# Patient Record
Sex: FEMALE | Race: BLACK OR AFRICAN AMERICAN | Employment: FULL TIME | URBAN - METROPOLITAN AREA
[De-identification: names, ages, dates, MRNs, and addresses within clinical notes are randomized per-mention and may not be internally consistent; named-entity substitution may affect disease eponyms.]

---

## 2017-01-09 ENCOUNTER — HOSPITAL ENCOUNTER (OUTPATIENT)
Dept: VASCULAR SURGERY | Age: 37
Discharge: HOME | End: 2017-01-09
Attending: PODIATRIST
Payer: COMMERCIAL

## 2017-01-09 DIAGNOSIS — M79.652 LEFT THIGH PAIN: ICD-10-CM

## 2017-01-09 PROCEDURE — 93971 EXTREMITY STUDY: CPT

## 2017-01-09 NOTE — PROCEDURES
[facility]  *** FINAL REPORT ***    Name: Trista Swift  MRN: [id number]    Outpatient  : 12 Mar 1980  HIS Order #: [id number]  [id number] [facility] Visit #: [id number]  Date: 2017    TYPE OF TEST: Peripheral Venous Testing    REASON FOR TEST  Pain in limb, Limb swelling    Left Leg:-  Deep venous thrombosis:           No  Superficial venous thrombosis:    No  Deep venous insufficiency:        No  Superficial venous insufficiency: No      INTERPRETATION/FINDINGS  PROCEDURE:  Venous duplex examination using B-mode, color flow and  spectral Doppler of the lower extremity veins. Left leg :  1. Deep vein(s) visualized include the common femoral, proximal  femoral, mid femoral, distal femoral, popliteal(above knee),  popliteal(fossa), popliteal(below knee), posterior tibial and peroneal   veins. 2. No evidence of deep venous thrombosis detected in the veins  visualized. 3. No evidence of deep vein thrombosis in the contralateral common  femoral vein. 4. Superficial vein(s) visualized include the great saphenous vein. 5. No evidence of superficial thrombosis detected. ADDITIONAL COMMENTS    I have personally reviewed the data relevant to the interpretation of  this  study.     TECHNOLOGIST: Baron Light RVT  Signed: 2017 11:31 AM    PHYSICIAN: Momo Bernstein MD  Signed: 2017 04:23 PM

## 2017-01-09 NOTE — PROGRESS NOTES
[facility]  Preliminary Report:  Venous Duplex Leg    Left leg venous duplex was performed. All deep and superficial veins appear compressible with normal Doppler characteristics. Final report to follow.

## 2017-03-10 ENCOUNTER — OFFICE VISIT (OUTPATIENT)
Dept: SURGERY | Age: 37
End: 2017-03-10

## 2017-03-10 VITALS
WEIGHT: 255 LBS | HEART RATE: 94 BPM | BODY MASS INDEX: 40.02 KG/M2 | OXYGEN SATURATION: 99 % | TEMPERATURE: 97.6 F | DIASTOLIC BLOOD PRESSURE: 80 MMHG | HEIGHT: 67 IN | SYSTOLIC BLOOD PRESSURE: 141 MMHG

## 2017-03-10 DIAGNOSIS — R10.32 LLQ PAIN: Primary | ICD-10-CM

## 2017-03-10 DIAGNOSIS — M25.475 SWELLING OF FOOT JOINT, LEFT: ICD-10-CM

## 2017-03-10 NOTE — PROGRESS NOTES
SUBJECTIVE: Sherwin Garcia is a 39 y.o. female who presents with left foot swelling for about a year on a daily basis and her foot and ankle specialist, Dr. Dileep Franks recommended that she see her gyn for pelvic US to make sure that there is no growth in her pelvis that is causing pressure on the left and making her left foot swell. Also having mild LLQ pain for a few weeks. Patient's last menstrual period was 2017. Hx of PCOS and oligomenorrhea and now trying to get pregnant without success. Will refer to Dr. Alysha Bradley. 2016  Cervical/Endocervical Thin Prep   Satisfactory for evaluation. NEGATIVE FOR INTRAEPITHELIAL LESION OR MALIGNANCY. No Known Allergies    Past Medical History:   Diagnosis Date    History of PCOS      History reviewed. No pertinent surgical history. OB History    Grav Para Term  Abortions TAB SAB Ect Mult Living    0 0 0 0 0 0 0 0 0 0        Family History   Problem Relation Age of Onset    Hypertension Mother     Hypertension Sister     Hypertension Brother     Cancer Paternal Uncle      Social History     Social History    Marital status:      Spouse name: N/A    Number of children: N/A    Years of education: N/A     Occupational History    Not on file. Social History Main Topics    Smoking status: Former Smoker    Smokeless tobacco: Never Used    Alcohol use Yes    Drug use: No    Sexual activity: Yes     Partners: Male     Birth control/ protection: None     Other Topics Concern    Not on file     Social History Narrative         Review of Systems:   Constitutional: No weight change, chills or fever, anorexia, weakness or sleep disturbance . Cardiovascular: No chest pain, shortness of breath, or palpitations . Respiratory: No cough, shortness of breath, hemoptysis, or orthopnea . Neurologic: No syncope, headaches or seizures . Hematologic: No easy bruising or unusual bleeding .  Psychiatric: No insomnia, confusion, depression, or anxiety . GI:No nausea and vomiting, diarrhea or constipation  . : See HPI . Musculoskeletal: No joint pain or muscle pain . Endocrine: No polydipsia, polyuria, cold intolerance, excessive fatigue, or sleep disturbance . Integumentary: No breast pain, lumps, nipple discharge, or axillary lumps . Objective:     Visit Vitals    Ht 5' 7\" (1.702 m)    Wt 255 lb (115.7 kg)    LMP 02/20/2017    BMI 39.94 kg/m2       General:  alert, cooperative, no distress, appears stated age   Skin:  no rash or abnormalities   Eyes: negative   Mouth: MMM no lesions   Lymph Nodes:  Cervical, supraclavicular, and axillary nodes normal.   Breast Exam: normal appearance, no masses or tenderness    Lungs:  clear to auscultation bilaterally   Heart:  regular rate and rhythm   Abdomen: soft, non-tender. Bowel sounds normal. No masses,  no organomegaly   Back:  Costovertebral angle tenderness absent   Genitourinary: Pelvic exam: VULVA: normal appearing vulva with no masses, tenderness or lesions, VAGINA: normal appearing vagina with normal color and discharge, no lesions, CERVIX: normal appearing cervix without discharge or lesions, UTERUS: uterus is normal size, shape, consistency and moderately tender, ADNEXA: tender bilateral, marked. Extremities:  extremities normal, atraumatic, no cyanosis or edema   Neurologic:  negative   Psychiatric:  non focal     ASSESSMENT:      ICD-10-CM ICD-9-CM    1. LLQ pain R10.32 789.04    2. Swelling of foot joint, left M25.475 719.07 US PELV NON OBS      US TRANSVAGINAL        Follow-up Disposition:  Return if symptoms worsen or fail to improve.

## 2017-03-10 NOTE — MR AVS SNAPSHOT
Visit Information Date & Time Provider Department Dept. Phone Encounter #  
 3/10/2017  3:00 PM Charlie Garcia, [address] [facility] [address] [id number] Follow-up Instructions Return if symptoms worsen or fail to improve. Your Appointments 12/8/2017  8:30 AM  
ACUTE CARE with Charlie Garcia MD  
[facility] [address]) Appt Note: 1 year well woman OV $0 - 12/2/16  
 [address] [phone] [address] [address] Upcoming Health Maintenance Date Due DTaP/Tdap/Td series (1 - Tdap) 3/12/2001 INFLUENZA AGE 9 TO ADULT 8/1/2016 PAP AKA CERVICAL CYTOLOGY 12/2/2019 Allergies as of 3/10/2017  Review Complete On: 3/10/2017 By: Richard Murphy LPN No Known Allergies Current Immunizations  Never Reviewed No immunizations on file. Not reviewed this visit You Were Diagnosed With   
  
 Codes Comments LLQ pain    -  Primary ICD-10-CM: R10.32 
ICD-9-CM: 789.04 Swelling of foot joint, left     ICD-10-CM: M25.475 ICD-9-CM: 719.07 Vitals BP Pulse Temp Height(growth percentile) Weight(growth percentile) LMP  
 141/80 94 97.6 °F (36.4 °C) (Oral) 5' 7\" (1.702 m) 255 lb (115.7 kg) 02/20/2017 SpO2 BMI OB Status Smoking Status 99% 39.94 kg/m2 Having regular periods Former Smoker Vitals History BMI and BSA Data Body Mass Index Body Surface Area  
 39.94 kg/m 2 2.34 m 2 Preferred Pharmacy Pharmacy Name Phone [facility] [address] [place] [address] [phone] Your Updated Medication List  
  
   
This list is accurate as of: 3/10/17  3:29 PM.  Always use your most recent med list.  
  
  
  
  
 furosemide 20 mg tablet Commonly known as:  LASIX Take 1 tablet by mouth daily Follow-up Instructions Return if symptoms worsen or fail to improve. To-Do List   
 03/10/2017 Imaging:  US PELV NON OBS   
  
 03/10/2017 Imaging:  US TRANSVAGINAL Introducing [facility] SERVICES! Dear Pepito Sorto: 
Thank you for requesting a [employer] account. Our records indicate that you already have an active [employer] account. You can access your account anytime at [email or web address] Did you know that you can access your hospital and ER discharge instructions at any time in [employer]? You can also review all of your test results from your hospital stay or ER visit. Additional Information If you have questions, please visit the Frequently Asked Questions section of the [employer] website at [email or web address] Remember, [employer] is NOT to be used for urgent needs. For medical emergencies, dial 911. Now available from your iPhone and Android! Please provide this summary of care documentation to your next provider. Your primary care clinician is listed as Hillary Hernandez. If you have any questions after today's visit, please call [phone].

## 2017-03-17 ENCOUNTER — HOSPITAL ENCOUNTER (OUTPATIENT)
Dept: ULTRASOUND IMAGING | Age: 37
Discharge: HOME | End: 2017-03-17
Attending: OBSTETRICS & GYNECOLOGY
Payer: COMMERCIAL

## 2017-03-17 DIAGNOSIS — M25.475 SWELLING OF FOOT JOINT, LEFT: ICD-10-CM

## 2017-03-17 PROCEDURE — 76856 US EXAM PELVIC COMPLETE: CPT

## 2017-03-17 PROCEDURE — 76830 TRANSVAGINAL US NON-OB: CPT

## 2017-03-21 ENCOUNTER — TELEPHONE (OUTPATIENT)
Dept: SURGERY | Age: 37
End: 2017-03-21

## 2017-03-21 NOTE — TELEPHONE ENCOUNTER
Ms. Emma Diez is the patient that was holding on your phone because you said not to leave a message. She hung up after a while. Please call back as soon as possible.    [phone]

## 2017-03-21 NOTE — TELEPHONE ENCOUNTER
Pt said that you wanted her to call once her results came back. Please give her a call as soon as possible.     Thanks   [phone]

## 2017-03-23 NOTE — TELEPHONE ENCOUNTER
Called pt back to discuss pelvic US results:   Transvaginal pelvic ultrasound revealing uterine leiomyomata most likely,  although the lower uterine segment mass abutting the endometrium is  nonspecific. Gianfranco Spies Unremarkable ovaries with left ovarian cysts. She plans to sched appt to get myomectomy because she is trying to get pregnant and has hx of miscarriage.

## 2017-05-24 ENCOUNTER — OFFICE VISIT (OUTPATIENT)
Dept: SURGERY | Age: 37
End: 2017-05-24

## 2017-05-24 VITALS
BODY MASS INDEX: 38.36 KG/M2 | SYSTOLIC BLOOD PRESSURE: 135 MMHG | DIASTOLIC BLOOD PRESSURE: 97 MMHG | HEART RATE: 66 BPM | RESPIRATION RATE: 16 BRPM | HEIGHT: 67 IN | WEIGHT: 244.4 LBS | TEMPERATURE: 97.3 F | OXYGEN SATURATION: 98 %

## 2017-05-24 DIAGNOSIS — D25.1 FIBROIDS, INTRAMURAL: Primary | ICD-10-CM

## 2017-05-24 DIAGNOSIS — N94.6 DYSMENORRHEA: ICD-10-CM

## 2017-05-24 DIAGNOSIS — N91.5 OLIGOMENORRHEA: ICD-10-CM

## 2017-05-24 DIAGNOSIS — E28.2 PCOS (POLYCYSTIC OVARIAN SYNDROME): ICD-10-CM

## 2017-05-24 DIAGNOSIS — N92.0 MENORRHAGIA WITH REGULAR CYCLE: ICD-10-CM

## 2017-05-24 DIAGNOSIS — N97.0 INFERTILITY ASSOCIATED WITH ANOVULATION: ICD-10-CM

## 2017-05-24 NOTE — PROGRESS NOTES
SUBJECTIVE: Michelet Asencio is a 40 y.o. female who presents with left foot swelling for about a year on a daily basis and her foot and ankle specialist, Dr. Deana Hernández recommended that she see her gyn for pelvic US to make sure that there is no growth in her pelvis that is causing pressure on the left and making her left foot swell. Also having mild LLQ pain for a few weeks. Patient's last menstrual period was 2017. Hx of PCOS and oligomenorrhea and now trying to get pregnant without success. Will refer to Dr. Hayes Hum. 2016  Cervical/Endocervical Thin Prep   Satisfactory for evaluation. NEGATIVE FOR INTRAEPITHELIAL LESION OR MALIGNANCY. No Known Allergies    Past Medical History:   Diagnosis Date    History of PCOS      History reviewed. No pertinent surgical history. OB History    Grav Para Term  Abortions TAB SAB Ect Mult Living    0 0 0 0 0 0 0 0 0 0        Family History   Problem Relation Age of Onset    Hypertension Mother     Hypertension Sister     Hypertension Brother     Cancer Paternal Uncle      Social History     Social History    Marital status:      Spouse name: N/A    Number of children: N/A    Years of education: N/A     Occupational History    Not on file. Social History Main Topics    Smoking status: Former Smoker    Smokeless tobacco: Never Used    Alcohol use Yes    Drug use: No    Sexual activity: Yes     Partners: Male     Birth control/ protection: None     Other Topics Concern    Not on file     Social History Narrative         Review of Systems:   Constitutional: No weight change, chills or fever, anorexia, weakness or sleep disturbance . Cardiovascular: No chest pain, shortness of breath, or palpitations . Respiratory: No cough, shortness of breath, hemoptysis, or orthopnea . Neurologic: No syncope, headaches or seizures . Hematologic: No easy bruising or unusual bleeding .  Psychiatric: No insomnia, confusion, depression, or anxiety . GI:No nausea and vomiting, diarrhea or constipation  . : See HPI . Musculoskeletal: No joint pain or muscle pain . Endocrine: No polydipsia, polyuria, cold intolerance, excessive fatigue, or sleep disturbance . Integumentary: No breast pain, lumps, nipple discharge, or axillary lumps . Objective:     Visit Vitals    BP (!) 135/97    Pulse 66    Temp 97.3 °F (36.3 °C) (Oral)    Resp 16    Ht 5' 7\" (1.702 m)    Wt 244 lb 6.4 oz (110.9 kg)    LMP 05/01/2017    SpO2 98%    BMI 38.28 kg/m2       General:  alert, cooperative, no distress, appears stated age   Skin:  no rash or abnormalities   Eyes: negative   Mouth: MMM no lesions   Lymph Nodes:  Cervical, supraclavicular, and axillary nodes normal.   Breast Exam: normal appearance, no masses or tenderness    Lungs:  clear to auscultation bilaterally   Heart:  regular rate and rhythm   Abdomen: soft, non-tender. Bowel sounds normal. No masses,  no organomegaly   Back:  Costovertebral angle tenderness absent   Genitourinary: Pelvic exam: VULVA: normal appearing vulva with no masses, tenderness or lesions, VAGINA: normal appearing vagina with normal color and discharge, no lesions, CERVIX: normal appearing cervix without discharge or lesions, UTERUS: uterus is normal size, shape, consistency and moderately tender, ADNEXA: tender bilateral, marked. Extremities:  extremities normal, atraumatic, no cyanosis or edema   Neurologic:  negative   Psychiatric:  non focal     Final result (Exam End: 3/17/2017  5:29 PM) Reviewed    Study Result   INDICATION: left foot swelling and LLQ pain.     EXAM: PELVIC AND TRANSVAGINAL ULTRASONOGRAPHY.     COMPARISON: None .     PROCEDURE: The pelvis was scanned via high resolution real-time linear array  sonography, using both the transabdominal and transvaginal approach, which was  required for delineation of the endometrium and ovaries .     FINDINGS TRANSABDOMINAL: The UTERUS MEASURES 7.9 x 5.4 x 5.1 cm.  The central  endometrium measures 9mm in thickness. There is no focal endometrial mass or  fluid collection.     There is no free fluid in the cul-de-sac.     The ovaries cannot be seen because of overlying bowel gas obscuring the adnexal  regions.      FINDINGS TRANSVAGINAL: The uterus shows overall size of 8.7 x 6.5 x 5.3 cm. The  central endometrium measures 7 mm in thickness with no endometrial mass or fluid  collection. . There are, however, myometrial masses, the largest measuring 3.6 x  2.9 x 1.5 in the the lower uterine segment posteriorly. This lies immediately  posterior to the endometrium. The next largest measures 2.4 x 1.8 x 1.6 cm in  the uterine fundus anteriorly, with calcification. There are nabothian cysts at  the internal cervical os.     Further evaluation of the ovaries transvaginally reveals the right ovary  measures 4.2 x 3.5 x 2.8 cm and the left ovary 3.7 x 3.4 x 2.9 cm. . The left  ovary contains numerous simple cysts. The largest measures 1.8 cm in diameter. Both ovaries show normal blood flow by color Doppler.     IMPRESSION:   1. Transabdominal pelvic ultrasound revealing unremarkable uterus. Ovaries not  seen. .  2. Transvaginal pelvic ultrasound revealing uterine leiomyomata most likely,  although the lower uterine segment mass abutting the endometrium is  nonspecific. Coleen Lewis Unremarkable ovaries with left ovarian cysts. ASSESSMENT:      ICD-10-CM ICD-9-CM    1. Fibroids, intramural D25.1 218.1    2. Menorrhagia with regular cycle N92.0 626.2    3. PCOS (polycystic ovarian syndrome) E28.2 256.4    4. Dysmenorrhea N94.6 625.3    5. Infertility associated with anovulation N97.0 628.0    6. Oligomenorrhea N91.5 626.1      Pt is here today and is leaning toward having a myomectomy procedure because of diagnosis of fibroids. Pelvic US report noted and findings explained to pt.  She and  relate that Dr. Curt Moore had shared same findings on his pelvic US and he has not recommended any myomectomy based on his findings. I then stated that we should do nothing unless Dr. Diogenes Kaiser makes that recommendation because he is the expert in this area and I have the utmost respect for his opinion. Therefore, I am not recommending myomectomy. They agree. Follow-up Disposition:  Return if symptoms worsen or fail to improve.     Copy sent via [employer] to:    Dr. Charanjit Lima

## 2018-05-24 LAB
ANTIBODY SCREEN, EXTERNAL: NEGATIVE
CHLAMYDIA, EXTERNAL: NEGATIVE
HBSAG, EXTERNAL: NEGATIVE
HIV, EXTERNAL: NON REACTIVE
N. GONORRHEA, EXTERNAL: NEGATIVE
RUBELLA, EXTERNAL: NORMAL
T. PALLIDUM, EXTERNAL: NEGATIVE
TYPE, ABO & RH, EXTERNAL: NORMAL

## 2018-09-10 ENCOUNTER — HOSPITAL ENCOUNTER (OUTPATIENT)
Age: 38
Setting detail: OBSERVATION
Discharge: HOME | End: 2018-09-12
Attending: OBSTETRICS & GYNECOLOGY | Admitting: OBSTETRICS & GYNECOLOGY
Payer: COMMERCIAL

## 2018-09-10 LAB — GRBS, EXTERNAL: NEGATIVE

## 2018-09-10 PROCEDURE — 99218 HC RM OBSERVATION: CPT

## 2018-09-10 PROCEDURE — 99281 EMR DPT VST MAYX REQ PHY/QHP: CPT

## 2018-09-10 PROCEDURE — 74011250637 HC RX REV CODE- 250/637: Performed by: OBSTETRICS & GYNECOLOGY

## 2018-09-10 RX ORDER — SODIUM CHLORIDE 0.9 % (FLUSH) 0.9 %
5-10 SYRINGE (ML) INJECTION AS NEEDED
Status: DISCONTINUED | OUTPATIENT
Start: 2018-09-10 | End: 2018-09-12 | Stop reason: HOSPADM

## 2018-09-10 RX ORDER — ACETAMINOPHEN 325 MG/1
650 TABLET ORAL
Status: DISCONTINUED | OUTPATIENT
Start: 2018-09-10 | End: 2018-09-12 | Stop reason: HOSPADM

## 2018-09-10 RX ORDER — SWAB
1 SWAB, NON-MEDICATED MISCELLANEOUS DAILY
Status: DISCONTINUED | OUTPATIENT
Start: 2018-09-11 | End: 2018-09-12 | Stop reason: HOSPADM

## 2018-09-10 RX ORDER — DIPHENHYDRAMINE HCL 25 MG
25 CAPSULE ORAL
Status: DISCONTINUED | OUTPATIENT
Start: 2018-09-10 | End: 2018-09-12 | Stop reason: HOSPADM

## 2018-09-10 RX ORDER — ZOLPIDEM TARTRATE 5 MG/1
5 TABLET ORAL
Status: DISCONTINUED | OUTPATIENT
Start: 2018-09-10 | End: 2018-09-12 | Stop reason: HOSPADM

## 2018-09-10 RX ORDER — ONDANSETRON 4 MG/1
4 TABLET, ORALLY DISINTEGRATING ORAL
Status: DISCONTINUED | OUTPATIENT
Start: 2018-09-10 | End: 2018-09-12 | Stop reason: HOSPADM

## 2018-09-10 RX ORDER — PROGESTERONE 100 MG/1
100 CAPSULE ORAL
Status: DISCONTINUED | OUTPATIENT
Start: 2018-09-10 | End: 2018-09-12 | Stop reason: HOSPADM

## 2018-09-10 RX ORDER — MAG HYDROX/ALUMINUM HYD/SIMETH 200-200-20
30 SUSPENSION, ORAL (FINAL DOSE FORM) ORAL
Status: DISCONTINUED | OUTPATIENT
Start: 2018-09-10 | End: 2018-09-12 | Stop reason: HOSPADM

## 2018-09-10 RX ORDER — SODIUM CHLORIDE 0.9 % (FLUSH) 0.9 %
5-10 SYRINGE (ML) INJECTION EVERY 8 HOURS
Status: DISCONTINUED | OUTPATIENT
Start: 2018-09-10 | End: 2018-09-12 | Stop reason: HOSPADM

## 2018-09-10 RX ORDER — DOCUSATE SODIUM 100 MG/1
100 CAPSULE, LIQUID FILLED ORAL
Status: DISCONTINUED | OUTPATIENT
Start: 2018-09-10 | End: 2018-09-12 | Stop reason: HOSPADM

## 2018-09-10 RX ADMIN — PROGESTERONE 100 MG: 100 CAPSULE ORAL at 21:57

## 2018-09-10 NOTE — IP AVS SNAPSHOT
[address] 
[phone] Patient: Aleyda Guaman MRN: [id number] PWR:[birth year] A check brandie indicates which time of day the medication should be taken. My Medications START taking these medications Instructions Each Dose to Equal  
 Morning Noon Evening Bedtime  
 progesterone 200 mg capsule Commonly known as:  PROMETRIUM Your last dose was: Your next dose is: Insert 1 Cap into vagina nightly. 200 mg CONTINUE taking these medications Instructions Each Dose to Equal  
 Morning Noon Evening Bedtime PNV66-Iron Fumarate-FA-DSS-DHA 26-1.2- mg Cap Your last dose was: Your next dose is: Take  by mouth. STOP taking these medications   
 furosemide 20 mg tablet Commonly known as:  LASIX Where to Get Your Medications Information on where to get these meds will be given to you by the nurse or doctor. ! Ask your nurse or doctor about these medications  
  progesterone 200 mg capsule

## 2018-09-10 NOTE — H&P
EDC:2018 EGA: 24 weeks, 1 days Vital Signs 45Years Old Female Weight: 233.1 pounds BP:       138/80 Hospital of delivery: [facility] Pap/HPV/Gardasil History History of abnormal pap: no 
Gardasil Injection History: Not Applicable Patient's Prenatal Care with Doctor of Record Mara Martinez MD Notable For - High risk pregnancy AMA primigravida  lab screening Obesity in pregnancy BMI>34.99-FS ____ Obesity (BMI > 29.99) Leiomyoma Allergies This patient has no known allergies. Medications Removed from Medication List 
 
 
 
[address] for Follow-up Visit Estimated weeks of 
      gestation:  24  Weight:  233.1 Blood pressure: 138 / 80 Urine Protein:  N 
   Urine Glucose: N Headache:  + 
   Nausea/vomiting: nausea Edema:  Tr-gen Vaginal bleeding: no 
   Vaginal discharge: no 
   Fetal activity:  yes Fundal height:    25 FHR:   153/US Labor symptoms: no 
   Fetal position:  oblique right Cx Dilation:  0 Cx Effacement: 0% Cx Station:  high Next visit:  to L&D Preceptor:  gabriel 
   Comment:  It's a BOY! Shortened cervix seen today on ultrasound 7mm with 4mm funnel, pt asymptomatic, cervix appears closed on exam; per MFM to [facility] for eval, contraction monitoring, start vaginal progesterone, steroids if needed and repeat cervical length in 48 hours. Mycolplasma/ureaplasma, urine cx and gbs today. RTO 1 wk for f/u in office with cervical length if discharged from hospital.  
 
 
 
Impression & Recommendations: 
 
Problem # 1:  Cervical shortening, second trimester, 8mm, progesterone, inpatient admission, repeat cervical length in 48 hours ____ (XUS-322.93) (FNX82-S13.872) Shortened cervix seen today on ultrasound 7mm with 4mm funnel, pt asymptomatic, cervix appears closed on exam; per MFM (Demay) to [facility] for eval, contraction monitoring, start vaginal progesterone, steroids, and repeat cervical length in 48 hours. Mycolplasma/ureaplasma, urine cx and gbs collected today. Rx for vaginal progesterone given in office today. RTO 1 wk for f/u in office with cervical length if discharged from hospital.  
Growth today 78th%ile, male, presentation oblique right. Orders: Antepartum Care (CPT-10) Patient Sent to L&D (CPT-LD) Sent to L&D  (CPT-AdmitF) Prenatal Problem Visit Level 3 (KJQ-67133) Specimen Handling ([phone]) NuSwab Genital Mycoplasma/Ureaplasma Profile ([facility]-417238) Urine Sens Culture (UVI-91833) Group B Strep Culture ([facility]-83368/54622) 1 wk prenatal visit (xxxx) Cervix + ALEX w/MFM (xxxx) Medications (at conclusion of this visit) 09/10/2018 FIRST-PROGESTERONE  SUPPOSITORY (PROGESTERONE SUPP) Place one suppository per vagina qhs 
05/10/2018 PNV-DHA CAPSULE (PRENAT W/O Q-TT-NKLYEHD-FA-DHA CAPS) Primary Provider:  Antwan Wong MD 
 
CC:  shortened cervix. History of Present Illness: 
Pt is a  at 25 1/7 who presented to the office for routine growth scan for h/o fibroids and was found incidentally to have a shortened cervix (7mm) with 4mm funnel. She denies contractions, bleeding or leakage of fluid. Past Medical History: 
   Reviewed history from 2018 and no changes required: 
      Fibroids Past Surgical History: 
   Reviewed history from 2015 and no changes required: 
      McCamey Teeth Family History Summary:  
   Reviewed history Last on 2018 and no changes required:09/10/2018 Mother Alex Joseph.) - Has Family History of Hypertension - Entered On: 2015 Sister (full) - Has Family History of Hypertension - Entered On: 2015 Sister (full) - Has Family History of Diabetes - dc'd due to [de-identified]- age 28 - Entered On: 2015 Aunt - Has Family History of Heart Disease - Maternal - Entered On: 2015 Aunt - Has Family History of Stroke/CVA - Entered On: 2015 MGF - Has Family History of Prostate Cancer - dc'd - Entered On: 1/20/2015 Uncle - Has Family History of Prostate Cancer - Paternal-dc'd - Entered On: 1/20/2015 General Comments - FH: 
Family history transferred to [address] [place] [address] [address] [place] [address] Social History: 
   Reviewed history from 01/20/2015 and no changes required: 
      Stable relationship  [employer] mortgage Smoking History: 
      Patient has never smoked. Risk Factors:  
 
Dietary Counseling: no 
 
Previous Tobacco Use: Signed On - 05/24/2018 Smoked Tobacco Use:  Never smoker Smokeless Tobacco Use:  Never Passive smoke exposure:  no 
Drug use:  no 
HIV high-risk behavior:  no 
Caffeine use:  0 drinks per day Previous Alcohol Use: Signed On - 05/24/2018 Alcohol use:  no 
Exercise:  no 
Seatbelt use:  100 % Sun Exposure:  occasionally Dietary Counseling: no 
 
PAP Smear History: 
   Date of Last PAP Smear:  08/04/2014 Review of Systems See HPI Except as noted in the HPI, the review of systems is negative for General, Breast, , CV, Resp, GI, Endo, MS, Derm, Neuro, Psych, Eyes, ENT, Allergy and Heme. Vital Signs Blood Pressure: 138 / 80 Weight:  233.1 pounds Physical Exam  
 
General  
        General appearance:  no acute distress Head Inspection:   normal 
 
Eyes External:   EOM intact ENT Dental:   adequate dentition Chest  
        Lungs:  clear to auscultation Heart:  regular rate and rhythm Extremeties Extremeties:  0 edema Neurological  
        Reflexes:  2+ and symmetric with no pathological reflexes Psych Orientation:  oriented to time, place, and person Mood:  no appearance of anxiety, depression, or agitation Lymph Inguinal:  no inguinal adenopathy Skin Inspection:  no rashes, suspicious lesions, or ulcerations Abdomen Abdomen:  gravid Fundal Height:  25 
 
Pelvic Exam  
        EGBUS:  no lesions Vagina:  normal appearing without lesions or discharge Uterus:  gravid Cervix:  no lesions or discharge Dilation: : 0 Effacement:  0% Station:  high Presentation:  oblique right Allergies This patient has no known allergies. Medications Removed from Medication List 
 
 
 
Impression & Recommendations: 
 
Problem # 1:  Cervical shortening, second trimester, 8mm, progesterone, inpatient admission, repeat cervical length in 48 hours ____ (KWL-625.63) (VKD03-B12.872) Shortened cervix seen today on ultrasound 7mm with 4mm funnel, pt asymptomatic, cervix appears closed on exam; per MFM (Demay) to [facility] for eval, contraction monitoring, start vaginal progesterone, steroids, and repeat cervical length in 48 hours. Mycolplasma/ureaplasma, urine cx and gbs collected today. Rx for vaginal progesterone given in office today. RTO 1 wk for f/u in office with cervical length if discharged from hospital.  
Growth today 78th%ile, male, presentation oblique right. Orders: Antepartum Care (CPT-10) Patient Sent to L&D (CPT-LD) Sent to L&D  (CPT-AdmitF) Prenatal Problem Visit Level 3 (EBP-02633) Specimen Handling ([phone]) Nuab Genital Mycoplasma/Ureaplasma Profile (OOJ-[id number]) Urine Sens Culture (ICI-21291) Group B Strep Culture (WXE-62786/53712) 1 wk prenatal visit (xxxx) Cervix + ALEX w/MFM (xxxx) Medications (at conclusion of this visit) 09/10/2018 FIRST-PROGESTERONE  SUPPOSITORY (PROGESTERONE SUPP) Place one suppository per vagina qhs 
05/10/2018 PNV-DHA CAPSULE (PRENAT W/O Z-QK-MUBOGIH-FA-DHA CAPS) LABORATORY DATA TEST DATE RESULT Group B Strep culture                                     (Group B Strep Culture Result Field) Blood Type 05/24/2018 O                                             (Blood Type Result Field) Rh 05/24/2018 Positive                                   (Rh Result Field) Rhogam Inj Given Tdap Vaccine Given Antibody Screen 05/24/2018 Negative Rubella [facility] Reference Ranges On or After 3/10/14             
    <0.90              Non-immune 0.90 - 0.99     Equivocal 
    >0.99              Immune [address] Before 3/10/14    
      <5                 Non-immune 5 - 9               Equivocal     
      >9                 Immune [facility] < Or = 0.90       Negative        
    0.91-1.09          Equivocal       
    > Or = 1.10       Positive   05/24/2018 
 
 2.85 
  
TPA (T Pallidum Antibodies) 05/24/2018 Negative Serology (RPR) HBsAg 05/24/2018 Negative HIV 05/24/2018 Non Reactive Hemoglobin 05/24/2018 12.0 Hematocrit 05/24/2018 38.7 Platelets 34/78/8031 354 X10E3/UL  
TSH Urine Culture 06/21/2018 Negative GC DNA Probe 05/24/2018 Negative Chlamydia DNA 05/24/2018 Negative PAP 08/04/2014 Normal Per Patient Flu Vaccine Given HGBA1C    
HGB Electro 05/24/2018 AA  
T4, Free BG Fasting GTT 1H 50G    
GTT 1H 100G    
GTT 2H 100G    
GTT 3H 100G Glucose Plasma CF Accept or Decline 05/24/2018 declined CF Screen Result Nuchal Trans 05/24/2018 declined AFP Only Tetra AFP Serum 07/19/2018 declined CVS 05/24/2018 declined AFP Amniotic Amnio Karyo 05/24/2018 declined FISH    
GC Culture Chlamydia Cult Ureaplasma Mycoplasma WBC 05/24/2018 8.7 X10E3/UL  
RBC 05/24/2018 5.22 X10E6/UL  
MCV 05/24/2018 [facility] 05/24/2018 23.0 MCHC RBC 05/24/2018 31.0 ULTRASOUND DATA TEST DATE RESULT Estimated Fetal Weight 08/14/2018 104.2537632^231 g&grams Weight % 08/14/2018 72^72% %&percent ALEX                     
BPP Cervical Length (mm) 01/20/2015 40.000  
 
] Electronically signed by Kaila Rowe  MD on 09/10/2018 at 4:46 PM 
 
________________________________________________________________________

## 2018-09-10 NOTE — PROGRESS NOTES
Pt feels no contractions/cramping or abdominal pain. FHT - reassuring 
toco - no activity 
 
- vaginal progesterone ordered 
- daily fetal monitoring 
- cont toco for now

## 2018-09-10 NOTE — PROGRESS NOTES
[address] Patient arrived from Dr. Lillian Alcantar office for shortened cervix. Placed on monitor. Per patient, cervix is closed. Ira Berger at bedside discussing [address] Bedside shift change report given to Alma Nair RN (oncoming nurse) by Newton Schwab RN (offgoing nurse). Report included the following information SBAR, Kardex, Intake/Output, MAR and Recent Results.

## 2018-09-10 NOTE — IP AVS SNAPSHOT
[address] [facility] [address] Byrd 13 
[phone] Patient: Esau Salcido MRN: [id number] About your hospitalization You were admitted on:  September 10, 2018 You last received care in the:  [facility] 3 LABOR & DELIVERY You were discharged on:  2018 Why you were hospitalized Your primary diagnosis was:  Not on File Follow-up Information Follow up With Details Comments Contact Info Marcus Buitrago MD   [address] [place] [id number] [phone] Discharge Orders None A check brandie indicates which time of day the medication should be taken. My Medications START taking these medications Instructions Each Dose to Equal  
 Morning Noon Evening Bedtime  
 progesterone 200 mg capsule Commonly known as:  PROMETRIUM Your last dose was: Your next dose is: Insert 1 Cap into vagina nightly. 200 mg CONTINUE taking these medications Instructions Each Dose to Equal  
 Morning Noon Evening Bedtime PNV66-Iron Fumarate-FA-DSS-DHA 26-1.2- mg Cap Your last dose was: Your next dose is: Take  by mouth. STOP taking these medications   
 furosemide 20 mg tablet Commonly known as:  LASIX Where to Get Your Medications Information on where to get these meds will be given to you by the nurse or doctor. ! Ask your nurse or doctor about these medications  
  progesterone 200 mg capsule Discharge Instructions  DISCHARGE INSTRUCTIONS Name: Esau Salcido YOB: 1980 Primary Diagnosis: Active Problems: * No active hospital problems. * Introduction: You have visited the hospital because you thought you were in  labor.  These guidelines are for your information at home to help prevent repeated problems. In general, you should remember: 
? Empty your bladder every 2-3 hours. ? Avoid breast stimulation (including showers where the water stream is on your breasts)-this can cause contractions. ? Rest means lying down. ? Contractions and cramping happen more often in evening and nighttime. ? No intercourse or sexual stimulation without asking your doctor. ? Try to arrange for help with housework and . General:  
 
Follow up appointment to [facility] MFM appointment Diet/Diet Restrictions:   
 
Drink 8-10 glasses of water each day. Avoid beverages with caffeine. and Eat fresh vegetables, fruits, bran cereal to avoid becoming constipated. Physical Activity / Restrictions / Safety:  
 
* Activity at home is based on how strong your  labor has been, You should follow the following activity guidelines. As tolerated, avoid heavy lifting. Discharge Instructions/ Special Treatment/ Home Care Needs:  
 
Call your provider if: 
? Uterine cramping (menstrual-like cramps, intermittent or constant ? Uterine contractions every 10-15 minutes or more frequently ? Low abdominal pressure ( pelvic pressure) ? Dull low backache (intermittent or constant) ? Increase or change in vaginal discharge ? Feeling that the baby is \"pushing down\" ? Abdominal cramping with or without diarrhea If any of these symptoms are experienced, stop what you are doing, lie down on your side, drink two to three glasses of water and wait one hour. If the symptoms persist or get worse, call your provider. Pain Management:  
 
 
 
 
Signed By: Abhinav oRmeo RN                                                                                                   Date: 2018 Time: 9:57 AM 
 
Discharge Checklist-NURSING TO COMPLETE:  
 
Date and Time of Discharge: Date: 2018 Time: 9:57 AM 
 
Return of:  
Dental Appliance: Dental Appliances: None Vision: Hearing Aid:   
Jewelry: Jewelry: Felice Upton Clothing: Clothing: At bedside Other Valuables: Other Valuables: At bedside Valuables sent to safe:   
 
Prescription Given: yes Medication Instruction Sheet(s), including side effects, provided: yes Accompanied By: Family Mode of Transportation: 
 
Discharge Disposition: Home I have had the opportunity to make my options or choices for discharge. I have received and understand these instructions. Introducing [facility]! Dear Isi Velazquez: 
Thank you for requesting a [employer] account. Our records indicate that you already have an active [employer] account. You can access your account anytime at [email or web address] Did you know that you can access your hospital and ER discharge instructions at any time in [employer]? You can also review all of your test results from your hospital stay or ER visit. Additional Information If you have questions, please visit the Frequently Asked Questions section of the [employer] website at [email or web address] Remember, [employer] is NOT to be used for urgent needs. For medical emergencies, dial 911. Now available from your iPhone and Android! Introducing Juliano Baxter As a [employer] patient, I wanted to make you aware of our electronic visit tool called [employer] [employer]. [employer] 24/7 allows you to connect within minutes with a medical provider 24 hours a day, seven days a week via a mobile device or tablet or logging into a secure website from your computer. You can access Juliano Baxter from anywhere in the [place].  
 
A virtual visit might be right for you when you have a simple condition and feel like you just dont want to get out of bed, or cant get away from work for an appointment, when your regular [employer] provider is not available (evenings, weekends or holidays), or when youre out of town and need minor care. Electronic visits cost only $49 and if the [employer] 24/7 provider determines a prescription is needed to treat your condition, one can be electronically transmitted to a nearby pharmacy*. Please take a moment to enroll today if you have not already done so. The enrollment process is free and takes just a few minutes. To enroll, please download the [employer] 24/7 amara to your tablet or phone, or visit [email or web address] to enroll on your computer. And, as an [address] [place] [address] patient with a [employer] account, the results of your visits will be scanned into your electronic medical record and your primary care provider will be able to view the scanned results. We urge you to continue to see your regular [employer] provider for your ongoing medical care. And while your primary care provider may not be the one available when you seek a [employer] virtual visit, the peace of mind you get from getting a real diagnosis real time can be priceless. For more information on [employer], view our Frequently Asked Questions (FAQs) at [email or web address]. Sincerely, 
 
Gurjit Hull MD 
Chief Medical Officer [employer] *:  certain medications cannot be prescribed via [employer] Providers Seen During Your Hospitalization Provider Specialty Primary office phone Alyssia Kidd MD Obstetrics & Gynecology [phone] Your Primary Care Physician (PCP) Primary Care Physician Office Phone Office Fax [address] [phone] You are allergic to the following No active allergies Recent Documentation Height Weight BMI OB Status Smoking Status 1.702 m 105.7 kg 36.49 kg/m2 Pregnant Former Smoker Emergency Contacts Name Discharge Info Relation Home Work Mobile Erik Brown DISCHARGE CAREGIVER [3] Spouse [3] [phone] Patient Belongings The following personal items are in your possession at time of discharge: 
  Dental Appliances: None         Home Medications: None   Jewelry: Ring, Necklace  Clothing: At bedside    Other Valuables: At bedside Please provide this summary of care documentation to your next provider. Signatures-by signing, you are acknowledging that this After Visit Summary has been reviewed with you and you have received a copy. Patient Signature:  ____________________________________________________________ Date:  ____________________________________________________________  
  
[facility] Provider Signature:  ____________________________________________________________ Date:  ____________________________________________________________

## 2018-09-11 PROCEDURE — 99218 HC RM OBSERVATION: CPT

## 2018-09-11 PROCEDURE — 74011250637 HC RX REV CODE- 250/637: Performed by: OBSTETRICS & GYNECOLOGY

## 2018-09-11 RX ADMIN — PROGESTERONE 100 MG: 100 CAPSULE ORAL at 21:27

## 2018-09-11 RX ADMIN — Medication 1 TABLET: at 09:31

## 2018-09-11 NOTE — PROGRESS NOTES
Verbal shift change report given to ARIELA Barksdale RN (oncoming nurse) by LINDSAY Amador RN (offgoing nurse). Report included the following information SBKymberly CUEVAS, MAR. 
0935:  Breakfast tray given. 1044:  Dr. Yusuf Butcher in to see pt and discussing plan of care. Dr. Yusuf Butchre states she will talk with Dr. Johnnie Escamilla regarding plan of care. Pt denies any pain or UC's. Orders received to monitor for UC's twice a day. 1048: Monitors off, no UC's noted. 1215:  Report to KEVIN Sam RN for lunch coverage. 1250:  Care resumed per Chayo Hicks RN. 1330:  Pt tolerated lunch, continues to state no UC's and no discomfort. 1440:  Dr. Yusuf Butcher spoke with Dr. Johnnie Escamilla and states will do [address] tomorrow for cervical length and decide plan of care after that. Pt states no UC's and no pain. 1733: Monitoring done, no UC's noted. 1800:  Pt tolerated dinner. 1850:  Pt showered and states she feels much better. Pt denies UC's or pain. 1935:  Bedside and Verbal shift change report given to IZABELA Levy RN (oncoming nurse) by ARIELA Barksdale RN (offgoing nurse). Report included the following information SBAR, Kardex and Intake/Output.

## 2018-09-12 ENCOUNTER — APPOINTMENT (OUTPATIENT)
Dept: PERINATAL CARE | Age: 38
End: 2018-09-12
Payer: COMMERCIAL

## 2018-09-12 VITALS
TEMPERATURE: 98 F | BODY MASS INDEX: 36.57 KG/M2 | HEART RATE: 90 BPM | WEIGHT: 233 LBS | DIASTOLIC BLOOD PRESSURE: 70 MMHG | HEIGHT: 67 IN | SYSTOLIC BLOOD PRESSURE: 123 MMHG | RESPIRATION RATE: 16 BRPM

## 2018-09-12 PROCEDURE — 99218 HC RM OBSERVATION: CPT

## 2018-09-12 RX ORDER — PROGESTERONE 200 MG/1
200 CAPSULE ORAL
Qty: 30 CAP | Refills: 1 | Status: SHIPPED | OUTPATIENT
Start: 2018-09-12 | End: 2018-12-17

## 2018-09-12 NOTE — PROGRESS NOTES
Reviewed case with Dr. Sanya Brown - U/S today shows CL 23-25mm with echogenic intracervical finding that is most likely cervical mucous, no significant funneling with membranes/amniotic fluid. Pt remains without complaint. [address] for d/c home, but will need f/u with CL U/S with MFM next week - office will call pt to schedule. Reviewed precautions.

## 2018-09-12 NOTE — DISCHARGE SUMMARY
Antepartum  Discharge Summary     Patient ID:  Theo Berger  [id number]  68 y.o.  1980    Admit date: 9/10/2018    Discharge date: 9/12/2018    Admission Diagnoses:    Patient Active Problem List   Diagnosis Code    Oligomenorrhea N91.5    Abdominal pain, other specified site R10.9    Menorrhagia N92.0    Dysmenorrhea N94.6    PCOS (polycystic ovarian syndrome) E28.2    Infertility associated with anovulation N97.0       Discharge Diagnoses: There are no discharge diagnoses documented for the most recent discharge. 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 No si/sx pre-term labor. Disposition: Home or self care    Discharged Condition: stable            Patient Instructions:   Current Discharge Medication List      START taking these medications    Details   progesterone (PROMETRIUM) 200 mg capsule Insert 1 Cap into vagina nightly. Qty: 30 Cap, Refills: 1         CONTINUE these medications which have NOT CHANGED    Details   PNV66-Iron Fumarate-FA-DSS-DHA 26-1.2- mg cap Take  by mouth.          STOP taking these medications       furosemide (LASIX) 20 mg tablet Comments:   Reason for Stopping:             Activity: no strenuous exercise or sex until next visit  Diet: Regular Diet    Follow-up with   Follow-up Appointments   Procedures    FOLLOW UP VISIT Appointment in: One Week At [facility] for MFM ultrasound for cervical length     At [facility] for MFM ultrasound for cervical length     Standing Status:   Standing     Number of Occurrences:   1 Order Specific Question:   Appointment in     Answer:    One Week        Signed:  Bunny Sorenson MD  9/12/2018  1:15 PM

## 2018-09-12 NOTE — DISCHARGE INSTRUCTIONS
[address] INSTRUCTIONS    Name: Abelardo Perez  YOB: 1980  Primary Diagnosis: Active Problems:    * No active hospital problems. *      Introduction: You have visited the hospital because you thought you were in  labor. These guidelines are for your information at home to help prevent repeated problems. In general, you should remember:   Empty your bladder every 2-3 hours.  Avoid breast stimulation (including showers where the water stream is on your breasts)-this can cause contractions.  Rest means lying down.  Contractions and cramping happen more often in evening and nighttime.  No intercourse or sexual stimulation without asking your doctor.  Try to arrange for help with housework and . General:     Follow up appointment to [facility] appointment    Diet/Diet Restrictions:      Drink 8-10 glasses of water each day. Avoid beverages with caffeine. and Eat fresh vegetables, fruits, bran cereal to avoid becoming constipated. Physical Activity / Restrictions / Safety:     * Activity at home is based on how strong your  labor has been, You should follow the following activity guidelines. As tolerated, avoid heavy lifting. Discharge Instructions/ Special Treatment/ Home Care Needs:     Call your provider if:   Uterine cramping (menstrual-like cramps, intermittent or constant   Uterine contractions every 10-15 minutes or more frequently   Low abdominal pressure ( pelvic pressure)   Dull low backache (intermittent or constant)   Increase or change in vaginal discharge   Feeling that the baby is \"pushing down\"   Abdominal cramping with or without diarrhea  If any of these symptoms are experienced, stop what you are doing, lie down on your side, drink two to three glasses of water and wait one hour. If the symptoms persist or get worse, call your provider.     Pain Management:           Signed By: Abhinav Romeo RN Date: 9/11/2018 Time: 9:57 AM    Discharge Checklist-NURSING TO COMPLETE:     Date and Time of Discharge: Date: 9/11/2018 Time: 9:57 AM    Return of:   Dental Appliance: Dental Appliances: None  Vision:    Hearing Aid:    Jewelry: Jewelry: Jacquelin Mccollumlaanalia  Clothing: Clothing: At bedside  Other Valuables: Other Valuables: At bedside  Valuables sent to safe:      Prescription Given: yes  Medication Instruction Sheet(s), including side effects, provided: yes    Accompanied By: Family    Mode of Transportation:    Discharge Disposition: Home    I have had the opportunity to make my options or choices for discharge. I have received and understand these instructions.

## 2018-09-12 NOTE — PROGRESS NOTES
1200- SBAR from Agustin Zavala RN  
 
1297- Dr. Isabel Moe at bedside discussing plan of care and pt able to be discharged at this time. [facility]- for follow up MFM appointment for next week,  should be calling today or tomorrow to make appointment 1327- Discharge instructions given, explained and signed with patient, answering all questions. Prescriptions given to pt. 
1335-Pt walked to Room 315 to wait on her ride. Her family member will be arriving around 1500.

## 2018-09-12 NOTE — PROGRESS NOTES
1945 - Bedside and Verbal shift change report given to IZABELA Levy RNC (oncoming nurse) by ARIELA Barksdale RNC (offgoing nurse). Report included the following information SBAR, Kardex, Intake/Output, MAR, Accordion, Recent Results and Med Rec Status. 2128 - Pt inserted Prometrium vaginally after being instructed and stating that she was comfortable doing so (she washed hands before and after). She denies feeling any contractions/cramping/tightening. 2215 - [place] removed after approx 35 min. Some possible mild irritability noted, from 2153 - 2203, but not persistent/sustained and pt denies noticing it. She is trying to sleep (declined offer of Ambien). 5925 - Verbal shift change report given to PAULO Gomez RN (oncoming nurse) by Alyce Sanchez RNC (offgoing nurse). Report included the following information SBAR, Kardex, Intake/Output, MAR, Accordion, Recent Results and Med Rec Status. Did not give bedside report due to pt appearing to be soundly asleep.

## 2018-09-12 NOTE — PROGRESS NOTES
Ante Partum Progress Note Sarina Eisenmenger 
[id number] Assessment: 24w3d Possible cervical insufficiency - U/S 9/10 showed cervical shortening to 7mm with possible funneling of 4mm, although after [place] reviewed imaging further there was some question that funneling may have actually just been presence of cervical mucous, cervix closed and no evidence of PTL, started prometrium on admit, she has not had BMZ at this point Plan:  Continue hospitalization with hospitalized bedrest and Maternal fetal medicine consultation today for CL, if CL remains short appearing then would likely need to proceed with BMZ administration, if CL improved then could consider d/c home with office f/u next week for CL Orders/Charges: Medium Patient states she has no new complaints Vitals: 
Visit Vitals  /70 (BP 1 Location: Left arm, BP Patient Position: At rest)  Pulse 90  Temp 98 °F (36.7 °C)  Resp 16  
 Ht 5' 7\" (1.702 m)  Wt 105.7 kg (233 lb)  BMI 36.49 kg/m2 Temp (24hrs), Av.3 °F (36.8 °C), Min:98 °F (36.7 °C), Max:98.5 °F (36.9 °C) Last 24hr Input/Output: 
No intake or output data in the 24 hours ending 18 0911 Non stress test:  N/A Patient Vitals for the past 4 hrs: Mode Fetal Heart Rate Fetal Activity 18 0856 External 150 Present Patient Vitals for the past 4 hrs: Mode Fetal Heart Rate Fetal Activity 18 0856 External 150 Present Uterine Activity: None Exam:  Patient without distress. Abdomen, fundus soft non-tender Extremities, no redness or tenderness Additional Exam: Deferred Labs:  
 
Lab Results Component Value Date/Time WBC 10.4 2014 01:07 AM  
 HGB 11.7 2014 01:07 AM  
 HCT 37.6 2014 01:07 AM  
 PLATELET 684  01:07 AM  
 
 
No results found for this or any previous visit (from the past 24 hour(s)).

## 2018-09-12 NOTE — PROGRESS NOTES
7717 Bedside shift change report given to Leslee Ybarra RN (oncoming nurse) by Terry Dominguez RN (offgoing nurse). Report included the following information SBAR, Kardex, Intake/Output, MAR, Accordion and Recent Results. 1150 Patient to [facility] 
 
1200 Bedside shift change report given to Leslee Ybarra RN (oncoming nurse) by Terry Dominguez RN (offgoing nurse). Report included the following information SBAR, Kardex, Intake/Output, MAR, Accordion and Recent Results.

## 2018-09-25 ENCOUNTER — HOSPITAL ENCOUNTER (OUTPATIENT)
Age: 38
Setting detail: OBSERVATION
Discharge: HOME | End: 2018-09-26
Attending: OBSTETRICS & GYNECOLOGY | Admitting: OBSTETRICS & GYNECOLOGY
Payer: COMMERCIAL

## 2018-09-25 PROBLEM — Z34.90 PREGNANCY: Status: ACTIVE | Noted: 2018-09-25

## 2018-09-25 LAB
BASOPHILS # BLD: 0 K/UL (ref 0–0.1)
BASOPHILS NFR BLD: 0 % (ref 0–1)
DIFFERENTIAL METHOD BLD: ABNORMAL
EOSINOPHIL # BLD: 0.1 K/UL (ref 0–0.4)
EOSINOPHIL NFR BLD: 1 % (ref 0–7)
ERYTHROCYTE [DISTWIDTH] IN BLOOD BY AUTOMATED COUNT: 14.2 % (ref 11.5–14.5)
HCT VFR BLD AUTO: 35.7 % (ref 35–47)
HGB BLD-MCNC: 11.3 G/DL (ref 11.5–16)
IMM GRANULOCYTES # BLD: 0.1 K/UL (ref 0–0.04)
IMM GRANULOCYTES NFR BLD AUTO: 1 % (ref 0–0.5)
LYMPHOCYTES # BLD: 1.4 K/UL (ref 0.8–3.5)
LYMPHOCYTES NFR BLD: 16 % (ref 12–49)
MCH RBC QN AUTO: 23.6 PG (ref 26–34)
MCHC RBC AUTO-ENTMCNC: 31.7 G/DL (ref 30–36.5)
MCV RBC AUTO: 74.5 FL (ref 80–99)
MONOCYTES # BLD: 0.7 K/UL (ref 0–1)
MONOCYTES NFR BLD: 9 % (ref 5–13)
NEUTS SEG # BLD: 6.2 K/UL (ref 1.8–8)
NEUTS SEG NFR BLD: 73 % (ref 32–75)
NRBC # BLD: 0 K/UL (ref 0–0.01)
NRBC BLD-RTO: 0 PER 100 WBC
PLATELET # BLD AUTO: 272 K/UL (ref 150–400)
PMV BLD AUTO: 9.9 FL (ref 8.9–12.9)
RBC # BLD AUTO: 4.79 M/UL (ref 3.8–5.2)
WBC # BLD AUTO: 8.5 K/UL (ref 3.6–11)

## 2018-09-25 PROCEDURE — 99283 EMERGENCY DEPT VISIT LOW MDM: CPT

## 2018-09-25 PROCEDURE — 85025 COMPLETE CBC W/AUTO DIFF WBC: CPT | Performed by: OBSTETRICS & GYNECOLOGY

## 2018-09-25 PROCEDURE — 96372 THER/PROPH/DIAG INJ SC/IM: CPT

## 2018-09-25 PROCEDURE — 36415 COLL VENOUS BLD VENIPUNCTURE: CPT | Performed by: OBSTETRICS & GYNECOLOGY

## 2018-09-25 PROCEDURE — 74011250637 HC RX REV CODE- 250/637: Performed by: OBSTETRICS & GYNECOLOGY

## 2018-09-25 PROCEDURE — 87081 CULTURE SCREEN ONLY: CPT | Performed by: OBSTETRICS & GYNECOLOGY

## 2018-09-25 PROCEDURE — 74011250636 HC RX REV CODE- 250/636: Performed by: OBSTETRICS & GYNECOLOGY

## 2018-09-25 PROCEDURE — 99218 HC RM OBSERVATION: CPT

## 2018-09-25 RX ORDER — PROGESTERONE 100 MG/1
200 CAPSULE ORAL
Status: DISCONTINUED | OUTPATIENT
Start: 2018-09-25 | End: 2018-09-25

## 2018-09-25 RX ORDER — BETAMETHASONE SODIUM PHOSPHATE AND BETAMETHASONE ACETATE 3; 3 MG/ML; MG/ML
12 INJECTION, SUSPENSION INTRA-ARTICULAR; INTRALESIONAL; INTRAMUSCULAR; SOFT TISSUE EVERY 24 HOURS
Status: COMPLETED | OUTPATIENT
Start: 2018-09-25 | End: 2018-09-26

## 2018-09-25 RX ORDER — SODIUM CHLORIDE 0.9 % (FLUSH) 0.9 %
5-10 SYRINGE (ML) INJECTION EVERY 8 HOURS
Status: DISCONTINUED | OUTPATIENT
Start: 2018-09-25 | End: 2018-09-26 | Stop reason: HOSPADM

## 2018-09-25 RX ORDER — PROGESTERONE 100 MG/1
200 CAPSULE ORAL
Status: DISCONTINUED | OUTPATIENT
Start: 2018-09-25 | End: 2018-09-26 | Stop reason: HOSPADM

## 2018-09-25 RX ADMIN — BETAMETHASONE ACETATE AND BETAMETHASONE SODIUM PHOSPHATE 12 MG: 3; 3 INJECTION, SUSPENSION INTRA-ARTICULAR; INTRALESIONAL; INTRAMUSCULAR; SOFT TISSUE at 12:36

## 2018-09-25 RX ADMIN — Medication 10 ML: at 21:32

## 2018-09-25 RX ADMIN — Medication 10 ML: at 14:00

## 2018-09-25 RX ADMIN — PROGESTERONE 200 MG: 100 CAPSULE ORAL at 21:31

## 2018-09-25 NOTE — IP AVS SNAPSHOT
Summary of Care Report The Summary of Care report has been created to help improve care coordination. Users with access to [employer] or [address] (Web-based application) may access additional patient information including the Discharge Summary. If you are not currently a [address] user and need more information, please call the number listed below in the [place] [address] section and ask to be connected with Medical Records. Facility Information Name Address Phone [address] [id number] [phone] Patient Information Patient Name Sex RYAN Silveira ([id number]) Female 1980 Discharge Information Admitting Provider Service Area Unit Darshana Pereira MD / [address] [address] Labor & Delivery / [phone] Discharge Provider Discharge Date/Time Discharge Disposition Destination (none) 2018 Midday (Pending) AHR (none) Patient Language Language ENGLISH [13] Hospital Problems as of 2018  Reviewed: 2017  3:38 PM by Ashwin Hidalgo Noted - Resolved Last Modified POA Active Problems Pregnancy  2018 - Present 2018 by Darshana Pereira MD Unknown Entered by Darshana Pereira MD  
  
Non-Hospital Problems as of 2018  Reviewed: 2017  3:38 PM by Ashwin Hidalgo Noted - Resolved Last Modified Active Problems Oligomenorrhea  2013 - Present 2013 by Ashwin Renae Entered by Ashwin Renae Abdominal pain, other specified site  2014 - Present 2014 by Radha Byrd MD  
  Entered by Radha Byrd MD  
  Menorrhagia  2014 - Present 2014 by Ashwin Renae Entered by Ashwin Renae Dysmenorrhea  2014 - Present 2014 by Ashwin Renae Entered by Ashwin Renae PCOS (polycystic ovarian syndrome)  12/2/2016 - Present 12/2/2016 by Alistair Jolly Entered by Alistair Jolly Infertility associated with anovulation  12/2/2016 - Present 12/2/2016 by Alistair Jolly Entered by Alistair Jolly You are allergic to the following No active allergies Current Discharge Medication List  
  
CONTINUE these medications which have NOT CHANGED Dose & Instructions Dispensing Information Comments PNV66-Iron Fumarate-FA-DSS-DHA 26-1.2- mg Cap Take  by mouth. Refills:  0  
   
 progesterone 200 mg capsule Commonly known as:  PROMETRIUM Dose:  200 mg Insert 1 Cap into vagina nightly. Quantity:  30 Cap Refills:  1 Follow-up Information Follow up With Details Comments Contact Info Anjel Grullon MD   [address] [place] [address] [phone] Discharge Instructions Counting Your Baby's Kicks: Care Instructions Your Care Instructions Counting your baby's kicks is one way your doctor can tell that your baby is healthy. Most women-especially in a first pregnancy-feel their baby move for the first time between 16 and 22 weeks. The movement may feel like flutters rather than kicks. Your baby may move more at certain times of the day. When you are active, you may notice less kicking than when you are resting. At your prenatal visits, your doctor will ask whether the baby is active. In your last trimester, your doctor may ask you to count the number of times you feel your baby move. Follow-up care is a key part of your treatment and safety. Be sure to make and go to all appointments, and call your doctor if you are having problems. It's also a good idea to know your test results and keep a list of the medicines you take. How do you count fetal kicks? · A common method of checking your baby's movement is to count the number of kicks or moves you feel in 1 hour.  Ten movements (such as kicks, flutters, or rolls) in 1 hour are normal. Some doctors suggest that you count in the morning until you get to 10 movements. Then you can quit for that day and start again the next day. · Pick your baby's most active time of day to count. This may be any time from morning to evening. · If you do not feel 10 movements in an hour, your baby may be sleeping. Wait for the next hour and count again. When should you call for help? Call your doctor now or seek immediate medical care if: 
  · You noticed that your baby has stopped moving or is moving much less than normal.  
 Watch closely for changes in your health, and be sure to contact your doctor if you have any problems. Where can you learn more? Go to http://mikal-blair.info/. Enter Z805 in the search box to learn more about \"Counting Your Baby's Kicks: Care Instructions. \" Current as of: 2017 Content Version: 11.7 © 6821-8158 [employer]. Care instructions adapted under license by [employer] (which disclaims liability or warranty for this information). If you have questions about a medical condition or this instruction, always ask your healthcare professional. [place] [address] any warranty or liability for your use of this information.  Labor: Care Instructions Your Care Instructions  labor is the start of labor between 20 and 36 weeks of pregnancy. A full-term pregnancy lasts 37 to 42 weeks. In labor, the uterus contracts to open the cervix. This is the first stage of childbirth.  labor can be caused by a problem with the baby, the mother, or both. Often the cause is not known. In some cases, doctors use medicines to try to delay labor until 29 or more weeks of pregnancy. By this time, a baby has grown enough so that problems are not likely.  In some cases-such as with a serious infection-it is healthier for the baby to be born early. Your treatment will depend on how far along you are in your pregnancy and on your health and your baby's health. Follow-up care is a key part of your treatment and safety. Be sure to make and go to all appointments, and call your doctor if you are having problems. It's also a good idea to know your test results and keep a list of the medicines you take. How can you care for yourself at home? · If your doctor prescribed medicines, take them exactly as directed. Call your doctor if you think you are having a problem with your medicine. · Rest until your doctor advises you about activity. He or she will tell you if you should stay in bed most of the time. You may need to arrange for  if you have young children. · Do not have sexual intercourse unless your doctor says it is safe. · Use pads, not tampons, if you have vaginal bleeding. · Make sure to drink plenty of fluids. Dehydration can lead to contractions. If you have kidney, heart, or liver disease and have to limit fluids, talk with your doctor before you increase the amount of fluids you drink. · Do not smoke or allow others to smoke around you. If you need help quitting, talk to your doctor about stop-smoking programs and medicines. These can increase your chances of quitting for good. When should you call for help? Call 911 anytime you think you may need emergency care. For example, call if: 
  · You passed out (lost consciousness).  
  · You have severe vaginal bleeding.  
  · You have severe pain in your belly or pelvis.  
  · You have had fluid gushing or leaking from your vagina and you know or think the umbilical cord is bulging into your vagina. If this happens, immediately get down on your knees so your rear end (buttocks) is higher than your head. This will decrease the pressure on the cord until help arrives.  
[facility] your doctor now or seek immediate medical care if:   · You have signs of preeclampsia, such as: 
¨ Sudden swelling of your face, hands, or feet. ¨ New vision problems (such as dimness or blurring). ¨ A severe headache.  
  · You have any vaginal bleeding.  
  · You have belly pain or cramping.  
  · You have a fever.  
  · You have had regular contractions (with or without pain) for an hour. This means that you have 6 or more within 1 hour after you change your position and drink fluids.  
  · You have a sudden release of fluid from the vagina.  
  · You have low back pain or pelvic pressure that does not go away.  
  · You notice that your baby has stopped moving or is moving much less than normal.  
 Watch closely for changes in your health, and be sure to contact your doctor if you have any problems. Where can you learn more? Go to http://mikal-blair.info/. Enter Q400 in the search box to learn more about \" Labor: Care Instructions. \" Current as of: 2017 Content Version: 11.7 © 1201-9720 [employer]. Care instructions adapted under license by [employer] (which disclaims liability or warranty for this information). If you have questions about a medical condition or this instruction, always ask your healthcare professional. [place] [address] any warranty or liability for your use of this information. Chart Review Routing History Recipient Method Report Sent By Lillian Pedraza MD  
Fax: [phone] Phone: [phone] Fax Outpatient Ashley Cabrera 2016 10:43 AM 2016 José Miguel Pedraza MD  
Fax: [phone] Phone: [phone] Fax Outpatient Ashley Cabrera 2017  4:10 PM 2017 Ashwin Beckford MD  
Phone: [phone] In Basket IP Auto Routed Notes Bacilio King MD [[id number]] 9/10/2018  6:55 PM 09/10/2018 Ashwin Beckford MD  
Phone: [phone] In Basket IP Auto Routed Notes Holley Cruz MD [[id number]] 9/12/2018  1:18 PM 09/12/2018 Miriam Woodall MD  
Phone: [phone] In Basket IP Auto Routed Notes Myriam Emerson MD [[id number]] 9/25/2018 11:45 AM 09/25/2018 Miriam Woodall MD  
Phone: [phone] In Basket IP Auto Routed Notes Myriam Emerson MD [[id number]] 9/25/2018  4:49 PM 09/25/2018 Miriam Woodall MD  
Phone: [phone] In Basket IP Auto Routed Notes Quincy Obrien MD [[id number]] 9/26/2018 12:46 PM 09/26/2018

## 2018-09-25 NOTE — IP AVS SNAPSHOT
[facility] 
 
 
 [address] 
[phone] Patient: Chayo Taylor MRN: [id number] DNK:[id number] About your hospitalization You were admitted on:  September 25, 2018 You last received care in the:  [facility] 2 LABOR & DELIVERY You were discharged on:  September 26, 2018 Why you were hospitalized Your primary diagnosis was:  Not on File Your diagnoses also included:  Pregnancy Follow-up Information Follow up With Details Comments Contact Info Tierra Klein MD   [address] [place] [id number] 
[phone] Discharge Orders None A check brandie indicates which time of day the medication should be taken. My Medications CONTINUE taking these medications Instructions Each Dose to Equal  
 Morning Noon Evening Bedtime PNV66-Iron Fumarate-FA-DSS-DHA 26-1.2- mg Cap Take  by mouth.  
     
  
   
   
   
  
 progesterone 200 mg capsule Commonly known as:  PROMETRIUM Insert 1 Cap into vagina nightly. 200 mg Discharge Instructions Counting Your Baby's Kicks: Care Instructions Your Care Instructions Counting your baby's kicks is one way your doctor can tell that your baby is healthy. Most women-especially in a first pregnancy-feel their baby move for the first time between 16 and 22 weeks. The movement may feel like flutters rather than kicks. Your baby may move more at certain times of the day. When you are active, you may notice less kicking than when you are resting. At your prenatal visits, your doctor will ask whether the baby is active. In your last trimester, your doctor may ask you to count the number of times you feel your baby move. Follow-up care is a key part of your treatment and safety.  Be sure to make and go to all appointments, and call your doctor if you are having problems. It's also a good idea to know your test results and keep a list of the medicines you take. How do you count fetal kicks? · A common method of checking your baby's movement is to count the number of kicks or moves you feel in 1 hour. Ten movements (such as kicks, flutters, or rolls) in 1 hour are normal. Some doctors suggest that you count in the morning until you get to 10 movements. Then you can quit for that day and start again the next day. · Pick your baby's most active time of day to count. This may be any time from morning to evening. · If you do not feel 10 movements in an hour, your baby may be sleeping. Wait for the next hour and count again. When should you call for help? Call your doctor now or seek immediate medical care if: 
  · You noticed that your baby has stopped moving or is moving much less than normal.  
 Watch closely for changes in your health, and be sure to contact your doctor if you have any problems. Where can you learn more? Go to http://mikal-blair.info/. Enter P258 in the search box to learn more about \"Counting Your Baby's Kicks: Care Instructions. \" Current as of: 2017 Content Version: 11.7 © 8022-7314 Healthwise, Incorporated. Care instructions adapted under license by [employer] (which disclaims liability or warranty for this information). If you have questions about a medical condition or this instruction, always ask your healthcare professional. [place] [address] any warranty or liability for your use of this information.  Labor: Care Instructions Your Care Instructions  labor is the start of labor between 20 and 36 weeks of pregnancy. A full-term pregnancy lasts 37 to 42 weeks. In labor, the uterus contracts to open the cervix. This is the first stage of childbirth.  labor can be caused by a problem with the baby, the mother, or both. Often the cause is not known. In some cases, doctors use medicines to try to delay labor until 29 or more weeks of pregnancy. By this time, a baby has grown enough so that problems are not likely. In some cases-such as with a serious infection-it is healthier for the baby to be born early. Your treatment will depend on how far along you are in your pregnancy and on your health and your baby's health. Follow-up care is a key part of your treatment and safety. Be sure to make and go to all appointments, and call your doctor if you are having problems. It's also a good idea to know your test results and keep a list of the medicines you take. How can you care for yourself at home? · If your doctor prescribed medicines, take them exactly as directed. Call your doctor if you think you are having a problem with your medicine. · Rest until your doctor advises you about activity. He or she will tell you if you should stay in bed most of the time. You may need to arrange for  if you have young children. · Do not have sexual intercourse unless your doctor says it is safe. · Use pads, not tampons, if you have vaginal bleeding. · Make sure to drink plenty of fluids. Dehydration can lead to contractions. If you have kidney, heart, or liver disease and have to limit fluids, talk with your doctor before you increase the amount of fluids you drink. · Do not smoke or allow others to smoke around you. If you need help quitting, talk to your doctor about stop-smoking programs and medicines. These can increase your chances of quitting for good. When should you call for help? Call 911 anytime you think you may need emergency care. For example, call if: 
  · You passed out (lost consciousness).  
  · You have severe vaginal bleeding.  
  · You have severe pain in your belly or pelvis.  
  · You have had fluid gushing or leaking from your vagina and you know or think the umbilical cord is bulging into your vagina.  If this happens, immediately get down on your knees so your rear end (buttocks) is higher than your head. This will decrease the pressure on the cord until help arrives.  
[facility] your doctor now or seek immediate medical care if: 
  · You have signs of preeclampsia, such as: 
¨ Sudden swelling of your face, hands, or feet. ¨ New vision problems (such as dimness or blurring). ¨ A severe headache.  
  · You have any vaginal bleeding.  
  · You have belly pain or cramping.  
  · You have a fever.  
  · You have had regular contractions (with or without pain) for an hour. This means that you have 6 or more within 1 hour after you change your position and drink fluids.  
  · You have a sudden release of fluid from the vagina.  
  · You have low back pain or pelvic pressure that does not go away.  
  · You notice that your baby has stopped moving or is moving much less than normal.  
 Watch closely for changes in your health, and be sure to contact your doctor if you have any problems. Where can you learn more? Go to http://mikal-blair.info/. Enter Q400 in the search box to learn more about \" Labor: Care Instructions. \" Current as of: 2017 Content Version: 11.7 © 3299-3740 [employer]. Care instructions adapted under license by [facility] (which disclaims liability or warranty for this information). If you have questions about a medical condition or this instruction, always ask your healthcare professional. [place] [address] any warranty or liability for your use of this information. [place] Announcement We are excited to announce that we are making your provider's discharge notes available to you in [facility]. You will see these notes when they are completed and signed by the physician that discharged you from your recent hospital stay.   If you have any questions or concerns about any information you see in [facility], please call the Health Information Department where you were seen or reach out to your Primary Care Provider for more information about your plan of care. Introducing [facility]! Dear Myriam Solis: 
Thank you for requesting a [facility] account. Our records indicate that you already have an active [facility] account. You can access your account anytime at [email or web address] Did you know that you can access your hospital and ER discharge instructions at any time in [facility]? You can also review all of your test results from your hospital stay or ER visit. Additional Information If you have questions, please visit the Frequently Asked Questions section of the [facility] website at [email or web address] Remember, [facility] is NOT to be used for urgent needs. For medical emergencies, dial 911. Now available from your iPhone and Android! Introducing Juliano Baxter As a [facility] patient, I wanted to make you aware of our electronic visit tool called Juliano Baxter. [facility] 24/7 allows you to connect within minutes with a medical provider 24 hours a day, seven days a week via a mobile device or tablet or logging into a secure website from your computer. You can access Juliano Baxter from anywhere in the [place]. A virtual visit might be right for you when you have a simple condition and feel like you just dont want to get out of bed, or cant get away from work for an appointment, when your regular [facility] provider is not available (evenings, weekends or holidays), or when youre out of town and need minor care. Electronic visits cost only $49 and if the [facility] 24/7 provider determines a prescription is needed to treat your condition, one can be electronically transmitted to a nearby pharmacy*. Please take a moment to enroll today if you have not already done so.   The enrollment process is free and takes just a few minutes. To enroll, please download the [employer] 24/7 amara to your tablet or phone, or visit [email or web address] to enroll on your computer. And, as an [address] [place] [address] patient with a Install[employer] account, the results of your visits will be scanned into your electronic medical record and your primary care provider will be able to view the scanned results. We urge you to continue to see your regular [facility] provider for your ongoing medical care. And while your primary care provider may not be the one available when you seek a [employer] virtual visit, the peace of mind you get from getting a real diagnosis real time can be priceless. For more information on [employer], view our Frequently Asked Questions (FAQs) at [email or web address]. Sincerely, 
 
Tian Conn MD 
Chief Medical Officer Shala Hernandez *:  certain medications cannot be prescribed via [employer] Unresulted Labs-Please follow up with your PCP about these lab tests Order Current Status CULTURE, GENITAL GROUP B STREP Preliminary result Providers Seen During Your Hospitalization Provider Specialty Primary office phone Aleida Dee MD Obstetrics & Gynecology [phone] Your Primary Care Physician (PCP) Primary Care Physician Office Phone Office Fax [facility], [address] [place] [address] Rd [phone] You are allergic to the following No active allergies Recent Documentation Height Weight Breastfeeding? BMI OB Status Smoking Status 1.702 m 105.7 kg Yes 36.49 kg/m2 Pregnant Former Smoker Emergency Contacts Name Discharge Info Relation Home Work Mobile Erik Brown DISCHARGE CAREGIVER [3] Spouse [3] [phone] Patient Belongings The following personal items are in your possession at time of discharge: Dental Appliances: None  Visual Aid: None      Home Medications: None   Jewelry: Ring, With patient  Clothing: With patient    Other Valuables: Cell Phone, Purse, With patient Please provide this summary of care documentation to your next provider. Signatures-by signing, you are acknowledging that this After Visit Summary has been reviewed with you and you have received a copy. Patient Signature:  ____________________________________________________________ Date:  ____________________________________________________________  
  
[facility] Provider Signature:  ____________________________________________________________ Date:  ____________________________________________________________

## 2018-09-25 NOTE — PROGRESS NOTES
11:37- U/S used to doppler FHT.  
12:42- IV attempted by this RN x2 and by second RN x1. Unable to obtain IV access. Anesthesia unavailable at this time. PICC team contacted to obtain IV access and labs. Encouraged oral hydration. 17:00- RN at bedside to check on pt. Pt states she's been feeling pain like \"cramping or maybe the baby moving\" on her right side of her abdomen. RN places pt on toco to monitor for ctx. MD Merle notified. 17:20- RN removed toco from pt. No complaints at this time. Advised pt that she should continue to notify RNs if experiencing any pain, cramping, changes in current condition, even if unsure if may be fetal movement. Pt verbalizes understanding. 19:15- OB SBAR report given to PAT Nicolas

## 2018-09-25 NOTE — H&P
History and Physical 
 
Patient: Linda Cervantes MRN: [id number]  SSN: [id number] YOB: 1980  Age: 45 y.o. Sex: female Subjective:  
  
Linda Cervantes is a 45 y.o.  at 26.2 admitted from clinic due to progressively shortened cervical length. She been followed with serially ultrasound for the last several weeks after noticing a shorter length. Today the CL is 7mm with a funnel noted. The total cervical length is 2.8 cm. Patient denies any contractions or cramping. No bleeding. Active fetal movement. Past Medical History:  
Diagnosis Date  Fibroids  History of PCOS  Polycystic disease, ovaries  Psychiatric problem   
 anxiety Past Surgical History:  
Procedure Laterality Date  HX OTHER SURGICAL    
 wisdom tooth extraction x4 Family History Problem Relation Age of Onset  Hypertension Mother  Hypertension Sister  Diabetes Sister  Hypertension Brother  Cancer Paternal Uncle  Diabetes Paternal Uncle  Diabetes Maternal Uncle Social History Substance Use Topics  Smoking status: Former Smoker  Smokeless tobacco: Never Used  Alcohol use Yes Prior to Admission medications Medication Sig Start Date End Date Taking? Authorizing Provider  
progesterone (PROMETRIUM) 200 mg capsule Insert 1 Cap into vagina nightly. 18  Yes Arturo Montez MD  
[id number] Fumarate-FA-DSS-DHA 26-1.2- mg cap Take  by mouth. Yes Historical Provider No Known Allergies Review of Systems: A comprehensive review of systems was negative except for that written in the History of Present Illness. Objective:  
 
Vitals:  
 18 1113 18 1132 BP:  123/79 Pulse:  90 Resp:  16 Temp:  98.3 °F (36.8 °C) SpO2:  98% Weight: 105.7 kg (233 lb) Height: 5' 7\" (1.702 m) Physical Exam: 
GENERAL: alert, cooperative, no distress, appears stated age LUNG: non labored respirations HEART: normal peripheral perfusion ABDOMEN: soft, non-tender,Gravid EXTREMITIES:  extremities normal, atraumatic, no cyanosis or edema Cervix: examined by Dr. Vianey Marquez in clinic- closed/long/high Assessment and Plan:  
 46 yo  at 26.2 admitted for shortened cervical length of 7mm with funneling, total length 2.8 cm.   
- intermittent tocometer - BMZ now, repeat in 24hrs  
- repeat cervical exam in am  
- patient scheduled for repeat US on Friday with MFM   
-continue vaginal progesterone  
- will collect GBS given concern for early delivery Signed By: Ant Patel MD   
 2018

## 2018-09-25 NOTE — IP AVS SNAPSHOT
[address] 
[phone] Patient: Aleyda Guaman MRN: [id number] EWS:7/00/4168 A check brandie indicates which time of day the medication should be taken. My Medications CONTINUE taking these medications Instructions Each Dose to Equal  
 Morning Noon Evening Bedtime PNV66-Iron Fumarate-FA-DSS-DHA 26-1.2- mg Cap Take  by mouth.  
     
  
   
   
   
  
 progesterone 200 mg capsule Commonly known as:  PROMETRIUM Insert 1 Cap into vagina nightly.   
 200 mg

## 2018-09-26 VITALS
SYSTOLIC BLOOD PRESSURE: 119 MMHG | RESPIRATION RATE: 16 BRPM | BODY MASS INDEX: 36.57 KG/M2 | OXYGEN SATURATION: 98 % | WEIGHT: 233 LBS | DIASTOLIC BLOOD PRESSURE: 77 MMHG | TEMPERATURE: 98.5 F | HEART RATE: 96 BPM | HEIGHT: 67 IN

## 2018-09-26 PROCEDURE — 74011250636 HC RX REV CODE- 250/636: Performed by: OBSTETRICS & GYNECOLOGY

## 2018-09-26 PROCEDURE — 96372 THER/PROPH/DIAG INJ SC/IM: CPT

## 2018-09-26 PROCEDURE — 99218 HC RM OBSERVATION: CPT

## 2018-09-26 RX ADMIN — BETAMETHASONE ACETATE AND BETAMETHASONE SODIUM PHOSPHATE 12 MG: 3; 3 INJECTION, SUSPENSION INTRA-ARTICULAR; INTRALESIONAL; INTRAMUSCULAR; SOFT TISSUE at 12:44

## 2018-09-26 NOTE — PROGRESS NOTES
07:10- Bedside OB SBAR report received from PAT Gifford RN 
07:54- Doppler . Pt denies feeling ctx. 
08:30- Plan for the day discussed with pt. Pt will receive second dose beta at 12:30pm. MD will recheck cervix for change. If unchanged pt may be D/C'd later today. 10:45- Pt requesting to shower. RN provided pt with needed toiletries and linen. IV covered. 12:38Justina Renteria MD at bedside for cervical exam. No change.  MD states pt may be D/C'd

## 2018-09-26 NOTE — DISCHARGE INSTRUCTIONS
Counting Your Baby's Kicks: Care Instructions  Your Care Instructions    Counting your baby's kicks is one way your doctor can tell that your baby is healthy. Most women-especially in a first pregnancy-feel their baby move for the first time between 16 and 22 weeks. The movement may feel like flutters rather than kicks. Your baby may move more at certain times of the day. When you are active, you may notice less kicking than when you are resting. At your prenatal visits, your doctor will ask whether the baby is active. In your last trimester, your doctor may ask you to count the number of times you feel your baby move. Follow-up care is a key part of your treatment and safety. Be sure to make and go to all appointments, and call your doctor if you are having problems. It's also a good idea to know your test results and keep a list of the medicines you take. How do you count fetal kicks? · A common method of checking your baby's movement is to count the number of kicks or moves you feel in 1 hour. Ten movements (such as kicks, flutters, or rolls) in 1 hour are normal. Some doctors suggest that you count in the morning until you get to 10 movements. Then you can quit for that day and start again the next day. · Pick your baby's most active time of day to count. This may be any time from morning to evening. · If you do not feel 10 movements in an hour, your baby may be sleeping. Wait for the next hour and count again. When should you call for help? Call your doctor now or seek immediate medical care if:    · You noticed that your baby has stopped moving or is moving much less than normal.    Watch closely for changes in your health, and be sure to contact your doctor if you have any problems. Where can you learn more? Go to http://mikal-blair.info/. Enter N944 in the search box to learn more about \"Counting Your Baby's Kicks: Care Instructions. \"  Current as of: November 21, 2017  Content Version: 11.7  © 5568-3414 [employer] Care instructions adapted under license by [facility] (which disclaims liability or warranty for this information). If you have questions about a medical condition or this instruction, always ask your healthcare professional. [address] any warranty or liability for your use of this information.  Labor: Care Instructions  Your Care Instructions     labor is the start of labor between 21 and 36 weeks of pregnancy. A full-term pregnancy lasts 37 to 42 weeks. In labor, the uterus contracts to open the cervix. This is the first stage of childbirth.  labor can be caused by a problem with the baby, the mother, or both. Often the cause is not known. In some cases, doctors use medicines to try to delay labor until [address] or more weeks of pregnancy. By this time, a baby has grown enough so that problems are not likely. In some cases-such as with a serious infection-it is healthier for the baby to be born early. Your treatment will depend on how far along you are in your pregnancy and on your health and your baby's health. Follow-up care is a key part of your treatment and safety. Be sure to make and go to all appointments, and call your doctor if you are having problems. It's also a good idea to know your test results and keep a list of the medicines you take. How can you care for yourself at home? · If your doctor prescribed medicines, take them exactly as directed. Call your doctor if you think you are having a problem with your medicine. · Rest until your doctor advises you about activity. He or she will tell you if you should stay in bed most of the time. You may need to arrange for  if you have young children. · Do not have sexual intercourse unless your doctor says it is safe. · Use pads, not tampons, if you have vaginal bleeding. · Make sure to drink plenty of fluids. Dehydration can lead to contractions. If you have kidney, heart, or liver disease and have to limit fluids, talk with your doctor before you increase the amount of fluids you drink. · Do not smoke or allow others to smoke around you. If you need help quitting, talk to your doctor about stop-smoking programs and medicines. These can increase your chances of quitting for good. When should you call for help? Call 911 anytime you think you may need emergency care. For example, call if:    · You passed out (lost consciousness).     · You have severe vaginal bleeding.     · You have severe pain in your belly or pelvis.     · You have had fluid gushing or leaking from your vagina and you know or think the umbilical cord is bulging into your vagina. If this happens, immediately get down on your knees so your rear end (buttocks) is higher than your head. This will decrease the pressure on the cord until help arrives.   [facility] your doctor now or seek immediate medical care if:    · You have signs of preeclampsia, such as:  ¨ Sudden swelling of your face, hands, or feet. ¨ New vision problems (such as dimness or blurring). ¨ A severe headache.     · You have any vaginal bleeding.     · You have belly pain or cramping.     · You have a fever.     · You have had regular contractions (with or without pain) for an hour. This means that you have 6 or more within 1 hour after you change your position and drink fluids.     · You have a sudden release of fluid from the vagina.     · You have low back pain or pelvic pressure that does not go away.     · You notice that your baby has stopped moving or is moving much less than normal.    Watch closely for changes in your health, and be sure to contact your doctor if you have any problems. Where can you learn more? Go to http://mikal-blair.info/. Enter Q400 in the search box to learn more about \" Labor: Care Instructions. \"  Current as of:  2017  Content Version: 11.7  © 1216-4433 [employer]. Care instructions adapted under license by [employer] (which disclaims liability or warranty for this information). If you have questions about a medical condition or this instruction, always ask your healthcare professional. [address] any warranty or liability for your use of this information.

## 2018-09-26 NOTE — PROGRESS NOTES
Reviewed D/C instructions with pt. Written instructions given to patient. Pt verbalized understanding of instructions. Pt has ride home, but unavailable until approx. 15:00. RN aware. Charge RN aware. Pt may remain in room 216 until  arrives to bring pt home.

## 2018-09-26 NOTE — DISCHARGE SUMMARY
Antepartum  Discharge Summary     Patient ID:  Radha Espinoza  [id number]  2 y.o.  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 Patient Instructions:   Current Discharge Medication List      CONTINUE these medications which have NOT CHANGED    Details   progesterone (PROMETRIUM) 200 mg capsule Insert 1 Cap into vagina nightly. Qty: 30 Cap, Refills: 1      PNV66-Iron Fumarate-FA-DSS-DHA 26-1.2- mg cap Take  by mouth. Activity: Activity as tolerated  Diet: Regular Diet    Follow-up with   Follow-up Appointments   Procedures    FOLLOW UP VISIT Appointment in: 3 - 5 Days [facility], already scheduled     [phone] [facility]an, already scheduled     Standing Status:   Standing     Number of Occurrences:   1     Order Specific Question:   Appointment in     Answer:   3 - 5 Days        Signed:   Savana Mittal MD  9/26/2018  12:45 PM

## 2018-09-26 NOTE — PROGRESS NOTES
Patient seen prior to [address] #2 and cervical exam done No change and no signs of labor Will discharge with outpatient followup

## 2018-09-26 NOTE — PROGRESS NOTES
Ante Partum Progress Note Ophelia Mueller 
89H3S Assessment: 26w3d Shortened cervix/threatened  labor Plan:  Continue hospitalization with hospitalized bedrest 
 intermittent tocometer BMZ #1, 2nd dose later today ~1300 Repeat US on Friday with MFM at outpatient if cervix unchanged this afternoon Continue vaginal progesterone Collect GBS given concern for early delivery, consider discharge following BMZ # 2 Orders/Charges: Medium Patient states she has no new complaints, but does report baseline intermitted cramps, no bleeding or loss of fluid, does report yellowish tint to discharge but similar to color of inpatient progesterone Vitals: 
Visit Vitals  /72  Pulse 94  Temp 97.5 °F (36.4 °C)  Resp 16  
 Ht 5' 7\" (1.702 m)  Wt 105.7 kg (233 lb)  SpO2 98%  Breastfeeding Yes  BMI 36.49 kg/m2 Temp (24hrs), Av.9 °F (36.6 °C), Min:97.5 °F (36.4 °C), Max:98.3 °F (36.8 °C) Last 24hr Input/Output: 
No intake or output data in the 24 hours ending 18 07 Non stress test:  Non-reactive but appropriate for gestational age No data found. No data found. Uterine Activity: Irregular, mild Exam:  Patient without distress. Abdomen, fundus soft non-tender Extremities, no redness or tenderness Additional Exam: Deferred Labs:  
 
Lab Results Component Value Date/Time WBC 8.5 2018 12:14 PM  
 WBC 10.4 2014 01:07 AM  
 HGB 11.3 (L) 2018 12:14 PM  
 HGB 11.7 2014 01:07 AM  
 HCT 35.7 2018 12:14 PM  
 HCT 37.6 2014 01:07 AM  
 PLATELET 663  12:14 PM  
 PLATELET 307 456 01:07 AM  
 
 
Recent Results (from the past 24 hour(s)) CBC WITH AUTOMATED DIFF Collection Time: 18 12:14 PM  
Result Value Ref Range WBC 8.5 3.6 - 11.0 K/uL  
 RBC 4.79 3.80 - 5.20 M/uL  
 HGB 11.3 (L) 11.5 - 16.0 g/dL HCT 35.7 35.0 - 47.0 %  MCV 74.5 (L) 80.0 - 99.0 FL  
 MCH 23.6 (L) 26.0 - 34.0 PG  
 MCHC 31.7 30.0 - 36.5 g/dL  
 RDW 14.2 11.5 - 14.5 % PLATELET 577 551 - 884 K/uL MPV 9.9 8.9 - 12.9 FL  
 NRBC 0.0 0  WBC ABSOLUTE NRBC 0.00 0.00 - 0.01 K/uL NEUTROPHILS 73 32 - 75 % LYMPHOCYTES 16 12 - 49 % MONOCYTES 9 5 - 13 % EOSINOPHILS 1 0 - 7 % BASOPHILS 0 0 - 1 % IMMATURE GRANULOCYTES 1 (H) 0.0 - 0.5 % ABS. NEUTROPHILS 6.2 1.8 - 8.0 K/UL  
 ABS. LYMPHOCYTES 1.4 0.8 - 3.5 K/UL  
 ABS. MONOCYTES 0.7 0.0 - 1.0 K/UL  
 ABS. EOSINOPHILS 0.1 0.0 - 0.4 K/UL  
 ABS. BASOPHILS 0.0 0.0 - 0.1 K/UL  
 ABS. IMM. GRANS. 0.1 (H) 0.00 - 0.04 K/UL  
 DF AUTOMATED

## 2018-09-26 NOTE — ROUTINE PROCESS
1920- Bedside report received. No needs at this time. 2030- Pt resting in bed visiting with friends. 2130- FHT by doppler 150 with +fetal movement. 2230- Resting in bed. No needs at this time. 0200- Resting in bed with eyes closed.

## 2018-09-28 LAB
BACTERIA SPEC CULT: NORMAL
SERVICE CMNT-IMP: NORMAL

## 2018-10-16 ENCOUNTER — HOSPITAL ENCOUNTER (INPATIENT)
Age: 38
LOS: 6 days | Discharge: HOME | DRG: 833 | End: 2018-10-22
Attending: OBSTETRICS & GYNECOLOGY | Admitting: OBSTETRICS & GYNECOLOGY
Payer: COMMERCIAL

## 2018-10-16 PROBLEM — O47.00 THREATENED PREMATURE LABOR: Status: ACTIVE | Noted: 2018-10-16

## 2018-10-16 LAB
ERYTHROCYTE [DISTWIDTH] IN BLOOD BY AUTOMATED COUNT: 14.7 % (ref 11.5–14.5)
HCT VFR BLD AUTO: 34.3 % (ref 35–47)
HGB BLD-MCNC: 10.8 G/DL (ref 11.5–16)
MCH RBC QN AUTO: 23.6 PG (ref 26–34)
MCHC RBC AUTO-ENTMCNC: 31.5 G/DL (ref 30–36.5)
MCV RBC AUTO: 75.1 FL (ref 80–99)
NRBC # BLD: 0 K/UL (ref 0–0.01)
NRBC BLD-RTO: 0 PER 100 WBC
PLATELET # BLD AUTO: 236 K/UL (ref 150–400)
PMV BLD AUTO: 9.6 FL (ref 8.9–12.9)
RBC # BLD AUTO: 4.57 M/UL (ref 3.8–5.2)
WBC # BLD AUTO: 8.2 K/UL (ref 3.6–11)

## 2018-10-16 PROCEDURE — 74011250637 HC RX REV CODE- 250/637: Performed by: OBSTETRICS & GYNECOLOGY

## 2018-10-16 PROCEDURE — 65270000029 HC RM PRIVATE

## 2018-10-16 PROCEDURE — 59025 FETAL NON-STRESS TEST: CPT

## 2018-10-16 PROCEDURE — 99283 EMERGENCY DEPT VISIT LOW MDM: CPT

## 2018-10-16 PROCEDURE — 36415 COLL VENOUS BLD VENIPUNCTURE: CPT | Performed by: OBSTETRICS & GYNECOLOGY

## 2018-10-16 PROCEDURE — 85027 COMPLETE CBC AUTOMATED: CPT | Performed by: OBSTETRICS & GYNECOLOGY

## 2018-10-16 RX ORDER — SWAB
1 SWAB, NON-MEDICATED MISCELLANEOUS DAILY
Status: DISCONTINUED | OUTPATIENT
Start: 2018-10-16 | End: 2018-10-22 | Stop reason: HOSPADM

## 2018-10-16 RX ORDER — PROGESTERONE 100 MG/1
200 CAPSULE ORAL DAILY
Status: DISCONTINUED | OUTPATIENT
Start: 2018-10-17 | End: 2018-10-16

## 2018-10-16 RX ORDER — PROGESTERONE 100 MG/1
200 CAPSULE ORAL
Status: DISCONTINUED | OUTPATIENT
Start: 2018-10-16 | End: 2018-10-22 | Stop reason: HOSPADM

## 2018-10-16 RX ADMIN — PROGESTERONE 200 MG: 100 CAPSULE ORAL at 21:44

## 2018-10-16 RX ADMIN — Medication 1 TABLET: at 16:05

## 2018-10-16 NOTE — ROUTINE PROCESS
1537:Bedside, Verbal and Written shift change report given to KEVIN Stephenson (oncoming nurse) by Fernando Milligan. Parth Finnegan, RN (offgoing nurse). Report included the following information SBAR, Kardex, Intake/Output, MAR, Accordion and Recent Results. 1603: Dr. Fozia Pierce updated on pt status, no further orders received at this time    1900: Bedside, Verbal and Written shift change report given to LISSETTE Fernandez RN (oncoming nurse) by Jennifer Chanel RN (offgoing nurse). Report included the following information SBAR, Kardex, Intake/Output, MAR, Accordion and Recent Results.  Pt confirming feeling fetal movement at this time

## 2018-10-16 NOTE — H&P
Obstetrics Admission History & Physical    Name: Mario Alberto Penny MRN: [id number]  SSN: [id number]    YOB: 1980  Age: 45 y.o. Sex: female      Subjective:     Reason for Admission:  Pregnancy and Shortened cervix and funic presentation    History of Present Illness: Mario Alberto Penny is a 45 y.o.  female with an estimated gestational age of 26w3d with Estimated Date of Delivery: 18. Patient complains of mild pelvic pressure for many weeks. Pregnancy has been complicated by advanced maternal age, shortened cervix and funic presentation. Patient denies contractions, vaginal bleeding  and vaginal leaking of fluid . OB History      Para Term  AB Living    2    1 0    SAB TAB Ectopic Molar Multiple Live Births    1     0        Past Medical History:   Diagnosis Date    Fibroids     History of PCOS     Polycystic disease, ovaries     Psychiatric problem     anxiety     Past Surgical History:   Procedure Laterality Date    HX OTHER SURGICAL      wisdom tooth extraction x4     Social History     Social History    Marital status:      Spouse name: N/A    Number of children: N/A    Years of education: N/A     Occupational History    Not on file. Social History Main Topics    Smoking status: Former Smoker    Smokeless tobacco: Never Used    Alcohol use Yes    Drug use: No    Sexual activity: Yes     Partners: Male     Birth control/ protection: None     Other Topics Concern    Not on file     Social History Narrative     Family History   Problem Relation Age of Onset    Hypertension Mother     Hypertension Sister     Diabetes Sister     Hypertension Brother     Cancer Paternal Uncle     Diabetes Paternal Uncle     Diabetes Maternal Uncle      No Known Allergies  Prior to Admission medications    Medication Sig Start Date End Date Taking? Authorizing Provider   progesterone (PROMETRIUM) 200 mg capsule Insert 1 Cap into vagina nightly.  18   Viva Record Adenike Townsend MD   KIJ21-Zoxi Fumarate-FA-DSS-DHA 26-1.2- mg cap Take  by mouth. Historical Provider        Review of Systems:  A comprehensive review of systems was negative except for that written in the History of Present Illness. Objective:     Vitals:  Pending as well as initial NST    No Data Recorded     Physical Exam:  Deferred       Membranes:  Intact    Uterine Activity:  None    Fetal Heart Rate:  pending       Labs: No results found for this or any previous visit (from the past 24 hour(s)).     Assessment and Plan:     Shortened cervical length with funic presentation  Close observation and bed rest  Immediate delivery if labor or PROM given funic presentation unless changed  Repeat scan with MFM in 48 hours and dispo per findings  NST BID  Regular diet and bed rest  S/P BMZ course - will repeat if  delivery becomes probable  GBS-  Will get labs  Will continue vaginal progesterone    Signed By:  Markell Pérez MD     2018

## 2018-10-17 PROCEDURE — 65270000029 HC RM PRIVATE

## 2018-10-17 PROCEDURE — 74011250637 HC RX REV CODE- 250/637: Performed by: OBSTETRICS & GYNECOLOGY

## 2018-10-17 RX ADMIN — PROGESTERONE 200 MG: 100 CAPSULE ORAL at 22:27

## 2018-10-17 RX ADMIN — Medication 1 TABLET: at 10:29

## 2018-10-17 NOTE — PROGRESS NOTES
10/17/18 2:44 PM  CM met with patient to complete initial assessment and to begin discharge planning. Demographics were reviewed and confirmed. Patient and her /FOB Lilliana Marquesor ([phone]) live together and this is their first baby. Patient is currently 29w3d pregnant and here for progressive cervical shortening with funneling and cord presentation. Patient being followed by MFM and will have a repeat scan tomorrow for development of further plan of care. Demographics were reviewed and confirmed. Patient discussed that she plans to breastfeed and will order a breast pump. CM educated patient on how to obtain breast pump. A pediatrician list was provided to patient, as she has not yet selected a provider. Patient confirmed that she and her  have a means to purchase all necessary items for the baby. Denied need for [facility] and Medicaid services. Patient to remain on hospitalized bedrest and follow up with MFM in office tomorrow. CM will follow.   Care Management Interventions  PCP Verified by CM: Yes(Dr. Db Feliz)  Mode of Transport at Discharge: Self  Transition of Care Consult (CM Consult): Discharge Planning  Current Support Network: Lives with Spouse, Family Lives Nearby  Confirm Follow Up Transport: Family  Plan discussed with Pt/Family/Caregiver: Yes  Discharge Location  Discharge Placement: Home with outpatient services  BHARATH Benjamin

## 2018-10-17 NOTE — PROGRESS NOTES
10/17/18  7:24PM  SBAR report received from Franchesca Anderson, RN to Nichol Meraz, IRAJ. Assumed care of patient at this time. 7:45PM  NST in progress, assessment completed. Patient without complaints. 1:25AM  Rounded on patient; currently sleeping. Call bell within reach.  6:51AM  SBAR report given to Graciela Tellez RN from Nichol Meraz, IRAJ.

## 2018-10-17 NOTE — PROGRESS NOTES
Ante Partum Progress Note    Micha Angles  29w3d    Assessment: 29w3d   Cervical insufficiency, cervix shortened to 11mm with large funnel and baby in cephalic presentation with funic presentation of cord filling amniotic bag distal to head; on vaginal progesterone, getting serial cervical lengths showing progressive shorteneing, asymptomatic  S/p BMZ x 2 -  GBS negative      Plan:    Continue hospitalization with hospitalized bedrest and close observation  Immediate delivery if labor or PROM given funic presentation unless changed  Repeat scan with MFM in 48 hours (tomorrow) and dispo per findings  NST BID  Regular diet and bed rest  S/P BMZ course - will repeat if  delivery becomes probable  Will continue vaginal progesterone        Orders/Charges: Medium and Non Stress Test  X 2    Patient states she has no complaints    Vitals:  Visit Vitals  /73   Pulse 83   Temp 98.4 °F (36.9 °C)   Resp 14   Ht 5' 7\" (1.702 m)   Wt 104.8 kg (231 lb)   Breastfeeding? Yes   BMI 36.18 kg/m²     Temp (24hrs), Av.5 °F (36.9 °C), Min:98.4 °F (36.9 °C), Max:98.7 °F (37.1 °C)      Last 24hr Input/Output:  No intake or output data in the 24 hours ending 10/17/18 0851     Non stress test:  Non-reactive but appropriate for gestational age    No data found. No data found. Uterine Activity: None     Exam:  Patient without distress.      Abdomen, fundus soft non-tender     Extremities, no redness or tenderness               Additional Exam: Patient without distress, Abdomen soft, non-tender, Fundus soft and non tender, Perineum No sign of blood or amniotic fluid and Lower extremities edema No    Labs:     Lab Results   Component Value Date/Time    WBC 8.2 10/16/2018 01:27 PM    WBC 8.5 2018 12:14 PM    WBC 10.4 2014 01:07 AM    HGB 10.8 (L) 10/16/2018 01:27 PM    HGB 11.3 (L) 2018 12:14 PM    HGB 11.7 2014 01:07 AM    HCT 34.3 (L) 10/16/2018 01:27 PM    HCT 35.7 2018 12:14 PM HCT 37.6 04/17/2014 01:07 AM    PLATELET 701 81/97/6575 01:27 PM    PLATELET 447 20/23/1753 12:14 PM    PLATELET 930 53/13/6929 01:07 AM       Recent Results (from the past 24 hour(s))   CBC W/O DIFF    Collection Time: 10/16/18  1:27 PM   Result Value Ref Range    WBC 8.2 3.6 - 11.0 K/uL    RBC 4.57 3.80 - 5.20 M/uL    HGB 10.8 (L) 11.5 - 16.0 g/dL    HCT 34.3 (L) 35.0 - 47.0 %    MCV 75.1 (L) 80.0 - 99.0 FL    MCH 23.6 (L) 26.0 - 34.0 PG    MCHC 31.5 30.0 - 36.5 g/dL    RDW 14.7 (H) 11.5 - 14.5 %    PLATELET 828 245 - 185 K/uL    MPV 9.6 8.9 - 12.9 FL    NRBC 0.0 0  WBC    ABSOLUTE NRBC 0.00 0.00 - 0.01 K/uL

## 2018-10-17 NOTE — PROGRESS NOTES
1900: Bedside shift report received from CHERIE Murphy 267, [facility]. RN assumes care of patient at this time. 3215: Bedside and Verbal shift change report given to ZACH Goodman RN (oncoming nurse) by Chelsea Fernandez RN (offgoing nurse). Report included the following information SBAR, Kardex, Intake/Output, MAR, Accordion and Recent Results.

## 2018-10-17 NOTE — PROGRESS NOTES
7902 - Assumed care of patient at this time, patient in bed sleeping.     3135 - Patient lying in bed resting, VSS, patient in bed backwards for comfort. Water jug refilled, no complaints overnight, patient denies any additional needs at this time. Patient instructed to call out after breakfast for NST.     8103 - Patient up to shower at this time. Linens changed and given new gown. 3101 - Placed on EFM. 56 - Dr. Mahoney Class at bedside discussing plan of care. 7990 - RN had to hold monitor of to obtain EFM. Patient turned on left side. Audible fetal movement heard throughout monitoring. 10x10 accelerations noted, sometimes through fetal movement not picked up on US.     3945 - Bedside and Verbal shift change report given to Lillian Mcfadden RN (oncoming nurse) by Grabiel Peguero RN (offgoing nurse). Report included the following information SBAR, Procedure Summary, Intake/Output, MAR, Recent Results and Med Rec Status.

## 2018-10-18 PROCEDURE — 65270000029 HC RM PRIVATE

## 2018-10-18 PROCEDURE — 74011250637 HC RX REV CODE- 250/637: Performed by: OBSTETRICS & GYNECOLOGY

## 2018-10-18 PROCEDURE — 59025 FETAL NON-STRESS TEST: CPT

## 2018-10-18 PROCEDURE — 76816 OB US FOLLOW-UP PER FETUS: CPT | Performed by: OBSTETRICS & GYNECOLOGY

## 2018-10-18 RX ADMIN — Medication 1 TABLET: at 12:57

## 2018-10-18 RX ADMIN — PROGESTERONE 200 MG: 100 CAPSULE ORAL at 22:00

## 2018-10-18 NOTE — PROGRESS NOTES
0700 Assumed care of patient following beside SBAR report from Dmitry Price RN.  3198 Patient taken to [facility] via wheelchair by Tabatha Sanchez RN.  0930 Patient brought back to room following consult from [facility] via wheelchair by KEVIN Ruffin. 1100 Attempted to place patient on EFM for NST, patient refused at this time and asked for this RN to come back after she is off the phone talking with [employer]ML[employer]. 1125 This RN rounded on patient, patient continues to be on the phone at this time. 1139 Patient placed on EFM at this time for NST.  1200 Dr. Albino Caballero at bedside discussing plan of care with patient. 1239 Reviewed FHR tracing with Elieser Ham RN, strip appears reactive. Patient taken off EFM at this time. 1910 Bedside shift change report given to Laurel Oviedo RN (oncoming nurse) by Floridalma Calderon. Alex Perez, IRAJ (offgoing nurse). Report included the following information SBAR, Intake/Output, MAR, Recent Results and Med Rec Status.

## 2018-10-18 NOTE — PROGRESS NOTES
M - Please see the Ultrasound report / consult note to be entered into this patient's record as a scanned document from my office. A text copy of this note is also provided below for convenience. Mirella Alamo M.D., Ph.D.  Ivon Fuller    ---------------------------------------    Indication: Obesity complicating pregnancy 3rd Tri 099.213, AMA Multigravida 3rd Tri O09.523.   ____________________________________________________________________________  History: Age: 45 years. Maternal age at [facility]: 45 years. Current Pregnancy: Pre- pregnancy data: Weight 233 lbs. Height 5 ft 7 ins. BMI 36.5.  ____________________________________________________________________________  Dating:  Stated EDC:  EDC: 12/30/18 GA by stated EDC: 29w4d  Current Scan on: 10/18/18 EDC: 12/28/18 GA by current scan: 29w6d  Best Overall Assessment: 10/18/18 EDC: 12/30/18 Assessed GA: 29w4d  The calculation of the gestational age by current scan was based on BPD, HC, AC and FL. The Best Overall Assessment is based on the stated EDC.  ____________________________________________________________________________  General Evaluation:  Fetal heart activity: present. Fetal heart rate: 143 bpm.   Presentation: cephalic. Fetal movement: visible. Amniotic Fluid: normal. ALEX  15.0 cm. Maximal vertical pocket 5.6 cm. Cord: 3 vessels. Placenta: anterior. ____________________________________________________________________________  Anatomy Scan:  Howard gestation. Biometry:  Fetal Measurements used for the estimation of the gestational age are bolded.   BPD 72.3 mm 21st% 29w0d (28w2d to 29w5d)  .3 mm 45th% 30w4d (27w4d to 33w4d)  .9 mm 33rd% 29w1d (28w3d to 30w0d)  FL 58.6 mm 65th% 30w4d (27w5d to 33w4d)  .1 mm 76th% 30w5d  HUM 52.2 mm 70th% 30w4d  VENTRp 5.7 mm n/a  EFW (lbs/oz) 3 lbs 3 ozs  EFW (g) 1454 g  54th%       Fetal Anatomy:  Visualized with normal appearance: head, brain, chest, abdominal wall, gastrointestinal tract, kidneys, bladder. Heart: The 4-chamber view was obtained but outflow tracts could not be adequately visualized. Genitalia: normal.    Summary of Ultrasound Findings:  Transabdominal US. U/S machine: [facility] E8 Expert. U/S view: limited by adiposity. Impression: The evaluated fetal anatomy appears normal.    ____________________________________________________________________________  Fetal Wellbeing Assessment:  Amniotic fluid: normal. ALEX: 15.0 cm. MVP: 5.6 cm. Q1: 3.0 cm. Q2: 2.3 cm. Q3: 4.1 cm. Q4: 5.6 cm.     ____________________________________________________________________________  Maternal Structures:  Cervix: normal. Cervical length: 15 mm.  ____________________________________________________________________________  Report Summary:  Impression: A follow-up study was performed for fetal evaluation and cervical length assessment. Ms. Renee Zuluaga is hospitalized due to funic presentation and short cervix. She denies any symptoms concerning for labor at this time. She reports good fetal movement. Single viable IUP is observed. Funic presentation is present leading the fetal head toward the cervix. There is ample fluid between the head, cord, and cervix with no evidence of cord compression or engagement. The composite biometry consistent with dates is observed. EFW is appropriate for gestational age. The maternal cervix is closed and its length is about 15 mm by transabdominal examination. The closed cervical length is short for gestational age. The internal cervical canal is narrowly dilated (~15 mm dilated) along its length up to the closed portion of the cervix. Fetal anatomy was not reviewed in detail, but appears normal as indicated above. Normal fetal movements and amniotic fluid measurements are noted.      Recommendations: Continue current plan devised by her on-service MFM service and OB team.  Currently she is scheduled for weekly evaluations by ultrasound.

## 2018-10-18 NOTE — ADT AUTH CERT NOTES
Labor, Threatened - Care Day 3 (10/18/2018) by Laurie Salcedo RN        Review Status Review Entered   Completed 10/18/2018 13:22      Criteria Review      Care Day: 3 Care Date: 10/18/2018 Level of Care: Inpatient Floor   Guideline Day 2    Clinical Status   (X) * Active labor absent   10/18/2018 1:22 PM EDT by Laurie Salcedo     Uterine Activity: None      (X) * Fetal distress absent   10/18/2018 1:22 PM EDT by Laurie Salcedo     none noted      ( ) * Membranes intact   ( ) * Condition requiring premature delivery absent   ( ) * No inciting cause or maternal comorbidity identified that requires inpatient treatment   ( ) * Discharge plans and education understood      Activity   ( ) * Activity as tolerated      Routes   (X) * Oral hydration, medications, and diet   10/18/2018 1:22 PM EDT by Mouna Frankel prenatal vit with iron q d, regular diet         Interventions   (X) Fetal monitoring   10/18/2018 1:22 PM EDT by Laurie Salcedo     Non stress test:  Reactive         Medications   (X) Possible progesterone supplementation   10/18/2018 1:22 PM EDT by Laurie Salcedo     progesterone 200 mg vaginal q hs         10/18/2018 1:22 PM EDT by Laurie Salcedo   Subject: Additional Clinical Information   10-18-18OB- [id number] insufficiency, cervix shortened to 11mm with large funnel funic presentation, on vaginal progesteroneS/p BMZ x 2 -GBS negativePlan- Continue hospitalization with hospitalized bedrest  and close observationImmediate delivery if labor or PROM given funic presentation unless changedRepeat scan with MFM today  and dispo per findingsNST BIDRegular diet and bed restContinue vaginal progesteroneVitals- 122/58, 88, 98.6, 15. MFM- Impression: A follow-up study was performed for fetal evaluation and cervical length assessment. Ms. Nina Clancy is hospitalized due to funic presentation and short cervix.   She denies any symptoms concerning for labor at this time. She reports good fetal movement. Single viable IUP is observed.   Funic presentation is present leading the fetal head toward the cervix.   There is ample fluid between the head, cord, and cervix with no evidence of cord compression or engagement. The composite biometry consistent with dates is observed.   EFW is appropriate for gestational age. The maternal cervix is closed and its length is about 15 mm by transabdominal examination.   The closed cervical length is short for gestational age.   The internal cervical canal is narrowly dilated (~15 mm dilated) along its length up to the closed portion of the cervix. Fetal anatomy was not reviewed in detail, but appears normal as indicated above.   Normal fetal movements and amniotic fluid measurements are noted.  Recommendations: Continue current plan devised by her on-service MFM service and OB team.   Currently she is scheduled for weekly evaluations by ultrasoundOB 1634 [id number]- Reviewed case with Bashir Guillermo and DeMayGreatly appreciate MFM inputGiven shortening of cervix since last visit and unstable funic presentationWill continue with inpatient monitoringWill redose steroids if labor develops Otherwise will rescan in 4-5 days               * Milestone          Labor, Threatened - Care Day 2 (10/17/2018) by Nichol Toribio RN        Review Status Review Entered   Completed 10/17/2018 13:37      Criteria Review      Care Day: 2 Care Date: 10/17/2018 Level of Care: Inpatient Floor   Guideline Day 2    Clinical Status   (X) * Active labor absent   10/17/2018 1:37 PM EDT by Nichol Toribio     Uterine Activity: None      (X) * Fetal distress absent   10/17/2018 1:37 PM EDT by Nichol Toribio     Non stress test:  Non-reactive but appropriate for gestational age      ( ) * Membranes intact   ( ) * Condition requiring premature delivery absent   ( ) * No inciting cause or maternal comorbidity identified that requires inpatient treatment   ( ) * Discharge plans and education understood      Activity   ( ) * Activity as tolerated      Routes   (X) * Oral hydration, medications, and diet   10/17/2018 1:37 PM EDT by Mouna Frankel prenatal vit po q d, regular diet         Medications   (X) Possible progesterone supplementation   10/17/2018 1:37 PM EDT by Laurie Salcedo     prometrium 200 mg vaginal q hs         10/17/2018 1:37 PM EDT by Laurie Salcedo   Subject: Additional Clinical Information   10-17-1829w3dCervical insufficiency, cervix shortened to 11mm with large funnel and baby in cephalic presentation with funic presentation of cord filling amniotic bag distal to head; on vaginal progesterone, getting serial cervical lengths showing progressive shorteneing, asymptomaticS/p BMZ x 2 -GBS negativePlan:   Continue hospitalization with hospitalized bedrest and close observationImmediate delivery if labor or PROM given funic presentation unless changedRepeat scan with MFM in 48 hours (tomorrow) and dispo per findingsNST BIDRegular diet and bed restS/P BMZ course - will repeat if  delivery becomes probableWill continue vaginal progesteroneOrders/Charges: Medium and Non Stress Test   X 2Vitals- 124/73, 83, 98.4, 14.

## 2018-10-18 NOTE — PROGRESS NOTES
Ante Partum Progress Note    Jory Garnett  50O9V    Assessment: 44A7L   Cervical insufficiency, cervix shortened to 11mm with large funnel funic presentation, on vaginal progesterone  S/p BMZ x 2 -  GBS negative        Plan:      Continue hospitalization with hospitalized bedrest and close observation  Immediate delivery if labor or PROM given funic presentation unless changed  Repeat scan with MFM today and dispo per findings  NST BID  Regular diet and bed rest  Continue vaginal progesterone        Orders/Charges: Medium and Non Stress Test    Patient states she has no new complaints    Vitals:  Visit Vitals  /58   Pulse 88   Temp 98.6 °F (37 °C)   Resp 15   Ht 5' 7\" (1.702 m)   Wt 104.8 kg (231 lb)   Breastfeeding? Yes   BMI 36.18 kg/m²     Temp (24hrs), Av.4 °F (36.9 °C), Min:98.1 °F (36.7 °C), Max:98.6 °F (37 °C)      Last 24hr Input/Output:  No intake or output data in the 24 hours ending 10/18/18 0653     Non stress test:  Reactive    No data found. No data found. Uterine Activity: None     Exam:  Patient without distress. Abdomen, fundus soft non-tender     Extremities, no redness or tenderness               Additional Exam: Patient without distress, Abdomen soft, non-tender, Fundus soft and Lower extremities edema 1+    Labs:     Lab Results   Component Value Date/Time    WBC 8.2 10/16/2018 01:27 PM    WBC 8.5 2018 12:14 PM    WBC 10.4 2014 01:07 AM    HGB 10.8 (L) 10/16/2018 01:27 PM    HGB 11.3 (L) 2018 12:14 PM    HGB 11.7 2014 01:07 AM    HCT 34.3 (L) 10/16/2018 01:27 PM    HCT 35.7 2018 12:14 PM    HCT 37.6 2014 01:07 AM    PLATELET 241 10/3384 01:27 PM    PLATELET 108  12:14 PM    PLATELET 613  01:07 AM       No results found for this or any previous visit (from the past 24 hour(s)).

## 2018-10-18 NOTE — PROGRESS NOTES
Reviewed case with Bashir Galindo and [facility]  Greatly appreciate MFM input  Given shortening of cervix since last visit and unstable funic presentation  Will continue with inpatient monitoring  Will redose steroids if labor develops   Otherwise will rescan in 4-5 days

## 2018-10-18 NOTE — PROGRESS NOTES
10/17/18  8:24 PM  NST completed. Provided the patient with ginger ale. Patient without complaints or further needs at this time.

## 2018-10-19 PROCEDURE — 74011250637 HC RX REV CODE- 250/637: Performed by: OBSTETRICS & GYNECOLOGY

## 2018-10-19 PROCEDURE — 65270000029 HC RM PRIVATE

## 2018-10-19 RX ADMIN — Medication 1 TABLET: at 09:25

## 2018-10-19 RX ADMIN — PROGESTERONE 200 MG: 100 CAPSULE ORAL at 22:00

## 2018-10-19 NOTE — PROGRESS NOTES
1900  SBAR report from Adriel Gibbs. Assumed care of the patient at this time. Discussed plan of care for the evening with the patient, she verbalized understanding and has no further questions at this time.

## 2018-10-19 NOTE — PROGRESS NOTES
07:00- OB SBAR report received from Sabas Weber  10:07- Reviewed FHT with MD Walter. MD states pt may come off the monitor. Strip reactive. 12:18- Pt resting comfortably in bed. Lunch tray delivered. No complaints at this time  18:52- OB SBAR report given to PAT Ochoa

## 2018-10-19 NOTE — PROGRESS NOTES
Ante Partum Progress Note    Larry Miller  55M5F    Assessment: 29w5d   Cervical insufficiency, cervix shortened to 11mm with large funnel funic presentation, on vaginal progesterone - repeat ultrasound yesterday demonstrated cervix now 15mm (improved) still with 15mm wide funnel with funic presentation however cord and head are floating and cord at the time of ultrasound not located between head and cervix and moved freely in the amniotic bag  No si/sx of PTL  S/p BMZ x 2 -  GBS negative        Plan:    Continue hospitalization with hospitalized bedrest and close observation  Immediate delivery if labor or PROM given funic presentation unless changed  Discussed plan of care with Dr. Valenzuela Im this am and she recommends to stay in-house over the weekend given persistent funic presentation in setting of shortened cervix and repeat ultrasound on Monday to see if funic presentation resolved and if 1) funic presentation resolved and 2) cervical length stable without PTL sxs may consider d/c home for bedrest at home and close f/u in office  NST BID  Regular diet and bed rest  Continue vaginal progesterone        Plan:  Continue hospitalization with hospitalized bedrest    Orders/Charges: Medium and Non Stress Test    Patient states she has no new complaints    Vitals:  Visit Vitals  /66   Pulse 92   Temp 98.4 °F (36.9 °C)   Resp 14   Ht 5' 7\" (1.702 m)   Wt 104.8 kg (231 lb)   SpO2 100%   Breastfeeding? Yes   BMI 36.18 kg/m²     Temp (24hrs), Av.4 °F (36.9 °C), Min:98.2 °F (36.8 °C), Max:98.6 °F (37 °C)      Last 24hr Input/Output:  No intake or output data in the 24 hours ending 10/19/18 0737     Non stress test:  Reactive    No data found. No data found. Uterine Activity: None     Exam:  Patient without distress.      Abdomen, fundus soft non-tender, GRAVID     Extremities, no redness or tenderness               Additional Exam: Patient without distress, Abdomen soft, non-tender, Fundus soft and non tender, Perineum No sign of blood or amniotic fluid and Lower extremities edema No    Labs:     Lab Results   Component Value Date/Time    WBC 8.2 10/16/2018 01:27 PM    WBC 8.5 09/25/2018 12:14 PM    WBC 10.4 04/17/2014 01:07 AM    HGB 10.8 (L) 10/16/2018 01:27 PM    HGB 11.3 (L) 09/25/2018 12:14 PM    HGB 11.7 04/17/2014 01:07 AM    HCT 34.3 (L) 10/16/2018 01:27 PM    HCT 35.7 09/25/2018 12:14 PM    HCT 37.6 04/17/2014 01:07 AM    PLATELET 505 48/72/6707 01:27 PM    PLATELET 216 82/76/2030 12:14 PM    PLATELET 013 33/64/2351 01:07 AM       No results found for this or any previous visit (from the past 24 hour(s)).

## 2018-10-20 PROCEDURE — 59025 FETAL NON-STRESS TEST: CPT

## 2018-10-20 PROCEDURE — 65270000029 HC RM PRIVATE

## 2018-10-20 PROCEDURE — 77030032490 HC SLV COMPR SCD KNE COVD -B

## 2018-10-20 PROCEDURE — 74011250637 HC RX REV CODE- 250/637: Performed by: OBSTETRICS & GYNECOLOGY

## 2018-10-20 PROCEDURE — 77030033269 HC SLV COMPR SCD KNE2 CARD -B

## 2018-10-20 RX ADMIN — PROGESTERONE 200 MG: 100 CAPSULE ORAL at 22:17

## 2018-10-20 RX ADMIN — Medication 1 TABLET: at 09:55

## 2018-10-20 NOTE — PROGRESS NOTES
1900- Bedside report received. Pt talking on phone. VSS. No needs. 2030- Visiting with family. 2300- Resting in bed with eyes closed. 0100- Sleeping. 46- Sleeping. 0430- Sleeping. 56- Sleeping. 0700- Bedside and Verbal shift change report given to John Fontaine (oncoming nurse) by Arelis Albert (offgoing nurse). Report included the following information SBAR.

## 2018-10-20 NOTE — PROGRESS NOTES
0710 - Bedside, Verbal and Written shift change report given to this RN, Jenna Wells (oncoming nurse) by Amilcar Pinedo RN (offgoing nurse). Report included the following information SBAR, Kardex, Intake/Output, MAR, Recent Results and Med Rec Status. Assumed care of pt. At this time. 9483 - Rounded on pt at this time. Pt in bed, breathing evenly, resting quietly with eyes closed; left undisturbed. 0840 - Rounded on pt at this time. VS and assessment done. Pt resting quietly in bed, ordering breakfast. Pt denies further needs at this time. PO fluids given. 0950 - Pt placed on EFM x 2 for ordered NST. Pt resting comfortably in bed, denies further needs at this time. 1025 - PO juice given at this time. 1044 - Fetal tracing reactive - taken off EFM at this time. Verified by second RN, Skip Liriano. 1047 - RN at bedside; pt resting quietly, denies further needs. VS taken. 1130 - Pt oob to shower independently at this time. Gown changed. Oral care. Bed linens changed. Pt denies further needs at this time. 1234 - Rounded on pt at this time. Pt denies pain, denies needs. Resting comfortably in bed. 1315 - Rounded on pt at this time. Pt sitting up in bed, eating lunch. Pt denies further needs at this time. 1426 - Rounded on pt at this time. Pt resting in bed; states she will try to take a nap now. No concerns or needs vocalized. 1500 - Rounded on pt at this time - pt resting with eyes closed, breathing evenly; left undisturbed. 1610 - Rounded on pt at this time. Assessment and VS done. Pt denies pain. Denies further needs. Resting comfortably in bed, watching TV.  1714 - Rounded on pt at this time. Pt resting comfortably in bed, watching TV. Pt denies pain, denies further concerns at this time. 1803 - Rounded on pt at this time; pt denies needs or concerns at this time. Sitting up in bed, eating dinner.   1920 - Bedside, Verbal and Written shift change report given to Pat Lea RN (oncoming nurse) by this RN, Deon Jenkins (offgoing nurse). Report included the following information SBAR, Kardex, Procedure Summary, Intake/Output, MAR, Recent Results and Med Rec Status. Relinquished care of pt.

## 2018-10-20 NOTE — PROGRESS NOTES
Ante Partum Progress Note    Render Challenger  29w6d    Assessment: 29w6d   Cervical insufficiency, cervix shortened to 11mm with large funnel funic presentation, on vaginal progesterone - repeat ultrasound yesterday demonstrated cervix now 15mm (improved) still with 15mm wide funnel with funic presentation however cord and head are floating and cord at the time of ultrasound not located between head and cervix and moved freely in the amniotic bag  No si/sx of PTL  S/p BMZ x 2 -  GBS negative        Plan:    Continue hospitalization with hospitalized bedrest and close observation  Immediate delivery if labor or PROM given funic presentation unless changed  Discussed plan of care with Dr. Patrick Samples this am and she recommends to stay in-house over the weekend given persistent funic presentation in setting of shortened cervix and repeat ultrasound on Monday to see if funic presentation resolved and if 1) funic presentation resolved and 2) cervical length stable without PTL sxs may consider d/c home for bedrest at home and close f/u in office  NST BID  Regular diet and bed rest  Continue vaginal progesterone      Orders/Charges:   Medium and Non Stress Test  X 2    Fetal tracing from yesterday reviewed  AM - 145, moderate variability, + accels 10/10,  few variables which are mild and resolve  Reassuring for gestational age    Patient is currently on monitor this am  Baseline 150, moderate variability, + accels 10/10, no decels - reassuring for gestational age    Patient states she has no new complaints    Vitals:  Visit Vitals  /65   Pulse 85   Temp 98.9 °F (37.2 °C)   Resp 14   Ht 5' 7\" (1.702 m)   Wt 104.8 kg (231 lb)   SpO2 98%   Breastfeeding?  Yes   BMI 36.18 kg/m²     Temp (24hrs), Av.7 °F (37.1 °C), Min:98.4 °F (36.9 °C), Max:98.9 °F (37.2 °C)      Last 24hr Input/Output:  No intake or output data in the 24 hours ending 10/20/18 0995     Non stress test:  Non-reactive but appropriate for gestational age    No data found. No data found. Uterine Activity: None     Exam:  Patient without distress. Abdomen, fundus soft non-tender     Extremities, no redness or tenderness               Additional Exam: Patient without distress, Abdomen soft, non-tender, Fundus soft and non tender, Perineum No sign of blood or amniotic fluid and Lower extremities edema No    Labs:     Lab Results   Component Value Date/Time    WBC 8.2 10/16/2018 01:27 PM    WBC 8.5 09/25/2018 12:14 PM    WBC 10.4 04/17/2014 01:07 AM    HGB 10.8 (L) 10/16/2018 01:27 PM    HGB 11.3 (L) 09/25/2018 12:14 PM    HGB 11.7 04/17/2014 01:07 AM    HCT 34.3 (L) 10/16/2018 01:27 PM    HCT 35.7 09/25/2018 12:14 PM    HCT 37.6 04/17/2014 01:07 AM    PLATELET 759 91/16/3893 01:27 PM    PLATELET 371 04/26/8111 12:14 PM    PLATELET 892 26/83/9555 01:07 AM       No results found for this or any previous visit (from the past 24 hour(s)).

## 2018-10-21 PROCEDURE — 74011250637 HC RX REV CODE- 250/637: Performed by: OBSTETRICS & GYNECOLOGY

## 2018-10-21 PROCEDURE — 59025 FETAL NON-STRESS TEST: CPT

## 2018-10-21 PROCEDURE — 65270000029 HC RM PRIVATE

## 2018-10-21 RX ORDER — ACETAMINOPHEN 325 MG/1
650 TABLET ORAL
Status: DISCONTINUED | OUTPATIENT
Start: 2018-10-21 | End: 2018-10-22 | Stop reason: HOSPADM

## 2018-10-21 RX ORDER — LANOLIN ALCOHOL/MO/W.PET/CERES
1 CREAM (GRAM) TOPICAL
Status: DISCONTINUED | OUTPATIENT
Start: 2018-10-21 | End: 2018-10-22 | Stop reason: HOSPADM

## 2018-10-21 RX ADMIN — PROGESTERONE 200 MG: 100 CAPSULE ORAL at 22:13

## 2018-10-21 RX ADMIN — FERROUS SULFATE TAB 325 MG (65 MG ELEMENTAL FE) 325 MG: 325 (65 FE) TAB at 09:25

## 2018-10-21 RX ADMIN — Medication 1 TABLET: at 09:25

## 2018-10-21 NOTE — PROGRESS NOTES
2142: Bedside and Verbal shift change report given to ESPERANZA Buck RN (oncoming nurse) by Daren Workman RN (offgoing nurse). Report included the following information SBAR, Kardex, Intake/Output, MAR, Recent Results and Med Rec Status. 0830: RN at bedside for assessment, pt resting quietly, pt denies any complaints at this time. Patient reports positive fetal movement, denies any contractions, denies any vaginal bleeding or leaking of fluid. Plan of care discussed with patient for the day, pt verbalized understanding. 1900: Bedside and Verbal shift change report given to ESPERANZA España RN (oncoming nurse) by ESPERANZA Buck RN (offgoing nurse). Report included the following information SBAR, Kardex, MAR, Recent Results and Med Rec Status.

## 2018-10-21 NOTE — PROGRESS NOTES
Ante Partum Progress Note    Nicole Perea  30w0d    Assessment: 30w0d   Cervical insufficiency, cervix shortened to 11mm with large funnel funic presentation, on vaginal progesterone - repeat ultrasound on 10/18 demonstrated cervix now 15mm (improved) still with 15mm wide funnel with funic presentation however cord and head are floating and dynamic and cord at the time of ultrasound not located between head and cervix and moved freely in the amniotic bag but would intermittently still move into funic presentation No si/sx of PTL  S/p BMZ x 2 -  GBS negative  Mild anemia of pregnancy        Plan:    Continue hospitalization with hospitalized bedrest and close observation  Immediate delivery if labor or PROM given funic presentation unless changed  Discussed plan of care with Dr. Hastings Older Friday, 10/19 and she recommends to stay in-house over the weekend given persistent funic presentation in setting of shortened cervix and repeat ultrasound on Monday to see if funic presentation resolved and if 1) funic presentation resolved and 2) cervical length stable without PTL sxs may consider d/c home for bedrest at home and close f/u in office  NST BID  Regular diet and bed rest  Continue vaginal progesterone  Continue daily iron      Orders/Charges:   NST yesterday am: Baseline 150, moderate variability, 10/10 accels, no decels - reassuring  NST yesterday pm: Baseline 150, moderate variability, 10/10 accels, no decels - reasuring    Low and Non Stress Test  X 2 today pending (am NST not done yet as patient has just woken up)    Patient states she has no new complaints    Vitals:  Visit Vitals  /74 (BP 1 Location: Left arm, BP Patient Position: At rest)   Pulse 88   Temp 98.4 °F (36.9 °C)   Resp 16   Ht 5' 7\" (1.702 m)   Wt 104.8 kg (231 lb)   SpO2 98%   Breastfeeding?  Yes   BMI 36.18 kg/m²     Temp (24hrs), Av.5 °F (36.9 °C), Min:98.3 °F (36.8 °C), Max:98.9 °F (37.2 °C)      Last 24hr Input/Output:  No intake or output data in the 24 hours ending 10/21/18 0727     Non stress test:  Non-reactive but appropriate for gestational age    No data found. No data found. Uterine Activity: None     Exam:  Patient without distress. Abdomen, fundus soft non-tender     Extremities, no redness or tenderness               Additional Exam: Deferred    Labs:     Lab Results   Component Value Date/Time    WBC 8.2 10/16/2018 01:27 PM    WBC 8.5 09/25/2018 12:14 PM    WBC 10.4 04/17/2014 01:07 AM    HGB 10.8 (L) 10/16/2018 01:27 PM    HGB 11.3 (L) 09/25/2018 12:14 PM    HGB 11.7 04/17/2014 01:07 AM    HCT 34.3 (L) 10/16/2018 01:27 PM    HCT 35.7 09/25/2018 12:14 PM    HCT 37.6 04/17/2014 01:07 AM    PLATELET 793 81/56/7794 01:27 PM    PLATELET 936 48/37/0236 12:14 PM    PLATELET 908 15/31/7248 01:07 AM       No results found for this or any previous visit (from the past 24 hour(s)).

## 2018-10-21 NOTE — PROGRESS NOTES
1920: Bedside and Verbal shift change report given to A Renzo Ankeny (oncoming nurse) by Lee Ann Coy RN (offgoing nurse). Report included the following information SBAR, Kardex, Intake/Output, MAR, Accordion, Recent Results and Med Rec Status. Assumed care of pt at this time. 1950: RN at bedside to perform assessment and place pt on EFM. Pt denies HA, visual disturbances, RUQ pain. Pt denies need of anything at this time. RN and pt discuss purposeful hourly rounding. 2050: Purposeful hourly rounding. Pt observed resting quietly. Pt denies need of anything at this time. 2210: RN at bedside for medication administration. Pt denies need of anything else at this time. 2320: Purposeful rounding performed. Pt observed sleeping at this time. Respirations even and unlabored. 0037: Bedside and Verbal shift change report given to [facility] RN (oncoming nurse) by Sonali Loomis RN (offgoing nurse). Report included the following information SBAR, Kardex, Intake/Output, MAR, Accordion, Recent Results and Med Rec Status.

## 2018-10-22 VITALS
WEIGHT: 231 LBS | HEIGHT: 67 IN | DIASTOLIC BLOOD PRESSURE: 72 MMHG | HEART RATE: 105 BPM | TEMPERATURE: 98.1 F | SYSTOLIC BLOOD PRESSURE: 122 MMHG | OXYGEN SATURATION: 98 % | BODY MASS INDEX: 36.26 KG/M2 | RESPIRATION RATE: 16 BRPM

## 2018-10-22 PROCEDURE — 59025 FETAL NON-STRESS TEST: CPT

## 2018-10-22 PROCEDURE — 76816 OB US FOLLOW-UP PER FETUS: CPT | Performed by: OBSTETRICS & GYNECOLOGY

## 2018-10-22 PROCEDURE — 76817 TRANSVAGINAL US OBSTETRIC: CPT | Performed by: OBSTETRICS & GYNECOLOGY

## 2018-10-22 NOTE — PROGRESS NOTES
0700 sbar report received from jameson akhtar rn  0736 am assessment performed  1045 up to mfm  1130 returned from [facility]  1135 Dr Nacho Boucher aware per Dr Valdez Linear pt may be discharged home  [phone] sbar report given to lizzy menon rn

## 2018-10-22 NOTE — PROGRESS NOTES
1200: assumed care of pt at this time from KEVIN Arroyo Rn  2648: Pt given d/c instructions at this time. Pt verbalizes understanding. Pt informed to make an appt with Dr. Corinne Giovanni in ONE week for f/u.    [id number]: Pt ambulated off L&D at this time in stable condition.

## 2018-10-22 NOTE — PROGRESS NOTES
Ante Partum Progress Note    Florence Finnegan  30w1d    Assessment: 30w1d   Cervical insufficiency, cervix shortened to 11mm with large funnel funic presentation, on vaginal progesterone   S/p BMZ x 2 -  GBS negative  Mild anemia of pregnancy        Plan:    Continue hospitalization with hospitalized bedrest and close observation  Immediate delivery if labor or PROM given funic presentation unless changed  Repeat scan today to see if funic presentation resolved and if:    1) funic presentation resolved and 2) cervical length stable without PTL sxs may consider d/c home for bedrest at home and close f/u in office  NST BID  Regular diet and bed rest  Continue vaginal progesterone  Continue daily iron        Orders/Charges: Medium and Non Stress Test    Patient states she has no new complaints    Vitals:  Visit Vitals  /75 (BP 1 Location: Left arm, BP Patient Position: At rest)   Pulse 93   Temp 98.2 °F (36.8 °C)   Resp 16   Ht 5' 7\" (1.702 m)   Wt 104.8 kg (231 lb)   SpO2 98%   Breastfeeding? Yes   BMI 36.18 kg/m²     Temp (24hrs), Av.5 °F (36.9 °C), Min:98.2 °F (36.8 °C), Max:98.8 °F (37.1 °C)      Last 24hr Input/Output:  No intake or output data in the 24 hours ending 10/22/18 0736     Non stress test:  Reactive  150's moderate variability no decelerations    Uterine Activity: None     Exam:  Patient without distress.      Abdomen, fundus soft non-tender     Extremities, no redness or tenderness, SCD's in place               Additional Exam: Deferred    Labs:     Lab Results   Component Value Date/Time    WBC 8.2 10/16/2018 01:27 PM    WBC 8.5 2018 12:14 PM    WBC 10.4 2014 01:07 AM    HGB 10.8 (L) 10/16/2018 01:27 PM    HGB 11.3 (L) 2018 12:14 PM    HGB 11.7 2014 01:07 AM    HCT 34.3 (L) 10/16/2018 01:27 PM    HCT 35.7 2018 12:14 PM    HCT 37.6 2014 01:07 AM    PLATELET 192  01:27 PM    PLATELET 026  12:14 PM    PLATELET 568  01:07 AM No results found for this or any previous visit (from the past 24 hour(s)).

## 2018-10-22 NOTE — PROGRESS NOTES
M - Please see the Ultrasound report / consult note to be entered into this patient's record as a scanned document from my office. A text copy of this note is also provided below for convenience. Calvin Lerner M.D., Ph.D.  Ingrid Adame    ------------------------    Indication: Obesity complicating pregnancy 3rd Tri 099.213, AMA Multigravida 3rd Tri O09.523.   ____________________________________________________________________________  History: Age: 45 years. Maternal age at [facility]: 45 years. : 1 Para: 0.   Current Pregnancy: Pre- pregnancy data: Weight 233 lbs. Height 5 ft 7 ins. BMI 36.5.  ____________________________________________________________________________  Dating:  Stated EDC:  EDC: 18 GA by stated EDC: 30w1d  Best Overall Assessment: 10/22/18 EDC: 18 Assessed GA: 30w1d  The Best Overall Assessment is based on the stated EDC.  ____________________________________________________________________________  General Evaluation:  Fetal heart activity: present. Fetal heart rate: 160 bpm.   Presentation: cephalic. Fetal movement: visible. Amniotic Fluid: normal. ALEX  9.5 cm. Maximal vertical pocket 5.7 cm. Placenta: anterior. ____________________________________________________________________________  Anatomy Scan:  Howard gestation. Fetal Anatomy:  Visualized with normal appearance: gastrointestinal tract, kidneys, bladder. Summary of Ultrasound Findings:  Transabdominal and transvaginal US. U/S machine: [employer] E8 Expert. U/S view: limited by late gestational age. Impression: The evaluated fetal anatomy appears normal.    ____________________________________________________________________________  Fetal Wellbeing Assessment:  Amniotic fluid: normal. ALEX: 9.5 cm. MVP: 5.7 cm. Q1: 1.3 cm. Q2: 5.7 cm. Q3: 0.6 cm.  Q4: 1.9 cm.     ____________________________________________________________________________  Report Summary:  Impression: A follow-up study was performed for fetal evaluation and cervical length assessment. Transabdominal and endovaginal ultrasound evaluations were performed. Ms. Moe De La Torre is hospitalized due to funic presentation and short cervix. She denies any symptoms concerning for labor at this time. She reports good fetal movement. Single viable IUP is observed. The cord is NOT presenting to the cervix and is currently draped around the shoulder/neck area of the fetus. The maternal cervix is closed and its length is about 13.5 mm by transabdominal examination. The closed cervical length is short for gestational age. There is broad U-shaped funneling at the closed cervix with no sludging of material.    Fetal anatomy was not reviewed in detail, but appears normal as indicated above. Normal fetal movements and amniotic fluid measurements are noted. Recommendations: Continue current plan devised by her on-service MFM service and OB team.  Consider discharge planning if there is no evidence of labor or concern over progressive cervical dilation. ____________________________________________________________________________  Fetal Growth Overview:  Date GA BPD [mm] HC [mm] AC [mm] FL [mm] HUM [mm] EFW GP  09/12/18 24 + 3 ... ... ... ... ... ... . ..  10/18/18 29 + 4 72.3 21st 279.3 45th 249.9 33rd 58.6 65th 52.2 70th 1454g , 3 lbs 3 ozs 54th%%  10/22/18 30 + 1 ... ... ... ... ... ... . ..

## 2018-10-22 NOTE — PROGRESS NOTES
rescan today shows stable cervix and cephalic presentation  Will discharge with strict precautions and close followup

## 2018-10-22 NOTE — DISCHARGE INSTRUCTIONS
MAKE APPT. WITH DR. CHANG IN ONE WEEK!!!     Counting Your Baby's Kicks: Care Instructions  Your Care Instructions    Counting your baby's kicks is one way your doctor can tell that your baby is healthy. Most women--especially in a first pregnancy--feel their baby move for the first time between 16 and 22 weeks. The movement may feel like flutters rather than kicks. Your baby may move more at certain times of the day. When you are active, you may notice less kicking than when you are resting. At your prenatal visits, your doctor will ask whether the baby is active. In your last trimester, your doctor may ask you to count the number of times you feel your baby move. Follow-up care is a key part of your treatment and safety. Be sure to make and go to all appointments, and call your doctor if you are having problems. It's also a good idea to know your test results and keep a list of the medicines you take. How do you count fetal kicks? · A common method of checking your baby's movement is to count the number of kicks or moves you feel in 1 hour. Ten movements (such as kicks, flutters, or rolls) in 1 hour are normal. Some doctors suggest that you count in the morning until you get to 10 movements. Then you can quit for that day and start again the next day. · Pick your baby's most active time of day to count. This may be any time from morning to evening. · If you do not feel 10 movements in an hour, your baby may be sleeping. Wait for the next hour and count again. When should you call for help? Call your doctor now or seek immediate medical care if:    · You noticed that your baby has stopped moving or is moving much less than normal.    Watch closely for changes in your health, and be sure to contact your doctor if you have any problems. Where can you learn more? Go to http://mikal-blair.info/.   Enter W523 in the search box to learn more about \"Counting Your Baby's Kicks: Care Instructions. \"  Current as of: 2017  Content Version: 11.8  © 8880-0956 [employer]. Care instructions adapted under license by [employer] (which disclaims liability or warranty for this information). If you have questions about a medical condition or this instruction, always ask your healthcare professional. [address] any warranty or liability for your use of this information. Weeks 26 to 30 of Your Pregnancy: Care Instructions  Your Care Instructions    You are now in your last trimester of pregnancy. Your baby is growing rapidly. And you'll probably feel your baby moving around more often. Your doctor may ask you to count your baby's kicks. Your back may ache as your body gets used to your baby's size and length. If you haven't already had the Tdap shot during this pregnancy, talk to your doctor about getting it. It will help protect your  against pertussis infection. During this time, it's important to take care of yourself and pay attention to what your body needs. If you feel sexual, explore ways to be close with your partner that match your comfort and desire. Use the tips provided in this care sheet to find ways to be sexual in your own way. Follow-up care is a key part of your treatment and safety. Be sure to make and go to all appointments, and call your doctor if you are having problems. It's also a good idea to know your test results and keep a list of the medicines you take. How can you care for yourself at home? Take it easy at work  · Take frequent breaks. If possible, stop working when you are tired, and rest during your lunch hour. · Take bathroom breaks every 2 hours. · Change positions often. If you sit for long periods, stand up and walk around. · When you stand for a long time, keep one foot on a low stool with your knee bent. After standing a lot, sit with your feet up.   · Avoid fumes, chemicals, and tobacco smoke. Be sexual in your own way  · Having sex during pregnancy is okay, unless your doctor tells you not to. · You may be very interested in sex, or you may have no interest at all. · Your growing belly can make it hard to find a good position during intercourse. [place] and explore. · You may get cramps in your uterus when your partner touches your breasts. · A back rub may relieve the backache or cramps that sometimes follow orgasm. Learn about  labor  · Watch for signs of  labor. You may be going into labor if:  ? You have menstrual-like cramps, with or without nausea. ? You have about 6 or more contractions in 1 hour, even after you have had a glass of water and are resting. ? You have a low, dull backache that does not go away when you change your position. ? You have pain or pressure in your pelvis that comes and goes in a pattern. ? You have intestinal cramping or flu-like symptoms, with or without diarrhea.  ? You notice an increase or change in your vaginal discharge. Discharge may be heavy, mucus-like, watery, or streaked with blood. ? Your water breaks. · If you think you have  labor:  ? Drink 2 or 3 glasses of water or juice. Not drinking enough fluids can cause contractions. ? Stop what you are doing, and empty your bladder. Then lie down on your left side for at least 1 hour. ? While lying on your side, find your breast bone. Put your fingers in the soft spot just below it. Move your fingers down toward your belly button to find the top of your uterus. Check to see if it is tight. ? Contractions can be weak or strong. Record your contractions for an hour. Time a contraction from the start of one contraction to the start of the next one.  ? Single or several strong contractions without a pattern are called [place] contractions. They are practice contractions but not the start of labor. They often stop if you change what you are doing.   ? Call your doctor if you have regular contractions. Where can you learn more? Go to http://mikal-blair.info/. Enter B286 in the search box to learn more about \"Weeks 26 to 30 of Your Pregnancy: Care Instructions. \"  Current as of: November 21, 2017  Content Version: 11.8  © 9300-3484 [employer]. Care instructions adapted under license by [employer] (which disclaims liability or warranty for this information). If you have questions about a medical condition or this instruction, always ask your healthcare professional. [address] any warranty or liability for your use of this information.

## 2018-10-22 NOTE — PROGRESS NOTES
1900: Bedside and Verbal shift change report given to [address] [facility] (oncoming nurse) by Giulia Salcedo RN (offgoing nurse). Report included the following information SBAR, Kardex, Intake/Output, MAR, Accordion, Recent Results and Med Rec Status. Assumed care of pt at this time. Pt to call for RN when ready for assessment and to be placed on EFM. 1945: RN at bedside to perform assessment and place pt on EFM. Pt denies need of anything at this time. Pt states that she has a HA. RN to speak with MD regarding order for tylenol. 2015: RN at bedside to remove pt from [facility]. Pt states that her HA is gone. 2136: Queenie Khan pt may have 650mg tylenol PRN for HA.     2210: RN at bedside for medication administration. Pt denies need of anything else at this time. 2320: Purposeful rounding performed. Pt observed talking on phone in bed. Pt denies need of anything at this time. 0120: Purposeful rounding performed. Pt observed sleeping at this time. Respirations even and unlabored. 5107: Purposeful rounding performed. Pt observed sleeping at this time. Respirations even and unlabored. 8225: RN at bedside to perform reassessment & vitals. Pt denies need of anything at this time. 0700: Bedside and Verbal shift change report given to [address] [place] [address] (oncoming nurse) by Aidan Caballero RN (offgoing nurse). Report included the following information SBAR, Kardex, Intake/Output, MAR, Accordion, Recent Results and Med Rec Status.

## 2018-12-12 ENCOUNTER — HOSPITAL ENCOUNTER (INPATIENT)
Age: 38
LOS: 4 days | Discharge: HOME | End: 2018-12-17
Attending: OBSTETRICS & GYNECOLOGY | Admitting: OBSTETRICS & GYNECOLOGY
Payer: COMMERCIAL

## 2018-12-12 DIAGNOSIS — O13.9 GESTATIONAL HYPERTENSION AFFECTING FIRST PREGNANCY: Primary | ICD-10-CM

## 2018-12-12 LAB
ALBUMIN SERPL-MCNC: 2.9 G/DL (ref 3.5–5)
ALBUMIN/GLOB SERPL: 0.9 {RATIO} (ref 1.1–2.2)
ALP SERPL-CCNC: 100 U/L (ref 45–117)
ALT SERPL-CCNC: 25 U/L (ref 12–78)
ALT SERPL-CCNC: 26 U/L (ref 12–78)
ANION GAP SERPL CALC-SCNC: 8 MMOL/L (ref 5–15)
AST SERPL-CCNC: 19 U/L (ref 15–37)
AST SERPL-CCNC: 21 U/L (ref 15–37)
BILIRUB SERPL-MCNC: 0.8 MG/DL (ref 0.2–1)
BUN SERPL-MCNC: 8 MG/DL (ref 6–20)
BUN/CREAT SERPL: 12 (ref 12–20)
CALCIUM SERPL-MCNC: 8.9 MG/DL (ref 8.5–10.1)
CHLORIDE SERPL-SCNC: 110 MMOL/L (ref 97–108)
CO2 SERPL-SCNC: 21 MMOL/L (ref 21–32)
CREAT SERPL-MCNC: 0.66 MG/DL (ref 0.55–1.02)
CREAT UR-MCNC: 147 MG/DL
ERYTHROCYTE [DISTWIDTH] IN BLOOD BY AUTOMATED COUNT: 14.6 % (ref 11.5–14.5)
GLOBULIN SER CALC-MCNC: 3.2 G/DL (ref 2–4)
GLUCOSE SERPL-MCNC: 80 MG/DL (ref 65–100)
HCT VFR BLD AUTO: 33.8 % (ref 35–47)
HGB BLD-MCNC: 10.8 G/DL (ref 11.5–16)
MCH RBC QN AUTO: 24.3 PG (ref 26–34)
MCHC RBC AUTO-ENTMCNC: 32 G/DL (ref 30–36.5)
MCV RBC AUTO: 76.1 FL (ref 80–99)
NRBC # BLD: 0 K/UL (ref 0–0.01)
NRBC BLD-RTO: 0 PER 100 WBC
PLATELET # BLD AUTO: 237 K/UL (ref 150–400)
PMV BLD AUTO: 10.8 FL (ref 8.9–12.9)
POTASSIUM SERPL-SCNC: 3.7 MMOL/L (ref 3.5–5.1)
PROT SERPL-MCNC: 6.1 G/DL (ref 6.4–8.2)
PROT UR-MCNC: 7 MG/DL (ref 0–11.9)
PROT/CREAT UR-RTO: 0
RBC # BLD AUTO: 4.44 M/UL (ref 3.8–5.2)
SODIUM SERPL-SCNC: 139 MMOL/L (ref 136–145)
WBC # BLD AUTO: 8.4 K/UL (ref 3.6–11)

## 2018-12-12 PROCEDURE — 84460 ALANINE AMINO (ALT) (SGPT): CPT

## 2018-12-12 PROCEDURE — 85027 COMPLETE CBC AUTOMATED: CPT

## 2018-12-12 PROCEDURE — 82570 ASSAY OF URINE CREATININE: CPT

## 2018-12-12 PROCEDURE — 84450 TRANSFERASE (AST) (SGOT): CPT

## 2018-12-12 PROCEDURE — 36415 COLL VENOUS BLD VENIPUNCTURE: CPT

## 2018-12-12 PROCEDURE — 80053 COMPREHEN METABOLIC PANEL: CPT

## 2018-12-12 RX ORDER — SODIUM CHLORIDE 0.9 % (FLUSH) 0.9 %
SYRINGE (ML) INJECTION
Status: DISPENSED
Start: 2018-12-12 | End: 2018-12-13

## 2018-12-13 PROBLEM — O13.9 GESTATIONAL HYPERTENSION AFFECTING FIRST PREGNANCY: Status: ACTIVE | Noted: 2018-12-13

## 2018-12-13 PROCEDURE — 74011250636 HC RX REV CODE- 250/636: Performed by: OBSTETRICS & GYNECOLOGY

## 2018-12-13 PROCEDURE — 65270000029 HC RM PRIVATE

## 2018-12-13 PROCEDURE — 3E0P7VZ INTRODUCTION OF HORMONE INTO FEMALE REPRODUCTIVE, VIA NATURAL OR ARTIFICIAL OPENING: ICD-10-PCS | Performed by: OBSTETRICS & GYNECOLOGY

## 2018-12-13 PROCEDURE — 99218 HC RM OBSERVATION: CPT

## 2018-12-13 PROCEDURE — 99284 EMERGENCY DEPT VISIT MOD MDM: CPT

## 2018-12-13 PROCEDURE — 36415 COLL VENOUS BLD VENIPUNCTURE: CPT

## 2018-12-13 PROCEDURE — 4A1HXCZ MONITORING OF PRODUCTS OF CONCEPTION, CARDIAC RATE, EXTERNAL APPROACH: ICD-10-PCS | Performed by: OBSTETRICS & GYNECOLOGY

## 2018-12-13 PROCEDURE — 94760 N-INVAS EAR/PLS OXIMETRY 1: CPT

## 2018-12-13 PROCEDURE — 75410000002 HC LABOR FEE PER 1 HR

## 2018-12-13 PROCEDURE — BY4FZZZ ULTRASONOGRAPHY OF THIRD TRIMESTER, SINGLE FETUS: ICD-10-PCS | Performed by: OBSTETRICS & GYNECOLOGY

## 2018-12-13 RX ORDER — SODIUM CHLORIDE 0.9 % (FLUSH) 0.9 %
SYRINGE (ML) INJECTION
Status: DISPENSED
Start: 2018-12-13 | End: 2018-12-13

## 2018-12-13 RX ORDER — OXYTOCIN/0.9 % SODIUM CHLORIDE 30/500 ML
0-25 PLASTIC BAG, INJECTION (ML) INTRAVENOUS
Status: DISCONTINUED | OUTPATIENT
Start: 2018-12-13 | End: 2018-12-14 | Stop reason: HOSPADM

## 2018-12-13 RX ORDER — TERBUTALINE SULFATE 1 MG/ML
0.25 INJECTION SUBCUTANEOUS AS NEEDED
Status: DISCONTINUED | OUTPATIENT
Start: 2018-12-13 | End: 2018-12-14 | Stop reason: HOSPADM

## 2018-12-13 RX ORDER — NALBUPHINE HYDROCHLORIDE 10 MG/ML
10 INJECTION, SOLUTION INTRAMUSCULAR; INTRAVENOUS; SUBCUTANEOUS
Status: DISCONTINUED | OUTPATIENT
Start: 2018-12-13 | End: 2018-12-14 | Stop reason: HOSPADM

## 2018-12-13 RX ORDER — SODIUM CHLORIDE, SODIUM LACTATE, POTASSIUM CHLORIDE, CALCIUM CHLORIDE 600; 310; 30; 20 MG/100ML; MG/100ML; MG/100ML; MG/100ML
125 INJECTION, SOLUTION INTRAVENOUS CONTINUOUS
Status: DISCONTINUED | OUTPATIENT
Start: 2018-12-13 | End: 2018-12-17 | Stop reason: HOSPADM

## 2018-12-13 RX ADMIN — OXYTOCIN-SODIUM CHLORIDE 0.9% IV SOLN 30 UNIT/500ML 1 MILLI-UNITS/MIN: 30-0.9/5 SOLUTION at 14:20

## 2018-12-13 RX ADMIN — NALBUPHINE HYDROCHLORIDE 10 MG: 10 INJECTION, SOLUTION INTRAMUSCULAR; INTRAVENOUS; SUBCUTANEOUS at 23:29

## 2018-12-13 RX ADMIN — SODIUM CHLORIDE, SODIUM LACTATE, POTASSIUM CHLORIDE, AND CALCIUM CHLORIDE 125 ML/HR: 600; 310; 30; 20 INJECTION, SOLUTION INTRAVENOUS at 14:21

## 2018-12-13 RX ADMIN — SODIUM CHLORIDE, SODIUM LACTATE, POTASSIUM CHLORIDE, AND CALCIUM CHLORIDE 125 ML/HR: 600; 310; 30; 20 INJECTION, SOLUTION INTRAVENOUS at 21:57

## 2018-12-13 NOTE — PROGRESS NOTES
0750- Bedside and Verbal shift change report given to JOSH Palomo, RN (oncoming nurse) by Caitlin Curtis, RN (offgoing nurse). Report included the following information SBAR, MAR and Recent Results. 7415- Dr. Mariano Gleason at bedside, [facility] to induce pt, pt states understanding and no questions or complaints at this time.      1149- dr. Yvette Pak at bedside    - bedside ultrasound by dr Yvtete Pak confirms vertex position    1900- 30ml removed from vaginal balloon

## 2018-12-13 NOTE — PROGRESS NOTES
Cervical zamora insertion--    Zamora bulb inserted into cervix  60mL sterile saline infused into cervical balloon; 60mL into vaginal balloon  FHTs reactive and reassuring throughout and patient tolerated the procedure well  Will allow patient to eat lunch, then start pitocin

## 2018-12-13 NOTE — PROGRESS NOTES
Ante Partum Progress Note    Jory Garnett  37w4d    Assessment: 39yo  @ 37w4d admitted last night with elevated blood pressures, now found to have GHTN. 1. GHTN: BPs high 130s/80s-140s/90s overnight. PreE labs normal and urine was negative for protein. Recommend IOL for a new diagnosis of GHTN at 37w4d. 2. IUP @ 37w4d: RFS. Bedside ultrasound confirmed cephalic presentation. 3. Vaginal septum: thin vaginal septum vs. Hymenal remnant from 12-5 o'clock. Patient counseled that this is likely to tear with delivery. We discussed likely excision of remnant after delivery. 4. GBS negative    Plan:   -Burger/ pitocin  -epidural prn  -continue to monitor BPs. Antihypertensives currently not necessary  -excise vaginal septum after delivery (too thin to interfere with delivery)    Orders/Charges: Medium and Non Stress Test      Subjective:  Patient states she has no new complaints. Denies HA currently but had one when she woke this morning. Denies change in vision, RUQ pain. Baby is active. Just didn't feel right yesterday when she checked her BP - this feeling of lightheadedness seems to come and go. Vitals:  Visit Vitals  /85   Pulse 96   Temp 98.6 °F (37 °C)   Resp 14   Ht 5' 7\" (1.702 m)   Wt 105.7 kg (233 lb)   Breastfeeding? No   BMI 36.49 kg/m²     Temp (24hrs), Av.4 °F (36.9 °C), Min:98.2 °F (36.8 °C), Max:98.6 °F (37 °C)      Last 24hr Input/Output:  No intake or output data in the 24 hours ending 18 1232     Non stress test:  Reactive, Cat 1    Patient Vitals for the past 4 hrs: Mode Fetal Heart Rate Fetal Activity Variability Decelerations Accelerations RN Reviewed Strip?   18 1200 External 140 Present 6-25 BPM None Yes Yes   18 1135 External 150     Yes   18 0950 External 135 Present 6-25 BPM None Yes Yes   18 0941 External 140     Yes   18 0916 External 140 Present 6-25 BPM None Yes Yes      Patient Vitals for the past 4 hrs:    Mode Fetal Heart Rate Fetal Activity Variability Decelerations Accelerations RN Reviewed Strip?   12/13/18 1200 External 140 Present 6-25 BPM None Yes Yes   12/13/18 1135 External 150     Yes   12/13/18 0950 External 135 Present 6-25 BPM None Yes Yes   12/13/18 0941 External 140     Yes   12/13/18 0916 External 140 Present 6-25 BPM None Yes Yes        Uterine Activity: None     Exam:      GEN Patient without distress  Pulm no resp distress  Abd soft, gravid, nontender   thin vaginal septum vs. Hymenal remnant visible and palpable from 12-5 o'clock.   Scar tissue is evident at the ectocervix - cervix very soft, 1cm/30%/-4  Ext tr LE edema bilaterally, nontender      Labs:     Lab Results   Component Value Date/Time    WBC 8.4 12/12/2018 10:43 PM    WBC 8.2 10/16/2018 01:27 PM    WBC 8.5 09/25/2018 12:14 PM    WBC 10.4 04/17/2014 01:07 AM    HGB 10.8 (L) 12/12/2018 10:43 PM    HGB 10.8 (L) 10/16/2018 01:27 PM    HGB 11.3 (L) 09/25/2018 12:14 PM    HGB 11.7 04/17/2014 01:07 AM    HCT 33.8 (L) 12/12/2018 10:43 PM    HCT 34.3 (L) 10/16/2018 01:27 PM    HCT 35.7 09/25/2018 12:14 PM    HCT 37.6 04/17/2014 01:07 AM    PLATELET 595 46/05/2871 10:43 PM    PLATELET 736 40/29/8636 01:27 PM    PLATELET 816 33/20/1272 12:14 PM    PLATELET 795 55/36/4645 01:07 AM       Recent Results (from the past 24 hour(s))   CBC W/O DIFF    Collection Time: 12/12/18 10:43 PM   Result Value Ref Range    WBC 8.4 3.6 - 11.0 K/uL    RBC 4.44 3.80 - 5.20 M/uL    HGB 10.8 (L) 11.5 - 16.0 g/dL    HCT 33.8 (L) 35.0 - 47.0 %    MCV 76.1 (L) 80.0 - 99.0 FL    MCH 24.3 (L) 26.0 - 34.0 PG    MCHC 32.0 30.0 - 36.5 g/dL    RDW 14.6 (H) 11.5 - 14.5 %    PLATELET 465 343 - 520 K/uL    MPV 10.8 8.9 - 12.9 FL    NRBC 0.0 0  WBC    ABSOLUTE NRBC 0.00 0.00 - 2.69 K/uL   METABOLIC PANEL, COMPREHENSIVE    Collection Time: 12/12/18 10:43 PM   Result Value Ref Range    Sodium 139 136 - 145 mmol/L    Potassium 3.7 3.5 - 5.1 mmol/L    Chloride 110 (H) 97 - 108 mmol/L    CO2 21 21 - 32 mmol/L    Anion gap 8 5 - 15 mmol/L    Glucose 80 65 - 100 mg/dL    BUN 8 6 - 20 MG/DL    Creatinine 0.66 0.55 - 1.02 MG/DL    BUN/Creatinine ratio 12 12 - 20      GFR est AA >60 >60 ml/min/1.73m2    GFR est non-AA >60 >60 ml/min/1.73m2    Calcium 8.9 8.5 - 10.1 MG/DL    Bilirubin, total 0.8 0.2 - 1.0 MG/DL    ALT (SGPT) 26 12 - 78 U/L    AST (SGOT) 21 15 - 37 U/L    Alk. phosphatase 100 45 - 117 U/L    Protein, total 6.1 (L) 6.4 - 8.2 g/dL    Albumin 2.9 (L) 3.5 - 5.0 g/dL    Globulin 3.2 2.0 - 4.0 g/dL    A-G Ratio 0.9 (L) 1.1 - 2.2     AST    Collection Time: 12/12/18 10:43 PM   Result Value Ref Range    AST (SGOT) 19 15 - 37 U/L   ALT    Collection Time: 12/12/18 10:43 PM   Result Value Ref Range    ALT (SGPT) 25 12 - 78 U/L   PROTEIN/CREATININE RATIO, URINE    Collection Time: 12/12/18 10:43 PM   Result Value Ref Range    Protein, urine random 7 0.0 - 11.9 mg/dL    Creatinine, urine 147.00 mg/dL    Protein/Creat.  urine Ratio 0.0

## 2018-12-13 NOTE — H&P
History & Physical    Name: Eder Boston MRN: [id number]  SSN: [id number]    YOB: 1980  Age: 45 y.o. Sex: female      Subjective:     Reason for Admission:  Pregnancy and PIH    History of Present Illness: Eder Boston is a 45 y.o.  female with an estimated gestational age of 37w1d with Estimated Date of Delivery: 18. Patient complains of Elevated blood pressure for 1 days. Pregnancy has been complicated by cervical incompetence. Patient denies abdominal pain  , chest pain, contractions, fever, headache , nausea and vomiting, pelvic pressure, right upper quadrant pain  , shortness of breath, swelling, vaginal bleeding , vaginal leaking of fluid  and visual disturbances. OB History      Para Term  AB Living    2       1 0    SAB TAB Ectopic Molar Multiple Live Births    1         0        Past Medical History:   Diagnosis Date    Fibroids     Gestational hypertension affecting first pregnancy 2018    History of PCOS     Infertility, female     Polycystic disease, ovaries     Psychiatric problem     anxiety - took medications as a teenager, no medications recently      Past Surgical History:   Procedure Laterality Date    HX OTHER SURGICAL  2009    wisdom tooth extraction x4     Social History     Occupational History    Not on file   Tobacco Use    Smoking status: Never Smoker    Smokeless tobacco: Never Used   Substance and Sexual Activity    Alcohol use: No     Frequency: Never    Drug use: No    Sexual activity: Yes     Partners: Male     Birth control/protection: None     Family History   Problem Relation Age of Onset    Hypertension Mother     Hypertension Sister     Diabetes Sister     Hypertension Brother     Cancer Paternal Uncle     Diabetes Paternal Uncle     Diabetes Maternal Uncle        No Known Allergies  Prior to Admission medications    Medication Sig Start Date End Date Taking?  Authorizing Provider   progesterone (PROMETRIUM) 200 mg capsule Insert 1 Cap into vagina nightly. 9/12/18  Yes Yas Garcia MD   [id number] Fumarate-FA-DSS-DHA 26-1.2- mg cap Take  by mouth. Yes Provider, Historical        Review of Systems    Objective:     Vitals:    Vitals:    12/12/18 2135 12/12/18 2145 12/12/18 2205 12/12/18 2339   BP: 138/85 139/83 130/85 142/87   Pulse: 90 97 94 80   Resp:       Weight:       Height:          No data recorded. BP  Min: 130/85  Max: 149/91     Physical Exam    Cervical Exam: deferred  Uterine Activity: None  Membranes: Intact  Fetal Heart Rate: Baseline: 130 per minute  Variability: moderate  Accelerations: yes  Decelerations: none  Uterine contractions: none       Labs:   Recent Results (from the past 24 hour(s))   CBC W/O DIFF    Collection Time: 12/12/18 10:43 PM   Result Value Ref Range    WBC 8.4 3.6 - 11.0 K/uL    RBC 4.44 3.80 - 5.20 M/uL    HGB 10.8 (L) 11.5 - 16.0 g/dL    HCT 33.8 (L) 35.0 - 47.0 %    MCV 76.1 (L) 80.0 - 99.0 FL    MCH 24.3 (L) 26.0 - 34.0 PG    MCHC 32.0 30.0 - 36.5 g/dL    RDW 14.6 (H) 11.5 - 14.5 %    PLATELET 480 999 - 189 K/uL    MPV 10.8 8.9 - 12.9 FL    NRBC 0.0 0  WBC    ABSOLUTE NRBC 0.00 0.00 - 1.02 K/uL   METABOLIC PANEL, COMPREHENSIVE    Collection Time: 12/12/18 10:43 PM   Result Value Ref Range    Sodium 139 136 - 145 mmol/L    Potassium 3.7 3.5 - 5.1 mmol/L    Chloride 110 (H) 97 - 108 mmol/L    CO2 21 21 - 32 mmol/L    Anion gap 8 5 - 15 mmol/L    Glucose 80 65 - 100 mg/dL    BUN 8 6 - 20 MG/DL    Creatinine 0.66 0.55 - 1.02 MG/DL    BUN/Creatinine ratio 12 12 - 20      GFR est AA >60 >60 ml/min/1.73m2    GFR est non-AA >60 >60 ml/min/1.73m2    Calcium 8.9 8.5 - 10.1 MG/DL    Bilirubin, total 0.8 0.2 - 1.0 MG/DL    ALT (SGPT) 26 12 - 78 U/L    AST (SGOT) 21 15 - 37 U/L    Alk.  phosphatase 100 45 - 117 U/L    Protein, total 6.1 (L) 6.4 - 8.2 g/dL    Albumin 2.9 (L) 3.5 - 5.0 g/dL    Globulin 3.2 2.0 - 4.0 g/dL    A-G Ratio 0.9 (L) 1.1 - 2.2 AST    Collection Time: 12/12/18 10:43 PM   Result Value Ref Range    AST (SGOT) 19 15 - 37 U/L   ALT    Collection Time: 12/12/18 10:43 PM   Result Value Ref Range    ALT (SGPT) 25 12 - 78 U/L   PROTEIN/CREATININE RATIO, URINE    Collection Time: 12/12/18 10:43 PM   Result Value Ref Range    Protein, urine random 7 0.0 - 11.9 mg/dL    Creatinine, urine 147.00 mg/dL    Protein/Creat.  urine Ratio 0.0         Patient Active Problem List   Diagnosis Code    Oligomenorrhea N91.5    Abdominal pain, other specified site R10.9    Menorrhagia N92.0    Dysmenorrhea N94.6    PCOS (polycystic ovarian syndrome) E28.2    Infertility associated with anovulation N97.0    Pregnancy Z34.90    Threatened premature labor O53.65    Gestational hypertension affecting first pregnancy O13.9     Assessment and Plan:     - Pregnancy-Induced Hypertension:  Continue present management  PIH precautions  Bed rest  Overnight observation of BP's  Serial Bp's  Determination by Primary team in AM as to whether IOl is indicated for Gestational hypertension @ Term Vs outpatient management      Signed By:  Titus Farris MD     December 13, 2018                 istor

## 2018-12-13 NOTE — PROGRESS NOTES
12/12/2018  8:50 PM Patient arrived to LDR 8 with complaints of high BPs. Patient states she was seen in the office yesterday and they were \"slightly elevated\" and was told to \"watch her BPs closely\" Patient has felt lightheaded all day today and went to [facility] and had her BP checked. Per patient, BP at [facility] was 161/113. Patient reports positive fetal movement and denies any bleeding or loss of fluid. 2119 Dr Parth Sommers at bedside to see patient and discuss plan of care. Orders received to send labs. 2357 Dr Parth Sommers called and notified labs are back, will review. 7937 Dr Parth Sommers at bedside discussing plan with patient. FHT reviewed. Plan to keep patient overnight. Take BPs every few hours, intermittent monitoring. Per Dr Parth Sommers, [facility] will decide POC in the morning. 0750 Bedside and Verbal shift change report given to Cristian RN (oncoming nurse) by Emperatriz GALO (offgoing nurse). Report included the following information SBAR, Kardex, Intake/Output, MAR, Accordion, Recent Results and Med Rec Status.  Patient still sleeping at this time

## 2018-12-14 ENCOUNTER — ANESTHESIA EVENT (OUTPATIENT)
Dept: LABOR AND DELIVERY | Age: 38
End: 2018-12-14
Payer: COMMERCIAL

## 2018-12-14 ENCOUNTER — ANESTHESIA (OUTPATIENT)
Dept: LABOR AND DELIVERY | Age: 38
End: 2018-12-14
Payer: COMMERCIAL

## 2018-12-14 PROCEDURE — 74011000250 HC RX REV CODE- 250

## 2018-12-14 PROCEDURE — 74011000250 HC RX REV CODE- 250: Performed by: ANESTHESIOLOGY

## 2018-12-14 PROCEDURE — 76010000391 HC C SECN FIRST 1 HR: Performed by: OBSTETRICS & GYNECOLOGY

## 2018-12-14 PROCEDURE — 65410000002 HC RM PRIVATE OB

## 2018-12-14 PROCEDURE — 74011250636 HC RX REV CODE- 250/636

## 2018-12-14 PROCEDURE — 74011250636 HC RX REV CODE- 250/636: Performed by: OBSTETRICS & GYNECOLOGY

## 2018-12-14 PROCEDURE — 74011250636 HC RX REV CODE- 250/636: Performed by: ANESTHESIOLOGY

## 2018-12-14 PROCEDURE — 77030011943

## 2018-12-14 PROCEDURE — 74011250637 HC RX REV CODE- 250/637: Performed by: OBSTETRICS & GYNECOLOGY

## 2018-12-14 PROCEDURE — 75410000002 HC LABOR FEE PER 1 HR

## 2018-12-14 PROCEDURE — A4300 CATH IMPL VASC ACCESS PORTAL: HCPCS

## 2018-12-14 PROCEDURE — 75410000003 HC RECOV DEL/VAG/CSECN EA 0.5 HR: Performed by: OBSTETRICS & GYNECOLOGY

## 2018-12-14 PROCEDURE — 76060000078 HC EPIDURAL ANESTHESIA: Performed by: OBSTETRICS & GYNECOLOGY

## 2018-12-14 RX ORDER — LIDOCAINE HYDROCHLORIDE AND EPINEPHRINE 20; 5 MG/ML; UG/ML
INJECTION, SOLUTION EPIDURAL; INFILTRATION; INTRACAUDAL; PERINEURAL
Status: COMPLETED
Start: 2018-12-14 | End: 2018-12-14

## 2018-12-14 RX ORDER — OXYCODONE AND ACETAMINOPHEN 5; 325 MG/1; MG/1
1 TABLET ORAL
Status: DISCONTINUED | OUTPATIENT
Start: 2018-12-14 | End: 2018-12-17 | Stop reason: HOSPADM

## 2018-12-14 RX ORDER — DOCUSATE SODIUM 100 MG/1
100 CAPSULE, LIQUID FILLED ORAL
Status: DISCONTINUED | OUTPATIENT
Start: 2018-12-14 | End: 2018-12-17 | Stop reason: HOSPADM

## 2018-12-14 RX ORDER — AMMONIA 15 % (W/V)
1 AMPUL (EA) INHALATION AS NEEDED
Status: DISCONTINUED | OUTPATIENT
Start: 2018-12-14 | End: 2018-12-17 | Stop reason: HOSPADM

## 2018-12-14 RX ORDER — MORPHINE SULFATE 10 MG/ML
10 INJECTION, SOLUTION INTRAMUSCULAR; INTRAVENOUS
Status: ACTIVE | OUTPATIENT
Start: 2018-12-14 | End: 2018-12-15

## 2018-12-14 RX ORDER — SODIUM CHLORIDE 0.9 % (FLUSH) 0.9 %
5-10 SYRINGE (ML) INJECTION EVERY 8 HOURS
Status: DISCONTINUED | OUTPATIENT
Start: 2018-12-14 | End: 2018-12-17 | Stop reason: HOSPADM

## 2018-12-14 RX ORDER — KETOROLAC TROMETHAMINE 30 MG/ML
30 INJECTION, SOLUTION INTRAMUSCULAR; INTRAVENOUS
Status: DISCONTINUED | OUTPATIENT
Start: 2018-12-14 | End: 2018-12-17 | Stop reason: HOSPADM

## 2018-12-14 RX ORDER — ONDANSETRON 2 MG/ML
4 INJECTION INTRAMUSCULAR; INTRAVENOUS
Status: DISCONTINUED | OUTPATIENT
Start: 2018-12-14 | End: 2018-12-17 | Stop reason: HOSPADM

## 2018-12-14 RX ORDER — OXYTOCIN/RINGER'S LACTATE 20/1000 ML
PLASTIC BAG, INJECTION (ML) INTRAVENOUS
Status: COMPLETED
Start: 2018-12-14 | End: 2018-12-14

## 2018-12-14 RX ORDER — NALOXONE HYDROCHLORIDE 0.4 MG/ML
0.4 INJECTION, SOLUTION INTRAMUSCULAR; INTRAVENOUS; SUBCUTANEOUS AS NEEDED
Status: DISCONTINUED | OUTPATIENT
Start: 2018-12-14 | End: 2018-12-14 | Stop reason: HOSPADM

## 2018-12-14 RX ORDER — NALBUPHINE HYDROCHLORIDE 10 MG/ML
2.5 INJECTION, SOLUTION INTRAMUSCULAR; INTRAVENOUS; SUBCUTANEOUS
Status: ACTIVE | OUTPATIENT
Start: 2018-12-14 | End: 2018-12-15

## 2018-12-14 RX ORDER — IBUPROFEN 400 MG/1
800 TABLET ORAL EVERY 8 HOURS
Status: DISCONTINUED | OUTPATIENT
Start: 2018-12-14 | End: 2018-12-17 | Stop reason: HOSPADM

## 2018-12-14 RX ORDER — OXYTOCIN/RINGER'S LACTATE 20/1000 ML
PLASTIC BAG, INJECTION (ML) INTRAVENOUS
Status: DISCONTINUED | OUTPATIENT
Start: 2018-12-14 | End: 2018-12-14 | Stop reason: HOSPADM

## 2018-12-14 RX ORDER — SIMETHICONE 80 MG
80 TABLET,CHEWABLE ORAL
Status: DISCONTINUED | OUTPATIENT
Start: 2018-12-14 | End: 2018-12-17 | Stop reason: HOSPADM

## 2018-12-14 RX ORDER — SODIUM CHLORIDE, SODIUM LACTATE, POTASSIUM CHLORIDE, CALCIUM CHLORIDE 600; 310; 30; 20 MG/100ML; MG/100ML; MG/100ML; MG/100ML
125 INJECTION, SOLUTION INTRAVENOUS CONTINUOUS
Status: DISCONTINUED | OUTPATIENT
Start: 2018-12-14 | End: 2018-12-17 | Stop reason: HOSPADM

## 2018-12-14 RX ORDER — BUPIVACAINE HYDROCHLORIDE 5 MG/ML
30 INJECTION, SOLUTION EPIDURAL; INTRACAUDAL AS NEEDED
Status: DISCONTINUED | OUTPATIENT
Start: 2018-12-14 | End: 2018-12-14 | Stop reason: HOSPADM

## 2018-12-14 RX ORDER — FENTANYL CITRATE 50 UG/ML
100 INJECTION, SOLUTION INTRAMUSCULAR; INTRAVENOUS ONCE
Status: DISCONTINUED | OUTPATIENT
Start: 2018-12-14 | End: 2018-12-14 | Stop reason: HOSPADM

## 2018-12-14 RX ORDER — OXYCODONE AND ACETAMINOPHEN 5; 325 MG/1; MG/1
2 TABLET ORAL
Status: DISCONTINUED | OUTPATIENT
Start: 2018-12-14 | End: 2018-12-17 | Stop reason: HOSPADM

## 2018-12-14 RX ORDER — FENTANYL/BUPIVACAINE/NS/PF 2-1250MCG
1-16 PREFILLED PUMP RESERVOIR EPIDURAL CONTINUOUS
Status: DISCONTINUED | OUTPATIENT
Start: 2018-12-14 | End: 2018-12-17 | Stop reason: HOSPADM

## 2018-12-14 RX ORDER — LIDOCAINE HYDROCHLORIDE AND EPINEPHRINE 20; 5 MG/ML; UG/ML
INJECTION, SOLUTION EPIDURAL; INFILTRATION; INTRACAUDAL; PERINEURAL AS NEEDED
Status: DISCONTINUED | OUTPATIENT
Start: 2018-12-14 | End: 2018-12-14 | Stop reason: HOSPADM

## 2018-12-14 RX ORDER — MISOPROSTOL 100 UG/1
TABLET ORAL AS NEEDED
Status: DISCONTINUED | OUTPATIENT
Start: 2018-12-14 | End: 2018-12-17 | Stop reason: HOSPADM

## 2018-12-14 RX ORDER — PHENYLEPHRINE 10 MG/250 ML(40 MCG/ML)IN 0.9 % SOD.CHLORIDE INTRAVENOUS
Status: DISPENSED
Start: 2018-12-14 | End: 2018-12-15

## 2018-12-14 RX ORDER — HYDROCORTISONE 1 %
CREAM (GRAM) TOPICAL AS NEEDED
Status: DISCONTINUED | OUTPATIENT
Start: 2018-12-14 | End: 2018-12-17 | Stop reason: HOSPADM

## 2018-12-14 RX ORDER — SODIUM CHLORIDE 0.9 % (FLUSH) 0.9 %
5-10 SYRINGE (ML) INJECTION AS NEEDED
Status: DISCONTINUED | OUTPATIENT
Start: 2018-12-14 | End: 2018-12-17 | Stop reason: HOSPADM

## 2018-12-14 RX ORDER — BUPIVACAINE HYDROCHLORIDE 5 MG/ML
INJECTION, SOLUTION EPIDURAL; INTRACAUDAL
Status: COMPLETED | OUTPATIENT
Start: 2018-12-14 | End: 2018-12-14

## 2018-12-14 RX ORDER — PHENYLEPHRINE HCL IN 0.9% NACL 0.4MG/10ML
SYRINGE (ML) INTRAVENOUS AS NEEDED
Status: DISCONTINUED | OUTPATIENT
Start: 2018-12-14 | End: 2018-12-14 | Stop reason: HOSPADM

## 2018-12-14 RX ORDER — EPHEDRINE SULFATE 50 MG/ML
12.5 INJECTION, SOLUTION INTRAVENOUS
Status: COMPLETED | OUTPATIENT
Start: 2018-12-14 | End: 2018-12-14

## 2018-12-14 RX ORDER — ACETAMINOPHEN 10 MG/ML
INJECTION, SOLUTION INTRAVENOUS AS NEEDED
Status: DISCONTINUED | OUTPATIENT
Start: 2018-12-14 | End: 2018-12-14 | Stop reason: HOSPADM

## 2018-12-14 RX ORDER — OXYTOCIN/RINGER'S LACTATE 20/1000 ML
125 PLASTIC BAG, INJECTION (ML) INTRAVENOUS AS NEEDED
Status: DISCONTINUED | OUTPATIENT
Start: 2018-12-14 | End: 2018-12-17 | Stop reason: HOSPADM

## 2018-12-14 RX ORDER — DIPHENHYDRAMINE HCL 25 MG
25 CAPSULE ORAL
Status: DISCONTINUED | OUTPATIENT
Start: 2018-12-14 | End: 2018-12-17 | Stop reason: HOSPADM

## 2018-12-14 RX ORDER — FENTANYL CITRATE 50 UG/ML
INJECTION, SOLUTION INTRAMUSCULAR; INTRAVENOUS AS NEEDED
Status: DISCONTINUED | OUTPATIENT
Start: 2018-12-14 | End: 2018-12-14 | Stop reason: HOSPADM

## 2018-12-14 RX ORDER — ACETAMINOPHEN 325 MG/1
650 TABLET ORAL
Status: DISCONTINUED | OUTPATIENT
Start: 2018-12-14 | End: 2018-12-17 | Stop reason: HOSPADM

## 2018-12-14 RX ORDER — MORPHINE SULFATE 0.5 MG/ML
INJECTION, SOLUTION EPIDURAL; INTRATHECAL; INTRAVENOUS AS NEEDED
Status: DISCONTINUED | OUTPATIENT
Start: 2018-12-14 | End: 2018-12-14 | Stop reason: HOSPADM

## 2018-12-14 RX ORDER — ONDANSETRON 2 MG/ML
INJECTION INTRAMUSCULAR; INTRAVENOUS AS NEEDED
Status: DISCONTINUED | OUTPATIENT
Start: 2018-12-14 | End: 2018-12-14 | Stop reason: HOSPADM

## 2018-12-14 RX ADMIN — KETOROLAC TROMETHAMINE 30 MG: 30 INJECTION, SOLUTION INTRAMUSCULAR at 18:10

## 2018-12-14 RX ADMIN — TERBUTALINE SULFATE 0.25 MG: 1 INJECTION SUBCUTANEOUS at 14:55

## 2018-12-14 RX ADMIN — CEFAZOLIN 3 G: 1 INJECTION, POWDER, FOR SOLUTION INTRAMUSCULAR; INTRAVENOUS; PARENTERAL at 15:03

## 2018-12-14 RX ADMIN — OXYTOCIN-SODIUM CHLORIDE 0.9% IV SOLN 30 UNIT/500ML 9 MILLI-UNITS/MIN: 30-0.9/5 SOLUTION at 08:32

## 2018-12-14 RX ADMIN — OXYTOCIN-SODIUM CHLORIDE 0.9% IV SOLN 30 UNIT/500ML 6 MILLI-UNITS/MIN: 30-0.9/5 SOLUTION at 11:09

## 2018-12-14 RX ADMIN — Medication: at 15:35

## 2018-12-14 RX ADMIN — ONDANSETRON 4 MG: 2 INJECTION INTRAMUSCULAR; INTRAVENOUS at 15:40

## 2018-12-14 RX ADMIN — SODIUM CHLORIDE, SODIUM LACTATE, POTASSIUM CHLORIDE, AND CALCIUM CHLORIDE 125 ML/HR: 600; 310; 30; 20 INJECTION, SOLUTION INTRAVENOUS at 12:09

## 2018-12-14 RX ADMIN — SODIUM CHLORIDE, SODIUM LACTATE, POTASSIUM CHLORIDE, AND CALCIUM CHLORIDE 125 ML/HR: 600; 310; 30; 20 INJECTION, SOLUTION INTRAVENOUS at 10:20

## 2018-12-14 RX ADMIN — MORPHINE SULFATE 5 MG: 0.5 INJECTION, SOLUTION EPIDURAL; INTRATHECAL; INTRAVENOUS at 15:38

## 2018-12-14 RX ADMIN — FENTANYL CITRATE 100 MCG: 50 INJECTION, SOLUTION INTRAMUSCULAR; INTRAVENOUS at 10:25

## 2018-12-14 RX ADMIN — BUPIVACAINE HYDROCHLORIDE 6 MG: 5 INJECTION, SOLUTION EPIDURAL; INTRACAUDAL at 10:24

## 2018-12-14 RX ADMIN — SODIUM CHLORIDE, SODIUM LACTATE, POTASSIUM CHLORIDE, AND CALCIUM CHLORIDE 500 ML: 600; 310; 30; 20 INJECTION, SOLUTION INTRAVENOUS at 13:30

## 2018-12-14 RX ADMIN — ACETAMINOPHEN 1000 MG: 10 INJECTION, SOLUTION INTRAVENOUS at 15:53

## 2018-12-14 RX ADMIN — Medication 10 ML/HR: at 10:34

## 2018-12-14 RX ADMIN — OXYTOCIN-SODIUM CHLORIDE 0.9% IV SOLN 30 UNIT/500ML 1 MILLI-UNITS/MIN: 30-0.9/5 SOLUTION at 05:43

## 2018-12-14 RX ADMIN — Medication 120 MCG: at 15:34

## 2018-12-14 RX ADMIN — OXYTOCIN-SODIUM CHLORIDE 0.9% IV SOLN 30 UNIT/500ML 7 MILLI-UNITS/MIN: 30-0.9/5 SOLUTION at 07:47

## 2018-12-14 RX ADMIN — AZITHROMYCIN MONOHYDRATE 500 MG: 500 INJECTION, POWDER, LYOPHILIZED, FOR SOLUTION INTRAVENOUS at 16:25

## 2018-12-14 RX ADMIN — LIDOCAINE HYDROCHLORIDE AND EPINEPHRINE 14 ML: 20; 5 INJECTION, SOLUTION EPIDURAL; INFILTRATION; INTRACAUDAL; PERINEURAL at 15:03

## 2018-12-14 RX ADMIN — EPHEDRINE SULFATE 12.5 MG: 50 INJECTION, SOLUTION INTRAVENOUS at 11:40

## 2018-12-14 NOTE — PROGRESS NOTES
Labor Progress Note    Patient seen, fetal heart rate and contraction pattern evaluated. Very uncomfortable. Received 1 dose of Nubain with some relief. Reviewed options with pt: continue with present management, remove balloon and place cervidil if still 1 cm, or remove balloon and continue with pitocin. Pt desires to have cervidil placed for remainder of night. Desires un-medicated birth if possible. Physical Exam:  Cervical Exam: Balloon removed and cervix 5/80/-2, soft and stretchy.    Membranes:  Intact  Uterine Activity: Frequency: 2-3 min  Fetal Heart Rate: Reactive, 135 moderate variaiblity  Accelerations: yes  Decelerations: none  Uterine contractions: regular    Assessment/Plan:  IUP 37.5  Reassuring FWB  GHTN- BPs stable  D/W pt new plan- agrees to rest without pitocin and see if labor continues on its own, if UCs space can restart pitocin around 5-6 AM.     Lay Montaño, DIOR

## 2018-12-14 NOTE — PROGRESS NOTES
5639- Bedside and Verbal shift change report given to Kodi Ruvalcaba RN/ Radha Calderon RN (oncoming nurse) by Diana Zamorano RN(offgoing nurse). Report included the following information SBAR, Intake/Output and MAR. Assumed care of patient at this time. Pt resting quietly watching TV, call light within reach  1000- Pt requesting epidural, fluid bolus started. 80- Dr Ann Stoddard at bedside for epidural  [phone]- Spoke with Dr Angela Garcia about patient blood pressure 153/90.  Parameters to call physician are   /110

## 2018-12-14 NOTE — PROGRESS NOTES
Labor Progress Note  Patient seen, fetal heart rate and contraction pattern evaluated. Contractions getting stronger. VSS BPs 130s-140s/80s, pitocin @ 7  Fetal Heart Rate: moderate variability  Accelerations: yes  Decelerations: no  Uterine Activity: Irregular  Cervical Exam: 5/90/-3  Membranes:  Intact    Assessment/Plan:  Reassuring fetal status. BPs - mild  Will continue current management.

## 2018-12-14 NOTE — PROGRESS NOTES
Discussed with mother her plan for feeding her baby. Reviewed the benefits and management of exclusive breast milk feeding as well as the importance of latching within the first hour after delivery. Instruction provided on how to recognize hunger cues and initiate breast feeding in response to those cues. Patient confirms breast milk feed exclusively as intended feeding method at this time.

## 2018-12-14 NOTE — ANESTHESIA PREPROCEDURE EVALUATION
Anesthetic History   No history of anesthetic complications            Review of Systems / Medical History  Patient summary reviewed, nursing notes reviewed and pertinent labs reviewed    Pulmonary  Within defined limits                 Neuro/Psych   Within defined limits           Cardiovascular  Within defined limits  Hypertension                   GI/Hepatic/Renal  Within defined limits              Endo/Other  Within defined limits           Other Findings              Physical Exam    Airway  Mallampati: II  TM Distance: > 6 cm  Neck ROM: normal range of motion   Mouth opening: Normal     Cardiovascular  Regular rate and rhythm,  S1 and S2 normal,  no murmur, click, rub, or gallop             Dental  No notable dental hx       Pulmonary  Breath sounds clear to auscultation               Abdominal  GI exam deferred       Other Findings            Anesthetic Plan    ASA: 2  Anesthesia type: epidural            Anesthetic plan and risks discussed with: Patient

## 2018-12-14 NOTE — PROGRESS NOTES
0730: Bedside and Verbal shift change report given to JOSH Rosario and ESPERANZA Castellano RN (oncoming nurse) by LINDSAY Brooks RN (offgoing nurse). Report included the following information SBAR, Intake/Output and MAR.     1419: Dr. Fatimah Hernandez at bedside. SVE 9/100/0. U/S to confirm position. 1439: Knee chest.  1448: FSE placed by Dr. Fatimah Hernandez. 1458: Dr. Rylee Arias at bedside. SVE 9/90/+1.   1500: Dr. Bae Patient at bedside to dose epidural for C/S.

## 2018-12-14 NOTE — PROGRESS NOTES
CTSP for decel in the 46s  FSE placed and fetal decel to the 30s then occurred    Pt consented for CS with risks reviewed including bleeding/transfusion, infection, injuryt to surrounding organs/tissues with possible need for further surgery to repair. possibiligy that all future deliveries will need to be CS. Alternative would be to cont trying for vaginal delivery, which I do not recommend at this point. - cvx checked 9cm, too tight posteriorly to push through  - terbutaline given while prepping for CS.

## 2018-12-14 NOTE — PROGRESS NOTES
Labor Progress Note    Patient seen, fetal heart rate and contraction pattern evaluated. Rested for the past few hours, feeling more energized and refreshed.      Physical Exam:  Cervical Exam: not examined  Membranes:  Intact  Uterine Activity: Frequency: Irregular  Fetal Heart Rate: Reactive, 130 moderate variaibility  Accelerations: yes  Decelerations: variable  Uterine contractions: irregular    Assessment/Plan:  IUP 37.5  Reassuring FWB  Pitocin restarted at [facility] 52- stable    Marielena Young CNM

## 2018-12-14 NOTE — PROGRESS NOTES
Labor Progress Note  Patient seen, fetal heart rate and contraction pattern evaluated. Comfortable w epidural, but increased pressure w contractions. VSS, pitocin @   Fetal Heart Rate: moderate variability  Accelerations: yes  Decelerations: variable  Uterine Activity: Irregular  Cervical Exam: 9/100/0    Assessment/Plan:  Reassuring fetal status. Will continue current management. BSUS done for position confirms vertex.

## 2018-12-14 NOTE — ANESTHESIA PROCEDURE NOTES
Epidural Block    Start time: 12/14/2018 10:13 AM  End time: 12/14/2018 10:25 AM  Performed by: Lou Johnson MD  Authorized by: Lou Johnson MD     Pre-Procedure  Indication: labor epidural    Preanesthetic Checklist: risks and benefits discussed and timeout performed    Timeout Time: 10:13        Epidural:   Patient position:  Left lateral decubitus  Prep region:  Lumbar  Prep: Chlorhexidine    Location:  L2-3    Needle and Epidural Catheter:   Needle Type:  Tuohy  Needle Gauge:  17 G  Injection Technique:  Loss of resistance using air  Attempts:  1  Catheter Size:  19 G  Events: no blood with aspiration, no cerebrospinal fluid with aspiration, no paresthesia and negative aspiration test    Test Dose:  Bupivacaine 0.25% and negative    Assessment:   Catheter Secured:  Tegaderm and tape  Insertion:  Uncomplicated  Patient tolerance:  Patient tolerated the procedure well with no immediate complications

## 2018-12-14 NOTE — PROGRESS NOTES
Labor Progress Note    Patient seen, fetal heart rate and contraction pattern evaluated. Feeling UCs stronger, mostly in lower back. Encouraged pt to move around and change positions. FOB at bedside.      Physical Exam:  Cervical Exam: not examined  Membranes:  Intact  Uterine Activity: Frequency: 2-4 min  Fetal Heart Rate: Reactive, 140 moderate variability  Accelerations: yes  Decelerations: none    Assessment/Plan:  IUP 37.4  Reassuring FWB  Balloon in place, pitocin infusing  CPM    Wayne Stout CNM

## 2018-12-14 NOTE — PROGRESS NOTES
1950 Bedside shift change report given to LINDA Brooks RN (oncoming nurse) by Susanen Bledsoe RN (offgoing nurse). Report included the following information SBAR, Kardex, Intake/Output, MAR and Recent Results. 2100 Charlee Bergeron CNM at bedside, discussing POC.    2300 Patient called out complaining of pain. Options discussed. 2329 IV nubain given. Patient attempting to rest.     0020 R She Waters at bedside to discuss POC.    0026 Burger balloon removed. SVE 5/80/-2. Pitocin turned off. Plan to assess labor and restart pitocin if necessary. 5596 Patient awake. Plan to restart pitocin. 0745 Bedside shift change report given to 1519 Aparna Torres RN (oncoming nurse) by De Spence RN (offgoing nurse). Report included the following information SBAR, Kardex, Intake/Output, MAR and Recent Results.

## 2018-12-14 NOTE — OP NOTES
Operative Note    Name: [address] Record Number: [id number]   YOB: 1980  Today's Date: 2018      Pre-operative Diagnosis: non reassuring fetal surveillance    Post-operative Diagnosis: 1. Non-reassuring fetal tracing, delivered  2. Large anterior wall fibroid  3. Bandl's ring  4. Nuchal cord x1  5. OP presentation    Operation: low transverse  section Procedure(s):   SECTION    Surgeon(s):  MD Alena Rdz MD    Anesthesia: Epidural    Prophylactic Antibiotics: azithromycin and Ancef  DVT Prophylaxis: Sequential Compression Devices         Fetal Description: reynoso     Birth Information:   Information for the patient's :  Wendy Bucio [[id number]]   Delivery of a   male infant on 2018 at 3:29 PM. Apgars were 8  and 9 . Umbilical Cord: 3 Vessels     Umbilical Cord Events:       Placenta: Manual Removal removal with Normal appearance. Amniotic Fluid Volume:        Amniotic Fluid Description:  Clear        Umbilical Cord: Nuchal Cord x  1    Placenta:  manual removal    Estimated Blood Loss (ml):  800    Specimens: none    Implants: none           Complications:  Bandl's ring - nitroglycerin given to release tension, successfully    Procedure Detail:      After proper patient identification and consent, the patient was taken to the operating room, where epidural anesthesia was administered and found to be adequate. Burger catheter had been placed using sterile technique, and fetal vertex was elevated by myself. The patient was prepped and draped in the normal sterile fashion. The abdomen was entered using the Pfannenstiel technique. The peritoneum was entered well superior to the bladder without any apparent injury. The bladder flap was created without difficulty. A low transverse uterine incision was made with the scalpel, just below the Bandl ring and fibroid, and extended.  Amniotomy was performed and the fluid was clear. The babys head was then delivered atraumatically. Nuchal cord reduced. The nose and mouth were suctioned. The cord was clamped and cut and the baby was handed off to Nursing staff in attendance. Placenta was then removed from the uterus. The uterus was curettaged with a moist lap pad and cleared of all clots and debris. In spite of the bandl ring, I was able to reach both cornual regions to ensure removal of placenta and membranes. Prior to uterine closure, cytotec 800mcg was placed in the uterus due to fibroid, and nitroglyerin administration. The uterine incision was closed with 0 Vicryl  in running locking fashion with good hemostasis assured. A second imbricating layer was placed. Good hemostasis was again reassured and the uterus was returned to the abdomen. The pelvis was irrigated with warm normal saline and good hemostasis was again reassured throughout. The fascia was closed with 0 Vicryl in a running fashion. Good hemostasis was assured. The skin was closed with a insorb closure. The patient tolerated the procedure well. Sponge, lap, and needle counts were correct times three and the patient and baby were taken to recovery/postpartum room in stable condition.     Christelle Seth MD  December 14, 2018  4:10 PM

## 2018-12-14 NOTE — ANESTHESIA POSTPROCEDURE EVALUATION
Procedure(s):   SECTION. Anesthesia Post Evaluation        Patient location during evaluation: PACU  Patient participation: complete - patient participated  Level of consciousness: awake  Pain management: adequate  Airway patency: patent  Anesthetic complications: no  Cardiovascular status: hemodynamically stable  Respiratory status: acceptable  Hydration status: acceptable  Comments: I have seen and evaluated the patient. The patient is ready for PACU discharge. [address], DO                         Visit Vitals  /69   Pulse (!) 103   Temp 36.6 °C (97.9 °F)   Resp 16   Ht 5' 7\" (1.702 m)   Wt 105.7 kg (233 lb)   SpO2 99%   Breastfeeding?  No   BMI 36.49 kg/m²

## 2018-12-15 LAB
BASOPHILS # BLD: 0 K/UL (ref 0–0.1)
BASOPHILS NFR BLD: 0 % (ref 0–1)
DIFFERENTIAL METHOD BLD: ABNORMAL
EOSINOPHIL # BLD: 0 K/UL (ref 0–0.4)
EOSINOPHIL NFR BLD: 0 % (ref 0–7)
ERYTHROCYTE [DISTWIDTH] IN BLOOD BY AUTOMATED COUNT: 14.2 % (ref 11.5–14.5)
HCT VFR BLD AUTO: 29 % (ref 35–47)
HGB BLD-MCNC: 9.5 G/DL (ref 11.5–16)
IMM GRANULOCYTES # BLD: 0 K/UL (ref 0–0.04)
IMM GRANULOCYTES NFR BLD AUTO: 0 % (ref 0–0.5)
LYMPHOCYTES # BLD: 0.9 K/UL (ref 0.8–3.5)
LYMPHOCYTES NFR BLD: 8 % (ref 12–49)
MCH RBC QN AUTO: 24.6 PG (ref 26–34)
MCHC RBC AUTO-ENTMCNC: 32.8 G/DL (ref 30–36.5)
MCV RBC AUTO: 75.1 FL (ref 80–99)
MONOCYTES # BLD: 0.8 K/UL (ref 0–1)
MONOCYTES NFR BLD: 7 % (ref 5–13)
NEUTS SEG # BLD: 9.1 K/UL (ref 1.8–8)
NEUTS SEG NFR BLD: 84 % (ref 32–75)
NRBC # BLD: 0 K/UL (ref 0–0.01)
NRBC BLD-RTO: 0 PER 100 WBC
PLATELET # BLD AUTO: 186 K/UL (ref 150–400)
PMV BLD AUTO: 10.6 FL (ref 8.9–12.9)
RBC # BLD AUTO: 3.86 M/UL (ref 3.8–5.2)
WBC # BLD AUTO: 10.9 K/UL (ref 3.6–11)

## 2018-12-15 PROCEDURE — 65410000002 HC RM PRIVATE OB

## 2018-12-15 PROCEDURE — 85025 COMPLETE CBC W/AUTO DIFF WBC: CPT

## 2018-12-15 PROCEDURE — 74011250637 HC RX REV CODE- 250/637: Performed by: OBSTETRICS & GYNECOLOGY

## 2018-12-15 PROCEDURE — 74011250636 HC RX REV CODE- 250/636: Performed by: ANESTHESIOLOGY

## 2018-12-15 PROCEDURE — 36415 COLL VENOUS BLD VENIPUNCTURE: CPT

## 2018-12-15 PROCEDURE — 77030027138 HC INCENT SPIROMETER -A

## 2018-12-15 RX ADMIN — KETOROLAC TROMETHAMINE 30 MG: 30 INJECTION, SOLUTION INTRAMUSCULAR at 12:58

## 2018-12-15 RX ADMIN — Medication 10 ML: at 12:58

## 2018-12-15 RX ADMIN — Medication 10 ML: at 04:06

## 2018-12-15 RX ADMIN — KETOROLAC TROMETHAMINE 30 MG: 30 INJECTION, SOLUTION INTRAMUSCULAR at 04:06

## 2018-12-15 RX ADMIN — DOCUSATE SODIUM 100 MG: 100 CAPSULE, LIQUID FILLED ORAL at 21:01

## 2018-12-15 RX ADMIN — IBUPROFEN 800 MG: 400 TABLET ORAL at 19:00

## 2018-12-15 NOTE — PROGRESS NOTES
TRANSFER - OUT REPORT:    Verbal report given to Alyssa Germain RN on [address]  being transferred to [address] [place] [address] for routine progression of care       Report consisted of patients Situation, Background, Assessment and   Recommendations(SBAR). Information from the following report(s) OR Summary, Procedure Summary and Intake/Output was reviewed with the receiving nurse. Lines:   Peripheral IV 12/13/18 Posterior;Right Wrist (Active)        Opportunity for questions and clarification was provided.       Patient transported with:   Registered Nurse

## 2018-12-15 NOTE — LACTATION NOTE
This note was copied from a baby's chart. Baby nursing well today,  deep latch obtained, mother is comfortable now with positioning extra large breasts, baby feeding vigorously with rhythmic suck, swallow, breathe pattern, audible swallowing, and evident milk transfer, both breasts offered, baby is asleep following feeding.

## 2018-12-15 NOTE — ROUTINE PROCESS
TRANSFER - IN REPORT:    Verbal report received from Maryanne Villegas RN(name) on [place]  being received from L&D(unit) for routine progression of care      Report consisted of patients Situation, Background, Assessment and   Recommendations(SBAR). Information from the following report(s) SBAR was reviewed with the receiving nurse. Opportunity for questions and clarification was provided. Assessment completed upon patients arrival to unit and care assumed.

## 2018-12-15 NOTE — PROGRESS NOTES
Post-Operative  Day 1    Erica Reed     Assessment: Post-Op day 1, stable    Plan:   1. Routine post-operative care   2. The risks and benefits of the circumcision  procedure and anesthesia including: bleeding, infection, variability of cosmetic results were discussed at length with the mother. She gives informed consent to proceed as noted and her questions are answered. 3. GHTN- 130s-150s/80s-90s   4. Hgb 9.5 (from 10.8)    Information for the patient's :  Gael Limon [[id number]]   , Low Transverse   Patient doing well without significant complaint. Nausea and vomiting resolved, tolerating PO, no flatus. Vitals:  Visit Vitals  /78 (BP 1 Location: Left arm, BP Patient Position: At rest)   Pulse (!) 121   Temp 98.8 °F (37.1 °C)   Resp 18   Ht 5' 7\" (1.702 m)   Wt 105.7 kg (233 lb)   SpO2 95%   Breastfeeding? Unknown   BMI 36.49 kg/m²     Temp (24hrs), Av.4 °F (36.9 °C), Min:97.6 °F (36.4 °C), Max:99.6 °F (37.6 °C)      Last 24hr Input/Output:    Intake/Output Summary (Last 24 hours) at 12/15/2018 0847  Last data filed at 12/15/2018 0406  Gross per 24 hour   Intake 700 ml   Output 850 ml   Net -150 ml          Exam:        Patient without distress. Abdomen, soft, expected tenderness, fundus firm, above U c/w fibroid uterus  - Wound dressing intact               Lower extremities are symmetric without tenderness, cords or erythema.     Labs:   Lab Results   Component Value Date/Time    WBC 10.9 12/15/2018 05:45 AM    WBC 8.4 2018 10:43 PM    WBC 8.2 10/16/2018 01:27 PM    WBC 8.5 2018 12:14 PM    WBC 10.4 2014 01:07 AM    HGB 9.5 (L) 12/15/2018 05:45 AM    HGB 10.8 (L) 2018 10:43 PM    HGB 10.8 (L) 10/16/2018 01:27 PM    HGB 11.3 (L) 2018 12:14 PM    HGB 11.7 2014 01:07 AM    HCT 29.0 (L) 12/15/2018 05:45 AM    HCT 33.8 (L) 2018 10:43 PM    HCT 34.3 (L) 10/16/2018 01:27 PM    HCT 35.7 2018 12:14 PM    HCT 37.6 2014 01:07 AM    PLATELET 669 81/81/0726 05:45 AM    PLATELET 730 01/30/5651 10:43 PM    PLATELET 643 75/91/7783 01:27 PM    PLATELET 979 73/55/7068 12:14 PM    PLATELET 644 70/14/4339 01:07 AM       Recent Results (from the past 24 hour(s))   CBC WITH AUTOMATED DIFF    Collection Time: 12/15/18  5:45 AM   Result Value Ref Range    WBC 10.9 3.6 - 11.0 K/uL    RBC 3.86 3.80 - 5.20 M/uL    HGB 9.5 (L) 11.5 - 16.0 g/dL    HCT 29.0 (L) 35.0 - 47.0 %    MCV 75.1 (L) 80.0 - 99.0 FL    MCH 24.6 (L) 26.0 - 34.0 PG    MCHC 32.8 30.0 - 36.5 g/dL    RDW 14.2 11.5 - 14.5 %    PLATELET 497 810 - 175 K/uL    MPV 10.6 8.9 - 12.9 FL    NRBC 0.0 0  WBC    ABSOLUTE NRBC 0.00 0.00 - 0.01 K/uL    NEUTROPHILS 84 (H) 32 - 75 %    LYMPHOCYTES 8 (L) 12 - 49 %    MONOCYTES 7 5 - 13 %    EOSINOPHILS 0 0 - 7 %    BASOPHILS 0 0 - 1 %    IMMATURE GRANULOCYTES 0 0.0 - 0.5 %    ABS. NEUTROPHILS 9.1 (H) 1.8 - 8.0 K/UL    ABS. LYMPHOCYTES 0.9 0.8 - 3.5 K/UL    ABS. MONOCYTES 0.8 0.0 - 1.0 K/UL    ABS. EOSINOPHILS 0.0 0.0 - 0.4 K/UL    ABS. BASOPHILS 0.0 0.0 - 0.1 K/UL    ABS. IMM.  GRANS. 0.0 0.00 - 0.04 K/UL    DF AUTOMATED

## 2018-12-16 PROCEDURE — 65410000002 HC RM PRIVATE OB

## 2018-12-16 PROCEDURE — 74011250637 HC RX REV CODE- 250/637: Performed by: OBSTETRICS & GYNECOLOGY

## 2018-12-16 RX ADMIN — IBUPROFEN 800 MG: 400 TABLET ORAL at 19:39

## 2018-12-16 RX ADMIN — IBUPROFEN 800 MG: 400 TABLET ORAL at 11:35

## 2018-12-16 RX ADMIN — IBUPROFEN 800 MG: 400 TABLET ORAL at 03:45

## 2018-12-16 RX ADMIN — DOCUSATE SODIUM 100 MG: 100 CAPSULE, LIQUID FILLED ORAL at 19:39

## 2018-12-16 NOTE — LACTATION NOTE
This note was copied from a baby's chart. Infant continues to be sleepy at breast. Reinforced to parents tongue exercises prior to breastfeeding. Obtained 20gtt with hand expression which were given by spoon. Per conversation with Tati Hendricks, electric pumping started. No EBM obtained. Pt aware stimulation to help with lactogenesis. 1900 Infant weight loss 8.9%; mom will pump  After each feed for 15 min.

## 2018-12-16 NOTE — ROUTINE PROCESS
Bedside and Verbal shift change report given to IZABELA Huerta Res, RN (oncoming nurse) by ARNOLD Willett RN (offgoing nurse). Report included the following information SBAR, Kardex, Procedure Summary, Intake/Output, MAR and Recent Results.

## 2018-12-16 NOTE — LACTATION NOTE
This note was copied from a baby's chart. Infant is sleepy after circumcision and is not staying awake long enough to improve on latching techniques. Baby made some progress with deep latch before falling asleep. Hand expression and hand pumping yielded 5 ml which was fed to baby via syringe. Mother's milk is coming in early.

## 2018-12-16 NOTE — PROGRESS NOTES
Post-Operative  Day 2    Hailee Lockett     Assessment: Post-Op day 2, doing well    Plan:   1. Routine post-operative care   2. GHTN- 120s-140s/60s-80s   3. Hgb 9.5 (from 10.8)           Information for the patient's :  Bryanna Aaron [[id number]]   , Low Transverse   Patient doing well without significant complaint. Nausea and vomiting resolved, tolerating PO, passing flatus, voiding and ambulating without difficulty. Vitals:  Visit Vitals  /60 (BP 1 Location: Left arm, BP Patient Position: At rest)   Pulse 88   Temp 98.3 °F (36.8 °C)   Resp 18   Ht 5' 7\" (1.702 m)   Wt 105.7 kg (233 lb)   SpO2 95%   Breastfeeding? Unknown   BMI 36.49 kg/m²     Temp (24hrs), Av.7 °F (37.1 °C), Min:98.3 °F (36.8 °C), Max:99 °F (37.2 °C)        Exam:        Patient without distress. Abdomen,  soft, expected tenderness, fundus firm                Wound dressing clean, dry and intact               Lower extremities are symmetric without tenderness, cords or erythema. Labs:   Lab Results   Component Value Date/Time    WBC 10.9 12/15/2018 05:45 AM    WBC 8.4 2018 10:43 PM    WBC 8.2 10/16/2018 01:27 PM    WBC 8.5 2018 12:14 PM    WBC 10.4 2014 01:07 AM    HGB 9.5 (L) 12/15/2018 05:45 AM    HGB 10.8 (L) 2018 10:43 PM    HGB 10.8 (L) 10/16/2018 01:27 PM    HGB 11.3 (L) 2018 12:14 PM    HGB 11.7 2014 01:07 AM    HCT 29.0 (L) 12/15/2018 05:45 AM    HCT 33.8 (L) 2018 10:43 PM    HCT 34.3 (L) 10/16/2018 01:27 PM    HCT 35.7 2018 12:14 PM    HCT 37.6 2014 01:07 AM    PLATELET 683  05:45 AM    PLATELET 365  10:43 PM    PLATELET 369  01:27 PM    PLATELET 869  12:14 PM    PLATELET 635  01:07 AM       No results found for this or any previous visit (from the past 24 hour(s)).

## 2018-12-17 VITALS
SYSTOLIC BLOOD PRESSURE: 126 MMHG | HEIGHT: 67 IN | RESPIRATION RATE: 17 BRPM | OXYGEN SATURATION: 95 % | DIASTOLIC BLOOD PRESSURE: 80 MMHG | BODY MASS INDEX: 36.57 KG/M2 | WEIGHT: 233 LBS | HEART RATE: 83 BPM | TEMPERATURE: 98.1 F

## 2018-12-17 PROBLEM — Z34.90 PREGNANCY: Status: RESOLVED | Noted: 2018-09-25 | Resolved: 2018-12-17

## 2018-12-17 PROBLEM — D21.9 FIBROID: Status: ACTIVE | Noted: 2018-12-17

## 2018-12-17 PROBLEM — D64.9 ANEMIA: Status: ACTIVE | Noted: 2018-12-17

## 2018-12-17 PROBLEM — O13.9 GESTATIONAL HYPERTENSION AFFECTING FIRST PREGNANCY: Status: RESOLVED | Noted: 2018-12-13 | Resolved: 2018-12-17

## 2018-12-17 PROCEDURE — 74011250637 HC RX REV CODE- 250/637: Performed by: OBSTETRICS & GYNECOLOGY

## 2018-12-17 RX ORDER — OXYCODONE AND ACETAMINOPHEN 5; 325 MG/1; MG/1
1 TABLET ORAL
Qty: 28 TAB | Refills: 0 | Status: SHIPPED | OUTPATIENT
Start: 2018-12-17 | End: 2020-02-04

## 2018-12-17 RX ORDER — LANOLIN ALCOHOL/MO/W.PET/CERES
325 CREAM (GRAM) TOPICAL 2 TIMES DAILY
Qty: 60 TAB | Refills: 1 | Status: SHIPPED | OUTPATIENT
Start: 2018-12-17 | End: 2020-02-04

## 2018-12-17 RX ORDER — IBUPROFEN 800 MG/1
800 TABLET ORAL
Qty: 60 TAB | Refills: 0 | Status: SHIPPED | OUTPATIENT
Start: 2018-12-17 | End: 2020-02-04

## 2018-12-17 RX ORDER — DOCUSATE SODIUM 100 MG/1
100 CAPSULE, LIQUID FILLED ORAL
Qty: 30 CAP | Refills: 0 | Status: SHIPPED | OUTPATIENT
Start: 2018-12-17 | End: 2020-02-04

## 2018-12-17 RX ADMIN — IBUPROFEN 800 MG: 400 TABLET ORAL at 12:12

## 2018-12-17 RX ADMIN — IBUPROFEN 800 MG: 400 TABLET ORAL at 03:56

## 2018-12-17 NOTE — ROUTINE PROCESS
Verbal shift change report given to A. Verner Blakes RN (oncoming nurse) by Sejal Graham RN (offgoing nurse). Report included the following information SBAR, Intake/Output, MAR, Accordion and Recent Results. Parents educated on safe sleep environment for . Verbalized understanding. Do parents have a safe sleep environment:YES    Parents request a Baby Box:YES      If Baby Box requested must complete and check all below:       [x] Nurse reviewed certifcate from videos. [x] Baby Box given to parents. [x] Education completed on use of Baby Box. [x] Release Form Signed.      [x] Copy of Release Form put in mother's chart     [x] Mom sticker and email address put on clipboard

## 2018-12-17 NOTE — ROUTINE PROCESS
Bedside SBAR received from Maddie Almeida RN.      2129: Vitals and assessment delayed due to patient breastfeeding infant and working with the lactation nurse. I have reviewed discharge instructions with the patient. The patient verbalized understanding.

## 2018-12-17 NOTE — PROGRESS NOTES
Post-Operative  Day 3    Alie Briggs       Assessment:   Hospital Problems  Date Reviewed: 2018          Codes Class Noted POA    Gestational hypertension affecting first pregnancy ICD-10-CM: O13.9  ICD-9-CM: 642.30  2018 Unknown        Pregnancy ICD-10-CM: Z34.90  ICD-9-CM: V22.2  2018 Unknown            Post-Op day 3, doing well   s/p LTCS for NRFHT after IOL for GHTN;   1. Pre E labs neg, P/C 0.   2. Fibroid and bandl ring noted at surgery, Hgb 10.8-> 9.5 g/dl_d/c on iron  3. Circ done        Plan:   1. Discharge home today  2. Follow up in office in 6 weeks with Sharon Pickering MD  3. Post partum activity/wound care advised, diet as tolerated  4. Discharge Medications: ibuprofen, percocet and medications prior to admission      Information for the patient's :  Royal  [[id number]]   , Low Transverse   Patient doing well without significant complaint. Tolerating diet, passing flatus, voiding and ambulating without difficulty    Current Facility-Administered Medications   Medication Dose Route Frequency    fentaNYL 2mcg/mL - bupivacaine 0.125% pf epidural  1-16 mL/hr Epidural CONTINUOUS    lactated Ringers infusion  125 mL/hr IntraVENous CONTINUOUS    sodium chloride (NS) flush 5-10 mL  5-10 mL IntraVENous Q8H    ibuprofen (MOTRIN) tablet 800 mg  800 mg Oral Q8H    lactated Ringers infusion  125 mL/hr IntraVENous CONTINUOUS       Vitals:  Visit Vitals  /73 (BP 1 Location: Right arm, BP Patient Position: At rest)   Pulse 86   Temp 98.2 °F (36.8 °C)   Resp 17   Ht 5' 7\" (1.702 m)   Wt 105.7 kg (233 lb)   SpO2 95%   Breastfeeding? Unknown   BMI 36.49 kg/m²     Temp (24hrs), Av.1 °F (36.7 °C), Min:97.4 °F (36.3 °C), Max:98.7 °F (37.1 °C)        Exam:        Patient without distress.                Abdomen, bowel sounds present, soft, expected tenderness, fundus firm                Wound incision clean, dry and intact               Lower extremities are negative for swelling, cords or tenderness. Labs:   Lab Results   Component Value Date/Time    WBC 10.9 12/15/2018 05:45 AM    WBC 8.4 12/12/2018 10:43 PM    WBC 8.2 10/16/2018 01:27 PM    HGB 9.5 (L) 12/15/2018 05:45 AM    HGB 10.8 (L) 12/12/2018 10:43 PM    HGB 10.8 (L) 10/16/2018 01:27 PM    HCT 29.0 (L) 12/15/2018 05:45 AM    HCT 33.8 (L) 12/12/2018 10:43 PM    HCT 34.3 (L) 10/16/2018 01:27 PM    PLATELET 345 82/53/6943 05:45 AM    PLATELET 123 38/17/6025 10:43 PM    PLATELET 140 81/85/2966 01:27 PM       No results found for this or any previous visit (from the past 24 hour(s)).

## 2018-12-17 NOTE — ROUTINE PROCESS
Bedside and Verbal shift change report given to PAT Arellano RN (oncoming nurse) by Eulalia Dickson RN (offgoing nurse). Report included the following information SBAR, Kardex, MAR and Accordion.

## 2018-12-17 NOTE — LACTATION NOTE
This note was copied from a baby's chart. Mom states the baby has been latching and nursing well. She said baby had a busy day yesterday and he was very sleepy. She said he did not nurse as well yesterday during the day. Mom has been able to hand express and she has been pumping over night due to baby's increased weight loss. She has been able to pump a small amount of breast milk that she has been giving to the baby. Baby's weight loss is 9.5% at the last weighing. Mom is very concerned that the baby is not eating enough. We discussed breast feeding and pumping but she would like to do more. We talked about offering the baby a small amount of formula after nursing. Mom would like to do that. Mom will breast feed first, then offer any colostrum she has saved and then follow up with formula. She will give 15-20 cc's of a combination of breast milk and formula. If she has enough of breast milk she will not need to give any formula. Mom will feed a couple more times and then we will weigh the baby again before discharge.

## 2018-12-17 NOTE — DISCHARGE SUMMARY
Obstetrical Discharge Summary     Name: Luis Fernando Cortez MRN: [id number]  SSN: [id number]    YOB: 1980  Age: 45 y.o. Sex: female      Admit Date: 2018    Discharge Date: 2018    Admitting Physician: Angélica Sellers MD     Attending Physician:  Kelli Cueto MD     Discharge Diagnoses:   Information for the patient's :  Kelsey Boyer [[id number]]   Delivery of a 2.63 kg male infant via , Low Transverse on 2018 at 3:29 PM  by . Apgars were 8 and 9.        Additional Diagnoses:   Problem List as of 2018 Date Reviewed: 2018          Codes Class Noted - Resolved    Fibroid ICD-10-CM: D21.9  ICD-9-CM: 215.9  2018 - Present        Anemia ICD-10-CM: D64.9  ICD-9-CM: 285.9  2018 - Present        Threatened premature labor ICD-10-CM: O47.00  ICD-9-CM: 644.00  10/16/2018 - Present        PCOS (polycystic ovarian syndrome) ICD-10-CM: E28.2  ICD-9-CM: 256.4  2016 - Present        Infertility associated with anovulation ICD-10-CM: N97.0  ICD-9-CM: 628.0  2016 - Present        Menorrhagia ICD-10-CM: N92.0  ICD-9-CM: 626.2  2014 - Present        Dysmenorrhea ICD-10-CM: N94.6  ICD-9-CM: 625.3  2014 - Present        Abdominal pain, other specified site ICD-10-CM: R10.9  ICD-9-CM: 789.09  2014 - Present        Oligomenorrhea ICD-10-CM: N91.5  ICD-9-CM: 626.1  2013 - Present        RESOLVED: Gestational hypertension affecting first pregnancy ICD-10-CM: O13.9  ICD-9-CM: 642.30  2018 - 2018        RESOLVED: Pregnancy ICD-10-CM: Z34.90  ICD-9-CM: V22.2  2018 - 2018        RESOLVED: BMI 33.0-33.9,adult ICD-10-CM: P22.93  ICD-9-CM: V85.33  2014 - 10/27/2015        RESOLVED: Morbid obesity ([facility] [address] ICD-10-CM: E66.01  ICD-9-CM: 278.01  2013 - 2014              Lab Results   Component Value Date/Time    Rubella, External immune 2018    GrBStrep, External Negative 09/10/2018     Recent Labs 12/15/18  0545   HGB 9.5*       Hospital Course: Normal hospital course following the delivery. s/p LTCS for NRFHT after IOL for GHTN;   1. Pre E labs neg, P/C 0. BPs normalized postpartum  2. Fibroid and bandl ring noted at surgery, Hgb 10.8-> 9.5 g/dl_d/c on iron  3. Circ done        Patient Instructions:   Current Discharge Medication List      START taking these medications    Details   docusate sodium (COLACE) 100 mg capsule Take 1 Cap by mouth two (2) times daily as needed for Constipation for up to 30 doses. Qty: 30 Cap, Refills: 0      ferrous sulfate (IRON) 325 mg (65 mg iron) tablet Take 1 Tab by mouth two (2) times a day. Qty: 60 Tab, Refills: 1      ibuprofen (MOTRIN) 800 mg tablet Take 1 Tab by mouth every eight (8) hours as needed for Pain. Qty: 60 Tab, Refills: 0      oxyCODONE-acetaminophen (PERCOCET) 5-325 mg per tablet Take 1 Tab by mouth every four (4) hours as needed for Pain. Max Daily Amount: 6 Tabs. Qty: 28 Tab, Refills: 0    Associated Diagnoses: Gestational hypertension affecting first pregnancy         CONTINUE these medications which have NOT CHANGED    Details   PNV66-Iron Fumarate-FA-DSS-DHA 26-1.2- mg cap Take  by mouth. STOP taking these medications       progesterone (PROMETRIUM) 200 mg capsule Comments:   Reason for Stopping:               Reference my discharge instructions.     Follow-up Appointments   Procedures    FOLLOW UP VISIT Appointment in: 6 Weeks     Standing Status:   Standing     Number of Occurrences:   1     Order Specific Question:   Appointment in     Answer:   6 Weeks        Signed By:  Yanira Winston MD     December 17, 2018                       BST

## 2018-12-17 NOTE — DISCHARGE INSTRUCTIONS
Section: What to Expect at [address] [place] [address]    A  section, or , is surgery to deliver your baby through a cut, called an incision, that the doctor makes in your lower belly and uterus. You may have some pain in your lower belly and need pain medicine for 1 to 2 weeks. You can expect some vaginal bleeding for several weeks. You will probably need about 6 weeks to fully recover. It is important to take it easy while the incision is healing. Avoid heavy lifting, strenuous activities, or exercises that strain the belly muscles while you are recovering. Ask a family member or friend for help with housework, cooking, and shopping. This care sheet gives you a general idea about how long it will take for you to recover. But each person recovers at a different pace. Follow the steps below to get better as quickly as possible. How can you care for yourself at home? Activity    · Rest when you feel tired. Getting enough sleep will help you recover.     · Try to walk each day. Start by walking a little more than you did the day before. Bit by bit, increase the amount you walk. Walking boosts blood flow and helps prevent pneumonia, constipation, and blood clots.     · Avoid strenuous activities, such as bicycle riding, jogging, weightlifting, and aerobic exercise, for 6 weeks or until your doctor says it is okay.     · Until your doctor says it is okay, do not lift anything heavier than your baby.     · Do not do sit-ups or other exercises that strain the belly muscles for 6 weeks or until your doctor says it is okay.     · Hold a pillow over your incision when you cough or take deep breaths. This will support your belly and decrease your pain.     · You may shower as usual. Pat the incision dry when you are done.     · You will have some vaginal bleeding. Wear sanitary pads.  Do not douche or use tampons until your doctor says it is okay.     · Ask your doctor when you can drive again.     · You will probably need to take at least 6 weeks off work. It depends on the type of work you do and how you feel.     · Ask your doctor when it is okay for you to have sex. Diet    · You can eat your normal diet. If your stomach is upset, try bland, low-fat foods like plain rice, broiled chicken, toast, and yogurt.     · Drink plenty of fluids (unless your doctor tells you not to).     · You may notice that your bowel movements are not regular right after your surgery. This is common. Try to avoid constipation and straining with bowel movements. You may want to take a fiber supplement every day. If you have not had a bowel movement after a couple of days, ask your doctor about taking a mild laxative.     · If you are breastfeeding, limit alcohol. Alcohol can cause a lack of energy and other health problems for the baby when a breastfeeding woman drinks heavily. It can also get in the way of a mom's ability to feed her baby or to care for the child in other ways. There isn't a lot of research about exactly how much alcohol can harm a baby. Having no alcohol is the safest choice for your baby. If you choose to have a drink now and then, have only one drink, and limit the number of occasions that you have a drink. Wait to breastfeed at least 2 hours after you have a drink to reduce the amount of alcohol the baby may get in the milk. Medicines    · Your doctor will tell you if and when you can restart your medicines. He or she will also give you instructions about taking any new medicines.     · If you take blood thinners, such as warfarin (Coumadin), clopidogrel (Plavix), or aspirin, be sure to talk to your doctor. He or she will tell you if and when to start taking those medicines again. Make sure that you understand exactly what your doctor wants you to do.     · Take pain medicines exactly as directed. ? If the doctor gave you a prescription medicine for pain, take it as prescribed.   ? If you are not taking a prescription pain medicine, ask your doctor if you can take an over-the-counter medicine.     · If you think your pain medicine is making you sick to your stomach:  ? Take your medicine after meals (unless your doctor has told you not to). ? Ask your doctor for a different pain medicine.     · If your doctor prescribed antibiotics, take them as directed. Do not stop taking them just because you feel better. You need to take the full course of antibiotics. Incision care    · If you have strips of tape on the incision, leave the tape on for a week or until it falls off.     · Wash the area daily with warm, soapy water, and pat it dry. Don't use hydrogen peroxide or alcohol, which can slow healing. You may cover the area with a gauze bandage if it weeps or rubs against clothing. Change the bandage every day.     · Keep the area clean and dry. Other instructions    · If you breastfeed your baby, you may be more comfortable while you are healing if you place the baby so that he or she is not resting on your belly. Try tucking your baby under your arm, with his or her body along the side you will be feeding on. Support your baby's upper body with your arm. With that hand you can control your baby's head to bring his or her mouth to your breast. This is sometimes called the football hold. Follow-up care is a key part of your treatment and safety. Be sure to make and go to all appointments, and call your doctor if you are having problems. It's also a good idea to know your test results and keep a list of the medicines you take. When should you call for help? Call 911 anytime you think you may need emergency care.  For example, call if:    · You passed out (lost consciousness).     · You have chest pain, are short of breath, or cough up blood.    Call your doctor now or seek immediate medical care if:    · You have pain that does not get better after you take pain medicine.     · You have severe vaginal bleeding.     · You are dizzy or lightheaded, or you feel like you may faint.     · You have new or worse pain in your belly or pelvis.     · You have loose stitches, or your incision comes open.     · You have symptoms of infection, such as:  ? Increased pain, swelling, warmth, or redness. ? Red streaks leading from the incision. ? Pus draining from the incision. ? A fever.     · You have symptoms of a blood clot in your leg (called a deep vein thrombosis), such as:  ? Pain in your calf, back of the knee, thigh, or groin. ? Redness and swelling in your leg or groin.    Watch closely for changes in your health, and be sure to contact your doctor if:    · You do not get better as expected. Where can you learn more? Go to http://mikal-blair.info/. Enter M806 in the search box to learn more about \" Section: What to Expect at Home. \"  Current as of: 2017  Content Version: 11.8  © 9871-3156 [employer]. Care instructions adapted under license by [employer] (which disclaims liability or warranty for this information). If you have questions about a medical condition or this instruction, always ask your healthcare professional. [address] any warranty or liability for your use of this information.

## 2020-02-04 ENCOUNTER — OFFICE VISIT (OUTPATIENT)
Dept: OBGYN CLINIC | Age: 40
End: 2020-02-04

## 2020-02-04 VITALS — WEIGHT: 218.5 LBS | SYSTOLIC BLOOD PRESSURE: 126 MMHG | BODY MASS INDEX: 34.22 KG/M2 | DIASTOLIC BLOOD PRESSURE: 84 MMHG

## 2020-02-04 DIAGNOSIS — Z01.419 ENCOUNTER FOR GYNECOLOGICAL EXAMINATION WITHOUT ABNORMAL FINDING: Primary | ICD-10-CM

## 2020-02-04 NOTE — PATIENT INSTRUCTIONS

## 2020-02-04 NOTE — PROGRESS NOTES
Annual exam    Tiffani Deluna is a 44 y.o. presenting for annual exam.   Her main concerns today include none. Occ RLQ pain since her . Ob/Gyn Hx:  G2   Patient's last menstrual period was 2020 (exact date). Menses- regular monthly cycles?  yes, Bothersome? no  Contraception-condoms- always  STI- declined  SA-yes    Health maintenance:  Pap-2018- normal per patient at [address]  Mammo-N/A  Colonoscopy- N/A  Gardasil-none    Past Medical History:   Diagnosis Date    Anemia 2018    Fibroids     Gestational hypertension     Gestational hypertension affecting first pregnancy 2018    History of PCOS     Infertility, female     Polycystic disease, ovaries     Psychiatric problem     anxiety - took medications as a teenager, no medications recently        Past Surgical History:   Procedure Laterality Date    HX  SECTION      HX OTHER SURGICAL  2009    wisdom tooth extraction x4       Family History   Problem Relation Age of Onset    Hypertension Mother     Hypertension Sister     Diabetes Sister     Hypertension Brother     Cancer Paternal Uncle     Diabetes Paternal Uncle     Diabetes Maternal Uncle        Social History     Socioeconomic History    Marital status:      Spouse name: Not on file    Number of children: Not on file    Years of education: Not on file    Highest education level: Not on file   Occupational History    Not on file   Social Needs    Financial resource strain: Not on file    Food insecurity:     Worry: Not on file     Inability: Not on file    Transportation needs:     Medical: Not on file     Non-medical: Not on file   Tobacco Use    Smoking status: Never Smoker    Smokeless tobacco: Never Used   Substance and Sexual Activity    Alcohol use: No     Frequency: Never    Drug use: No    Sexual activity: Yes     Partners: Male     Birth control/protection: Condom   Lifestyle    Physical activity:     Days per week: Not on file     Minutes per session: Not on file    Stress: Not on file   Relationships    Social connections:     Talks on phone: Not on file     Gets together: Not on file     Attends Cheondoism service: Not on file     Active member of club or organization: Not on file     Attends meetings of clubs or organizations: Not on file     Relationship status: Not on file    Intimate partner violence:     Fear of current or ex partner: Not on file     Emotionally abused: Not on file     Physically abused: Not on file     Forced sexual activity: Not on file   Other Topics Concern     Service Not Asked    Blood Transfusions Not Asked    Caffeine Concern Not Asked    Occupational Exposure Not Asked   Judene Pill Hazards Not Asked    Sleep Concern Not Asked    Stress Concern Not Asked    Weight Concern Not Asked    Special Diet Not Asked    Back Care Not Asked    Exercise Not Asked    Bike Helmet Not Asked   Semmes Not Asked    Self-Exams Not Asked   Social History Narrative    Not on file       Current Outpatient Medications   Medication Sig Dispense Refill    docusate sodium (COLACE) 100 mg capsule Take 1 Cap by mouth two (2) times daily as needed for Constipation for up to 30 doses. 30 Cap 0    ferrous sulfate (IRON) 325 mg (65 mg iron) tablet Take 1 Tab by mouth two (2) times a day. 60 Tab 1    ibuprofen (MOTRIN) 800 mg tablet Take 1 Tab by mouth every eight (8) hours as needed for Pain. 60 Tab 0    oxyCODONE-acetaminophen (PERCOCET) 5-325 mg per tablet Take 1 Tab by mouth every four (4) hours as needed for Pain. Max Daily Amount: 6 Tabs. 28 Tab 0    PNV66-Iron Fumarate-FA-DSS-DHA 26-1.2- mg cap Take  by mouth.          No Known Allergies    Review of Systems - History obtained from the patient  Constitutional: negative for weight loss, fever, night sweats  HEENT: negative for hearing loss, earache, congestion, snoring, sorethroat  CV: negative for chest pain, palpitations, edema  Resp: negative for cough, shortness of breath, wheezing  GI: negative for change in bowel habits, abdominal pain, black or bloody stools  : negative for frequency, dysuria, hematuria, vaginal discharge  MSK: negative for back pain, joint pain, muscle pain  Breast: negative for breast lumps, nipple discharge, galactorrhea  Skin :negative for itching, rash, hives  Neuro: negative for dizziness, headache, confusion, weakness  Psych: negative for anxiety, depression, change in mood  Heme/lymph: negative for bleeding, bruising, pallor    Physical Exam    Visit Vitals  /84 (BP 1 Location: Left arm, BP Patient Position: Sitting)   Wt 218 lb 8 oz (99.1 kg)   LMP 01/11/2020 (Exact Date)   Breastfeeding No   BMI 34.22 kg/m²       Constitutional  · Appearance: well-nourished, well developed, alert, in no acute distress    HENT  · Head and Face: appears normal    Neck  · Inspection/Palpation: normal appearance, no masses or tenderness  · Lymph Nodes: no lymphadenopathy present  · Thyroid: gland size normal, nontender, no nodules or masses present on palpation    Chest  · Respiratory Effort: non-labored breathing  · Auscultation: CTAB, normal breath sounds    Cardiovascular  · Heart:  · Auscultation: regular rate and rhythm without murmur  · Extremities: no peripheral edema    Breasts  · Inspection of Breasts: breasts symmetrical, no skin changes, no discharge present, nipple appearance normal, no skin retraction present  · Palpation of Breasts and Axillae: no masses present on palpation, no breast tenderness  · Axillary Lymph Nodes: no lymphadenopathy present    Gastrointestinal  · Abdominal Examination: abdomen non-tender to palpation, normal bowel sounds, no masses present  · Liver and spleen: no hepatomegaly present, spleen not palpable  · Hernias: no hernias identified    Genitourinary  · External Genitalia: normal appearance for age, no discharge present, no tenderness present, no inflammatory lesions present, no masses present, no atrophy present  · Vagina: normal vaginal vault without central or paravaginal defects, no discharge present, no inflammatory lesions present, no masses present  · Bladder: non-tender to palpation  · Urethra: appears normal  · Cervix: normal   · Uterus: normal size, shape and consistency  · Adnexa: no adnexal tenderness present, no adnexal masses present  · Perineum: perineum within normal limits, no evidence of trauma, no rashes or skin lesions present    Skin  · General Inspection: no rash, no lesions identified    Neurologic/Psychiatric  · Mental Status:  · Orientation: grossly oriented to person, place and time  · Mood and Affect: mood normal, affect appropriate      Assessment/Plan:  44 y.o. overall doing well.      Health Maintenance:  -counseled re: diet, exercise, healthy lifestyle  -pap/HPV sent  -STI testing SENT   -[facility] provided  -refer for mammo    RTC: 1 year for annual wellness assessment, or sooner prn for problems or concerns  -handouts and instructions provided    Valentin Rodriguez  2/4/2020  9:18 AM     Signed By: Reggie Bowie MD     February 4, 2020

## 2020-02-08 LAB
CYTOLOGIST CVX/VAG CYTO: NORMAL
CYTOLOGY CVX/VAG DOC CYTO: NORMAL
CYTOLOGY CVX/VAG DOC THIN PREP: NORMAL
DX ICD CODE: NORMAL
HPV I/H RISK 1 DNA CVX QL PROBE+SIG AMP: NEGATIVE
Lab: NORMAL
OTHER STN SPEC: NORMAL
STAT OF ADQ CVX/VAG CYTO-IMP: NORMAL

## 2020-09-01 ENCOUNTER — OFFICE VISIT (OUTPATIENT)
Dept: OBGYN CLINIC | Age: 40
End: 2020-09-01
Payer: COMMERCIAL

## 2020-09-01 VITALS — SYSTOLIC BLOOD PRESSURE: 118 MMHG | BODY MASS INDEX: 33.52 KG/M2 | DIASTOLIC BLOOD PRESSURE: 76 MMHG | WEIGHT: 214 LBS

## 2020-09-01 DIAGNOSIS — R10.2 PELVIC PAIN: Primary | ICD-10-CM

## 2020-09-01 LAB
HCG URINE, QL. (POC): NEGATIVE
VALID INTERNAL CONTROL?: YES

## 2020-09-01 PROCEDURE — 99214 OFFICE O/P EST MOD 30 MIN: CPT | Performed by: OBSTETRICS & GYNECOLOGY

## 2020-09-01 PROCEDURE — 81025 URINE PREGNANCY TEST: CPT | Performed by: OBSTETRICS & GYNECOLOGY

## 2020-09-01 NOTE — PROGRESS NOTES
Pelvic Pain    CC: Pelvic Pain    HPI: Ms. Ebenezer aJmil is a 36 y.o.  female presenting for evaluation of pelvic pain. Patient's last menstrual period was 2020. Marycruz Ocoha Her past medical history is notable for small fibroids. She has no additional complaints and has not yet been evaluated for pelvic pain. Onset: this month  Location of pain: LLQ  Quality of pain: crampy  Worse with menses? yes  Pain between menstrual cycles? yes  Severity? bothersome  Timing: chronic    Other associated symptoms:   Denies vaginal discharge. Denies abdominal distension, constipation, diarrhea, dysuria, hematuria, incontinence, urinary urgency/frequency. Denies fevers/chills. Denies dyspareunia.       Past Medical History:   Diagnosis Date    Anemia 2018    Fibroids     Gestational hypertension     Gestational hypertension affecting first pregnancy 2018    History of PCOS     Infertility, female     Polycystic disease, ovaries     Psychiatric problem     anxiety - took medications as a teenager, no medications recently        Past Surgical History:   Procedure Laterality Date    HX  SECTION      HX OTHER SURGICAL  2009    wisdom tooth extraction x4       Family History   Problem Relation Age of Onset    Hypertension Mother     Hypertension Sister     Diabetes Sister     Hypertension Brother     Cancer Paternal Uncle     Diabetes Paternal Uncle     Diabetes Maternal Uncle        Social History     Socioeconomic History    Marital status:      Spouse name: Not on file    Number of children: Not on file    Years of education: Not on file    Highest education level: Not on file   Occupational History    Not on file   Social Needs    Financial resource strain: Not on file    Food insecurity     Worry: Not on file     Inability: Not on file    Transportation needs     Medical: Not on file     Non-medical: Not on file   Tobacco Use    Smoking status: Never Smoker    Smokeless tobacco: Never Used   Substance and Sexual Activity    Alcohol use: No     Frequency: Never    Drug use: No    Sexual activity: Yes     Partners: Male     Birth control/protection: Condom     Comment: condoms- sometimes.    Lifestyle    Physical activity     Days per week: Not on file     Minutes per session: Not on file    Stress: Not on file   Relationships    Social connections     Talks on phone: Not on file     Gets together: Not on file     Attends Gnosticism service: Not on file     Active member of club or organization: Not on file     Attends meetings of clubs or organizations: Not on file     Relationship status: Not on file    Intimate partner violence     Fear of current or ex partner: Not on file     Emotionally abused: Not on file     Physically abused: Not on file     Forced sexual activity: Not on file   Other Topics Concern     Service Not Asked    Blood Transfusions Not Asked    Caffeine Concern Not Asked    Occupational Exposure Not Asked    Hobby Hazards Not Asked    Sleep Concern Not Asked    Stress Concern Not Asked    Weight Concern Not Asked    Special Diet Not Asked    Back Care Not Asked    Exercise Not Asked    Bike Helmet Not Asked   [address] Not Asked    Self-Exams Not Asked   Social History Narrative    Not on file           No Known Allergies    Review of Systems - History obtained from the patient  Constitutional: negative for weight loss, fever, night sweats  HEENT: negative for hearing loss, earache, congestion, snoring, sorethroat  CV: negative for chest pain, palpitations, edema  Resp: negative for cough, shortness of breath, wheezing  GI: negative for change in bowel habits, abdominal pain, black or bloody stools  : negative for frequency, dysuria, hematuria, vaginal discharge  MSK: negative for back pain, joint pain, muscle pain  Breast: negative for breast lumps, nipple discharge, galactorrhea  Skin :negative for itching, rash, hives  Neuro: negative for dizziness, headache, confusion, weakness  Psych: negative for anxiety, depression, change in mood  Heme/lymph: negative for bleeding, bruising, pallor    Physical Exam    Visit Vitals  /76   Wt 214 lb (97.1 kg)   LMP 08/07/2020   Breastfeeding Unknown   BMI 33.52 kg/m²       HENT  · Head and Face: appears normal    Neck  · Inspection/Palpation: normal appearance, no masses or tenderness  · Lymph Nodes: no lymphadenopathy present  · Thyroid: gland size normal, nontender, no nodules or masses present on palpation    Chest  · Respiratory Effort: non-labored breathing  · Auscultation: Clear to auscultation bilaterlly, normal breath sounds    Cardiovascular  · Heart:  · Auscultation: regular rate and rhythm without murmur  · Extremities: no peripheral edema    Gastrointestinal  · Abdominal Examination: abdomen non-tender to palpation, normal bowel sounds, no masses present  · Liver and spleen: no hepatomegaly present, spleen not palpable  · Hernias: no hernias identified    Genitourinary  · External Genitalia: normal appearance for age, no discharge present, no tenderness present, no inflammatory lesions present, no masses present, no atrophy present  · Vagina: normal vaginal vault without central or paravaginal defects, no discharge present, no inflammatory lesions present, no masses present  · Bladder: non-tender to palpation  · Urethra: appears normal  · Cervix: normal, no cervicitis, no CMT  · Uterus: enlarged size, shape and consistency, mobile  · Adnexa: no adnexal tenderness present, no adnexal masses present  · Perineum: perineum within normal limits, no evidence of trauma, no rashes or skin lesions present    Skin  · General Inspection: no rash, no lesions identified    Neurologic/Psychiatric  · Mental Status:  · Orientation: grossly oriented to person, place and time  · Mood and Affect: mood normal, affect appropriate    Results for orders placed or performed in visit on 02/04/20   PAP IG, HPV AND RFX HPV 10/53,23([id number])   Result Value Ref Range    Diagnosis Comment     Specimen adequacy Comment     Clinician provided ICD10 Comment     Performed by: Comment     . Tano Leader Note: Comment     Test methodology Comment     HPV DNA Probe, High Risk Negative Negative       No results found. Assessment/Plan:  36 y.o. with chronic pelvic pain. Discussed possible etiologies of pelvic pain including infectious verus gastrointestinal versus genitourinary versus idiopathic causes.     STI testing sent  Urine culture sent   Trial of NSAIDS  TVUS ordered    [place] Monday 9/1/2020  2:58 PM     Signed By: Elieser Cardona MD     September 1, 2020

## 2020-09-03 LAB — BACTERIA UR CULT: NO GROWTH

## 2020-09-04 LAB
A VAGINAE DNA VAG QL NAA+PROBE: NORMAL SCORE
BVAB2 DNA VAG QL NAA+PROBE: NORMAL SCORE
C ALBICANS DNA VAG QL NAA+PROBE: NORMAL
C GLABRATA DNA VAG QL NAA+PROBE: NORMAL
C TRACH DNA VAG QL NAA+PROBE: NEGATIVE
MEGA1 DNA VAG QL NAA+PROBE: NORMAL SCORE
N GONORRHOEA DNA VAG QL NAA+PROBE: NEGATIVE
T VAGINALIS DNA VAG QL NAA+PROBE: NEGATIVE

## 2020-09-14 ENCOUNTER — OFFICE VISIT (OUTPATIENT)
Dept: OBGYN CLINIC | Age: 40
End: 2020-09-14
Payer: COMMERCIAL

## 2020-09-14 VITALS — DIASTOLIC BLOOD PRESSURE: 84 MMHG | WEIGHT: 217 LBS | SYSTOLIC BLOOD PRESSURE: 130 MMHG | BODY MASS INDEX: 33.99 KG/M2

## 2020-09-14 DIAGNOSIS — R10.2 PELVIC PAIN: Primary | ICD-10-CM

## 2020-09-14 PROCEDURE — 99212 OFFICE O/P EST SF 10 MIN: CPT | Performed by: OBSTETRICS & GYNECOLOGY

## 2020-09-14 PROCEDURE — 76830 TRANSVAGINAL US NON-OB: CPT | Performed by: OBSTETRICS & GYNECOLOGY

## 2020-09-14 RX ORDER — IBUPROFEN 800 MG/1
800 TABLET ORAL
Qty: 40 TAB | Refills: 1 | Status: SHIPPED | OUTPATIENT
Start: 2020-09-14 | End: 2021-01-01

## 2020-09-14 NOTE — PROGRESS NOTES
Patient presents today to discuss ultrasound results. Her pain is essentially unchanged since last visit. See attached ultrasound report. We discussed findings of US today including 4cm uterine fibroid otherwise unremarkable TVUS. We discussed options for mgmt today including expectant v medical mgmt. She desires trial of NSAIDS today-to take q8hrs    Visit time 10min. More than 50% of my face-to-face time was spend on patient counseling.     Signed By: Emma Deshpande MD     September 14, 2020

## 2021-01-01 ENCOUNTER — OFFICE VISIT (OUTPATIENT)
Dept: FAMILY MEDICINE CLINIC | Age: 41
End: 2021-01-01
Payer: COMMERCIAL

## 2021-01-01 ENCOUNTER — HOSPITAL ENCOUNTER (OUTPATIENT)
Dept: ULTRASOUND IMAGING | Age: 41
Discharge: HOME | End: 2021-12-29
Attending: STUDENT IN AN ORGANIZED HEALTH CARE EDUCATION/TRAINING PROGRAM
Payer: COMMERCIAL

## 2021-01-01 ENCOUNTER — TELEPHONE (OUTPATIENT)
Dept: OBGYN CLINIC | Age: 41
End: 2021-01-01

## 2021-01-01 VITALS
BODY MASS INDEX: 35.79 KG/M2 | SYSTOLIC BLOOD PRESSURE: 132 MMHG | DIASTOLIC BLOOD PRESSURE: 86 MMHG | HEIGHT: 67 IN | HEART RATE: 68 BPM | TEMPERATURE: 98.1 F | WEIGHT: 228 LBS | RESPIRATION RATE: 17 BRPM | OXYGEN SATURATION: 99 %

## 2021-01-01 DIAGNOSIS — D50.9 IRON DEFICIENCY ANEMIA, UNSPECIFIED IRON DEFICIENCY ANEMIA TYPE: ICD-10-CM

## 2021-01-01 DIAGNOSIS — Z11.59 NEED FOR HEPATITIS C SCREENING TEST: ICD-10-CM

## 2021-01-01 DIAGNOSIS — G89.29 CHRONIC RUQ PAIN: ICD-10-CM

## 2021-01-01 DIAGNOSIS — E66.9 CLASS 2 OBESITY WITH BODY MASS INDEX (BMI) OF 35.0 TO 35.9 IN ADULT, UNSPECIFIED OBESITY TYPE, UNSPECIFIED WHETHER SERIOUS COMORBIDITY PRESENT: ICD-10-CM

## 2021-01-01 DIAGNOSIS — R03.0 ELEVATED BLOOD PRESSURE READING: ICD-10-CM

## 2021-01-01 DIAGNOSIS — R10.11 CHRONIC RUQ PAIN: ICD-10-CM

## 2021-01-01 DIAGNOSIS — Z13.220 SCREENING CHOLESTEROL LEVEL: ICD-10-CM

## 2021-01-01 DIAGNOSIS — R10.11 CHRONIC RUQ PAIN: Primary | ICD-10-CM

## 2021-01-01 DIAGNOSIS — G89.29 CHRONIC RUQ PAIN: Primary | ICD-10-CM

## 2021-01-01 LAB
ALBUMIN SERPL-MCNC: 3.9 G/DL (ref 3.5–5)
ALBUMIN/GLOB SERPL: 1.3 {RATIO} (ref 1.1–2.2)
ALP SERPL-CCNC: 69 U/L (ref 45–117)
ALT SERPL-CCNC: 23 U/L (ref 12–78)
ANION GAP SERPL CALC-SCNC: 4 MMOL/L (ref 5–15)
AST SERPL-CCNC: 20 U/L (ref 15–37)
BILIRUB SERPL-MCNC: 0.6 MG/DL (ref 0.2–1)
BUN SERPL-MCNC: 12 MG/DL (ref 6–20)
BUN/CREAT SERPL: 16 (ref 12–20)
CALCIUM SERPL-MCNC: 9.2 MG/DL (ref 8.5–10.1)
CHLORIDE SERPL-SCNC: 108 MMOL/L (ref 97–108)
CHOLEST SERPL-MCNC: 77 MG/DL
CO2 SERPL-SCNC: 26 MMOL/L (ref 21–32)
CREAT SERPL-MCNC: 0.73 MG/DL (ref 0.55–1.02)
ERYTHROCYTE [DISTWIDTH] IN BLOOD BY AUTOMATED COUNT: 14.6 % (ref 11.5–14.5)
EST. AVERAGE GLUCOSE BLD GHB EST-MCNC: 126 MG/DL
GLOBULIN SER CALC-MCNC: 2.9 G/DL (ref 2–4)
GLUCOSE SERPL-MCNC: 95 MG/DL (ref 65–100)
HBA1C MFR BLD: 6 % (ref 4–5.6)
HCT VFR BLD AUTO: 37.3 % (ref 35–47)
HCV AB SERPL QL IA: NONREACTIVE
HDLC SERPL-MCNC: 41 MG/DL
HDLC SERPL: 1.9 {RATIO} (ref 0–5)
HGB BLD-MCNC: 11.2 G/DL (ref 11.5–16)
LDLC SERPL CALC-MCNC: 26.8 MG/DL (ref 0–100)
MCH RBC QN AUTO: 23.6 PG (ref 26–34)
MCHC RBC AUTO-ENTMCNC: 30 G/DL (ref 30–36.5)
MCV RBC AUTO: 78.7 FL (ref 80–99)
NRBC # BLD: 0 K/UL (ref 0–0.01)
NRBC BLD-RTO: 0 PER 100 WBC
PLATELET # BLD AUTO: 397 K/UL (ref 150–400)
PMV BLD AUTO: 10 FL (ref 8.9–12.9)
POTASSIUM SERPL-SCNC: 4.7 MMOL/L (ref 3.5–5.1)
PROT SERPL-MCNC: 6.8 G/DL (ref 6.4–8.2)
RBC # BLD AUTO: 4.74 M/UL (ref 3.8–5.2)
SODIUM SERPL-SCNC: 138 MMOL/L (ref 136–145)
TRIGL SERPL-MCNC: 46 MG/DL (ref ?–150)
VLDLC SERPL CALC-MCNC: 9.2 MG/DL
WBC # BLD AUTO: 5.9 K/UL (ref 3.6–11)

## 2021-01-01 PROCEDURE — 76705 ECHO EXAM OF ABDOMEN: CPT

## 2021-01-01 PROCEDURE — 99204 OFFICE O/P NEW MOD 45 MIN: CPT | Performed by: STUDENT IN AN ORGANIZED HEALTH CARE EDUCATION/TRAINING PROGRAM

## 2021-01-01 RX ORDER — PANTOPRAZOLE SODIUM 40 MG/1
40 TABLET, DELAYED RELEASE ORAL DAILY
Qty: 90 TABLET | Refills: 0 | Status: SHIPPED | OUTPATIENT
Start: 2021-01-01 | End: 2022-01-01

## 2021-01-14 ENCOUNTER — TRANSCRIBE ORDER (OUTPATIENT)
Dept: SCHEDULING | Age: 41
End: 2021-01-14

## 2021-01-14 DIAGNOSIS — Z12.31 VISIT FOR SCREENING MAMMOGRAM: Primary | ICD-10-CM

## 2021-01-27 ENCOUNTER — HOSPITAL ENCOUNTER (OUTPATIENT)
Dept: MAMMOGRAPHY | Age: 41
Discharge: HOME | End: 2021-01-27
Attending: INTERNAL MEDICINE
Payer: COMMERCIAL

## 2021-01-27 DIAGNOSIS — Z12.31 VISIT FOR SCREENING MAMMOGRAM: ICD-10-CM

## 2021-01-27 PROCEDURE — 77063 BREAST TOMOSYNTHESIS BI: CPT

## 2021-03-05 NOTE — PROGRESS NOTES
[id number]  Patient oriented to room, call bell next to patient who is sitting in the chair. Patient states she is feeling great and has no needs at this time. Patient has ordered her lunch. Patient is waiting for her ride which is coming at 1500. 
 
[phone]  Patient's discharged - her ride is here! Recommended OBSERVATION and MONITORING for change.

## 2021-12-15 NOTE — PROGRESS NOTES
[address] [address] ESPERANZA Dowling, [address] [place] [address]  [phone]    C/C: RUQ pain    HPI:    eBto Dorman is a 39 y.o. female who presents to clinic today for evaluation of the issues listed above. Pt is new to the practice. Previous pcp: Dr. Mecca Chatman. Subjective;    RUQ pain:  Ongoing for years, diagnosed with fatty liver disease in the past.  Pain is Intermittent, flares about 4 times per month. States that the pain is always there but she feels it when it flares. No clear association with food although she reports that it is sometimes worst shortly after eating. Gallbladder is intact. Worked up in 2014 by previous pcp. Ultrasound showed possible Hepatic steatosis and nodule in the right, mid//lateral anterior abd wall which was possibly a small lipoma. Pt denies any  fever, chill, chest pain, SOB, DUBON, PND, n/v/d or blood in the stool.     Other Health Habits and social history:  Smoking history: no  Alcohol history: no  Occupation: [employer], now [employer].  Marital status: Patient is currently  and has one son (2 yo)      Allergies- reviewed:   No Known Allergies    Past Medical History- reviewed:  Past Medical History:   Diagnosis Date    Anemia 12/17/2018    Fibroids     Gestational hypertension     Gestational hypertension affecting first pregnancy 12/13/2018    History of PCOS     Infertility, female     Polycystic disease, ovaries     Psychiatric problem     anxiety - took medications as a teenager, no medications recently        Family History - reviewed:  Family History   Problem Relation Age of Onset    Hypertension Mother     Hypertension Sister     Diabetes Sister     Hypertension Brother     Cancer Paternal Uncle     Diabetes Paternal Uncle     Diabetes Maternal Uncle     Breast Cancer Cousin        Social History - reviewed:  Social History     Socioeconomic History    Marital status:      Spouse name: Not on file  Number of children: Not on file    Years of education: Not on file    Highest education level: Not on file   Occupational History    Not on file   Tobacco Use    Smoking status: Never Smoker    Smokeless tobacco: Never Used   Vaping Use    Vaping Use: Never used   Substance and Sexual Activity    Alcohol use: No    Drug use: No    Sexual activity: Yes     Partners: Male     Birth control/protection: Condom     Comment: condoms- sometimes. Other Topics Concern     Service Not Asked    Blood Transfusions Not Asked    Caffeine Concern Not Asked    Occupational Exposure Not Asked    Hobby Hazards Not Asked    Sleep Concern Not Asked    Stress Concern Not Asked    Weight Concern Not Asked    Special Diet Not Asked    Back Care Not Asked    Exercise Not Asked    Bike Helmet Not Asked   [address] Not Asked    Self-Exams Not Asked   Social History Narrative    Not on file     Social Determinants of Health     Financial Resource Strain:     Difficulty of Paying Living Expenses: Not on file   Food Insecurity:     Worried About Running Out of Food in the Last Year: Not on file    Isis of Food in the Last Year: Not on file   Transportation Needs:     Lack of Transportation (Medical): Not on file    Lack of Transportation (Non-Medical):  Not on file   Physical Activity:     Days of Exercise per Week: Not on file    Minutes of Exercise per Session: Not on file   Stress:     Feeling of Stress : Not on file   Social Connections:     Frequency of Communication with Friends and Family: Not on file    Frequency of Social Gatherings with Friends and Family: Not on file    Attends Yazdanism Services: Not on file    Active Member of Clubs or Organizations: Not on file    Attends Club or Organization Meetings: Not on file    Marital Status: Not on file   Intimate Partner Violence:     Fear of Current or Ex-Partner: Not on file    Emotionally Abused: Not on file    Physically Abused: Not on file    Sexually Abused: Not on file   Housing Stability:     Unable to Pay for Housing in the Last Year: Not on file    Number of Places Lived in the Last Year: Not on file    Unstable Housing in the Last Year: Not on file       Depression screening:  3 most recent PHQ Screens 12/15/2021   Little interest or pleasure in doing things Not at all   Feeling down, depressed, irritable, or hopeless Not at all   Total Score PHQ 2 0         Review of systems:     A comprehensive review of systems was negative except for that written in the History of Present Illness. Visit Vitals  /86 (BP 1 Location: Right upper arm, BP Patient Position: Sitting, BP Cuff Size: Large adult)   Pulse 68   Temp 98.1 °F (36.7 °C) (Oral)   Resp 17   Ht 5' 7\" (1.702 m)   Wt 228 lb (103.4 kg)   LMP 12/07/2021 (Exact Date)   SpO2 99%   BMI 35.71 kg/m²       General: Alert and oriented, in no acute distress. Well nourished. EYE: PERRL. Sclera and conjuctival clear. Extraocular movements intact. EARS: External normal, canals clear, tympanic membranes normal.   NOSE: Mucosa healthy without drainage or ulceration. OROPHARYNX: No suspicious lesions, normal dentition, pharynx, tongue and tonsils normal.  NECK: Supple; no masses; thyroid normal.  LUNGS: Respirations unlabored; clear to auscultation bilaterally. CARDIOVASCULAR: Regular, rate, and rhythm without murmurs, gallops or rubs. ABDOMEN: Soft; nontender; nondistended; normoactive bowel sounds; no masses or organomegaly. MUSCULOSKELETAL: FROM in all extremities     EXT: No edema. Neurovascularlly intact. Normal gait. SKIN: No rash. No suspicious lesions or moles. Neuro: Mental Status: Pt is alert and oriented to person, place, and time. Assessment/Plan       ICD-10-CM ICD-9-CM    1. Chronic RUQ pain  R10.11 789.01 US ABD LTD    G89.29 338.29 pantoprazole (PROTONIX) 40 mg tablet      CANCELED: US ABD LTD   2.  Iron deficiency anemia, unspecified iron deficiency anemia type  D50.9 280.9 CBC W/O DIFF      CBC W/O DIFF   3. Screening cholesterol level  Z13.220 V77.91 LIPID PANEL      LIPID PANEL   4. Class 2 obesity with body mass index (BMI) of 35.0 to 35.9 in adult, unspecified obesity type, unspecified whether serious comorbidity present  E66.9 278.00 HEMOGLOBIN A1C WITH EAG    A53.97 H59.20 METABOLIC PANEL, COMPREHENSIVE      METABOLIC PANEL, COMPREHENSIVE      HEMOGLOBIN A1C WITH EAG   5. Elevated blood pressure reading  R03.0 796.2    6. Need for hepatitis C screening test  Z11.59 V73.89 HEPATITIS C AB      HEPATITIS C AB       1. Chronic RUQ pain    - US ABD LTD; Future  - pantoprazole (PROTONIX) 40 mg tablet; Take 1 Tablet by mouth daily. - Will refer to GI if still no improvement and no clear etiology on US. 2. Iron deficiency anemia, unspecified iron deficiency anemia type  - CBC W/O DIFF; Future    3. Screening cholesterol level  - LIPID PANEL; Future    4. Class 2 obesity with body mass index (BMI) of 35.0 to 35.9 in adult, unspecified obesity type, unspecified whether serious comorbidity present  - HEMOGLOBIN A1C WITH EAG; Future  - METABOLIC PANEL, COMPREHENSIVE; Future  -  - I counseled on lifestyle changes; discussed of the importance of eating habits/patterns and physical activity to overall health. Also discussed the importance to avoid smoking and excessive alcohol consumption.  - Encouraged to eat foods that are baked, broiled, boiled, steamed or grilled. Avoid greasy or fried foods. Use olive oil or canola oil instead of vegetable oil. Eat fish twice weekly. Decreasing weight by 5-10% of baseline weight over the next 6 months if overweight/obese helps to improve obesity-related conditions. Eat a low carbohydrate diet. Decrease sugary beverages, white foods and sweets. Increase exercise to 5 days weekly for 30 minutes daily minimally and as tolerated. Have at an average of 7 hours of uninterrupted sleep at night.      5. Elevated blood pressure reading  Back to normal upon repeat. 6. Need for hepatitis C screening test  - HEPATITIS C AB; Future    Follow up: 6-8 weeks or sooner if needed    I have discussed the diagnosis with the patient and the intended plan as seen in the above orders. The patient has received an after-visit summary and questions were answered concerning future plans. I have discussed medication side effects and warnings with the patient as well. Informed patient to return to the office if new symptoms arise.     Signed By: Vinh Estrada MD     December 15, 2021

## 2021-12-15 NOTE — PROGRESS NOTES
Chief Complaint   Patient presents with   [facility] Sicard Establish Care     new patient        Patient is here today to establish care. She would like to discuss R flank and side pain. She says she has had it for years but it has been getting worse lately. She describes it as a throbbing pain and feels like she has knots on her side. She takes 2 ibuprofen twice a day if needed. She says her pain is worse when eating spicy or fried food.  Heating pad improves her pain

## 2021-12-16 NOTE — TELEPHONE ENCOUNTER
Call received at 2:!0Pm      39year old patient last seen in the office on 2/4/2020 for ae     SAUMYA patient      Patient calling to say that for the past week she has been having urinary frequency, lower abdminal pressure and pulling in her vagina. , that initially she rated at 10 on the pain scale of one ten and now at 6 since it is sporadic      Patient having her cycle that started last Tuesday and usually only list 4-5 days and was heavy and did not stop this time on Monday. Patient is wondering how to proceed    This nurse suggested pcp or urgent care and patient really wants to her her gyn      ?  Ov with ultrasound at [facility]    Please advise  Thank you

## 2021-12-17 NOTE — TELEPHONE ENCOUNTER
Other (lower abdominal pulling a pressure)    Lj Doll MD  You 40 minutes ago (10:00 AM)     SP    Yes please      Patient advised of, work in MD, Dr. Tommy Granados recommendations, patient was placed on the schedule to be seen on 12/27/2021 at 2:00PM for ultrasound and then MD at 2:40Pm at the [facility] location    Patient was provided with the location of the office     Patient verbalized understanding.

## 2021-12-29 NOTE — PROGRESS NOTES
Reviewed. 1.  No evidence of cholelithiasis. 2.  Possible nonobstructing 0.9 cm right renal calculus. 3.  Mildly hyperechoic 3 cm subcapsular hepatic lesion which is indeterminate.     If persistent will send for Multiphasic CT or abd MRI

## 2022-01-01 ENCOUNTER — TELEPHONE (OUTPATIENT)
Dept: OBGYN CLINIC | Age: 42
End: 2022-01-01

## 2022-01-01 ENCOUNTER — APPOINTMENT (OUTPATIENT)
Dept: CT IMAGING | Age: 42
DRG: 683 | End: 2022-01-01
Attending: EMERGENCY MEDICINE
Payer: COMMERCIAL

## 2022-01-01 ENCOUNTER — HOSPITAL ENCOUNTER (OUTPATIENT)
Age: 42
Setting detail: OBSERVATION
Discharge: HOME | End: 2022-04-15
Attending: EMERGENCY MEDICINE | Admitting: OBSTETRICS & GYNECOLOGY
Payer: COMMERCIAL

## 2022-01-01 ENCOUNTER — HOSPITAL ENCOUNTER (OUTPATIENT)
Dept: CT IMAGING | Age: 42
Discharge: HOME | End: 2022-05-03
Attending: OBSTETRICS & GYNECOLOGY
Payer: COMMERCIAL

## 2022-01-01 ENCOUNTER — TELEPHONE (OUTPATIENT)
Dept: GYNECOLOGY | Age: 42
End: 2022-01-01

## 2022-01-01 ENCOUNTER — HOSPITAL ENCOUNTER (OUTPATIENT)
Dept: INFUSION THERAPY | Age: 42
Discharge: HOME | End: 2022-04-28
Payer: COMMERCIAL

## 2022-01-01 ENCOUNTER — VIRTUAL VISIT (OUTPATIENT)
Dept: GYNECOLOGY | Age: 42
End: 2022-01-01
Payer: COMMERCIAL

## 2022-01-01 ENCOUNTER — HOSPICE ADMISSION (OUTPATIENT)
Dept: HOSPICE | Facility: HOSPICE | Age: 42
End: 2022-01-01

## 2022-01-01 ENCOUNTER — OFFICE VISIT (OUTPATIENT)
Dept: FAMILY MEDICINE CLINIC | Age: 42
End: 2022-01-01
Payer: COMMERCIAL

## 2022-01-01 ENCOUNTER — OFFICE VISIT (OUTPATIENT)
Dept: GYNECOLOGY | Age: 42
End: 2022-01-01
Payer: COMMERCIAL

## 2022-01-01 ENCOUNTER — TELEPHONE (OUTPATIENT)
Dept: FAMILY MEDICINE CLINIC | Age: 42
End: 2022-01-01

## 2022-01-01 ENCOUNTER — VIRTUAL VISIT (OUTPATIENT)
Dept: FAMILY MEDICINE CLINIC | Age: 42
End: 2022-01-01
Payer: COMMERCIAL

## 2022-01-01 ENCOUNTER — HOSPITAL ENCOUNTER (OUTPATIENT)
Dept: INFUSION THERAPY | Age: 42
End: 2022-01-01

## 2022-01-01 ENCOUNTER — APPOINTMENT (OUTPATIENT)
Dept: CT IMAGING | Age: 42
End: 2022-01-01
Attending: EMERGENCY MEDICINE
Payer: COMMERCIAL

## 2022-01-01 ENCOUNTER — NURSE TRIAGE (OUTPATIENT)
Dept: OTHER | Facility: CLINIC | Age: 42
End: 2022-01-01

## 2022-01-01 ENCOUNTER — HOSPITAL ENCOUNTER (OUTPATIENT)
Dept: PET IMAGING | Age: 42
Discharge: HOME | End: 2022-04-13
Attending: OBSTETRICS & GYNECOLOGY
Payer: COMMERCIAL

## 2022-01-01 ENCOUNTER — OFFICE VISIT (OUTPATIENT)
Dept: OBGYN CLINIC | Age: 42
End: 2022-01-01

## 2022-01-01 ENCOUNTER — HOSPITAL ENCOUNTER (INPATIENT)
Age: 42
LOS: 5 days | Discharge: HOME | DRG: 739 | End: 2022-03-22
Attending: EMERGENCY MEDICINE | Admitting: OBSTETRICS & GYNECOLOGY
Payer: COMMERCIAL

## 2022-01-01 ENCOUNTER — OFFICE VISIT (OUTPATIENT)
Dept: OBGYN CLINIC | Age: 42
End: 2022-01-01
Payer: COMMERCIAL

## 2022-01-01 ENCOUNTER — APPOINTMENT (OUTPATIENT)
Dept: GENERAL RADIOLOGY | Age: 42
DRG: 739 | End: 2022-01-01
Attending: OBSTETRICS & GYNECOLOGY
Payer: COMMERCIAL

## 2022-01-01 ENCOUNTER — HOSPITAL ENCOUNTER (OUTPATIENT)
Dept: INFUSION THERAPY | Age: 42
Discharge: HOME | End: 2022-05-06
Payer: COMMERCIAL

## 2022-01-01 ENCOUNTER — HOSPITAL ENCOUNTER (INPATIENT)
Age: 42
LOS: 1 days | Discharge: HOME | DRG: 812 | End: 2022-03-13
Attending: EMERGENCY MEDICINE | Admitting: OBSTETRICS & GYNECOLOGY
Payer: COMMERCIAL

## 2022-01-01 ENCOUNTER — HOSPITAL ENCOUNTER (EMERGENCY)
Age: 42
Discharge: HOME | End: 2022-02-12
Attending: STUDENT IN AN ORGANIZED HEALTH CARE EDUCATION/TRAINING PROGRAM
Payer: COMMERCIAL

## 2022-01-01 ENCOUNTER — APPOINTMENT (OUTPATIENT)
Dept: ULTRASOUND IMAGING | Age: 42
DRG: 812 | End: 2022-01-01
Attending: OBSTETRICS & GYNECOLOGY
Payer: COMMERCIAL

## 2022-01-01 ENCOUNTER — HOSPITAL ENCOUNTER (OUTPATIENT)
Dept: INFUSION THERAPY | Age: 42
Discharge: HOME | End: 2022-04-27
Payer: COMMERCIAL

## 2022-01-01 ENCOUNTER — APPOINTMENT (OUTPATIENT)
Dept: GYNECOLOGY | Age: 42
End: 2022-01-01

## 2022-01-01 ENCOUNTER — HOSPITAL ENCOUNTER (EMERGENCY)
Age: 42
Discharge: HOME | End: 2022-03-08
Attending: EMERGENCY MEDICINE
Payer: COMMERCIAL

## 2022-01-01 ENCOUNTER — OFFICE VISIT (OUTPATIENT)
Dept: CARDIOLOGY CLINIC | Age: 42
End: 2022-01-01

## 2022-01-01 ENCOUNTER — HOSPITAL ENCOUNTER (OUTPATIENT)
Dept: GENERAL RADIOLOGY | Age: 42
Discharge: HOME | End: 2022-03-11
Attending: EMERGENCY MEDICINE
Payer: COMMERCIAL

## 2022-01-01 ENCOUNTER — HOSPITAL ENCOUNTER (INPATIENT)
Age: 42
LOS: 3 days | DRG: 683 | End: 2022-05-11
Attending: EMERGENCY MEDICINE | Admitting: STUDENT IN AN ORGANIZED HEALTH CARE EDUCATION/TRAINING PROGRAM
Payer: COMMERCIAL

## 2022-01-01 ENCOUNTER — NURSE NAVIGATOR (OUTPATIENT)
Dept: CASE MANAGEMENT | Age: 42
End: 2022-01-01

## 2022-01-01 ENCOUNTER — DOCUMENTATION ONLY (OUTPATIENT)
Dept: GYNECOLOGY | Age: 42
End: 2022-01-01

## 2022-01-01 ENCOUNTER — APPOINTMENT (OUTPATIENT)
Dept: VASCULAR SURGERY | Age: 42
DRG: 683 | End: 2022-01-01
Attending: INTERNAL MEDICINE
Payer: COMMERCIAL

## 2022-01-01 ENCOUNTER — ANESTHESIA (OUTPATIENT)
Dept: SURGERY | Age: 42
DRG: 739 | End: 2022-01-01
Payer: COMMERCIAL

## 2022-01-01 ENCOUNTER — HOSPITAL ENCOUNTER (EMERGENCY)
Age: 42
Discharge: HOME | End: 2022-02-20
Attending: EMERGENCY MEDICINE
Payer: COMMERCIAL

## 2022-01-01 ENCOUNTER — ANESTHESIA EVENT (OUTPATIENT)
Dept: SURGERY | Age: 42
DRG: 739 | End: 2022-01-01
Payer: COMMERCIAL

## 2022-01-01 ENCOUNTER — ANCILLARY PROCEDURE (OUTPATIENT)
Dept: CARDIOLOGY CLINIC | Age: 42
End: 2022-01-01
Payer: COMMERCIAL

## 2022-01-01 ENCOUNTER — APPOINTMENT (OUTPATIENT)
Dept: CT IMAGING | Age: 42
DRG: 739 | End: 2022-01-01
Attending: EMERGENCY MEDICINE
Payer: COMMERCIAL

## 2022-01-01 ENCOUNTER — OFFICE VISIT (OUTPATIENT)
Dept: GYNECOLOGY | Age: 42
End: 2022-01-01

## 2022-01-01 VITALS
HEART RATE: 117 BPM | RESPIRATION RATE: 16 BRPM | HEIGHT: 67 IN | WEIGHT: 194 LBS | SYSTOLIC BLOOD PRESSURE: 127 MMHG | DIASTOLIC BLOOD PRESSURE: 81 MMHG | OXYGEN SATURATION: 99 % | BODY MASS INDEX: 30.45 KG/M2 | TEMPERATURE: 99.2 F

## 2022-01-01 VITALS
RESPIRATION RATE: 20 BRPM | DIASTOLIC BLOOD PRESSURE: 77 MMHG | SYSTOLIC BLOOD PRESSURE: 137 MMHG | TEMPERATURE: 99.2 F | HEIGHT: 67 IN | BODY MASS INDEX: 34.37 KG/M2 | OXYGEN SATURATION: 100 % | HEART RATE: 116 BPM | WEIGHT: 219 LBS

## 2022-01-01 VITALS
HEIGHT: 67 IN | HEART RATE: 108 BPM | RESPIRATION RATE: 16 BRPM | WEIGHT: 218.7 LBS | BODY MASS INDEX: 34.33 KG/M2 | SYSTOLIC BLOOD PRESSURE: 138 MMHG | OXYGEN SATURATION: 100 % | DIASTOLIC BLOOD PRESSURE: 80 MMHG | TEMPERATURE: 98.6 F

## 2022-01-01 VITALS
SYSTOLIC BLOOD PRESSURE: 130 MMHG | TEMPERATURE: 99.3 F | BODY MASS INDEX: 30.98 KG/M2 | WEIGHT: 197.4 LBS | HEART RATE: 120 BPM | DIASTOLIC BLOOD PRESSURE: 81 MMHG | HEIGHT: 67 IN

## 2022-01-01 VITALS
RESPIRATION RATE: 16 BRPM | SYSTOLIC BLOOD PRESSURE: 126 MMHG | HEART RATE: 118 BPM | HEIGHT: 67 IN | BODY MASS INDEX: 30.98 KG/M2 | WEIGHT: 197.4 LBS | OXYGEN SATURATION: 100 % | DIASTOLIC BLOOD PRESSURE: 63 MMHG | TEMPERATURE: 99.3 F

## 2022-01-01 VITALS
DIASTOLIC BLOOD PRESSURE: 48 MMHG | BODY MASS INDEX: 36.41 KG/M2 | HEIGHT: 67 IN | TEMPERATURE: 97.4 F | OXYGEN SATURATION: 98 % | HEART RATE: 98 BPM | RESPIRATION RATE: 14 BRPM | SYSTOLIC BLOOD PRESSURE: 78 MMHG | WEIGHT: 232 LBS

## 2022-01-01 VITALS
RESPIRATION RATE: 16 BRPM | OXYGEN SATURATION: 100 % | WEIGHT: 215 LBS | TEMPERATURE: 98.9 F | BODY MASS INDEX: 33.74 KG/M2 | SYSTOLIC BLOOD PRESSURE: 126 MMHG | HEART RATE: 83 BPM | HEIGHT: 67 IN | DIASTOLIC BLOOD PRESSURE: 81 MMHG

## 2022-01-01 VITALS
DIASTOLIC BLOOD PRESSURE: 87 MMHG | BODY MASS INDEX: 34.98 KG/M2 | HEIGHT: 67 IN | TEMPERATURE: 99.1 F | HEART RATE: 102 BPM | SYSTOLIC BLOOD PRESSURE: 137 MMHG | RESPIRATION RATE: 16 BRPM | OXYGEN SATURATION: 100 % | WEIGHT: 222.88 LBS

## 2022-01-01 VITALS
WEIGHT: 196.6 LBS | TEMPERATURE: 99.5 F | HEIGHT: 67 IN | DIASTOLIC BLOOD PRESSURE: 70 MMHG | HEART RATE: 115 BPM | BODY MASS INDEX: 30.86 KG/M2 | SYSTOLIC BLOOD PRESSURE: 113 MMHG

## 2022-01-01 VITALS
OXYGEN SATURATION: 97 % | HEIGHT: 67 IN | HEART RATE: 88 BPM | BODY MASS INDEX: 35.5 KG/M2 | SYSTOLIC BLOOD PRESSURE: 131 MMHG | RESPIRATION RATE: 16 BRPM | TEMPERATURE: 99.3 F | WEIGHT: 226.19 LBS | DIASTOLIC BLOOD PRESSURE: 82 MMHG

## 2022-01-01 VITALS
TEMPERATURE: 98.9 F | HEART RATE: 106 BPM | RESPIRATION RATE: 16 BRPM | OXYGEN SATURATION: 100 % | SYSTOLIC BLOOD PRESSURE: 131 MMHG | DIASTOLIC BLOOD PRESSURE: 75 MMHG

## 2022-01-01 VITALS — SYSTOLIC BLOOD PRESSURE: 144 MMHG | WEIGHT: 222 LBS | BODY MASS INDEX: 34.77 KG/M2 | DIASTOLIC BLOOD PRESSURE: 84 MMHG

## 2022-01-01 VITALS
TEMPERATURE: 102.3 F | RESPIRATION RATE: 18 BRPM | DIASTOLIC BLOOD PRESSURE: 76 MMHG | SYSTOLIC BLOOD PRESSURE: 132 MMHG | OXYGEN SATURATION: 99 % | HEART RATE: 126 BPM

## 2022-01-01 VITALS
WEIGHT: 200 LBS | SYSTOLIC BLOOD PRESSURE: 117 MMHG | DIASTOLIC BLOOD PRESSURE: 77 MMHG | HEART RATE: 68 BPM | HEIGHT: 67 IN | BODY MASS INDEX: 31.39 KG/M2

## 2022-01-01 VITALS
HEIGHT: 67 IN | HEART RATE: 115 BPM | RESPIRATION RATE: 18 BRPM | DIASTOLIC BLOOD PRESSURE: 74 MMHG | WEIGHT: 197 LBS | BODY MASS INDEX: 30.92 KG/M2 | OXYGEN SATURATION: 99 % | SYSTOLIC BLOOD PRESSURE: 132 MMHG

## 2022-01-01 VITALS
WEIGHT: 217.81 LBS | OXYGEN SATURATION: 99 % | DIASTOLIC BLOOD PRESSURE: 73 MMHG | BODY MASS INDEX: 34.19 KG/M2 | RESPIRATION RATE: 23 BRPM | HEART RATE: 90 BPM | SYSTOLIC BLOOD PRESSURE: 124 MMHG | TEMPERATURE: 99.4 F | HEIGHT: 67 IN

## 2022-01-01 VITALS — HEIGHT: 69 IN | WEIGHT: 197 LBS | BODY MASS INDEX: 29.18 KG/M2

## 2022-01-01 VITALS
WEIGHT: 226.4 LBS | BODY MASS INDEX: 35.53 KG/M2 | OXYGEN SATURATION: 98 % | DIASTOLIC BLOOD PRESSURE: 78 MMHG | HEART RATE: 91 BPM | SYSTOLIC BLOOD PRESSURE: 124 MMHG | TEMPERATURE: 98.4 F | RESPIRATION RATE: 16 BRPM | HEIGHT: 67 IN

## 2022-01-01 VITALS — DIASTOLIC BLOOD PRESSURE: 84 MMHG | WEIGHT: 225 LBS | SYSTOLIC BLOOD PRESSURE: 134 MMHG | BODY MASS INDEX: 35.24 KG/M2

## 2022-01-01 VITALS — WEIGHT: 197 LBS | HEIGHT: 67 IN | BODY MASS INDEX: 30.92 KG/M2

## 2022-01-01 DIAGNOSIS — Z79.899 ON ANTINEOPLASTIC CHEMOTHERAPY: ICD-10-CM

## 2022-01-01 DIAGNOSIS — K59.03 CONSTIPATION DUE TO OPIOID THERAPY: ICD-10-CM

## 2022-01-01 DIAGNOSIS — R19.00 PELVIC MASS: ICD-10-CM

## 2022-01-01 DIAGNOSIS — D50.9 MICROCYTIC ANEMIA: ICD-10-CM

## 2022-01-01 DIAGNOSIS — N94.6 DYSMENORRHEA: ICD-10-CM

## 2022-01-01 DIAGNOSIS — A41.9 SEPSIS, DUE TO UNSPECIFIED ORGANISM, UNSPECIFIED WHETHER ACUTE ORGAN DYSFUNCTION PRESENT (HCC): ICD-10-CM

## 2022-01-01 DIAGNOSIS — N93.8 DUB (DYSFUNCTIONAL UTERINE BLEEDING): ICD-10-CM

## 2022-01-01 DIAGNOSIS — D25.0 INTRAMURAL, SUBMUCOUS, AND SUBSEROUS LEIOMYOMA OF UTERUS: ICD-10-CM

## 2022-01-01 DIAGNOSIS — R10.84 GENERALIZED ABDOMINAL PAIN: ICD-10-CM

## 2022-01-01 DIAGNOSIS — N92.4 EXCESSIVE BLEEDING IN PREMENOPAUSAL PERIOD: ICD-10-CM

## 2022-01-01 DIAGNOSIS — N30.01 ACUTE CYSTITIS WITH HEMATURIA: Primary | ICD-10-CM

## 2022-01-01 DIAGNOSIS — C78.01 MALIGNANT NEOPLASM METASTATIC TO BOTH LUNGS (HCC): Primary | ICD-10-CM

## 2022-01-01 DIAGNOSIS — Z51.5 PALLIATIVE CARE ENCOUNTER: ICD-10-CM

## 2022-01-01 DIAGNOSIS — R25.2 MUSCLE CRAMPS: ICD-10-CM

## 2022-01-01 DIAGNOSIS — R42 DIZZINESS: Primary | ICD-10-CM

## 2022-01-01 DIAGNOSIS — C78.02 MALIGNANT NEOPLASM METASTATIC TO BOTH LUNGS (HCC): ICD-10-CM

## 2022-01-01 DIAGNOSIS — D25.1 INTRAMURAL, SUBMUCOUS, AND SUBSEROUS LEIOMYOMA OF UTERUS: ICD-10-CM

## 2022-01-01 DIAGNOSIS — C78.02 MALIGNANT NEOPLASM METASTATIC TO BOTH LUNGS (HCC): Primary | ICD-10-CM

## 2022-01-01 DIAGNOSIS — D50.0 IRON DEFICIENCY ANEMIA DUE TO CHRONIC BLOOD LOSS: Primary | ICD-10-CM

## 2022-01-01 DIAGNOSIS — N85.8 UTERINE MASS: ICD-10-CM

## 2022-01-01 DIAGNOSIS — C54.9 UTERINE SARCOMA (HCC): Primary | ICD-10-CM

## 2022-01-01 DIAGNOSIS — D64.9 ANEMIA, UNSPECIFIED TYPE: ICD-10-CM

## 2022-01-01 DIAGNOSIS — D72.825 BANDEMIA: ICD-10-CM

## 2022-01-01 DIAGNOSIS — N30.01 ACUTE CYSTITIS WITH HEMATURIA: ICD-10-CM

## 2022-01-01 DIAGNOSIS — D25.2 INTRAMURAL, SUBMUCOUS, AND SUBSEROUS LEIOMYOMA OF UTERUS: ICD-10-CM

## 2022-01-01 DIAGNOSIS — G89.29 CHRONIC RUQ PAIN: Primary | ICD-10-CM

## 2022-01-01 DIAGNOSIS — R10.11 CHRONIC RUQ PAIN: Primary | ICD-10-CM

## 2022-01-01 DIAGNOSIS — D25.9 UTERINE LEIOMYOMA, UNSPECIFIED LOCATION: ICD-10-CM

## 2022-01-01 DIAGNOSIS — N93.9 ABNORMAL UTERINE BLEEDING: ICD-10-CM

## 2022-01-01 DIAGNOSIS — K66.1 HEMOPERITONEUM: ICD-10-CM

## 2022-01-01 DIAGNOSIS — C78.01 MALIGNANT NEOPLASM METASTATIC TO BOTH LUNGS (HCC): ICD-10-CM

## 2022-01-01 DIAGNOSIS — R10.2 PELVIC PAIN IN FEMALE: ICD-10-CM

## 2022-01-01 DIAGNOSIS — R65.10 SIRS (SYSTEMIC INFLAMMATORY RESPONSE SYNDROME) (HCC): ICD-10-CM

## 2022-01-01 DIAGNOSIS — N30.00 ACUTE CYSTITIS WITHOUT HEMATURIA: ICD-10-CM

## 2022-01-01 DIAGNOSIS — N94.6 PAINFUL MENSTRUATION: Primary | ICD-10-CM

## 2022-01-01 DIAGNOSIS — D50.0 IRON DEFICIENCY ANEMIA DUE TO CHRONIC BLOOD LOSS: ICD-10-CM

## 2022-01-01 DIAGNOSIS — D64.9 ACUTE ON CHRONIC ANEMIA: ICD-10-CM

## 2022-01-01 DIAGNOSIS — Z48.02: Primary | ICD-10-CM

## 2022-01-01 DIAGNOSIS — D64.9 ANEMIA, UNSPECIFIED TYPE: Primary | ICD-10-CM

## 2022-01-01 DIAGNOSIS — C54.9 UTERINE SARCOMA (HCC): ICD-10-CM

## 2022-01-01 DIAGNOSIS — R11.0 CHEMOTHERAPY-INDUCED NAUSEA: ICD-10-CM

## 2022-01-01 DIAGNOSIS — K59.00 CONSTIPATION, UNSPECIFIED CONSTIPATION TYPE: ICD-10-CM

## 2022-01-01 DIAGNOSIS — M25.551 HIP PAIN, CHRONIC, RIGHT: ICD-10-CM

## 2022-01-01 DIAGNOSIS — R00.0 TACHYCARDIA: ICD-10-CM

## 2022-01-01 DIAGNOSIS — D21.9 FIBROIDS: ICD-10-CM

## 2022-01-01 DIAGNOSIS — D21.9 FIBROIDS: Primary | ICD-10-CM

## 2022-01-01 DIAGNOSIS — H11.31 SUBCONJUNCTIVAL HEMORRHAGE OF RIGHT EYE: Primary | ICD-10-CM

## 2022-01-01 DIAGNOSIS — E66.09 CLASS 1 OBESITY DUE TO EXCESS CALORIES WITHOUT SERIOUS COMORBIDITY WITH BODY MASS INDEX (BMI) OF 30.0 TO 30.9 IN ADULT: ICD-10-CM

## 2022-01-01 DIAGNOSIS — C55 SARCOMA OF UTERUS METASTATIC TO LUNG (HCC): ICD-10-CM

## 2022-01-01 DIAGNOSIS — D62 ACUTE BLOOD LOSS ANEMIA: ICD-10-CM

## 2022-01-01 DIAGNOSIS — R10.84 ABDOMINAL PAIN, GENERALIZED: ICD-10-CM

## 2022-01-01 DIAGNOSIS — C78.00 SARCOMA OF UTERUS METASTATIC TO LUNG (HCC): ICD-10-CM

## 2022-01-01 DIAGNOSIS — R10.2 PELVIC PAIN IN FEMALE: Primary | ICD-10-CM

## 2022-01-01 DIAGNOSIS — R00.0 TACHYCARDIA: Primary | ICD-10-CM

## 2022-01-01 DIAGNOSIS — Z51.11 ENCOUNTER FOR ANTINEOPLASTIC CHEMOTHERAPY: ICD-10-CM

## 2022-01-01 DIAGNOSIS — Z86.018 HISTORY OF UTERINE LEIOMYOMA: ICD-10-CM

## 2022-01-01 DIAGNOSIS — T40.2X5A CONSTIPATION DUE TO OPIOID THERAPY: ICD-10-CM

## 2022-01-01 DIAGNOSIS — N92.1 MENORRHAGIA WITH IRREGULAR CYCLE: ICD-10-CM

## 2022-01-01 DIAGNOSIS — G89.3 CANCER ASSOCIATED PAIN: ICD-10-CM

## 2022-01-01 DIAGNOSIS — G89.29 HIP PAIN, CHRONIC, RIGHT: ICD-10-CM

## 2022-01-01 DIAGNOSIS — T45.1X5A CHEMOTHERAPY-INDUCED NAUSEA: ICD-10-CM

## 2022-01-01 DIAGNOSIS — E87.1 HYPONATREMIA: ICD-10-CM

## 2022-01-01 DIAGNOSIS — R06.02 SOB (SHORTNESS OF BREATH): ICD-10-CM

## 2022-01-01 DIAGNOSIS — N92.0 MENORRHAGIA WITH REGULAR CYCLE: ICD-10-CM

## 2022-01-01 DIAGNOSIS — N71.0 ACUTE ENDOMETRITIS: ICD-10-CM

## 2022-01-01 LAB
ABO + RH BLD: NORMAL
ALBUMIN SERPL-MCNC: 1.6 G/DL (ref 3.5–5)
ALBUMIN SERPL-MCNC: 2.9 G/DL (ref 3.5–5)
ALBUMIN SERPL-MCNC: 3 G/DL (ref 3.5–5)
ALBUMIN SERPL-MCNC: 3.2 G/DL (ref 3.8–4.8)
ALBUMIN SERPL-MCNC: 3.3 G/DL (ref 3.5–5)
ALBUMIN SERPL-MCNC: 3.4 G/DL (ref 3.5–5)
ALBUMIN SERPL-MCNC: 3.4 G/DL (ref 3.5–5)
ALBUMIN SERPL-MCNC: 3.8 G/DL (ref 3.5–5)
ALBUMIN/GLOB SERPL: 0.3 {RATIO} (ref 1.1–2.2)
ALBUMIN/GLOB SERPL: 0.9 {RATIO} (ref 1.1–2.2)
ALBUMIN/GLOB SERPL: 1 {RATIO} (ref 1.1–2.2)
ALBUMIN/GLOB SERPL: 1 {RATIO} (ref 1.2–2.2)
ALBUMIN/GLOB SERPL: 1.3 {RATIO} (ref 1.1–2.2)
ALP SERPL-CCNC: 49 U/L (ref 45–117)
ALP SERPL-CCNC: 51 U/L (ref 45–117)
ALP SERPL-CCNC: 54 U/L (ref 45–117)
ALP SERPL-CCNC: 56 U/L (ref 45–117)
ALP SERPL-CCNC: 60 U/L (ref 45–117)
ALP SERPL-CCNC: 65 U/L (ref 45–117)
ALP SERPL-CCNC: 68 U/L (ref 45–117)
ALP SERPL-CCNC: 88 IU/L (ref 44–121)
ALT SERPL-CCNC: 15 U/L (ref 12–78)
ALT SERPL-CCNC: 16 IU/L (ref 0–32)
ALT SERPL-CCNC: 18 U/L (ref 12–78)
ALT SERPL-CCNC: 20 U/L (ref 12–78)
ALT SERPL-CCNC: 30 U/L (ref 12–78)
ALT SERPL-CCNC: 31 U/L (ref 12–78)
ALT SERPL-CCNC: 36 U/L (ref 12–78)
ALT SERPL-CCNC: 39 U/L (ref 12–78)
ANION GAP SERPL CALC-SCNC: 10 MMOL/L (ref 5–15)
ANION GAP SERPL CALC-SCNC: 12 MMOL/L (ref 5–15)
ANION GAP SERPL CALC-SCNC: 13 MMOL/L (ref 5–15)
ANION GAP SERPL CALC-SCNC: 2 MMOL/L (ref 5–15)
ANION GAP SERPL CALC-SCNC: 4 MMOL/L (ref 5–15)
ANION GAP SERPL CALC-SCNC: 5 MMOL/L (ref 5–15)
ANION GAP SERPL CALC-SCNC: 5 MMOL/L (ref 5–15)
ANION GAP SERPL CALC-SCNC: 6 MMOL/L (ref 5–15)
ANION GAP SERPL CALC-SCNC: 7 MMOL/L (ref 5–15)
ANION GAP SERPL CALC-SCNC: 7 MMOL/L (ref 5–15)
ANION GAP SERPL CALC-SCNC: 8 MMOL/L (ref 5–15)
APPEARANCE UR: ABNORMAL
APPEARANCE UR: ABNORMAL
APPEARANCE UR: CLEAR
AST SERPL-CCNC: 18 U/L (ref 15–37)
AST SERPL-CCNC: 19 U/L (ref 15–37)
AST SERPL-CCNC: 22 U/L (ref 15–37)
AST SERPL-CCNC: 24 IU/L (ref 0–40)
AST SERPL-CCNC: 24 U/L (ref 15–37)
AST SERPL-CCNC: 37 U/L (ref 15–37)
AST SERPL-CCNC: 47 U/L (ref 15–37)
AST SERPL-CCNC: 52 U/L (ref 15–37)
ATRIAL RATE: 114 BPM
ATRIAL RATE: 115 BPM
ATRIAL RATE: 127 BPM
ATRIAL RATE: 131 BPM
ATRIAL RATE: 142 BPM
ATRIAL RATE: 96 BPM
BACTERIA #/AREA URNS HPF: ABNORMAL /[HPF]
BACTERIA #/AREA URNS HPF: ABNORMAL /[HPF]
BACTERIA SPEC CULT: ABNORMAL
BACTERIA SPEC CULT: ABNORMAL
BACTERIA UR CULT: NORMAL
BACTERIA URNS QL MICRO: ABNORMAL /HPF
BACTERIA URNS QL MICRO: NEGATIVE /HPF
BASOPHILS # BLD AUTO: 0 X10E3/UL (ref 0–0.2)
BASOPHILS # BLD AUTO: 0 X10E3/UL (ref 0–0.2)
BASOPHILS # BLD: 0 K/UL (ref 0–0.1)
BASOPHILS # BLD: 0.1 K/UL (ref 0–0.1)
BASOPHILS # BLD: 0.1 K/UL (ref 0–0.1)
BASOPHILS NFR BLD AUTO: 0 %
BASOPHILS NFR BLD AUTO: 0 %
BASOPHILS NFR BLD: 0 % (ref 0–1)
BASOPHILS NFR BLD: 1 % (ref 0–1)
BILIRUB SERPL-MCNC: 0.7 MG/DL (ref 0.2–1)
BILIRUB SERPL-MCNC: 0.8 MG/DL (ref 0–1.2)
BILIRUB SERPL-MCNC: 1 MG/DL (ref 0.2–1)
BILIRUB SERPL-MCNC: 1.2 MG/DL (ref 0.2–1)
BILIRUB SERPL-MCNC: 1.2 MG/DL (ref 0.2–1)
BILIRUB SERPL-MCNC: 1.3 MG/DL (ref 0.2–1)
BILIRUB SERPL-MCNC: 2.4 MG/DL (ref 0.2–1)
BILIRUB SERPL-MCNC: 3.4 MG/DL (ref 0.2–1)
BILIRUB UR QL CFM: NEGATIVE
BILIRUB UR QL STRIP: NEGATIVE
BILIRUB UR QL STRIP: NEGATIVE
BILIRUB UR QL: NEGATIVE
BLD PROD TYP BPU: NORMAL
BLOOD GROUP ANTIBODIES SERPL: NORMAL
BNP SERPL-MCNC: 22 PG/ML
BNP SERPL-MCNC: 31 PG/ML
BNP SERPL-MCNC: 90 PG/ML
BPU ID: NORMAL
BUN SERPL-MCNC: 10 MG/DL (ref 6–20)
BUN SERPL-MCNC: 11 MG/DL (ref 6–20)
BUN SERPL-MCNC: 13 MG/DL (ref 6–20)
BUN SERPL-MCNC: 14 MG/DL (ref 6–20)
BUN SERPL-MCNC: 17 MG/DL (ref 6–20)
BUN SERPL-MCNC: 18 MG/DL (ref 6–20)
BUN SERPL-MCNC: 24 MG/DL (ref 6–20)
BUN SERPL-MCNC: 28 MG/DL (ref 6–20)
BUN SERPL-MCNC: 48 MG/DL (ref 6–20)
BUN SERPL-MCNC: 6 MG/DL (ref 6–20)
BUN SERPL-MCNC: 61 MG/DL (ref 6–20)
BUN SERPL-MCNC: 7 MG/DL (ref 6–20)
BUN SERPL-MCNC: 7 MG/DL (ref 6–20)
BUN SERPL-MCNC: 8 MG/DL (ref 6–24)
BUN SERPL-MCNC: 9 MG/DL (ref 6–20)
BUN/CREAT SERPL: 10 (ref 12–20)
BUN/CREAT SERPL: 12 (ref 12–20)
BUN/CREAT SERPL: 12 (ref 9–23)
BUN/CREAT SERPL: 13 (ref 12–20)
BUN/CREAT SERPL: 13 (ref 12–20)
BUN/CREAT SERPL: 15 (ref 12–20)
BUN/CREAT SERPL: 16 (ref 12–20)
BUN/CREAT SERPL: 17 (ref 12–20)
BUN/CREAT SERPL: 17 (ref 12–20)
BUN/CREAT SERPL: 18 (ref 12–20)
BUN/CREAT SERPL: 21 (ref 12–20)
BUN/CREAT SERPL: 21 (ref 12–20)
BUN/CREAT SERPL: 23 (ref 12–20)
BUN/CREAT SERPL: 28 (ref 12–20)
CALCIUM SERPL-MCNC: 6.1 MG/DL (ref 8.5–10.1)
CALCIUM SERPL-MCNC: 7.4 MG/DL (ref 8.5–10.1)
CALCIUM SERPL-MCNC: 7.7 MG/DL (ref 8.5–10.1)
CALCIUM SERPL-MCNC: 7.8 MG/DL (ref 8.5–10.1)
CALCIUM SERPL-MCNC: 7.8 MG/DL (ref 8.5–10.1)
CALCIUM SERPL-MCNC: 8 MG/DL (ref 8.5–10.1)
CALCIUM SERPL-MCNC: 8.3 MG/DL (ref 8.5–10.1)
CALCIUM SERPL-MCNC: 8.4 MG/DL (ref 8.5–10.1)
CALCIUM SERPL-MCNC: 8.5 MG/DL (ref 8.5–10.1)
CALCIUM SERPL-MCNC: 8.6 MG/DL (ref 8.5–10.1)
CALCIUM SERPL-MCNC: 8.6 MG/DL (ref 8.5–10.1)
CALCIUM SERPL-MCNC: 8.7 MG/DL (ref 8.5–10.1)
CALCIUM SERPL-MCNC: 8.8 MG/DL (ref 8.7–10.2)
CALCIUM SERPL-MCNC: 8.9 MG/DL (ref 8.5–10.1)
CALCIUM SERPL-MCNC: 9.7 MG/DL (ref 8.5–10.1)
CALCULATED P AXIS, ECG09: 24 DEGREES
CALCULATED P AXIS, ECG09: 28 DEGREES
CALCULATED P AXIS, ECG09: 30 DEGREES
CALCULATED P AXIS, ECG09: 37 DEGREES
CALCULATED P AXIS, ECG09: 39 DEGREES
CALCULATED P AXIS, ECG09: 50 DEGREES
CALCULATED R AXIS, ECG10: 10 DEGREES
CALCULATED R AXIS, ECG10: 33 DEGREES
CALCULATED R AXIS, ECG10: 37 DEGREES
CALCULATED R AXIS, ECG10: 38 DEGREES
CALCULATED R AXIS, ECG10: 38 DEGREES
CALCULATED R AXIS, ECG10: 43 DEGREES
CALCULATED T AXIS, ECG11: -9 DEGREES
CALCULATED T AXIS, ECG11: 36 DEGREES
CALCULATED T AXIS, ECG11: 40 DEGREES
CALCULATED T AXIS, ECG11: 47 DEGREES
CALCULATED T AXIS, ECG11: 48 DEGREES
CALCULATED T AXIS, ECG11: 56 DEGREES
CANCER AG125 SERPL-ACNC: 64.9 U/ML (ref 0–38.1)
CASTS URNS MICRO: ABNORMAL
CASTS URNS QL MICRO: ABNORMAL /LPF
CASTS URNS QL MICRO: PRESENT /LPF
CC UR VC: ABNORMAL
CC UR VC: ABNORMAL
CHLORIDE SERPL-SCNC: 103 MMOL/L (ref 97–108)
CHLORIDE SERPL-SCNC: 104 MMOL/L (ref 97–108)
CHLORIDE SERPL-SCNC: 104 MMOL/L (ref 97–108)
CHLORIDE SERPL-SCNC: 105 MMOL/L (ref 97–108)
CHLORIDE SERPL-SCNC: 106 MMOL/L (ref 97–108)
CHLORIDE SERPL-SCNC: 107 MMOL/L (ref 97–108)
CHLORIDE SERPL-SCNC: 107 MMOL/L (ref 97–108)
CHLORIDE SERPL-SCNC: 108 MMOL/L (ref 97–108)
CHLORIDE SERPL-SCNC: 109 MMOL/L (ref 97–108)
CHLORIDE SERPL-SCNC: 109 MMOL/L (ref 97–108)
CHLORIDE SERPL-SCNC: 110 MMOL/L (ref 97–108)
CHLORIDE SERPL-SCNC: 110 MMOL/L (ref 97–108)
CHLORIDE SERPL-SCNC: 111 MMOL/L (ref 97–108)
CHLORIDE SERPL-SCNC: 88 MMOL/L (ref 97–108)
CHLORIDE SERPL-SCNC: 91 MMOL/L (ref 97–108)
CHLORIDE SERPL-SCNC: 93 MMOL/L (ref 97–108)
CHLORIDE SERPL-SCNC: 94 MMOL/L (ref 96–106)
CK SERPL-CCNC: 31 U/L (ref 26–192)
CO2 SERPL-SCNC: 13 MMOL/L (ref 21–32)
CO2 SERPL-SCNC: 18 MMOL/L (ref 21–32)
CO2 SERPL-SCNC: 19 MMOL/L (ref 21–32)
CO2 SERPL-SCNC: 20 MMOL/L (ref 20–29)
CO2 SERPL-SCNC: 21 MMOL/L (ref 21–32)
CO2 SERPL-SCNC: 22 MMOL/L (ref 21–32)
CO2 SERPL-SCNC: 22 MMOL/L (ref 21–32)
CO2 SERPL-SCNC: 23 MMOL/L (ref 21–32)
CO2 SERPL-SCNC: 24 MMOL/L (ref 21–32)
CO2 SERPL-SCNC: 25 MMOL/L (ref 21–32)
CO2 SERPL-SCNC: 25 MMOL/L (ref 21–32)
COLOR UR: ABNORMAL
COLOR UR: NORMAL
COLOR UR: YELLOW
COLOR UR: YELLOW
COMMENT, HOLDF: NORMAL
CORTIS SERPL-MCNC: >75 UG/DL
COVID-19 RAPID TEST, COVR: DETECTED
CREAT SERPL-MCNC: 0.42 MG/DL (ref 0.55–1.02)
CREAT SERPL-MCNC: 0.43 MG/DL (ref 0.55–1.02)
CREAT SERPL-MCNC: 0.48 MG/DL (ref 0.55–1.02)
CREAT SERPL-MCNC: 0.51 MG/DL (ref 0.55–1.02)
CREAT SERPL-MCNC: 0.59 MG/DL (ref 0.55–1.02)
CREAT SERPL-MCNC: 0.65 MG/DL (ref 0.57–1)
CREAT SERPL-MCNC: 0.68 MG/DL (ref 0.55–1.02)
CREAT SERPL-MCNC: 0.72 MG/DL (ref 0.55–1.02)
CREAT SERPL-MCNC: 0.76 MG/DL (ref 0.55–1.02)
CREAT SERPL-MCNC: 0.79 MG/DL (ref 0.55–1.02)
CREAT SERPL-MCNC: 0.79 MG/DL (ref 0.55–1.02)
CREAT SERPL-MCNC: 0.82 MG/DL (ref 0.55–1.02)
CREAT SERPL-MCNC: 0.85 MG/DL (ref 0.55–1.02)
CREAT SERPL-MCNC: 1.52 MG/DL (ref 0.55–1.02)
CREAT SERPL-MCNC: 2.12 MG/DL (ref 0.55–1.02)
CREAT SERPL-MCNC: 3.89 MG/DL (ref 0.55–1.02)
CREAT SERPL-MCNC: 5.15 MG/DL (ref 0.55–1.02)
CROSSMATCH RESULT,%XM: NORMAL
D DIMER PPP FEU-MCNC: 2.42 MG/L FEU (ref 0–0.65)
DIAGNOSIS, 93000: NORMAL
DIFFERENTIAL METHOD BLD: ABNORMAL
ECHO AO ASC DIAM: 3 CM
ECHO AO ASCENDING AORTA INDEX: 1.49 CM/M2
ECHO AO ROOT DIAM: 2.8 CM
ECHO AO ROOT INDEX: 1.39 CM/M2
ECHO AV AREA PEAK VELOCITY: 2.3 CM2
ECHO AV AREA VTI: 2.7 CM2
ECHO AV AREA/BSA PEAK VELOCITY: 1.1 CM2/M2
ECHO AV AREA/BSA VTI: 1.3 CM2/M2
ECHO AV MEAN GRADIENT: 5 MMHG
ECHO AV MEAN VELOCITY: 1.1 M/S
ECHO AV PEAK GRADIENT: 10 MMHG
ECHO AV PEAK VELOCITY: 1.6 M/S
ECHO AV VELOCITY RATIO: 0.63
ECHO AV VTI: 20.8 CM
ECHO LA DIAMETER INDEX: 1.69 CM/M2
ECHO LA DIAMETER: 3.4 CM
ECHO LA TO AORTIC ROOT RATIO: 1.21
ECHO LV E' LATERAL VELOCITY: 10 CM/S
ECHO LV E' SEPTAL VELOCITY: 7 CM/S
ECHO LV EDV A2C: 45 ML
ECHO LV EDV A4C: 36 ML
ECHO LV EDV BP: 40 ML (ref 56–104)
ECHO LV EDV INDEX A4C: 18 ML/M2
ECHO LV EDV INDEX BP: 20 ML/M2
ECHO LV EDV NDEX A2C: 22 ML/M2
ECHO LV EJECTION FRACTION A2C: 60 %
ECHO LV EJECTION FRACTION A4C: 72 %
ECHO LV EJECTION FRACTION BIPLANE: 64 % (ref 55–100)
ECHO LV ESV A2C: 18 ML
ECHO LV ESV A4C: 10 ML
ECHO LV ESV BP: 14 ML (ref 19–49)
ECHO LV ESV INDEX A2C: 9 ML/M2
ECHO LV ESV INDEX A4C: 5 ML/M2
ECHO LV ESV INDEX BP: 7 ML/M2
ECHO LV FRACTIONAL SHORTENING: 42 % (ref 28–44)
ECHO LV INTERNAL DIMENSION DIASTOLE INDEX: 1.79 CM/M2
ECHO LV INTERNAL DIMENSION DIASTOLIC: 3.6 CM (ref 3.9–5.3)
ECHO LV INTERNAL DIMENSION SYSTOLIC INDEX: 1.04 CM/M2
ECHO LV INTERNAL DIMENSION SYSTOLIC: 2.1 CM
ECHO LV IVSD: 1.2 CM (ref 0.6–0.9)
ECHO LV MASS 2D: 150.6 G (ref 67–162)
ECHO LV MASS INDEX 2D: 74.9 G/M2 (ref 43–95)
ECHO LV POSTERIOR WALL DIASTOLIC: 1.3 CM (ref 0.6–0.9)
ECHO LV RELATIVE WALL THICKNESS RATIO: 0.72
ECHO LVOT AREA: 3.5 CM2
ECHO LVOT AV VTI INDEX: 0.78
ECHO LVOT DIAM: 2.1 CM
ECHO LVOT MEAN GRADIENT: 2 MMHG
ECHO LVOT PEAK GRADIENT: 4 MMHG
ECHO LVOT PEAK VELOCITY: 1 M/S
ECHO LVOT STROKE VOLUME INDEX: 28.1 ML/M2
ECHO LVOT SV: 56.4 ML
ECHO LVOT VTI: 16.3 CM
ECHO MV A VELOCITY: 1.29 M/S
ECHO MV E DECELERATION TIME (DT): 132.6 MS
ECHO MV E VELOCITY: 0.88 M/S
ECHO MV E/A RATIO: 0.68
ECHO MV E/E' LATERAL: 8.8
ECHO MV E/E' RATIO (AVERAGED): 10.69
ECHO MV E/E' SEPTAL: 12.57
ECHO PV MAX VELOCITY: 1.2 M/S
ECHO PV PEAK GRADIENT: 6 MMHG
ECHO TV REGURGITANT MAX VELOCITY: 2.52 M/S
ECHO TV REGURGITANT PEAK GRADIENT: 25 MMHG
EGFR: 113 ML/MIN/1.73
EOSINOPHIL # BLD AUTO: 0 X10E3/UL (ref 0–0.4)
EOSINOPHIL # BLD AUTO: 0 X10E3/UL (ref 0–0.4)
EOSINOPHIL # BLD: 0 K/UL (ref 0–0.4)
EOSINOPHIL # BLD: 0.1 K/UL (ref 0–0.4)
EOSINOPHIL # BLD: 0.3 K/UL (ref 0–0.4)
EOSINOPHIL NFR BLD AUTO: 0 %
EOSINOPHIL NFR BLD AUTO: 0 %
EOSINOPHIL NFR BLD: 0 % (ref 0–7)
EOSINOPHIL NFR BLD: 1 % (ref 0–7)
EOSINOPHIL NFR BLD: 2 % (ref 0–7)
EOSINOPHIL NFR BLD: 4 % (ref 0–7)
EPI CELLS #/AREA URNS HPF: >10 /HPF (ref 0–10)
EPI CELLS #/AREA URNS HPF: >10 /HPF (ref 0–10)
EPITH CASTS URNS QL MICRO: ABNORMAL /LPF
EPITH CASTS URNS QL MICRO: NORMAL /LPF
ERYTHROCYTE [DISTWIDTH] IN BLOOD BY AUTOMATED COUNT: 14.6 % (ref 11.5–14.5)
ERYTHROCYTE [DISTWIDTH] IN BLOOD BY AUTOMATED COUNT: 15 % (ref 11.5–14.5)
ERYTHROCYTE [DISTWIDTH] IN BLOOD BY AUTOMATED COUNT: 16.3 % (ref 11.5–14.5)
ERYTHROCYTE [DISTWIDTH] IN BLOOD BY AUTOMATED COUNT: 16.4 % (ref 11.5–14.5)
ERYTHROCYTE [DISTWIDTH] IN BLOOD BY AUTOMATED COUNT: 16.5 % (ref 11.5–14.5)
ERYTHROCYTE [DISTWIDTH] IN BLOOD BY AUTOMATED COUNT: 16.7 % (ref 11.5–14.5)
ERYTHROCYTE [DISTWIDTH] IN BLOOD BY AUTOMATED COUNT: 16.7 % (ref 11.5–14.5)
ERYTHROCYTE [DISTWIDTH] IN BLOOD BY AUTOMATED COUNT: 17 % (ref 11.5–14.5)
ERYTHROCYTE [DISTWIDTH] IN BLOOD BY AUTOMATED COUNT: 17 % (ref 11.5–14.5)
ERYTHROCYTE [DISTWIDTH] IN BLOOD BY AUTOMATED COUNT: 17.2 % (ref 11.5–14.5)
ERYTHROCYTE [DISTWIDTH] IN BLOOD BY AUTOMATED COUNT: 17.8 % (ref 11.5–14.5)
ERYTHROCYTE [DISTWIDTH] IN BLOOD BY AUTOMATED COUNT: 18 % (ref 11.5–14.5)
ERYTHROCYTE [DISTWIDTH] IN BLOOD BY AUTOMATED COUNT: 21.1 % (ref 11.7–15.4)
ERYTHROCYTE [DISTWIDTH] IN BLOOD BY AUTOMATED COUNT: 23.2 % (ref 11.5–14.5)
ERYTHROCYTE [DISTWIDTH] IN BLOOD BY AUTOMATED COUNT: 24.1 % (ref 11.5–14.5)
ERYTHROCYTE [DISTWIDTH] IN BLOOD BY AUTOMATED COUNT: 24.2 % (ref 11.5–14.5)
ERYTHROCYTE [DISTWIDTH] IN BLOOD BY AUTOMATED COUNT: 24.5 % (ref 11.5–14.5)
ERYTHROCYTE [DISTWIDTH] IN BLOOD BY AUTOMATED COUNT: 24.9 % (ref 11.7–15.4)
FERRITIN SERPL-MCNC: 1529 NG/ML (ref 26–388)
FOLATE SERPL-MCNC: 6.2 NG/ML (ref 5–21)
GLOBULIN SER CALC-MCNC: 2.9 G/DL (ref 2–4)
GLOBULIN SER CALC-MCNC: 3.1 G/DL (ref 1.5–4.5)
GLOBULIN SER CALC-MCNC: 3.4 G/DL (ref 2–4)
GLOBULIN SER CALC-MCNC: 3.4 G/DL (ref 2–4)
GLOBULIN SER CALC-MCNC: 3.5 G/DL (ref 2–4)
GLOBULIN SER CALC-MCNC: 3.5 G/DL (ref 2–4)
GLOBULIN SER CALC-MCNC: 3.7 G/DL (ref 2–4)
GLOBULIN SER CALC-MCNC: 4.7 G/DL (ref 2–4)
GLUCOSE BLD STRIP.AUTO-MCNC: 116 MG/DL (ref 65–117)
GLUCOSE BLD STRIP.AUTO-MCNC: 156 MG/DL (ref 65–117)
GLUCOSE BLD STRIP.AUTO-MCNC: 158 MG/DL (ref 65–117)
GLUCOSE SERPL-MCNC: 100 MG/DL (ref 65–100)
GLUCOSE SERPL-MCNC: 106 MG/DL (ref 65–100)
GLUCOSE SERPL-MCNC: 108 MG/DL (ref 65–100)
GLUCOSE SERPL-MCNC: 112 MG/DL (ref 65–99)
GLUCOSE SERPL-MCNC: 118 MG/DL (ref 65–100)
GLUCOSE SERPL-MCNC: 122 MG/DL (ref 65–100)
GLUCOSE SERPL-MCNC: 124 MG/DL (ref 65–100)
GLUCOSE SERPL-MCNC: 131 MG/DL (ref 65–100)
GLUCOSE SERPL-MCNC: 132 MG/DL (ref 65–100)
GLUCOSE SERPL-MCNC: 133 MG/DL (ref 65–100)
GLUCOSE SERPL-MCNC: 135 MG/DL (ref 65–100)
GLUCOSE SERPL-MCNC: 136 MG/DL (ref 65–100)
GLUCOSE SERPL-MCNC: 140 MG/DL (ref 65–100)
GLUCOSE SERPL-MCNC: 142 MG/DL (ref 65–100)
GLUCOSE SERPL-MCNC: 146 MG/DL (ref 65–100)
GLUCOSE SERPL-MCNC: 94 MG/DL (ref 65–100)
GLUCOSE SERPL-MCNC: 97 MG/DL (ref 65–100)
GLUCOSE UR QL STRIP: NEGATIVE
GLUCOSE UR QL STRIP: NEGATIVE
GLUCOSE UR STRIP.AUTO-MCNC: NEGATIVE MG/DL
HBV CORE AB SERPL QL IA: NEGATIVE
HBV CORE AB SERPL QL IA: NEGATIVE
HBV CORE IGM SERPL QL IA: NEGATIVE
HBV SURFACE AB SER QL: NONREACTIVE
HBV SURFACE AB SER-ACNC: <3.1 MIU/ML
HBV SURFACE AG SER QL: <0.1 INDEX
HBV SURFACE AG SER QL: NEGATIVE
HCG SERPL QL: NEGATIVE
HCG UR QL: NEGATIVE
HCG UR QL: NEGATIVE
HCT VFR BLD AUTO: 17.2 % (ref 35–47)
HCT VFR BLD AUTO: 17.7 % (ref 35–47)
HCT VFR BLD AUTO: 18.6 % (ref 34–46.6)
HCT VFR BLD AUTO: 18.9 % (ref 35–47)
HCT VFR BLD AUTO: 20 % (ref 35–47)
HCT VFR BLD AUTO: 20.3 % (ref 35–47)
HCT VFR BLD AUTO: 20.6 % (ref 35–47)
HCT VFR BLD AUTO: 20.7 % (ref 35–47)
HCT VFR BLD AUTO: 21.3 % (ref 35–47)
HCT VFR BLD AUTO: 22.5 % (ref 35–47)
HCT VFR BLD AUTO: 23 % (ref 35–47)
HCT VFR BLD AUTO: 23.1 % (ref 35–47)
HCT VFR BLD AUTO: 23.1 % (ref 35–47)
HCT VFR BLD AUTO: 23.2 % (ref 35–47)
HCT VFR BLD AUTO: 23.2 % (ref 35–47)
HCT VFR BLD AUTO: 23.6 % (ref 35–47)
HCT VFR BLD AUTO: 24.7 % (ref 35–47)
HCT VFR BLD AUTO: 24.9 % (ref 35–47)
HCT VFR BLD AUTO: 25.1 % (ref 35–47)
HCT VFR BLD AUTO: 25.5 % (ref 34–46.6)
HCT VFR BLD AUTO: 25.7 % (ref 35–47)
HCT VFR BLD AUTO: 27.6 % (ref 35–47)
HCT VFR BLD AUTO: 28.5 % (ref 35–47)
HCT VFR BLD AUTO: 32 % (ref 35–47)
HGB BLD-MCNC: 10.4 G/DL (ref 11.5–16)
HGB BLD-MCNC: 5.3 G/DL (ref 11.5–16)
HGB BLD-MCNC: 5.4 G/DL (ref 11.5–16)
HGB BLD-MCNC: 5.8 G/DL (ref 11.1–15.9)
HGB BLD-MCNC: 6 G/DL (ref 11.5–16)
HGB BLD-MCNC: 6 G/DL (ref 11.5–16)
HGB BLD-MCNC: 6.1 G/DL
HGB BLD-MCNC: 6.2 G/DL (ref 11.5–16)
HGB BLD-MCNC: 6.3 G/DL (ref 11.5–16)
HGB BLD-MCNC: 6.6 G/DL (ref 11.5–16)
HGB BLD-MCNC: 6.7 G/DL (ref 11.5–16)
HGB BLD-MCNC: 6.7 G/DL (ref 11.5–16)
HGB BLD-MCNC: 7.1 G/DL (ref 11.5–16)
HGB BLD-MCNC: 7.2 G/DL (ref 11.5–16)
HGB BLD-MCNC: 7.2 G/DL (ref 11.5–16)
HGB BLD-MCNC: 7.5 G/DL (ref 11.5–16)
HGB BLD-MCNC: 7.5 G/DL (ref 11.5–16)
HGB BLD-MCNC: 7.6 G/DL (ref 11.5–16)
HGB BLD-MCNC: 7.6 G/DL (ref 11.5–16)
HGB BLD-MCNC: 7.8 G/DL (ref 11.5–16)
HGB BLD-MCNC: 7.9 G/DL (ref 11.1–15.9)
HGB BLD-MCNC: 7.9 G/DL (ref 11.5–16)
HGB BLD-MCNC: 7.9 G/DL (ref 11.5–16)
HGB BLD-MCNC: 8.1 G/DL (ref 11.5–16)
HGB BLD-MCNC: 8.2 G/DL (ref 11.5–16)
HGB BLD-MCNC: 8.3 G/DL (ref 11.5–16)
HGB BLD-MCNC: 8.4 G/DL (ref 11.5–16)
HGB BLD-MCNC: 9.2 G/DL (ref 11.5–16)
HGB BLD-MCNC: 9.9 G/DL (ref 11.5–16)
HGB UR QL STRIP: ABNORMAL
HGB UR QL STRIP: NEGATIVE
HISTORY CHECKED?,CKHIST: NORMAL
HYALINE CASTS URNS QL MICRO: ABNORMAL /LPF (ref 0–5)
IMM GRANULOCYTES # BLD AUTO: 0 K/UL
IMM GRANULOCYTES # BLD AUTO: 0 K/UL (ref 0–0.04)
IMM GRANULOCYTES # BLD AUTO: 0 K/UL (ref 0–0.04)
IMM GRANULOCYTES # BLD AUTO: 0.1 K/UL (ref 0–0.04)
IMM GRANULOCYTES # BLD AUTO: 0.1 X10E3/UL (ref 0–0.1)
IMM GRANULOCYTES # BLD AUTO: 0.2 K/UL (ref 0–0.04)
IMM GRANULOCYTES # BLD AUTO: 0.4 K/UL (ref 0–0.04)
IMM GRANULOCYTES # BLD AUTO: 0.4 K/UL (ref 0–0.04)
IMM GRANULOCYTES NFR BLD AUTO: 0 %
IMM GRANULOCYTES NFR BLD AUTO: 0 % (ref 0–0.5)
IMM GRANULOCYTES NFR BLD AUTO: 0 % (ref 0–0.5)
IMM GRANULOCYTES NFR BLD AUTO: 1 %
IMM GRANULOCYTES NFR BLD AUTO: 1 % (ref 0–0.5)
IMM GRANULOCYTES NFR BLD AUTO: 2 % (ref 0–0.5)
IMMATURE CELLS, 115398: ABNORMAL
IRON SATN MFR SERPL: 25 % (ref 20–50)
IRON SERPL-MCNC: 33 UG/DL (ref 35–150)
KETONES UR QL STRIP.AUTO: ABNORMAL MG/DL
KETONES UR QL STRIP.AUTO: NEGATIVE MG/DL
KETONES UR QL STRIP: ABNORMAL
KETONES UR QL STRIP: NEGATIVE
LACTATE SERPL-SCNC: 1.3 MMOL/L (ref 0.4–2)
LACTATE SERPL-SCNC: 1.9 MMOL/L (ref 0.4–2)
LACTATE SERPL-SCNC: 2.5 MMOL/L (ref 0.4–2)
LDH SERPL L TO P-CCNC: 594 U/L (ref 81–246)
LEUKOCYTE ESTERASE UR QL STRIP.AUTO: ABNORMAL
LEUKOCYTE ESTERASE UR QL STRIP.AUTO: NEGATIVE
LEUKOCYTE ESTERASE UR QL STRIP: NEGATIVE
LEUKOCYTE ESTERASE UR QL STRIP: NEGATIVE
LIPASE SERPL-CCNC: 69 U/L (ref 73–393)
LIPASE SERPL-CCNC: 97 U/L (ref 73–393)
LYMPHOCYTES # BLD AUTO: 1.1 X10E3/UL (ref 0.7–3.1)
LYMPHOCYTES # BLD AUTO: 1.1 X10E3/UL (ref 0.7–3.1)
LYMPHOCYTES # BLD: 0.4 K/UL (ref 0.8–3.5)
LYMPHOCYTES # BLD: 0.6 K/UL (ref 0.8–3.5)
LYMPHOCYTES # BLD: 0.8 K/UL (ref 0.8–3.5)
LYMPHOCYTES # BLD: 0.8 K/UL (ref 0.8–3.5)
LYMPHOCYTES # BLD: 0.9 K/UL (ref 0.8–3.5)
LYMPHOCYTES # BLD: 1 K/UL (ref 0.8–3.5)
LYMPHOCYTES # BLD: 1.2 K/UL (ref 0.8–3.5)
LYMPHOCYTES # BLD: 1.2 K/UL (ref 0.8–3.5)
LYMPHOCYTES # BLD: 1.6 K/UL (ref 0.8–3.5)
LYMPHOCYTES # BLD: 1.6 K/UL (ref 0.8–3.5)
LYMPHOCYTES # BLD: 1.9 K/UL (ref 0.8–3.5)
LYMPHOCYTES NFR BLD AUTO: 7 %
LYMPHOCYTES NFR BLD AUTO: 8 %
LYMPHOCYTES NFR BLD: 10 % (ref 12–49)
LYMPHOCYTES NFR BLD: 11 % (ref 12–49)
LYMPHOCYTES NFR BLD: 11 % (ref 12–49)
LYMPHOCYTES NFR BLD: 12 % (ref 12–49)
LYMPHOCYTES NFR BLD: 12 % (ref 12–49)
LYMPHOCYTES NFR BLD: 2 % (ref 12–49)
LYMPHOCYTES NFR BLD: 24 % (ref 12–49)
LYMPHOCYTES NFR BLD: 4 % (ref 12–49)
LYMPHOCYTES NFR BLD: 5 % (ref 12–49)
LYMPHOCYTES NFR BLD: 5 % (ref 12–49)
LYMPHOCYTES NFR BLD: 6 % (ref 12–49)
MAGNESIUM SERPL-MCNC: 1.8 MG/DL (ref 1.6–2.3)
MAGNESIUM SERPL-MCNC: 1.8 MG/DL (ref 1.6–2.4)
MAGNESIUM SERPL-MCNC: 2 MG/DL (ref 1.6–2.4)
MAGNESIUM SERPL-MCNC: 2.8 MG/DL (ref 1.6–2.4)
MAGNESIUM SERPL-MCNC: 3 MG/DL (ref 1.6–2.4)
MCH RBC QN AUTO: 21.6 PG (ref 26.6–33)
MCH RBC QN AUTO: 22.3 PG (ref 26–34)
MCH RBC QN AUTO: 23.2 PG (ref 26–34)
MCH RBC QN AUTO: 23.4 PG (ref 26.6–33)
MCH RBC QN AUTO: 23.8 PG (ref 26–34)
MCH RBC QN AUTO: 23.8 PG (ref 26–34)
MCH RBC QN AUTO: 24.1 PG (ref 26–34)
MCH RBC QN AUTO: 24.4 PG (ref 26–34)
MCH RBC QN AUTO: 24.9 PG (ref 26–34)
MCH RBC QN AUTO: 25.3 PG (ref 26–34)
MCH RBC QN AUTO: 25.4 PG (ref 26–34)
MCH RBC QN AUTO: 26.2 PG (ref 26–34)
MCH RBC QN AUTO: 26.5 PG (ref 26–34)
MCH RBC QN AUTO: 27 PG (ref 26–34)
MCH RBC QN AUTO: 27.2 PG (ref 26–34)
MCH RBC QN AUTO: 27.6 PG (ref 26–34)
MCHC RBC AUTO-ENTMCNC: 29.9 G/DL (ref 30–36.5)
MCHC RBC AUTO-ENTMCNC: 30 G/DL (ref 30–36.5)
MCHC RBC AUTO-ENTMCNC: 31 G/DL (ref 31.5–35.7)
MCHC RBC AUTO-ENTMCNC: 31.2 G/DL (ref 31.5–35.7)
MCHC RBC AUTO-ENTMCNC: 31.3 G/DL (ref 30–36.5)
MCHC RBC AUTO-ENTMCNC: 31.4 G/DL (ref 30–36.5)
MCHC RBC AUTO-ENTMCNC: 31.7 G/DL (ref 30–36.5)
MCHC RBC AUTO-ENTMCNC: 31.8 G/DL (ref 30–36.5)
MCHC RBC AUTO-ENTMCNC: 32 G/DL (ref 30–36.5)
MCHC RBC AUTO-ENTMCNC: 32.3 G/DL (ref 30–36.5)
MCHC RBC AUTO-ENTMCNC: 32.5 G/DL (ref 30–36.5)
MCHC RBC AUTO-ENTMCNC: 32.8 G/DL (ref 30–36.5)
MCHC RBC AUTO-ENTMCNC: 32.9 G/DL (ref 30–36.5)
MCHC RBC AUTO-ENTMCNC: 32.9 G/DL (ref 30–36.5)
MCHC RBC AUTO-ENTMCNC: 33.8 G/DL (ref 30–36.5)
MCHC RBC AUTO-ENTMCNC: 34.1 G/DL (ref 30–36.5)
MCV RBC AUTO: 69 FL (ref 79–97)
MCV RBC AUTO: 73.2 FL (ref 80–99)
MCV RBC AUTO: 73.8 FL (ref 80–99)
MCV RBC AUTO: 74.4 FL (ref 80–99)
MCV RBC AUTO: 75.6 FL (ref 80–99)
MCV RBC AUTO: 76 FL (ref 79–97)
MCV RBC AUTO: 76.9 FL (ref 80–99)
MCV RBC AUTO: 77.5 FL (ref 80–99)
MCV RBC AUTO: 77.9 FL (ref 80–99)
MCV RBC AUTO: 78.4 FL (ref 80–99)
MCV RBC AUTO: 79.2 FL (ref 80–99)
MCV RBC AUTO: 79.4 FL (ref 80–99)
MCV RBC AUTO: 80.1 FL (ref 80–99)
MCV RBC AUTO: 82.6 FL (ref 80–99)
MCV RBC AUTO: 83.7 FL (ref 80–99)
MCV RBC AUTO: 83.8 FL (ref 80–99)
MCV RBC AUTO: 84 FL (ref 80–99)
MCV RBC AUTO: 84.9 FL (ref 80–99)
METAMYELOCYTES NFR BLD MANUAL: 1 %
MICRO URNS: ABNORMAL
MICRO URNS: ABNORMAL
MONOCYTES # BLD AUTO: 0.7 X10E3/UL (ref 0.1–0.9)
MONOCYTES # BLD AUTO: 1 X10E3/UL (ref 0.1–0.9)
MONOCYTES # BLD: 0 K/UL (ref 0–1)
MONOCYTES # BLD: 0.7 K/UL (ref 0–1)
MONOCYTES # BLD: 0.8 K/UL (ref 0–1)
MONOCYTES # BLD: 0.9 K/UL (ref 0–1)
MONOCYTES # BLD: 0.9 K/UL (ref 0–1)
MONOCYTES # BLD: 1.2 K/UL (ref 0–1)
MONOCYTES # BLD: 1.2 K/UL (ref 0–1)
MONOCYTES # BLD: 1.4 K/UL (ref 0–1)
MONOCYTES # BLD: 1.6 K/UL (ref 0–1)
MONOCYTES NFR BLD AUTO: 4 %
MONOCYTES NFR BLD AUTO: 7 %
MONOCYTES NFR BLD: 0 % (ref 5–13)
MONOCYTES NFR BLD: 11 % (ref 5–13)
MONOCYTES NFR BLD: 6 % (ref 5–13)
MONOCYTES NFR BLD: 7 % (ref 5–13)
MONOCYTES NFR BLD: 8 % (ref 5–13)
MONOCYTES NFR BLD: 8 % (ref 5–13)
MONOCYTES NFR BLD: 9 % (ref 5–13)
MONOCYTES NFR BLD: 9 % (ref 5–13)
MORPHOLOGY BLD-IMP: ABNORMAL
MUCOUS THREADS URNS QL MICRO: ABNORMAL /LPF
MUCOUS THREADS URNS QL MICRO: PRESENT
NEUTROPHILS # BLD AUTO: 10.9 X10E3/UL (ref 1.4–7)
NEUTROPHILS # BLD AUTO: 14.5 X10E3/UL (ref 1.4–7)
NEUTROPHILS NFR BLD AUTO: 84 %
NEUTROPHILS NFR BLD AUTO: 87 %
NEUTS BAND NFR BLD AUTO: 2 %
NEUTS SEG # BLD: 10.8 K/UL (ref 1.8–8)
NEUTS SEG # BLD: 11 K/UL (ref 1.8–8)
NEUTS SEG # BLD: 11.2 K/UL (ref 1.8–8)
NEUTS SEG # BLD: 12.9 K/UL (ref 1.8–8)
NEUTS SEG # BLD: 14.5 K/UL (ref 1.8–8)
NEUTS SEG # BLD: 18 K/UL (ref 1.8–8)
NEUTS SEG # BLD: 20 K/UL (ref 1.8–8)
NEUTS SEG # BLD: 5.1 K/UL (ref 1.8–8)
NEUTS SEG # BLD: 6.9 K/UL (ref 1.8–8)
NEUTS SEG # BLD: 8.2 K/UL (ref 1.8–8)
NEUTS SEG # BLD: 8.3 K/UL (ref 1.8–8)
NEUTS SEG NFR BLD: 63 % (ref 32–75)
NEUTS SEG NFR BLD: 77 % (ref 32–75)
NEUTS SEG NFR BLD: 77 % (ref 32–75)
NEUTS SEG NFR BLD: 78 % (ref 32–75)
NEUTS SEG NFR BLD: 80 % (ref 32–75)
NEUTS SEG NFR BLD: 81 % (ref 32–75)
NEUTS SEG NFR BLD: 84 % (ref 32–75)
NEUTS SEG NFR BLD: 85 % (ref 32–75)
NEUTS SEG NFR BLD: 86 % (ref 32–75)
NEUTS SEG NFR BLD: 87 % (ref 32–75)
NEUTS SEG NFR BLD: 97 % (ref 32–75)
NITRITE UR QL STRIP.AUTO: NEGATIVE
NITRITE UR QL STRIP: NEGATIVE
NITRITE UR QL STRIP: POSITIVE
NRBC # BLD: 0 K/UL (ref 0–0.01)
NRBC # BLD: 0.02 K/UL (ref 0–0.01)
NRBC # BLD: 0.03 K/UL (ref 0–0.01)
NRBC # BLD: 0.03 K/UL (ref 0–0.01)
NRBC # BLD: 0.04 K/UL (ref 0–0.01)
NRBC # BLD: 0.04 K/UL (ref 0–0.01)
NRBC # BLD: 0.06 K/UL (ref 0–0.01)
NRBC # BLD: 0.06 K/UL (ref 0–0.01)
NRBC # BLD: 0.11 K/UL (ref 0–0.01)
NRBC # BLD: 0.19 K/UL (ref 0–0.01)
NRBC BLD-RTO: 0 PER 100 WBC
NRBC BLD-RTO: 0.1 PER 100 WBC
NRBC BLD-RTO: 0.2 PER 100 WBC
NRBC BLD-RTO: 0.3 PER 100 WBC
NRBC BLD-RTO: 0.3 PER 100 WBC
NRBC BLD-RTO: 0.4 PER 100 WBC
NRBC BLD-RTO: 0.5 PER 100 WBC
NRBC BLD-RTO: 2.2 PER 100 WBC
P-R INTERVAL, ECG05: 114 MS
P-R INTERVAL, ECG05: 114 MS
P-R INTERVAL, ECG05: 116 MS
P-R INTERVAL, ECG05: 126 MS
P-R INTERVAL, ECG05: 136 MS
P-R INTERVAL, ECG05: 142 MS
PATH REV BLD -IMP: NORMAL
PH UR STRIP: 5 [PH] (ref 5–8)
PH UR STRIP: 5 [PH] (ref 5–8)
PH UR STRIP: 5.5 [PH] (ref 5–8)
PH UR STRIP: 5.5 [PH] (ref 5–8)
PH UR STRIP: 6 [PH] (ref 5–7.5)
PH UR STRIP: 6 [PH] (ref 5–7.5)
PH UR STRIP: 6.5 [PH] (ref 5–8)
PHOSPHATE SERPL-MCNC: 4.1 MG/DL (ref 2.6–4.7)
PHOSPHATE SERPL-MCNC: 5.4 MG/DL (ref 2.6–4.7)
PHOSPHATE SERPL-MCNC: 7.3 MG/DL (ref 2.6–4.7)
PHOSPHATE SERPL-MCNC: 8.4 MG/DL (ref 2.6–4.7)
PLATELET # BLD AUTO: 157 K/UL (ref 150–400)
PLATELET # BLD AUTO: 176 K/UL (ref 150–400)
PLATELET # BLD AUTO: 179 K/UL (ref 150–400)
PLATELET # BLD AUTO: 218 K/UL (ref 150–400)
PLATELET # BLD AUTO: 219 K/UL (ref 150–400)
PLATELET # BLD AUTO: 264 K/UL (ref 150–400)
PLATELET # BLD AUTO: 269 K/UL (ref 150–400)
PLATELET # BLD AUTO: 269 K/UL (ref 150–400)
PLATELET # BLD AUTO: 292 K/UL (ref 150–400)
PLATELET # BLD AUTO: 292 K/UL (ref 150–400)
PLATELET # BLD AUTO: 299 K/UL (ref 150–400)
PLATELET # BLD AUTO: 310 K/UL (ref 150–400)
PLATELET # BLD AUTO: 327 K/UL (ref 150–400)
PLATELET # BLD AUTO: 336 K/UL (ref 150–400)
PLATELET # BLD AUTO: 359 K/UL (ref 150–400)
PLATELET # BLD AUTO: 410 K/UL (ref 150–400)
PLATELET # BLD AUTO: 566 X10E3/UL (ref 150–450)
PLATELET # BLD AUTO: 878 X10E3/UL (ref 150–450)
PMV BLD AUTO: 10 FL (ref 8.9–12.9)
PMV BLD AUTO: 10 FL (ref 8.9–12.9)
PMV BLD AUTO: 10.1 FL (ref 8.9–12.9)
PMV BLD AUTO: 10.2 FL (ref 8.9–12.9)
PMV BLD AUTO: 10.2 FL (ref 8.9–12.9)
PMV BLD AUTO: 10.3 FL (ref 8.9–12.9)
PMV BLD AUTO: 10.4 FL (ref 8.9–12.9)
PMV BLD AUTO: 10.4 FL (ref 8.9–12.9)
PMV BLD AUTO: 10.5 FL (ref 8.9–12.9)
PMV BLD AUTO: 9.4 FL (ref 8.9–12.9)
PMV BLD AUTO: 9.5 FL (ref 8.9–12.9)
PMV BLD AUTO: 9.7 FL (ref 8.9–12.9)
POTASSIUM SERPL-SCNC: 3.2 MMOL/L (ref 3.5–5.1)
POTASSIUM SERPL-SCNC: 3.4 MMOL/L (ref 3.5–5.1)
POTASSIUM SERPL-SCNC: 3.5 MMOL/L (ref 3.5–5.1)
POTASSIUM SERPL-SCNC: 3.5 MMOL/L (ref 3.5–5.1)
POTASSIUM SERPL-SCNC: 3.6 MMOL/L (ref 3.5–5.1)
POTASSIUM SERPL-SCNC: 3.6 MMOL/L (ref 3.5–5.1)
POTASSIUM SERPL-SCNC: 3.7 MMOL/L (ref 3.5–5.1)
POTASSIUM SERPL-SCNC: 3.8 MMOL/L (ref 3.5–5.1)
POTASSIUM SERPL-SCNC: 3.9 MMOL/L (ref 3.5–5.1)
POTASSIUM SERPL-SCNC: 4.1 MMOL/L (ref 3.5–5.1)
POTASSIUM SERPL-SCNC: 4.2 MMOL/L (ref 3.5–5.1)
POTASSIUM SERPL-SCNC: 4.5 MMOL/L (ref 3.5–5.1)
POTASSIUM SERPL-SCNC: 4.7 MMOL/L (ref 3.5–5.2)
POTASSIUM SERPL-SCNC: 5.1 MMOL/L (ref 3.5–5.1)
POTASSIUM SERPL-SCNC: 5.3 MMOL/L (ref 3.5–5.1)
POTASSIUM SERPL-SCNC: 6 MMOL/L (ref 3.5–5.1)
POTASSIUM SERPL-SCNC: 6.4 MMOL/L (ref 3.5–5.1)
PROCALCITONIN SERPL-MCNC: 8.31 NG/ML
PROT SERPL-MCNC: 6.3 G/DL (ref 6.4–8.2)
PROT SERPL-MCNC: 6.3 G/DL (ref 6.4–8.2)
PROT SERPL-MCNC: 6.3 G/DL (ref 6–8.5)
PROT SERPL-MCNC: 6.4 G/DL (ref 6.4–8.2)
PROT SERPL-MCNC: 6.7 G/DL (ref 6.4–8.2)
PROT SERPL-MCNC: 6.8 G/DL (ref 6.4–8.2)
PROT SERPL-MCNC: 6.9 G/DL (ref 6.4–8.2)
PROT SERPL-MCNC: 7.1 G/DL (ref 6.4–8.2)
PROT UR QL STRIP: ABNORMAL
PROT UR QL STRIP: ABNORMAL
PROT UR STRIP-MCNC: 100 MG/DL
PROT UR STRIP-MCNC: 30 MG/DL
PROT UR STRIP-MCNC: NEGATIVE MG/DL
Q-T INTERVAL, ECG07: 280 MS
Q-T INTERVAL, ECG07: 302 MS
Q-T INTERVAL, ECG07: 310 MS
Q-T INTERVAL, ECG07: 326 MS
Q-T INTERVAL, ECG07: 328 MS
Q-T INTERVAL, ECG07: 348 MS
QRS DURATION, ECG06: 64 MS
QRS DURATION, ECG06: 68 MS
QRS DURATION, ECG06: 70 MS
QRS DURATION, ECG06: 72 MS
QRS DURATION, ECG06: 72 MS
QRS DURATION, ECG06: 78 MS
QTC CALCULATION (BEZET), ECG08: 430 MS
QTC CALCULATION (BEZET), ECG08: 438 MS
QTC CALCULATION (BEZET), ECG08: 439 MS
QTC CALCULATION (BEZET), ECG08: 449 MS
QTC CALCULATION (BEZET), ECG08: 453 MS
QTC CALCULATION (BEZET), ECG08: 457 MS
RBC # BLD AUTO: 2.33 M/UL (ref 3.8–5.2)
RBC # BLD AUTO: 2.36 M/UL (ref 3.8–5.2)
RBC # BLD AUTO: 2.38 M/UL (ref 3.8–5.2)
RBC # BLD AUTO: 2.39 M/UL (ref 3.8–5.2)
RBC # BLD AUTO: 2.52 M/UL (ref 3.8–5.2)
RBC # BLD AUTO: 2.69 X10E6/UL (ref 3.77–5.28)
RBC # BLD AUTO: 2.75 M/UL (ref 3.8–5.2)
RBC # BLD AUTO: 2.75 M/UL (ref 3.8–5.2)
RBC # BLD AUTO: 2.76 M/UL (ref 3.8–5.2)
RBC # BLD AUTO: 2.81 M/UL (ref 3.8–5.2)
RBC # BLD AUTO: 2.97 M/UL (ref 3.8–5.2)
RBC # BLD AUTO: 2.98 M/UL (ref 3.8–5.2)
RBC # BLD AUTO: 2.99 M/UL (ref 3.8–5.2)
RBC # BLD AUTO: 3.01 M/UL (ref 3.8–5.2)
RBC # BLD AUTO: 3.12 M/UL (ref 3.8–5.2)
RBC # BLD AUTO: 3.37 X10E6/UL (ref 3.77–5.28)
RBC # BLD AUTO: 3.77 M/UL (ref 3.8–5.2)
RBC # BLD AUTO: 4.37 M/UL (ref 3.8–5.2)
RBC #/AREA URNS HPF: >100 /HPF (ref 0–5)
RBC #/AREA URNS HPF: ABNORMAL /HPF (ref 0–2)
RBC #/AREA URNS HPF: ABNORMAL /HPF (ref 0–2)
RBC #/AREA URNS HPF: ABNORMAL /HPF (ref 0–5)
RBC #/AREA URNS HPF: NORMAL /HPF (ref 0–5)
RBC MORPH BLD: ABNORMAL
SAMPLES BEING HELD,HOLD: NORMAL
SARS-COV-2, COV2: NORMAL
SARS-COV-2, XPLCVT: ABNORMAL
SERVICE CMNT-IMP: ABNORMAL
SERVICE CMNT-IMP: NORMAL
SMEAR REVIEW PER PROTOCOL, 005075: NORMAL
SODIUM SERPL-SCNC: 124 MMOL/L (ref 136–145)
SODIUM SERPL-SCNC: 125 MMOL/L (ref 136–145)
SODIUM SERPL-SCNC: 125 MMOL/L (ref 136–145)
SODIUM SERPL-SCNC: 130 MMOL/L (ref 136–145)
SODIUM SERPL-SCNC: 131 MMOL/L (ref 134–144)
SODIUM SERPL-SCNC: 132 MMOL/L (ref 136–145)
SODIUM SERPL-SCNC: 134 MMOL/L (ref 136–145)
SODIUM SERPL-SCNC: 134 MMOL/L (ref 136–145)
SODIUM SERPL-SCNC: 135 MMOL/L (ref 136–145)
SODIUM SERPL-SCNC: 135 MMOL/L (ref 136–145)
SODIUM SERPL-SCNC: 136 MMOL/L (ref 136–145)
SODIUM SERPL-SCNC: 137 MMOL/L (ref 136–145)
SODIUM SERPL-SCNC: 138 MMOL/L (ref 136–145)
SODIUM SERPL-SCNC: 138 MMOL/L (ref 136–145)
SODIUM SERPL-SCNC: 139 MMOL/L (ref 136–145)
SOURCE, COVRS: ABNORMAL
SOURCE, COVRS: ABNORMAL
SP GR UR REFRACTOMETRY: 1.01
SP GR UR REFRACTOMETRY: 1.01 (ref 1–1.03)
SP GR UR REFRACTOMETRY: 1.01 (ref 1–1.03)
SP GR UR REFRACTOMETRY: 1.02
SP GR UR REFRACTOMETRY: 1.02 (ref 1–1.03)
SP GR UR STRIP: 1.02 (ref 1–1.03)
SP GR UR STRIP: 1.03 (ref 1–1.03)
SPECIMEN EXP DATE BLD: NORMAL
SPECIMEN STATUS REPORT, ROLRST: NORMAL
STATUS OF UNIT,%ST: NORMAL
TIBC SERPL-MCNC: 133 UG/DL (ref 250–450)
TROPONIN-HIGH SENSITIVITY: 4 NG/L (ref 0–51)
TROPONIN-HIGH SENSITIVITY: 5 NG/L (ref 0–51)
TROPONIN-HIGH SENSITIVITY: 5 NG/L (ref 0–51)
TROPONIN-HIGH SENSITIVITY: <4 NG/L (ref 0–51)
UA: UC IF INDICATED,UAUC: ABNORMAL
UA: UC IF INDICATED,UAUC: NORMAL
UNIT DIVISION, %UDIV: 0
UR CULT HOLD, URHOLD: NORMAL
URATE SERPL-MCNC: 8.4 MG/DL (ref 2.6–6)
UROBILINOGEN UR QL STRIP.AUTO: 0.2 EU/DL (ref 0.2–1)
UROBILINOGEN UR QL STRIP.AUTO: 0.2 EU/DL (ref 0.2–1)
UROBILINOGEN UR QL STRIP.AUTO: 1 EU/DL (ref 0.2–1)
UROBILINOGEN UR STRIP-MCNC: 0.2 MG/DL (ref 0.2–1)
UROBILINOGEN UR STRIP-MCNC: 1 MG/DL (ref 0.2–1)
VANCOMYCIN SERPL-MCNC: 43.7 UG/ML
VENTRICULAR RATE, ECG03: 114 BPM
VENTRICULAR RATE, ECG03: 115 BPM
VENTRICULAR RATE, ECG03: 127 BPM
VENTRICULAR RATE, ECG03: 131 BPM
VENTRICULAR RATE, ECG03: 142 BPM
VENTRICULAR RATE, ECG03: 96 BPM
VIT B12 SERPL-MCNC: 571 PG/ML (ref 193–986)
WBC # BLD AUTO: 10.1 K/UL (ref 3.6–11)
WBC # BLD AUTO: 10.4 K/UL (ref 3.6–11)
WBC # BLD AUTO: 10.8 K/UL (ref 3.6–11)
WBC # BLD AUTO: 12.8 K/UL (ref 3.6–11)
WBC # BLD AUTO: 13.1 K/UL (ref 3.6–11)
WBC # BLD AUTO: 13.2 X10E3/UL (ref 3.4–10.8)
WBC # BLD AUTO: 13.4 K/UL (ref 3.6–11)
WBC # BLD AUTO: 13.5 K/UL (ref 3.6–11)
WBC # BLD AUTO: 14 K/UL (ref 3.6–11)
WBC # BLD AUTO: 15.9 K/UL (ref 3.6–11)
WBC # BLD AUTO: 16 K/UL (ref 3.6–11)
WBC # BLD AUTO: 16.3 X10E3/UL (ref 3.4–10.8)
WBC # BLD AUTO: 17.4 K/UL (ref 3.6–11)
WBC # BLD AUTO: 20.6 K/UL (ref 3.6–11)
WBC # BLD AUTO: 20.6 K/UL (ref 3.6–11)
WBC # BLD AUTO: 8 K/UL (ref 3.6–11)
WBC # BLD AUTO: 8.5 K/UL (ref 3.6–11)
WBC # BLD AUTO: 8.9 K/UL (ref 3.6–11)
WBC #/AREA URNS HPF: ABNORMAL /HPF (ref 0–5)
WBC #/AREA URNS HPF: ABNORMAL /HPF (ref 0–5)
WBC URNS QL MICRO: ABNORMAL /HPF (ref 0–4)
WBC URNS QL MICRO: NORMAL /HPF (ref 0–4)

## 2022-01-01 PROCEDURE — 74011250637 HC RX REV CODE- 250/637: Performed by: PHYSICAL MEDICINE & REHABILITATION

## 2022-01-01 PROCEDURE — 99024 POSTOP FOLLOW-UP VISIT: CPT | Performed by: OBSTETRICS & GYNECOLOGY

## 2022-01-01 PROCEDURE — 80053 COMPREHEN METABOLIC PANEL: CPT

## 2022-01-01 PROCEDURE — 74011250636 HC RX REV CODE- 250/636: Performed by: OBSTETRICS & GYNECOLOGY

## 2022-01-01 PROCEDURE — 36415 COLL VENOUS BLD VENIPUNCTURE: CPT

## 2022-01-01 PROCEDURE — 74011250637 HC RX REV CODE- 250/637: Performed by: STUDENT IN AN ORGANIZED HEALTH CARE EDUCATION/TRAINING PROGRAM

## 2022-01-01 PROCEDURE — 77030031139 HC SUT VCRL2 J&J -A: Performed by: OBSTETRICS & GYNECOLOGY

## 2022-01-01 PROCEDURE — P9016 RBC LEUKOCYTES REDUCED: HCPCS

## 2022-01-01 PROCEDURE — 74011250636 HC RX REV CODE- 250/636: Performed by: PHYSICIAN ASSISTANT

## 2022-01-01 PROCEDURE — 74011250636 HC RX REV CODE- 250/636: Performed by: EMERGENCY MEDICINE

## 2022-01-01 PROCEDURE — 74011250637 HC RX REV CODE- 250/637: Performed by: NURSE PRACTITIONER

## 2022-01-01 PROCEDURE — 96367 TX/PROPH/DG ADDL SEQ IV INF: CPT

## 2022-01-01 PROCEDURE — 84703 CHORIONIC GONADOTROPIN ASSAY: CPT

## 2022-01-01 PROCEDURE — 74011000250 HC RX REV CODE- 250: Performed by: INTERNAL MEDICINE

## 2022-01-01 PROCEDURE — 77030042556 HC PNCL CAUT -B: Performed by: OBSTETRICS & GYNECOLOGY

## 2022-01-01 PROCEDURE — 96375 TX/PRO/DX INJ NEW DRUG ADDON: CPT

## 2022-01-01 PROCEDURE — 85025 COMPLETE CBC W/AUTO DIFF WBC: CPT

## 2022-01-01 PROCEDURE — 76060000036 HC ANESTHESIA 2.5 TO 3 HR: Performed by: OBSTETRICS & GYNECOLOGY

## 2022-01-01 PROCEDURE — 86923 COMPATIBILITY TEST ELECTRIC: CPT

## 2022-01-01 PROCEDURE — 74011250637 HC RX REV CODE- 250/637: Performed by: PHYSICIAN ASSISTANT

## 2022-01-01 PROCEDURE — 65270000029 HC RM PRIVATE

## 2022-01-01 PROCEDURE — 76937 US GUIDE VASCULAR ACCESS: CPT

## 2022-01-01 PROCEDURE — 36430 TRANSFUSION BLD/BLD COMPNT: CPT

## 2022-01-01 PROCEDURE — 99222 1ST HOSP IP/OBS MODERATE 55: CPT | Performed by: OBSTETRICS & GYNECOLOGY

## 2022-01-01 PROCEDURE — 71045 X-RAY EXAM CHEST 1 VIEW: CPT

## 2022-01-01 PROCEDURE — 86900 BLOOD TYPING SEROLOGIC ABO: CPT

## 2022-01-01 PROCEDURE — 84100 ASSAY OF PHOSPHORUS: CPT

## 2022-01-01 PROCEDURE — 84484 ASSAY OF TROPONIN QUANT: CPT

## 2022-01-01 PROCEDURE — 74011000250 HC RX REV CODE- 250: Performed by: NURSE ANESTHETIST, CERTIFIED REGISTERED

## 2022-01-01 PROCEDURE — 83880 ASSAY OF NATRIURETIC PEPTIDE: CPT

## 2022-01-01 PROCEDURE — 88309 TISSUE EXAM BY PATHOLOGIST: CPT

## 2022-01-01 PROCEDURE — 76830 TRANSVAGINAL US NON-OB: CPT

## 2022-01-01 PROCEDURE — APPNB30 APP NON BILLABLE TIME 0-30 MINS: Performed by: PHYSICIAN ASSISTANT

## 2022-01-01 PROCEDURE — 96372 THER/PROPH/DIAG INJ SC/IM: CPT

## 2022-01-01 PROCEDURE — P9045 ALBUMIN (HUMAN), 5%, 250 ML: HCPCS | Performed by: NURSE ANESTHETIST, CERTIFIED REGISTERED

## 2022-01-01 PROCEDURE — 74011000258 HC RX REV CODE- 258: Performed by: STUDENT IN AN ORGANIZED HEALTH CARE EDUCATION/TRAINING PROGRAM

## 2022-01-01 PROCEDURE — 0DBU0ZZ EXCISION OF OMENTUM, OPEN APPROACH: ICD-10-PCS | Performed by: OBSTETRICS & GYNECOLOGY

## 2022-01-01 PROCEDURE — 85027 COMPLETE CBC AUTOMATED: CPT

## 2022-01-01 PROCEDURE — 83735 ASSAY OF MAGNESIUM: CPT

## 2022-01-01 PROCEDURE — 74011000258 HC RX REV CODE- 258: Performed by: PHYSICIAN ASSISTANT

## 2022-01-01 PROCEDURE — 81001 URINALYSIS AUTO W/SCOPE: CPT

## 2022-01-01 PROCEDURE — 99024 POSTOP FOLLOW-UP VISIT: CPT | Performed by: PHYSICIAN ASSISTANT

## 2022-01-01 PROCEDURE — 74011000250 HC RX REV CODE- 250: Performed by: PHYSICIAN ASSISTANT

## 2022-01-01 PROCEDURE — 99214 OFFICE O/P EST MOD 30 MIN: CPT | Performed by: OBSTETRICS & GYNECOLOGY

## 2022-01-01 PROCEDURE — 88305 TISSUE EXAM BY PATHOLOGIST: CPT

## 2022-01-01 PROCEDURE — 77030011278 HC ELECTRD LIG IMPT COVD -F: Performed by: OBSTETRICS & GYNECOLOGY

## 2022-01-01 PROCEDURE — 96361 HYDRATE IV INFUSION ADD-ON: CPT

## 2022-01-01 PROCEDURE — 88342 IMHCHEM/IMCYTCHM 1ST ANTB: CPT

## 2022-01-01 PROCEDURE — 0UT70ZZ RESECTION OF BILATERAL FALLOPIAN TUBES, OPEN APPROACH: ICD-10-PCS | Performed by: OBSTETRICS & GYNECOLOGY

## 2022-01-01 PROCEDURE — 0UT20ZZ RESECTION OF BILATERAL OVARIES, OPEN APPROACH: ICD-10-PCS | Performed by: OBSTETRICS & GYNECOLOGY

## 2022-01-01 PROCEDURE — 96374 THER/PROPH/DIAG INJ IV PUSH: CPT

## 2022-01-01 PROCEDURE — 83605 ASSAY OF LACTIC ACID: CPT

## 2022-01-01 PROCEDURE — 85018 HEMOGLOBIN: CPT | Performed by: STUDENT IN AN ORGANIZED HEALTH CARE EDUCATION/TRAINING PROGRAM

## 2022-01-01 PROCEDURE — 85018 HEMOGLOBIN: CPT

## 2022-01-01 PROCEDURE — 30233N1 TRANSFUSION OF NONAUTOLOGOUS RED BLOOD CELLS INTO PERIPHERAL VEIN, PERCUTANEOUS APPROACH: ICD-10-PCS | Performed by: STUDENT IN AN ORGANIZED HEALTH CARE EDUCATION/TRAINING PROGRAM

## 2022-01-01 PROCEDURE — 80048 BASIC METABOLIC PNL TOTAL CA: CPT

## 2022-01-01 PROCEDURE — 87077 CULTURE AEROBIC IDENTIFY: CPT

## 2022-01-01 PROCEDURE — 74011000636 HC RX REV CODE- 636: Performed by: INTERNAL MEDICINE

## 2022-01-01 PROCEDURE — 82533 TOTAL CORTISOL: CPT

## 2022-01-01 PROCEDURE — 74011250636 HC RX REV CODE- 250/636: Performed by: NURSE PRACTITIONER

## 2022-01-01 PROCEDURE — 74011250636 HC RX REV CODE- 250/636: Performed by: INTERNAL MEDICINE

## 2022-01-01 PROCEDURE — 93005 ELECTROCARDIOGRAM TRACING: CPT

## 2022-01-01 PROCEDURE — 74011000250 HC RX REV CODE- 250: Performed by: STUDENT IN AN ORGANIZED HEALTH CARE EDUCATION/TRAINING PROGRAM

## 2022-01-01 PROCEDURE — 99213 OFFICE O/P EST LOW 20 MIN: CPT | Performed by: STUDENT IN AN ORGANIZED HEALTH CARE EDUCATION/TRAINING PROGRAM

## 2022-01-01 PROCEDURE — 74011250636 HC RX REV CODE- 250/636: Performed by: NURSE ANESTHETIST, CERTIFIED REGISTERED

## 2022-01-01 PROCEDURE — 87340 HEPATITIS B SURFACE AG IA: CPT

## 2022-01-01 PROCEDURE — 65410000002 HC RM PRIVATE OB

## 2022-01-01 PROCEDURE — 77030012935 HC DRSG AQUACEL BMS -B: Performed by: OBSTETRICS & GYNECOLOGY

## 2022-01-01 PROCEDURE — 77030013169 SET IV BLD ICUM -A

## 2022-01-01 PROCEDURE — 82962 GLUCOSE BLOOD TEST: CPT

## 2022-01-01 PROCEDURE — 99214 OFFICE O/P EST MOD 30 MIN: CPT | Performed by: PHYSICIAN ASSISTANT

## 2022-01-01 PROCEDURE — 74011250637 HC RX REV CODE- 250/637: Performed by: OBSTETRICS & GYNECOLOGY

## 2022-01-01 PROCEDURE — 65660000001 HC RM ICU INTERMED STEPDOWN

## 2022-01-01 PROCEDURE — 74011000250 HC RX REV CODE- 250: Performed by: OBSTETRICS & GYNECOLOGY

## 2022-01-01 PROCEDURE — 74011000250 HC RX REV CODE- 250: Performed by: EMERGENCY MEDICINE

## 2022-01-01 PROCEDURE — 74176 CT ABD & PELVIS W/O CONTRAST: CPT

## 2022-01-01 PROCEDURE — 80202 ASSAY OF VANCOMYCIN: CPT

## 2022-01-01 PROCEDURE — 74011000636 HC RX REV CODE- 636: Performed by: RADIOLOGY

## 2022-01-01 PROCEDURE — 87040 BLOOD CULTURE FOR BACTERIA: CPT

## 2022-01-01 PROCEDURE — 36573 INSJ PICC RS&I 5 YR+: CPT | Performed by: PHYSICAL MEDICINE & REHABILITATION

## 2022-01-01 PROCEDURE — 77030008684 HC TU ET CUF COVD -B: Performed by: ANESTHESIOLOGY

## 2022-01-01 PROCEDURE — 87086 URINE CULTURE/COLONY COUNT: CPT

## 2022-01-01 PROCEDURE — U0005 INFEC AGEN DETEC AMPLI PROBE: HCPCS

## 2022-01-01 PROCEDURE — 74011000258 HC RX REV CODE- 258: Performed by: INTERNAL MEDICINE

## 2022-01-01 PROCEDURE — 86704 HEP B CORE ANTIBODY TOTAL: CPT

## 2022-01-01 PROCEDURE — 93970 EXTREMITY STUDY: CPT

## 2022-01-01 PROCEDURE — 99223 1ST HOSP IP/OBS HIGH 75: CPT | Performed by: PHYSICAL MEDICINE & REHABILITATION

## 2022-01-01 PROCEDURE — 99283 EMERGENCY DEPT VISIT LOW MDM: CPT

## 2022-01-01 PROCEDURE — 77030040361 HC SLV COMPR DVT MDII -B: Performed by: OBSTETRICS & GYNECOLOGY

## 2022-01-01 PROCEDURE — G0378 HOSPITAL OBSERVATION PER HR: HCPCS

## 2022-01-01 PROCEDURE — APPNB15 APP NON BILLABLE TIME 0-15 MINS: Performed by: PHYSICIAN ASSISTANT

## 2022-01-01 PROCEDURE — 94760 N-INVAS EAR/PLS OXIMETRY 1: CPT

## 2022-01-01 PROCEDURE — 88341 IMHCHEM/IMCYTCHM EA ADD ANTB: CPT

## 2022-01-01 PROCEDURE — 99215 OFFICE O/P EST HI 40 MIN: CPT | Performed by: OBSTETRICS & GYNECOLOGY

## 2022-01-01 PROCEDURE — 99285 EMERGENCY DEPT VISIT HI MDM: CPT

## 2022-01-01 PROCEDURE — 81025 URINE PREGNANCY TEST: CPT

## 2022-01-01 PROCEDURE — 30233N1 TRANSFUSION OF NONAUTOLOGOUS RED BLOOD CELLS INTO PERIPHERAL VEIN, PERCUTANEOUS APPROACH: ICD-10-PCS | Performed by: EMERGENCY MEDICINE

## 2022-01-01 PROCEDURE — 74011250637 HC RX REV CODE- 250/637: Performed by: INTERNAL MEDICINE

## 2022-01-01 PROCEDURE — 84145 PROCALCITONIN (PCT): CPT

## 2022-01-01 PROCEDURE — 74011000250 HC RX REV CODE- 250

## 2022-01-01 PROCEDURE — 76010000133 HC OR TIME 3 TO 3.5 HR: Performed by: OBSTETRICS & GYNECOLOGY

## 2022-01-01 PROCEDURE — 99284 EMERGENCY DEPT VISIT MOD MDM: CPT

## 2022-01-01 PROCEDURE — 82746 ASSAY OF FOLIC ACID SERUM: CPT

## 2022-01-01 PROCEDURE — 74011250637 HC RX REV CODE- 250/637: Performed by: EMERGENCY MEDICINE

## 2022-01-01 PROCEDURE — 77030013079 HC BLNKT BAIR HGGR 3M -A: Performed by: ANESTHESIOLOGY

## 2022-01-01 PROCEDURE — 86706 HEP B SURFACE ANTIBODY: CPT

## 2022-01-01 PROCEDURE — A9552 F18 FDG: HCPCS

## 2022-01-01 PROCEDURE — 82550 ASSAY OF CK (CPK): CPT

## 2022-01-01 PROCEDURE — 77010033678 HC OXYGEN DAILY

## 2022-01-01 PROCEDURE — 99214 OFFICE O/P EST MOD 30 MIN: CPT | Performed by: INTERNAL MEDICINE

## 2022-01-01 PROCEDURE — 77030002966 HC SUT PDS J&J -A: Performed by: OBSTETRICS & GYNECOLOGY

## 2022-01-01 PROCEDURE — 0UT90ZZ RESECTION OF UTERUS, OPEN APPROACH: ICD-10-PCS | Performed by: OBSTETRICS & GYNECOLOGY

## 2022-01-01 PROCEDURE — 96413 CHEMO IV INFUSION 1 HR: CPT

## 2022-01-01 PROCEDURE — 77030014650 HC SEAL MTRX FLOSEL BAXT -C: Performed by: OBSTETRICS & GYNECOLOGY

## 2022-01-01 PROCEDURE — 76856 US EXAM PELVIC COMPLETE: CPT

## 2022-01-01 PROCEDURE — 99233 SBSQ HOSP IP/OBS HIGH 50: CPT | Performed by: NURSE PRACTITIONER

## 2022-01-01 PROCEDURE — 96376 TX/PRO/DX INJ SAME DRUG ADON: CPT

## 2022-01-01 PROCEDURE — 83615 LACTATE (LD) (LDH) ENZYME: CPT

## 2022-01-01 PROCEDURE — 77030008771 HC TU NG SALEM SUMP -A: Performed by: ANESTHESIOLOGY

## 2022-01-01 PROCEDURE — 74011250636 HC RX REV CODE- 250/636: Performed by: STUDENT IN AN ORGANIZED HEALTH CARE EDUCATION/TRAINING PROGRAM

## 2022-01-01 PROCEDURE — 84550 ASSAY OF BLOOD/URIC ACID: CPT

## 2022-01-01 PROCEDURE — 99213 OFFICE O/P EST LOW 20 MIN: CPT | Performed by: PHYSICIAN ASSISTANT

## 2022-01-01 PROCEDURE — 2709999900 HC NON-CHARGEABLE SUPPLY: Performed by: OBSTETRICS & GYNECOLOGY

## 2022-01-01 PROCEDURE — 83690 ASSAY OF LIPASE: CPT

## 2022-01-01 PROCEDURE — 02HV33Z INSERTION OF INFUSION DEVICE INTO SUPERIOR VENA CAVA, PERCUTANEOUS APPROACH: ICD-10-PCS | Performed by: STUDENT IN AN ORGANIZED HEALTH CARE EDUCATION/TRAINING PROGRAM

## 2022-01-01 PROCEDURE — 71275 CT ANGIOGRAPHY CHEST: CPT

## 2022-01-01 PROCEDURE — 74011250636 HC RX REV CODE- 250/636: Performed by: PHYSICAL MEDICINE & REHABILITATION

## 2022-01-01 PROCEDURE — 77030040830 HC CATH URETH FOL MDII -A: Performed by: OBSTETRICS & GYNECOLOGY

## 2022-01-01 PROCEDURE — 87635 SARS-COV-2 COVID-19 AMP PRB: CPT

## 2022-01-01 PROCEDURE — 74177 CT ABD & PELVIS W/CONTRAST: CPT

## 2022-01-01 PROCEDURE — 74018 RADEX ABDOMEN 1 VIEW: CPT

## 2022-01-01 PROCEDURE — 87186 SC STD MICRODIL/AGAR DIL: CPT

## 2022-01-01 PROCEDURE — 99219 PR INITIAL OBSERVATION CARE/DAY 50 MINUTES: CPT | Performed by: OBSTETRICS & GYNECOLOGY

## 2022-01-01 PROCEDURE — C9113 INJ PANTOPRAZOLE SODIUM, VIA: HCPCS | Performed by: EMERGENCY MEDICINE

## 2022-01-01 PROCEDURE — C1751 CATH, INF, PER/CENT/MIDLINE: HCPCS

## 2022-01-01 PROCEDURE — 36592 COLLECT BLOOD FROM PICC: CPT

## 2022-01-01 PROCEDURE — 76210000006 HC OR PH I REC 0.5 TO 1 HR: Performed by: OBSTETRICS & GYNECOLOGY

## 2022-01-01 PROCEDURE — 77030020365 HC SOL INJ SOD CL 0.9% 50ML

## 2022-01-01 PROCEDURE — 30233P1 TRANSFUSION OF NONAUTOLOGOUS FROZEN RED CELLS INTO PERIPHERAL VEIN, PERCUTANEOUS APPROACH: ICD-10-PCS | Performed by: OBSTETRICS & GYNECOLOGY

## 2022-01-01 PROCEDURE — 82607 VITAMIN B-12: CPT

## 2022-01-01 PROCEDURE — 96360 HYDRATION IV INFUSION INIT: CPT

## 2022-01-01 PROCEDURE — 99233 SBSQ HOSP IP/OBS HIGH 50: CPT | Performed by: OBSTETRICS & GYNECOLOGY

## 2022-01-01 PROCEDURE — 77030002996 HC SUT SLK J&J -A: Performed by: OBSTETRICS & GYNECOLOGY

## 2022-01-01 PROCEDURE — 93306 TTE W/DOPPLER COMPLETE: CPT | Performed by: INTERNAL MEDICINE

## 2022-01-01 PROCEDURE — 99214 OFFICE O/P EST MOD 30 MIN: CPT | Performed by: NURSE PRACTITIONER

## 2022-01-01 PROCEDURE — 96365 THER/PROPH/DIAG IV INF INIT: CPT

## 2022-01-01 PROCEDURE — 83540 ASSAY OF IRON: CPT

## 2022-01-01 PROCEDURE — 94762 N-INVAS EAR/PLS OXIMTRY CONT: CPT

## 2022-01-01 PROCEDURE — 74178 CT ABD&PLV WO CNTR FLWD CNTR: CPT

## 2022-01-01 PROCEDURE — 77030026438 HC STYL ET INTUB CARD -A: Performed by: ANESTHESIOLOGY

## 2022-01-01 PROCEDURE — 88307 TISSUE EXAM BY PATHOLOGIST: CPT

## 2022-01-01 PROCEDURE — 99233 SBSQ HOSP IP/OBS HIGH 50: CPT | Performed by: PHYSICAL MEDICINE & REHABILITATION

## 2022-01-01 PROCEDURE — 85379 FIBRIN DEGRADATION QUANT: CPT

## 2022-01-01 PROCEDURE — 82728 ASSAY OF FERRITIN: CPT

## 2022-01-01 RX ORDER — KETOROLAC TROMETHAMINE 30 MG/ML
15 INJECTION, SOLUTION INTRAMUSCULAR; INTRAVENOUS
Status: COMPLETED | OUTPATIENT
Start: 2022-01-01 | End: 2022-01-01

## 2022-01-01 RX ORDER — SODIUM CHLORIDE 9 MG/ML
250 INJECTION, SOLUTION INTRAVENOUS AS NEEDED
Status: DISCONTINUED | OUTPATIENT
Start: 2022-01-01 | End: 2022-01-01

## 2022-01-01 RX ORDER — POLYETHYLENE GLYCOL 3350 17 G/17G
17 POWDER, FOR SOLUTION ORAL 2 TIMES DAILY
Status: DISCONTINUED | OUTPATIENT
Start: 2022-01-01 | End: 2022-01-01

## 2022-01-01 RX ORDER — DIPHENHYDRAMINE HYDROCHLORIDE 50 MG/ML
25 INJECTION, SOLUTION INTRAMUSCULAR; INTRAVENOUS AS NEEDED
Status: CANCELLED
Start: 2022-01-01

## 2022-01-01 RX ORDER — KETOROLAC TROMETHAMINE 10 MG/1
10 TABLET, FILM COATED ORAL
Qty: 20 TABLET | Refills: 0 | Status: SHIPPED | OUTPATIENT
Start: 2022-01-01 | End: 2022-01-01

## 2022-01-01 RX ORDER — SODIUM CHLORIDE 0.9 % (FLUSH) 0.9 %
5-10 SYRINGE (ML) INJECTION AS NEEDED
Status: DISCONTINUED | OUTPATIENT
Start: 2022-01-01 | End: 2022-01-01 | Stop reason: HOSPADM

## 2022-01-01 RX ORDER — SODIUM CHLORIDE 0.9 % (FLUSH) 0.9 %
10 SYRINGE (ML) INJECTION AS NEEDED
Status: CANCELLED | OUTPATIENT
Start: 2022-05-13

## 2022-01-01 RX ORDER — MEDROXYPROGESTERONE ACETATE 10 MG/1
10 TABLET ORAL DAILY
Qty: 90 TABLET | Refills: 2 | Status: SHIPPED | OUTPATIENT
Start: 2022-01-01 | End: 2022-01-01 | Stop reason: SINTOL

## 2022-01-01 RX ORDER — MORPHINE SULFATE 15 MG/1
15 TABLET, FILM COATED, EXTENDED RELEASE ORAL EVERY 12 HOURS
Status: DISCONTINUED | OUTPATIENT
Start: 2022-01-01 | End: 2022-01-01

## 2022-01-01 RX ORDER — OXYCODONE AND ACETAMINOPHEN 5; 325 MG/1; MG/1
2 TABLET ORAL
Status: DISCONTINUED | OUTPATIENT
Start: 2022-01-01 | End: 2022-01-01

## 2022-01-01 RX ORDER — FENTANYL 12.5 UG/1
1 PATCH TRANSDERMAL
Status: DISCONTINUED | OUTPATIENT
Start: 2022-01-01 | End: 2022-01-01

## 2022-01-01 RX ORDER — HEPARIN 100 UNIT/ML
500 SYRINGE INTRAVENOUS AS NEEDED
Status: CANCELLED | OUTPATIENT
Start: 2022-01-01

## 2022-01-01 RX ORDER — AMOXICILLIN 250 MG
1 CAPSULE ORAL DAILY
Status: DISCONTINUED | OUTPATIENT
Start: 2022-01-01 | End: 2022-01-01

## 2022-01-01 RX ORDER — NALOXONE HYDROCHLORIDE 0.4 MG/ML
0.4 INJECTION, SOLUTION INTRAMUSCULAR; INTRAVENOUS; SUBCUTANEOUS
Status: DISCONTINUED | OUTPATIENT
Start: 2022-01-01 | End: 2022-01-01

## 2022-01-01 RX ORDER — HYDROMORPHONE HYDROCHLORIDE 1 MG/ML
1 INJECTION, SOLUTION INTRAMUSCULAR; INTRAVENOUS; SUBCUTANEOUS ONCE
Status: COMPLETED | OUTPATIENT
Start: 2022-01-01 | End: 2022-01-01

## 2022-01-01 RX ORDER — MEDROXYPROGESTERONE ACETATE 10 MG/1
10 TABLET ORAL DAILY
Qty: 10 TABLET | Refills: 0 | Status: SHIPPED | OUTPATIENT
Start: 2022-01-01 | End: 2022-01-01

## 2022-01-01 RX ORDER — ACETAMINOPHEN 325 MG/1
650 TABLET ORAL
Status: DISCONTINUED | OUTPATIENT
Start: 2022-01-01 | End: 2022-05-12 | Stop reason: HOSPADM

## 2022-01-01 RX ORDER — ENOXAPARIN SODIUM 100 MG/ML
40 INJECTION SUBCUTANEOUS DAILY
Status: DISCONTINUED | OUTPATIENT
Start: 2022-01-01 | End: 2022-01-01

## 2022-01-01 RX ORDER — IBUPROFEN 400 MG/1
800 TABLET ORAL EVERY 8 HOURS
Status: DISCONTINUED | OUTPATIENT
Start: 2022-01-01 | End: 2022-01-01

## 2022-01-01 RX ORDER — MEGESTROL ACETATE 40 MG/1
80 TABLET ORAL 2 TIMES DAILY
Status: DISCONTINUED | OUTPATIENT
Start: 2022-01-01 | End: 2022-01-01

## 2022-01-01 RX ORDER — ALBUMIN HUMAN 50 G/1000ML
SOLUTION INTRAVENOUS AS NEEDED
Status: DISCONTINUED | OUTPATIENT
Start: 2022-01-01 | End: 2022-01-01

## 2022-01-01 RX ORDER — LORAZEPAM 2 MG/ML
1 INJECTION INTRAMUSCULAR
Status: DISCONTINUED | OUTPATIENT
Start: 2022-01-01 | End: 2022-05-12 | Stop reason: HOSPADM

## 2022-01-01 RX ORDER — CALCIUM GLUCONATE 20 MG/ML
1 INJECTION, SOLUTION INTRAVENOUS ONCE
Status: COMPLETED | OUTPATIENT
Start: 2022-01-01 | End: 2022-01-01

## 2022-01-01 RX ORDER — HYDROMORPHONE HYDROCHLORIDE 2 MG/ML
2 INJECTION, SOLUTION INTRAMUSCULAR; INTRAVENOUS; SUBCUTANEOUS
Status: DISCONTINUED | OUTPATIENT
Start: 2022-01-01 | End: 2022-05-12 | Stop reason: HOSPADM

## 2022-01-01 RX ORDER — LIDOCAINE HYDROCHLORIDE 20 MG/ML
INJECTION, SOLUTION EPIDURAL; INFILTRATION; INTRACAUDAL; PERINEURAL AS NEEDED
Status: DISCONTINUED | OUTPATIENT
Start: 2022-01-01 | End: 2022-01-01 | Stop reason: HOSPADM

## 2022-01-01 RX ORDER — PROCHLORPERAZINE EDISYLATE 5 MG/ML
10 INJECTION INTRAMUSCULAR; INTRAVENOUS
Status: DISCONTINUED | OUTPATIENT
Start: 2022-01-01 | End: 2022-01-01 | Stop reason: HOSPADM

## 2022-01-01 RX ORDER — VANCOMYCIN HYDROCHLORIDE
1250 EVERY 12 HOURS
Status: DISCONTINUED | OUTPATIENT
Start: 2022-01-01 | End: 2022-01-01 | Stop reason: DRUGHIGH

## 2022-01-01 RX ORDER — PANTOPRAZOLE SODIUM 40 MG/10ML
40 INJECTION, POWDER, LYOPHILIZED, FOR SOLUTION INTRAVENOUS ONCE
Status: COMPLETED | OUTPATIENT
Start: 2022-01-01 | End: 2022-01-01

## 2022-01-01 RX ORDER — SODIUM CHLORIDE 0.9 % (FLUSH) 0.9 %
5-40 SYRINGE (ML) INJECTION EVERY 8 HOURS
Status: DISCONTINUED | OUTPATIENT
Start: 2022-01-01 | End: 2022-01-01 | Stop reason: HOSPADM

## 2022-01-01 RX ORDER — SODIUM CHLORIDE 9 MG/ML
50 INJECTION, SOLUTION INTRAVENOUS CONTINUOUS
Status: DISCONTINUED | OUTPATIENT
Start: 2022-01-01 | End: 2022-01-01 | Stop reason: HOSPADM

## 2022-01-01 RX ORDER — HYDROMORPHONE HYDROCHLORIDE 1 MG/ML
0.5 INJECTION, SOLUTION INTRAMUSCULAR; INTRAVENOUS; SUBCUTANEOUS ONCE
Status: COMPLETED | OUTPATIENT
Start: 2022-01-01 | End: 2022-01-01

## 2022-01-01 RX ORDER — ONDANSETRON 2 MG/ML
8 INJECTION INTRAMUSCULAR; INTRAVENOUS ONCE
Status: COMPLETED | OUTPATIENT
Start: 2022-01-01 | End: 2022-01-01

## 2022-01-01 RX ORDER — MORPHINE SULFATE 2 MG/ML
2 INJECTION, SOLUTION INTRAMUSCULAR; INTRAVENOUS
Status: DISCONTINUED | OUTPATIENT
Start: 2022-01-01 | End: 2022-01-01 | Stop reason: HOSPADM

## 2022-01-01 RX ORDER — ACETAMINOPHEN 325 MG/1
650 TABLET ORAL AS NEEDED
Status: CANCELLED
Start: 2022-01-01

## 2022-01-01 RX ORDER — DEXAMETHASONE SODIUM PHOSPHATE 4 MG/ML
4 INJECTION, SOLUTION INTRA-ARTICULAR; INTRALESIONAL; INTRAMUSCULAR; INTRAVENOUS; SOFT TISSUE ONCE
Status: COMPLETED | OUTPATIENT
Start: 2022-01-01 | End: 2022-01-01

## 2022-01-01 RX ORDER — ONDANSETRON 2 MG/ML
4 INJECTION INTRAMUSCULAR; INTRAVENOUS
Status: DISCONTINUED | OUTPATIENT
Start: 2022-01-01 | End: 2022-05-12 | Stop reason: HOSPADM

## 2022-01-01 RX ORDER — SODIUM CHLORIDE 9 MG/ML
100 INJECTION, SOLUTION INTRAVENOUS CONTINUOUS
Status: DISCONTINUED | OUTPATIENT
Start: 2022-01-01 | End: 2022-01-01

## 2022-01-01 RX ORDER — MEGESTROL ACETATE 40 MG/1
40 TABLET ORAL ONCE
Status: COMPLETED | OUTPATIENT
Start: 2022-01-01 | End: 2022-01-01

## 2022-01-01 RX ORDER — DIPHENHYDRAMINE HYDROCHLORIDE 50 MG/ML
12.5 INJECTION, SOLUTION INTRAMUSCULAR; INTRAVENOUS
Status: DISCONTINUED | OUTPATIENT
Start: 2022-01-01 | End: 2022-01-01 | Stop reason: HOSPADM

## 2022-01-01 RX ORDER — POTASSIUM CHLORIDE 750 MG/1
10 TABLET, FILM COATED, EXTENDED RELEASE ORAL 2 TIMES DAILY
Qty: 28 TABLET | Refills: 0 | Status: SHIPPED | OUTPATIENT
Start: 2022-01-01 | End: 2022-01-01

## 2022-01-01 RX ORDER — ALBUTEROL SULFATE 0.83 MG/ML
2.5 SOLUTION RESPIRATORY (INHALATION) AS NEEDED
Status: CANCELLED
Start: 2022-01-01

## 2022-01-01 RX ORDER — ALBUTEROL SULFATE 0.83 MG/ML
2.5 SOLUTION RESPIRATORY (INHALATION) AS NEEDED
Status: ACTIVE | OUTPATIENT
Start: 2022-01-01 | End: 2022-01-01

## 2022-01-01 RX ORDER — SODIUM CHLORIDE 9 MG/ML
250 INJECTION, SOLUTION INTRAVENOUS AS NEEDED
Status: DISCONTINUED | OUTPATIENT
Start: 2022-01-01 | End: 2022-01-01 | Stop reason: HOSPADM

## 2022-01-01 RX ORDER — DEXAMETHASONE 4 MG/1
TABLET ORAL
Qty: 36 TABLET | Refills: 2 | Status: SHIPPED | OUTPATIENT
Start: 2022-01-01 | End: 2022-01-01

## 2022-01-01 RX ORDER — FLUDEOXYGLUCOSE F-18 200 MCI/ML
10 INJECTION INTRAVENOUS ONCE
Status: COMPLETED | OUTPATIENT
Start: 2022-01-01 | End: 2022-01-01

## 2022-01-01 RX ORDER — HYDROCORTISONE SODIUM SUCCINATE 100 MG/2ML
100 INJECTION, POWDER, FOR SOLUTION INTRAMUSCULAR; INTRAVENOUS AS NEEDED
Status: CANCELLED | OUTPATIENT
Start: 2022-05-13

## 2022-01-01 RX ORDER — OXYCODONE HYDROCHLORIDE 5 MG/1
5 TABLET ORAL
Qty: 20 TABLET | Refills: 0 | Status: SHIPPED | OUTPATIENT
Start: 2022-01-01 | End: 2022-01-01

## 2022-01-01 RX ORDER — SODIUM CHLORIDE 9 MG/ML
250 INJECTION, SOLUTION INTRAVENOUS AS NEEDED
Status: DISPENSED | OUTPATIENT
Start: 2022-01-01 | End: 2022-01-01

## 2022-01-01 RX ORDER — ALBUMIN HUMAN 50 G/1000ML
SOLUTION INTRAVENOUS AS NEEDED
Status: DISCONTINUED | OUTPATIENT
Start: 2022-01-01 | End: 2022-01-01 | Stop reason: HOSPADM

## 2022-01-01 RX ORDER — MIDAZOLAM HYDROCHLORIDE 1 MG/ML
0.5 INJECTION, SOLUTION INTRAMUSCULAR; INTRAVENOUS
Status: DISCONTINUED | OUTPATIENT
Start: 2022-01-01 | End: 2022-01-01 | Stop reason: HOSPADM

## 2022-01-01 RX ORDER — PROPOFOL 10 MG/ML
INJECTION, EMULSION INTRAVENOUS AS NEEDED
Status: DISCONTINUED | OUTPATIENT
Start: 2022-01-01 | End: 2022-01-01 | Stop reason: HOSPADM

## 2022-01-01 RX ORDER — DIPHENHYDRAMINE HYDROCHLORIDE 50 MG/ML
50 INJECTION, SOLUTION INTRAMUSCULAR; INTRAVENOUS AS NEEDED
Status: CANCELLED
Start: 2022-01-01

## 2022-01-01 RX ORDER — HYDROMORPHONE HYDROCHLORIDE 1 MG/ML
1 INJECTION, SOLUTION INTRAMUSCULAR; INTRAVENOUS; SUBCUTANEOUS
Status: DISCONTINUED | OUTPATIENT
Start: 2022-01-01 | End: 2022-01-01

## 2022-01-01 RX ORDER — TRAMADOL HYDROCHLORIDE 50 MG/1
50-100 TABLET ORAL
Status: DISCONTINUED | OUTPATIENT
Start: 2022-01-01 | End: 2022-01-01 | Stop reason: HOSPADM

## 2022-01-01 RX ORDER — HYDROCODONE BITARTRATE AND ACETAMINOPHEN 5; 325 MG/1; MG/1
1 TABLET ORAL
Status: DISCONTINUED | OUTPATIENT
Start: 2022-01-01 | End: 2022-01-01 | Stop reason: HOSPADM

## 2022-01-01 RX ORDER — ACETAMINOPHEN 325 MG/1
650 TABLET ORAL AS NEEDED
Status: ACTIVE | OUTPATIENT
Start: 2022-01-01 | End: 2022-01-01

## 2022-01-01 RX ORDER — HYDROCORTISONE SODIUM SUCCINATE 100 MG/2ML
100 INJECTION, POWDER, FOR SOLUTION INTRAMUSCULAR; INTRAVENOUS AS NEEDED
Status: CANCELLED | OUTPATIENT
Start: 2022-01-01

## 2022-01-01 RX ORDER — LIDOCAINE AND PRILOCAINE 25; 25 MG/G; MG/G
CREAM TOPICAL
Qty: 30 G | Refills: 3 | Status: SHIPPED | OUTPATIENT
Start: 2022-01-01 | End: 2022-01-01

## 2022-01-01 RX ORDER — ONDANSETRON 2 MG/ML
8 INJECTION INTRAMUSCULAR; INTRAVENOUS
Status: COMPLETED | OUTPATIENT
Start: 2022-01-01 | End: 2022-01-01

## 2022-01-01 RX ORDER — ACETAMINOPHEN 500 MG
1000 TABLET ORAL ONCE
Status: COMPLETED | OUTPATIENT
Start: 2022-01-01 | End: 2022-01-01

## 2022-01-01 RX ORDER — SODIUM CHLORIDE 9 MG/ML
250 INJECTION, SOLUTION INTRAVENOUS AS NEEDED
Status: DISCONTINUED | OUTPATIENT
Start: 2022-01-01 | End: 2022-05-12 | Stop reason: HOSPADM

## 2022-01-01 RX ORDER — HEPARIN 100 UNIT/ML
300-500 SYRINGE INTRAVENOUS AS NEEDED
Status: CANCELLED
Start: 2022-05-13

## 2022-01-01 RX ORDER — ACETAMINOPHEN 325 MG/1
650 TABLET ORAL
Status: COMPLETED | OUTPATIENT
Start: 2022-01-01 | End: 2022-01-01

## 2022-01-01 RX ORDER — SODIUM CHLORIDE 0.9 % (FLUSH) 0.9 %
5-10 SYRINGE (ML) INJECTION AS NEEDED
Status: CANCELLED | OUTPATIENT
Start: 2022-01-01 | End: 2022-01-01

## 2022-01-01 RX ORDER — SODIUM CHLORIDE, SODIUM LACTATE, POTASSIUM CHLORIDE, CALCIUM CHLORIDE 600; 310; 30; 20 MG/100ML; MG/100ML; MG/100ML; MG/100ML
75 INJECTION, SOLUTION INTRAVENOUS CONTINUOUS
Status: DISCONTINUED | OUTPATIENT
Start: 2022-01-01 | End: 2022-01-01 | Stop reason: HOSPADM

## 2022-01-01 RX ORDER — SODIUM CHLORIDE 0.9 % (FLUSH) 0.9 %
5-40 SYRINGE (ML) INJECTION AS NEEDED
Status: DISCONTINUED | OUTPATIENT
Start: 2022-01-01 | End: 2022-01-01 | Stop reason: HOSPADM

## 2022-01-01 RX ORDER — SODIUM CHLORIDE 9 MG/ML
10 INJECTION INTRAMUSCULAR; INTRAVENOUS; SUBCUTANEOUS AS NEEDED
Status: CANCELLED | OUTPATIENT
Start: 2022-01-01

## 2022-01-01 RX ORDER — ONDANSETRON 2 MG/ML
8 INJECTION INTRAMUSCULAR; INTRAVENOUS ONCE
Status: CANCELLED | OUTPATIENT
Start: 2022-01-01 | End: 2022-01-01

## 2022-01-01 RX ORDER — HEPARIN 100 UNIT/ML
300-500 SYRINGE INTRAVENOUS AS NEEDED
Status: CANCELLED
Start: 2022-01-01

## 2022-01-01 RX ORDER — OXYCODONE HYDROCHLORIDE 5 MG/1
5 CAPSULE ORAL
Qty: 20 CAPSULE | Refills: 0 | Status: SHIPPED | OUTPATIENT
Start: 2022-01-01 | End: 2022-01-01

## 2022-01-01 RX ORDER — DIPHENHYDRAMINE HYDROCHLORIDE 50 MG/ML
25 INJECTION, SOLUTION INTRAMUSCULAR; INTRAVENOUS ONCE
Status: COMPLETED | OUTPATIENT
Start: 2022-01-01 | End: 2022-01-01

## 2022-01-01 RX ORDER — SODIUM CHLORIDE 0.9 % (FLUSH) 0.9 %
5-10 SYRINGE (ML) INJECTION AS NEEDED
Status: DISCONTINUED | OUTPATIENT
Start: 2022-01-01 | End: 2022-05-12 | Stop reason: HOSPADM

## 2022-01-01 RX ORDER — OXYCODONE AND ACETAMINOPHEN 5; 325 MG/1; MG/1
1 TABLET ORAL AS NEEDED
Status: DISCONTINUED | OUTPATIENT
Start: 2022-01-01 | End: 2022-01-01 | Stop reason: HOSPADM

## 2022-01-01 RX ORDER — HYDROMORPHONE HYDROCHLORIDE 1 MG/ML
1-2 INJECTION, SOLUTION INTRAMUSCULAR; INTRAVENOUS; SUBCUTANEOUS
Status: DISCONTINUED | OUTPATIENT
Start: 2022-01-01 | End: 2022-01-01 | Stop reason: HOSPADM

## 2022-01-01 RX ORDER — SODIUM CHLORIDE 0.9 % (FLUSH) 0.9 %
10 SYRINGE (ML) INJECTION AS NEEDED
Status: CANCELLED | OUTPATIENT
Start: 2022-01-01 | End: 2022-01-01

## 2022-01-01 RX ORDER — FAMOTIDINE 10 MG/ML
20 INJECTION INTRAVENOUS 2 TIMES DAILY
Status: DISCONTINUED | OUTPATIENT
Start: 2022-01-01 | End: 2022-01-01 | Stop reason: DRUGHIGH

## 2022-01-01 RX ORDER — ACETAMINOPHEN 500 MG
1000 TABLET ORAL
COMMUNITY

## 2022-01-01 RX ORDER — DIPHENHYDRAMINE HYDROCHLORIDE 50 MG/ML
12.5 INJECTION, SOLUTION INTRAMUSCULAR; INTRAVENOUS AS NEEDED
Status: DISCONTINUED | OUTPATIENT
Start: 2022-01-01 | End: 2022-01-01 | Stop reason: HOSPADM

## 2022-01-01 RX ORDER — ONDANSETRON 2 MG/ML
4 INJECTION INTRAMUSCULAR; INTRAVENOUS
Status: DISCONTINUED | OUTPATIENT
Start: 2022-01-01 | End: 2022-01-01 | Stop reason: HOSPADM

## 2022-01-01 RX ORDER — IBUPROFEN 200 MG
200 TABLET ORAL
COMMUNITY
End: 2022-01-01

## 2022-01-01 RX ORDER — ENOXAPARIN SODIUM 100 MG/ML
40 INJECTION SUBCUTANEOUS EVERY 24 HOURS
Status: DISCONTINUED | OUTPATIENT
Start: 2022-01-01 | End: 2022-01-01 | Stop reason: HOSPADM

## 2022-01-01 RX ORDER — IBUPROFEN 400 MG/1
400 TABLET ORAL
Status: DISCONTINUED | OUTPATIENT
Start: 2022-01-01 | End: 2022-01-01 | Stop reason: ALTCHOICE

## 2022-01-01 RX ORDER — ONDANSETRON HYDROCHLORIDE 8 MG/1
8 TABLET, FILM COATED ORAL
Qty: 12 TABLET | Refills: 0 | Status: SHIPPED | OUTPATIENT
Start: 2022-01-01 | End: 2022-01-01 | Stop reason: DRUGHIGH

## 2022-01-01 RX ORDER — SODIUM CHLORIDE 9 MG/ML
25 INJECTION, SOLUTION INTRAVENOUS CONTINUOUS
Status: CANCELLED | OUTPATIENT
Start: 2022-01-01 | End: 2022-01-01

## 2022-01-01 RX ORDER — ONDANSETRON 4 MG/1
4 TABLET, ORALLY DISINTEGRATING ORAL
Status: DISCONTINUED | OUTPATIENT
Start: 2022-01-01 | End: 2022-05-12 | Stop reason: HOSPADM

## 2022-01-01 RX ORDER — AMOXICILLIN 250 MG
2 CAPSULE ORAL DAILY
Status: DISCONTINUED | OUTPATIENT
Start: 2022-01-01 | End: 2022-01-01

## 2022-01-01 RX ORDER — EPINEPHRINE 1 MG/ML
0.3 INJECTION, SOLUTION, CONCENTRATE INTRAVENOUS AS NEEDED
Status: CANCELLED | OUTPATIENT
Start: 2022-01-01

## 2022-01-01 RX ORDER — METRONIDAZOLE 500 MG/1
500 TABLET ORAL 2 TIMES DAILY
Qty: 28 TABLET | Refills: 0 | Status: SHIPPED | OUTPATIENT
Start: 2022-01-01 | End: 2022-01-01

## 2022-01-01 RX ORDER — MEGESTROL ACETATE 40 MG/1
40 TABLET ORAL 2 TIMES DAILY
COMMUNITY
End: 2022-01-01

## 2022-01-01 RX ORDER — SODIUM CHLORIDE 0.9 % (FLUSH) 0.9 %
5-40 SYRINGE (ML) INJECTION EVERY 8 HOURS
Status: DISCONTINUED | OUTPATIENT
Start: 2022-01-01 | End: 2022-05-12 | Stop reason: HOSPADM

## 2022-01-01 RX ORDER — HYDROMORPHONE HYDROCHLORIDE 2 MG/ML
INJECTION, SOLUTION INTRAMUSCULAR; INTRAVENOUS; SUBCUTANEOUS AS NEEDED
Status: DISCONTINUED | OUTPATIENT
Start: 2022-01-01 | End: 2022-01-01 | Stop reason: HOSPADM

## 2022-01-01 RX ORDER — ASCORBIC ACID 500 MG
500 TABLET ORAL 2 TIMES DAILY
Status: DISCONTINUED | OUTPATIENT
Start: 2022-01-01 | End: 2022-01-01 | Stop reason: HOSPADM

## 2022-01-01 RX ORDER — SODIUM CHLORIDE 9 MG/ML
10 INJECTION INTRAMUSCULAR; INTRAVENOUS; SUBCUTANEOUS AS NEEDED
Status: CANCELLED | OUTPATIENT
Start: 2022-05-13

## 2022-01-01 RX ORDER — LANOLIN ALCOHOL/MO/W.PET/CERES
325 CREAM (GRAM) TOPICAL
COMMUNITY
End: 2022-01-01 | Stop reason: ALTCHOICE

## 2022-01-01 RX ORDER — SODIUM CHLORIDE 0.9 % (FLUSH) 0.9 %
5-40 SYRINGE (ML) INJECTION AS NEEDED
Status: DISCONTINUED | OUTPATIENT
Start: 2022-01-01 | End: 2022-05-12 | Stop reason: HOSPADM

## 2022-01-01 RX ORDER — AMOXICILLIN AND CLAVULANATE POTASSIUM 500; 125 MG/1; MG/1
1 TABLET, FILM COATED ORAL 3 TIMES DAILY
Qty: 30 TABLET | Refills: 0 | Status: SHIPPED | OUTPATIENT
Start: 2022-01-01 | End: 2022-01-01 | Stop reason: SDUPTHER

## 2022-01-01 RX ORDER — ACETAMINOPHEN 325 MG/1
650 TABLET ORAL
Status: DISCONTINUED | OUTPATIENT
Start: 2022-01-01 | End: 2022-01-01 | Stop reason: HOSPADM

## 2022-01-01 RX ORDER — OXYCODONE HYDROCHLORIDE 5 MG/1
10 TABLET ORAL
Status: DISCONTINUED | OUTPATIENT
Start: 2022-01-01 | End: 2022-01-01

## 2022-01-01 RX ORDER — HYDROCODONE BITARTRATE AND ACETAMINOPHEN 5; 325 MG/1; MG/1
2 TABLET ORAL
Status: DISCONTINUED | OUTPATIENT
Start: 2022-01-01 | End: 2022-01-01 | Stop reason: HOSPADM

## 2022-01-01 RX ORDER — FENTANYL CITRATE 50 UG/ML
50 INJECTION, SOLUTION INTRAMUSCULAR; INTRAVENOUS AS NEEDED
Status: DISCONTINUED | OUTPATIENT
Start: 2022-01-01 | End: 2022-01-01 | Stop reason: HOSPADM

## 2022-01-01 RX ORDER — ENOXAPARIN SODIUM 100 MG/ML
40 INJECTION SUBCUTANEOUS DAILY
Status: DISCONTINUED | OUTPATIENT
Start: 2022-01-01 | End: 2022-01-01 | Stop reason: HOSPADM

## 2022-01-01 RX ORDER — HYDROMORPHONE HYDROCHLORIDE 1 MG/ML
0.2 INJECTION, SOLUTION INTRAMUSCULAR; INTRAVENOUS; SUBCUTANEOUS
Status: DISCONTINUED | OUTPATIENT
Start: 2022-01-01 | End: 2022-01-01 | Stop reason: HOSPADM

## 2022-01-01 RX ORDER — SODIUM CHLORIDE 0.9 % (FLUSH) 0.9 %
5-10 SYRINGE (ML) INJECTION AS NEEDED
Status: DISPENSED | OUTPATIENT
Start: 2022-01-01 | End: 2022-01-01

## 2022-01-01 RX ORDER — AMOXICILLIN 250 MG
1 CAPSULE ORAL
Qty: 20 TABLET | Refills: 1 | Status: SHIPPED | OUTPATIENT
Start: 2022-01-01

## 2022-01-01 RX ORDER — ONDANSETRON 2 MG/ML
8 INJECTION INTRAMUSCULAR; INTRAVENOUS AS NEEDED
Status: ACTIVE | OUTPATIENT
Start: 2022-01-01 | End: 2022-01-01

## 2022-01-01 RX ORDER — METOPROLOL TARTRATE 25 MG/1
12.5 TABLET, FILM COATED ORAL DAILY
Status: DISCONTINUED | OUTPATIENT
Start: 2022-01-01 | End: 2022-01-01 | Stop reason: HOSPADM

## 2022-01-01 RX ORDER — HYDROCORTISONE SODIUM SUCCINATE 100 MG/2ML
100 INJECTION, POWDER, FOR SOLUTION INTRAMUSCULAR; INTRAVENOUS AS NEEDED
Status: ACTIVE | OUTPATIENT
Start: 2022-01-01 | End: 2022-01-01

## 2022-01-01 RX ORDER — ONDANSETRON 2 MG/ML
8 INJECTION INTRAMUSCULAR; INTRAVENOUS AS NEEDED
Status: CANCELLED | OUTPATIENT
Start: 2022-01-01

## 2022-01-01 RX ORDER — ZOLPIDEM TARTRATE 5 MG/1
5 TABLET ORAL
Status: DISCONTINUED | OUTPATIENT
Start: 2022-01-01 | End: 2022-01-01 | Stop reason: HOSPADM

## 2022-01-01 RX ORDER — SODIUM CHLORIDE 9 MG/ML
INJECTION, SOLUTION INTRAVENOUS
Status: DISCONTINUED | OUTPATIENT
Start: 2022-01-01 | End: 2022-01-01 | Stop reason: HOSPADM

## 2022-01-01 RX ORDER — DEXMEDETOMIDINE HYDROCHLORIDE 100 UG/ML
INJECTION, SOLUTION INTRAVENOUS AS NEEDED
Status: DISCONTINUED | OUTPATIENT
Start: 2022-01-01 | End: 2022-01-01 | Stop reason: HOSPADM

## 2022-01-01 RX ORDER — SENNOSIDES 8.6 MG/1
1 TABLET ORAL DAILY
Status: DISCONTINUED | OUTPATIENT
Start: 2022-01-01 | End: 2022-01-01

## 2022-01-01 RX ORDER — SULFAMETHOXAZOLE AND TRIMETHOPRIM 800; 160 MG/1; MG/1
1 TABLET ORAL 2 TIMES DAILY
Qty: 10 TABLET | Refills: 0 | Status: SHIPPED | OUTPATIENT
Start: 2022-01-01 | End: 2022-01-01

## 2022-01-01 RX ORDER — FACIAL-BODY WIPES
10 EACH TOPICAL DAILY PRN
Status: DISCONTINUED | OUTPATIENT
Start: 2022-01-01 | End: 2022-05-12 | Stop reason: HOSPADM

## 2022-01-01 RX ORDER — DEXTROMETHORPHAN POLISTIREX 30 MG/5 ML
SUSPENSION, EXTENDED RELEASE 12 HR ORAL AS NEEDED
Status: DISCONTINUED | OUTPATIENT
Start: 2022-01-01 | End: 2022-01-01

## 2022-01-01 RX ORDER — ROCURONIUM BROMIDE 10 MG/ML
INJECTION, SOLUTION INTRAVENOUS AS NEEDED
Status: DISCONTINUED | OUTPATIENT
Start: 2022-01-01 | End: 2022-01-01 | Stop reason: HOSPADM

## 2022-01-01 RX ORDER — MEGESTROL ACETATE 40 MG/1
40 TABLET ORAL 2 TIMES DAILY WITH MEALS
Status: DISCONTINUED | OUTPATIENT
Start: 2022-01-01 | End: 2022-01-01

## 2022-01-01 RX ORDER — PHENYLEPHRINE HCL IN 0.9% NACL 0.4MG/10ML
SYRINGE (ML) INTRAVENOUS AS NEEDED
Status: DISCONTINUED | OUTPATIENT
Start: 2022-01-01 | End: 2022-01-01 | Stop reason: HOSPADM

## 2022-01-01 RX ORDER — TRAMADOL HYDROCHLORIDE 50 MG/1
50-100 TABLET ORAL
Qty: 15 TABLET | Refills: 0 | Status: SHIPPED | OUTPATIENT
Start: 2022-01-01 | End: 2022-01-01

## 2022-01-01 RX ORDER — SODIUM CHLORIDE, SODIUM LACTATE, POTASSIUM CHLORIDE, CALCIUM CHLORIDE 600; 310; 30; 20 MG/100ML; MG/100ML; MG/100ML; MG/100ML
50 INJECTION, SOLUTION INTRAVENOUS CONTINUOUS
Status: CANCELLED | OUTPATIENT
Start: 2022-01-01

## 2022-01-01 RX ORDER — KETOROLAC TROMETHAMINE 10 MG/1
10 TABLET, FILM COATED ORAL
COMMUNITY
End: 2022-01-01

## 2022-01-01 RX ORDER — SODIUM CHLORIDE 9 MG/ML
25 INJECTION, SOLUTION INTRAVENOUS CONTINUOUS
Status: CANCELLED | OUTPATIENT
Start: 2022-05-13

## 2022-01-01 RX ORDER — ACETAMINOPHEN 325 MG/1
650 TABLET ORAL ONCE
Status: COMPLETED | OUTPATIENT
Start: 2022-01-01 | End: 2022-01-01

## 2022-01-01 RX ORDER — POLYETHYLENE GLYCOL 3350 17 G/17G
17 POWDER, FOR SOLUTION ORAL DAILY PRN
Status: DISCONTINUED | OUTPATIENT
Start: 2022-01-01 | End: 2022-01-01

## 2022-01-01 RX ORDER — MAGNESIUM CITRATE
148 SOLUTION, ORAL ORAL
Status: COMPLETED | OUTPATIENT
Start: 2022-01-01 | End: 2022-01-01

## 2022-01-01 RX ORDER — HEPARIN 100 UNIT/ML
300-500 SYRINGE INTRAVENOUS AS NEEDED
Status: ACTIVE | OUTPATIENT
Start: 2022-01-01 | End: 2022-01-01

## 2022-01-01 RX ORDER — POLYETHYLENE GLYCOL 3350 17 G/17G
17 POWDER, FOR SOLUTION ORAL DAILY
COMMUNITY

## 2022-01-01 RX ORDER — FENTANYL CITRATE 50 UG/ML
INJECTION, SOLUTION INTRAMUSCULAR; INTRAVENOUS AS NEEDED
Status: DISCONTINUED | OUTPATIENT
Start: 2022-01-01 | End: 2022-01-01 | Stop reason: HOSPADM

## 2022-01-01 RX ORDER — FUROSEMIDE 10 MG/ML
20 INJECTION INTRAMUSCULAR; INTRAVENOUS ONCE
Status: COMPLETED | OUTPATIENT
Start: 2022-01-01 | End: 2022-01-01

## 2022-01-01 RX ORDER — METHOCARBAMOL 500 MG/1
500 TABLET, FILM COATED ORAL ONCE
Status: COMPLETED | OUTPATIENT
Start: 2022-01-01 | End: 2022-01-01

## 2022-01-01 RX ORDER — EPINEPHRINE 1 MG/ML
0.3 INJECTION, SOLUTION, CONCENTRATE INTRAVENOUS AS NEEDED
Status: ACTIVE | OUTPATIENT
Start: 2022-01-01 | End: 2022-01-01

## 2022-01-01 RX ORDER — DEXAMETHASONE SODIUM PHOSPHATE 100 MG/10ML
10 INJECTION INTRAMUSCULAR; INTRAVENOUS ONCE
Status: CANCELLED | OUTPATIENT
Start: 2022-05-13 | End: 2022-01-01

## 2022-01-01 RX ORDER — ENOXAPARIN SODIUM 100 MG/ML
40 INJECTION SUBCUTANEOUS EVERY 24 HOURS
Qty: 11.2 ML | Refills: 0 | Status: SHIPPED | OUTPATIENT
Start: 2022-01-01 | End: 2022-01-01

## 2022-01-01 RX ORDER — BACITRACIN 500 UNIT/G
PACKET (EA) TOPICAL
Status: COMPLETED
Start: 2022-01-01 | End: 2022-01-01

## 2022-01-01 RX ORDER — TRAMADOL HYDROCHLORIDE 50 MG/1
50 TABLET ORAL
Status: DISCONTINUED | OUTPATIENT
Start: 2022-01-01 | End: 2022-01-01 | Stop reason: HOSPADM

## 2022-01-01 RX ORDER — TRAMADOL HYDROCHLORIDE 50 MG/1
50 TABLET ORAL
Qty: 20 TABLET | Refills: 0 | Status: SHIPPED | OUTPATIENT
Start: 2022-01-01 | End: 2022-01-01

## 2022-01-01 RX ORDER — DIPHENHYDRAMINE HYDROCHLORIDE 50 MG/ML
50 INJECTION, SOLUTION INTRAMUSCULAR; INTRAVENOUS AS NEEDED
Status: CANCELLED
Start: 2022-05-13

## 2022-01-01 RX ORDER — LIDOCAINE HYDROCHLORIDE 10 MG/ML
0.1 INJECTION, SOLUTION EPIDURAL; INFILTRATION; INTRACAUDAL; PERINEURAL AS NEEDED
Status: DISCONTINUED | OUTPATIENT
Start: 2022-01-01 | End: 2022-01-01 | Stop reason: HOSPADM

## 2022-01-01 RX ORDER — ONDANSETRON 2 MG/ML
INJECTION INTRAMUSCULAR; INTRAVENOUS AS NEEDED
Status: DISCONTINUED | OUTPATIENT
Start: 2022-01-01 | End: 2022-01-01 | Stop reason: HOSPADM

## 2022-01-01 RX ORDER — DIPHENHYDRAMINE HYDROCHLORIDE 50 MG/ML
25 INJECTION, SOLUTION INTRAMUSCULAR; INTRAVENOUS AS NEEDED
Status: CANCELLED
Start: 2022-05-13

## 2022-01-01 RX ORDER — MIDAZOLAM HYDROCHLORIDE 1 MG/ML
1 INJECTION, SOLUTION INTRAMUSCULAR; INTRAVENOUS AS NEEDED
Status: DISCONTINUED | OUTPATIENT
Start: 2022-01-01 | End: 2022-01-01 | Stop reason: HOSPADM

## 2022-01-01 RX ORDER — AMOXICILLIN AND CLAVULANATE POTASSIUM 500; 125 MG/1; MG/1
1 TABLET, FILM COATED ORAL 2 TIMES DAILY
Qty: 10 TABLET | Refills: 0 | Status: SHIPPED | OUTPATIENT
Start: 2022-01-01 | End: 2022-01-01

## 2022-01-01 RX ORDER — HYDROMORPHONE HYDROCHLORIDE 1 MG/ML
1 INJECTION, SOLUTION INTRAMUSCULAR; INTRAVENOUS; SUBCUTANEOUS
Status: DISCONTINUED | OUTPATIENT
Start: 2022-01-01 | End: 2022-05-12 | Stop reason: HOSPADM

## 2022-01-01 RX ORDER — DOXYCYCLINE 100 MG/1
100 TABLET ORAL 2 TIMES DAILY
Qty: 28 TABLET | Refills: 0 | Status: SHIPPED | OUTPATIENT
Start: 2022-01-01 | End: 2022-01-01

## 2022-01-01 RX ORDER — HYDROMORPHONE HYDROCHLORIDE 1 MG/ML
1 INJECTION, SOLUTION INTRAMUSCULAR; INTRAVENOUS; SUBCUTANEOUS
Status: DISCONTINUED | OUTPATIENT
Start: 2022-01-01 | End: 2022-01-01 | Stop reason: HOSPADM

## 2022-01-01 RX ORDER — ONDANSETRON 2 MG/ML
8 INJECTION INTRAMUSCULAR; INTRAVENOUS AS NEEDED
Status: CANCELLED | OUTPATIENT
Start: 2022-05-13

## 2022-01-01 RX ORDER — ACETAMINOPHEN 650 MG/1
650 SUPPOSITORY RECTAL
Status: DISCONTINUED | OUTPATIENT
Start: 2022-01-01 | End: 2022-05-12 | Stop reason: HOSPADM

## 2022-01-01 RX ORDER — ACETAMINOPHEN 650 MG/1
650 SUPPOSITORY RECTAL
Status: DISCONTINUED | OUTPATIENT
Start: 2022-01-01 | End: 2022-01-01 | Stop reason: HOSPADM

## 2022-01-01 RX ORDER — NALOXONE HYDROCHLORIDE 0.4 MG/ML
0.4 INJECTION, SOLUTION INTRAMUSCULAR; INTRAVENOUS; SUBCUTANEOUS AS NEEDED
Status: DISCONTINUED | OUTPATIENT
Start: 2022-01-01 | End: 2022-01-01 | Stop reason: HOSPADM

## 2022-01-01 RX ORDER — ONDANSETRON 2 MG/ML
4 INJECTION INTRAMUSCULAR; INTRAVENOUS AS NEEDED
Status: DISCONTINUED | OUTPATIENT
Start: 2022-01-01 | End: 2022-01-01 | Stop reason: HOSPADM

## 2022-01-01 RX ORDER — DEXAMETHASONE SODIUM PHOSPHATE 4 MG/ML
INJECTION, SOLUTION INTRA-ARTICULAR; INTRALESIONAL; INTRAMUSCULAR; INTRAVENOUS; SOFT TISSUE AS NEEDED
Status: DISCONTINUED | OUTPATIENT
Start: 2022-01-01 | End: 2022-01-01 | Stop reason: HOSPADM

## 2022-01-01 RX ORDER — MEGESTROL ACETATE 40 MG/1
80 TABLET ORAL 2 TIMES DAILY WITH MEALS
Status: DISCONTINUED | OUTPATIENT
Start: 2022-01-01 | End: 2022-01-01 | Stop reason: HOSPADM

## 2022-01-01 RX ORDER — OXYCODONE HCL 10 MG/1
10 TABLET, FILM COATED, EXTENDED RELEASE ORAL EVERY 8 HOURS
Status: DISCONTINUED | OUTPATIENT
Start: 2022-01-01 | End: 2022-01-01

## 2022-01-01 RX ORDER — ONDANSETRON 2 MG/ML
4 INJECTION INTRAMUSCULAR; INTRAVENOUS
Status: COMPLETED | OUTPATIENT
Start: 2022-01-01 | End: 2022-01-01

## 2022-01-01 RX ORDER — VANCOMYCIN/0.9 % SOD CHLORIDE 1 G/100 ML
1000 PLASTIC BAG, INJECTION (ML) INTRAVENOUS
Status: DISCONTINUED | OUTPATIENT
Start: 2022-01-01 | End: 2022-01-01 | Stop reason: DRUGHIGH

## 2022-01-01 RX ORDER — DIPHENHYDRAMINE HYDROCHLORIDE 50 MG/ML
50 INJECTION, SOLUTION INTRAMUSCULAR; INTRAVENOUS AS NEEDED
Status: ACTIVE | OUTPATIENT
Start: 2022-01-01 | End: 2022-01-01

## 2022-01-01 RX ORDER — FUROSEMIDE 10 MG/ML
INJECTION INTRAMUSCULAR; INTRAVENOUS AS NEEDED
Status: DISCONTINUED | OUTPATIENT
Start: 2022-01-01 | End: 2022-01-01 | Stop reason: HOSPADM

## 2022-01-01 RX ORDER — LORAZEPAM 2 MG/ML
1 INJECTION INTRAMUSCULAR
Status: DISCONTINUED | OUTPATIENT
Start: 2022-01-01 | End: 2022-01-01 | Stop reason: SDUPTHER

## 2022-01-01 RX ORDER — FENTANYL CITRATE 50 UG/ML
25 INJECTION, SOLUTION INTRAMUSCULAR; INTRAVENOUS
Status: DISCONTINUED | OUTPATIENT
Start: 2022-01-01 | End: 2022-01-01 | Stop reason: HOSPADM

## 2022-01-01 RX ORDER — CEPHALEXIN 500 MG/1
500 CAPSULE ORAL 2 TIMES DAILY
Qty: 14 CAPSULE | Refills: 0 | Status: SHIPPED | OUTPATIENT
Start: 2022-01-01 | End: 2022-01-01

## 2022-01-01 RX ORDER — LANOLIN ALCOHOL/MO/W.PET/CERES
1 CREAM (GRAM) TOPICAL
Status: DISCONTINUED | OUTPATIENT
Start: 2022-01-01 | End: 2022-01-01 | Stop reason: HOSPADM

## 2022-01-01 RX ORDER — HYDROMORPHONE HYDROCHLORIDE 1 MG/ML
1 INJECTION, SOLUTION INTRAMUSCULAR; INTRAVENOUS; SUBCUTANEOUS
Status: COMPLETED | OUTPATIENT
Start: 2022-01-01 | End: 2022-01-01

## 2022-01-01 RX ORDER — SUCCINYLCHOLINE CHLORIDE 20 MG/ML
INJECTION INTRAMUSCULAR; INTRAVENOUS AS NEEDED
Status: DISCONTINUED | OUTPATIENT
Start: 2022-01-01 | End: 2022-01-01 | Stop reason: HOSPADM

## 2022-01-01 RX ORDER — ACETAMINOPHEN 325 MG/1
650 TABLET ORAL AS NEEDED
Status: CANCELLED
Start: 2022-05-13

## 2022-01-01 RX ORDER — EPINEPHRINE 1 MG/ML
0.3 INJECTION, SOLUTION, CONCENTRATE INTRAVENOUS AS NEEDED
Status: CANCELLED | OUTPATIENT
Start: 2022-05-13

## 2022-01-01 RX ORDER — ALBUTEROL SULFATE 0.83 MG/ML
2.5 SOLUTION RESPIRATORY (INHALATION) AS NEEDED
Status: CANCELLED
Start: 2022-05-13

## 2022-01-01 RX ORDER — OXYCODONE AND ACETAMINOPHEN 10; 325 MG/1; MG/1
2 TABLET ORAL
Status: DISCONTINUED | OUTPATIENT
Start: 2022-01-01 | End: 2022-01-01

## 2022-01-01 RX ORDER — LORAZEPAM 2 MG/ML
1 INJECTION INTRAMUSCULAR
Status: DISCONTINUED | OUTPATIENT
Start: 2022-01-01 | End: 2022-01-01 | Stop reason: HOSPADM

## 2022-01-01 RX ORDER — LORAZEPAM 2 MG/ML
0.5 INJECTION INTRAMUSCULAR
Status: DISCONTINUED | OUTPATIENT
Start: 2022-01-01 | End: 2022-05-12 | Stop reason: HOSPADM

## 2022-01-01 RX ORDER — ONDANSETRON 4 MG/1
4 TABLET, ORALLY DISINTEGRATING ORAL
Status: DISCONTINUED | OUTPATIENT
Start: 2022-01-01 | End: 2022-01-01 | Stop reason: HOSPADM

## 2022-01-01 RX ORDER — SODIUM CHLORIDE 9 MG/ML
25 INJECTION, SOLUTION INTRAVENOUS CONTINUOUS
Status: DISPENSED | OUTPATIENT
Start: 2022-01-01 | End: 2022-01-01

## 2022-01-01 RX ORDER — ONDANSETRON 4 MG/1
4 TABLET, ORALLY DISINTEGRATING ORAL
Qty: 30 TABLET | Refills: 2 | Status: SHIPPED | OUTPATIENT
Start: 2022-01-01 | End: 2022-01-01

## 2022-01-01 RX ORDER — POTASSIUM CHLORIDE 750 MG/1
10 TABLET, FILM COATED, EXTENDED RELEASE ORAL 2 TIMES DAILY
Status: CANCELLED | OUTPATIENT
Start: 2022-01-01

## 2022-01-01 RX ORDER — POTASSIUM CHLORIDE 750 MG/1
10 TABLET, FILM COATED, EXTENDED RELEASE ORAL 2 TIMES DAILY
Status: DISCONTINUED | OUTPATIENT
Start: 2022-01-01 | End: 2022-01-01 | Stop reason: HOSPADM

## 2022-01-01 RX ORDER — CALCIUM GLUCONATE 20 MG/ML
1 INJECTION, SOLUTION INTRAVENOUS ONCE
Status: DISCONTINUED | OUTPATIENT
Start: 2022-01-01 | End: 2022-01-01

## 2022-01-01 RX ORDER — SODIUM CHLORIDE 0.9 % (FLUSH) 0.9 %
5-10 SYRINGE (ML) INJECTION AS NEEDED
Status: CANCELLED | OUTPATIENT
Start: 2022-01-01

## 2022-01-01 RX ORDER — FACIAL-BODY WIPES
10 EACH TOPICAL DAILY
Qty: 5 SUPPOSITORY | Refills: 1 | Status: SHIPPED | OUTPATIENT
Start: 2022-01-01

## 2022-01-01 RX ORDER — DIPHENHYDRAMINE HYDROCHLORIDE 50 MG/ML
25 INJECTION, SOLUTION INTRAMUSCULAR; INTRAVENOUS AS NEEDED
Status: ACTIVE | OUTPATIENT
Start: 2022-01-01 | End: 2022-01-01

## 2022-01-01 RX ORDER — SODIUM CHLORIDE 0.9 % (FLUSH) 0.9 %
10 SYRINGE (ML) INJECTION AS NEEDED
Status: DISPENSED | OUTPATIENT
Start: 2022-01-01 | End: 2022-01-01

## 2022-01-01 RX ORDER — POLYETHYLENE GLYCOL 3350 17 G/17G
17 POWDER, FOR SOLUTION ORAL DAILY PRN
Status: DISCONTINUED | OUTPATIENT
Start: 2022-01-01 | End: 2022-01-01 | Stop reason: HOSPADM

## 2022-01-01 RX ORDER — METHYLPHENIDATE HYDROCHLORIDE 18 MG/1
TABLET ORAL
Status: CANCELLED | OUTPATIENT
Start: 2022-01-01

## 2022-01-01 RX ORDER — SODIUM CHLORIDE 0.9 % (FLUSH) 0.9 %
10 SYRINGE (ML) INJECTION ONCE
Status: COMPLETED | OUTPATIENT
Start: 2022-01-01 | End: 2022-01-01

## 2022-01-01 RX ORDER — MEGESTROL ACETATE 40 MG/1
40 TABLET ORAL 2 TIMES DAILY
Qty: 60 TABLET | Refills: 1 | Status: SHIPPED | OUTPATIENT
Start: 2022-01-01 | End: 2022-01-01

## 2022-01-01 RX ADMIN — SODIUM CHLORIDE 500 ML: 0.9 INJECTION, SOLUTION INTRAVENOUS at 09:55

## 2022-01-01 RX ADMIN — VANCOMYCIN HYDROCHLORIDE 1250 MG: 1.25 INJECTION, POWDER, LYOPHILIZED, FOR SOLUTION INTRAVENOUS at 05:27

## 2022-01-01 RX ADMIN — SODIUM BICARBONATE: 84 INJECTION, SOLUTION INTRAVENOUS at 11:45

## 2022-01-01 RX ADMIN — CEFEPIME HYDROCHLORIDE 1 G: 1 INJECTION, POWDER, FOR SOLUTION INTRAMUSCULAR; INTRAVENOUS at 21:25

## 2022-01-01 RX ADMIN — SODIUM CHLORIDE, PRESERVATIVE FREE 10 ML: 5 INJECTION INTRAVENOUS at 15:50

## 2022-01-01 RX ADMIN — HYDROMORPHONE HYDROCHLORIDE 0.5 MG: 1 INJECTION, SOLUTION INTRAMUSCULAR; INTRAVENOUS; SUBCUTANEOUS at 09:13

## 2022-01-01 RX ADMIN — SODIUM CHLORIDE, POTASSIUM CHLORIDE, SODIUM LACTATE AND CALCIUM CHLORIDE 100 ML/HR: 600; 310; 30; 20 INJECTION, SOLUTION INTRAVENOUS at 09:12

## 2022-01-01 RX ADMIN — FUROSEMIDE 20 MG: 10 INJECTION INTRAMUSCULAR; INTRAVENOUS at 10:00

## 2022-01-01 RX ADMIN — SODIUM CHLORIDE, PRESERVATIVE FREE 10 ML: 5 INJECTION INTRAVENOUS at 22:00

## 2022-01-01 RX ADMIN — ENOXAPARIN SODIUM 40 MG: 100 INJECTION SUBCUTANEOUS at 08:32

## 2022-01-01 RX ADMIN — ONDANSETRON 8 MG: 2 INJECTION, SOLUTION INTRAMUSCULAR; INTRAVENOUS at 09:56

## 2022-01-01 RX ADMIN — SODIUM CHLORIDE, PRESERVATIVE FREE 10 ML: 5 INJECTION INTRAVENOUS at 16:28

## 2022-01-01 RX ADMIN — SUCCINYLCHOLINE CHLORIDE 180 MG: 20 INJECTION, SOLUTION INTRAMUSCULAR; INTRAVENOUS at 13:26

## 2022-01-01 RX ADMIN — MEGESTROL ACETATE 40 MG: 40 TABLET ORAL at 18:16

## 2022-01-01 RX ADMIN — ACETAMINOPHEN 1000 MG: 500 TABLET ORAL at 16:36

## 2022-01-01 RX ADMIN — HYDROMORPHONE HYDROCHLORIDE 1 MG: 1 INJECTION, SOLUTION INTRAMUSCULAR; INTRAVENOUS; SUBCUTANEOUS at 19:47

## 2022-01-01 RX ADMIN — FENTANYL CITRATE 25 MCG: 50 INJECTION, SOLUTION INTRAMUSCULAR; INTRAVENOUS at 15:33

## 2022-01-01 RX ADMIN — FERROUS SULFATE TAB 325 MG (65 MG ELEMENTAL FE) 325 MG: 325 (65 FE) TAB at 12:40

## 2022-01-01 RX ADMIN — SODIUM CHLORIDE, PRESERVATIVE FREE 10 ML: 5 INJECTION INTRAVENOUS at 14:10

## 2022-01-01 RX ADMIN — SODIUM CHLORIDE 250 ML: 9 INJECTION, SOLUTION INTRAVENOUS at 12:13

## 2022-01-01 RX ADMIN — ACETAMINOPHEN 650 MG: 325 TABLET ORAL at 09:47

## 2022-01-01 RX ADMIN — HYDROMORPHONE HYDROCHLORIDE 0.2 MG: 2 INJECTION, SOLUTION INTRAMUSCULAR; INTRAVENOUS; SUBCUTANEOUS at 15:06

## 2022-01-01 RX ADMIN — FERROUS SULFATE TAB 325 MG (65 MG ELEMENTAL FE) 325 MG: 325 (65 FE) TAB at 11:28

## 2022-01-01 RX ADMIN — ROCURONIUM BROMIDE 20 MG: 10 SOLUTION INTRAVENOUS at 13:38

## 2022-01-01 RX ADMIN — ENOXAPARIN SODIUM 40 MG: 100 INJECTION SUBCUTANEOUS at 14:09

## 2022-01-01 RX ADMIN — HYDROMORPHONE HYDROCHLORIDE 1 MG: 1 INJECTION, SOLUTION INTRAMUSCULAR; INTRAVENOUS; SUBCUTANEOUS at 06:14

## 2022-01-01 RX ADMIN — OXYCODONE HYDROCHLORIDE AND ACETAMINOPHEN 500 MG: 500 TABLET ORAL at 20:08

## 2022-01-01 RX ADMIN — HYDROMORPHONE HYDROCHLORIDE 1 MG: 1 INJECTION, SOLUTION INTRAMUSCULAR; INTRAVENOUS; SUBCUTANEOUS at 17:18

## 2022-01-01 RX ADMIN — SODIUM CHLORIDE 1000 ML: 9 INJECTION, SOLUTION INTRAVENOUS at 05:34

## 2022-01-01 RX ADMIN — ACETAMINOPHEN 325MG 650 MG: 325 TABLET ORAL at 20:08

## 2022-01-01 RX ADMIN — CEFEPIME 2 G: 2 INJECTION, POWDER, FOR SOLUTION INTRAVENOUS at 23:06

## 2022-01-01 RX ADMIN — Medication 10 ML: at 10:44

## 2022-01-01 RX ADMIN — SODIUM CHLORIDE 25 ML/HR: 9 INJECTION, SOLUTION INTRAVENOUS at 12:48

## 2022-01-01 RX ADMIN — Medication 120 MCG: at 14:30

## 2022-01-01 RX ADMIN — SODIUM CHLORIDE 1000 ML: 9 INJECTION, SOLUTION INTRAVENOUS at 05:58

## 2022-01-01 RX ADMIN — SODIUM CHLORIDE, POTASSIUM CHLORIDE, SODIUM LACTATE AND CALCIUM CHLORIDE: 600; 310; 30; 20 INJECTION, SOLUTION INTRAVENOUS at 15:00

## 2022-01-01 RX ADMIN — OXYCODONE HYDROCHLORIDE 10 MG: 10 TABLET, FILM COATED, EXTENDED RELEASE ORAL at 06:44

## 2022-01-01 RX ADMIN — PIPERACILLIN AND TAZOBACTAM 3.38 G: 3; .375 INJECTION, POWDER, LYOPHILIZED, FOR SOLUTION INTRAVENOUS at 00:24

## 2022-01-01 RX ADMIN — ENOXAPARIN SODIUM 40 MG: 100 INJECTION SUBCUTANEOUS at 08:23

## 2022-01-01 RX ADMIN — SENNOSIDES AND DOCUSATE SODIUM 2 TABLET: 8.6; 5 TABLET ORAL at 09:45

## 2022-01-01 RX ADMIN — OXYCODONE AND ACETAMINOPHEN 2 TABLET: 10; 325 TABLET ORAL at 22:39

## 2022-01-01 RX ADMIN — VANCOMYCIN HYDROCHLORIDE 1250 MG: 1.25 INJECTION, POWDER, LYOPHILIZED, FOR SOLUTION INTRAVENOUS at 19:36

## 2022-01-01 RX ADMIN — ROCURONIUM BROMIDE 20 MG: 10 SOLUTION INTRAVENOUS at 14:45

## 2022-01-01 RX ADMIN — KETOROLAC TROMETHAMINE 15 MG: 30 INJECTION, SOLUTION INTRAMUSCULAR at 20:47

## 2022-01-01 RX ADMIN — HYDROMORPHONE HYDROCHLORIDE 1 MG: 1 INJECTION, SOLUTION INTRAMUSCULAR; INTRAVENOUS; SUBCUTANEOUS at 21:34

## 2022-01-01 RX ADMIN — SODIUM BICARBONATE: 84 INJECTION, SOLUTION INTRAVENOUS at 21:16

## 2022-01-01 RX ADMIN — POLYETHYLENE GLYCOL 3350 17 G: 17 POWDER, FOR SOLUTION ORAL at 16:50

## 2022-01-01 RX ADMIN — ACETAMINOPHEN 650 MG: 325 TABLET ORAL at 16:00

## 2022-01-01 RX ADMIN — HYDROMORPHONE HYDROCHLORIDE 1 MG: 1 INJECTION, SOLUTION INTRAMUSCULAR; INTRAVENOUS; SUBCUTANEOUS at 18:55

## 2022-01-01 RX ADMIN — MAGNESIUM CITRATE 148 ML: 1.75 LIQUID ORAL at 12:00

## 2022-01-01 RX ADMIN — SODIUM CHLORIDE 125 ML/HR: 9 INJECTION, SOLUTION INTRAVENOUS at 03:52

## 2022-01-01 RX ADMIN — SODIUM CHLORIDE 1000 ML: 9 INJECTION, SOLUTION INTRAVENOUS at 01:53

## 2022-01-01 RX ADMIN — METHOCARBAMOL TABLETS 500 MG: 500 TABLET, COATED ORAL at 08:23

## 2022-01-01 RX ADMIN — CEFTRIAXONE SODIUM 1 G: 1 INJECTION, POWDER, FOR SOLUTION INTRAMUSCULAR; INTRAVENOUS at 11:05

## 2022-01-01 RX ADMIN — MEGESTROL ACETATE 80 MG: 40 TABLET ORAL at 17:35

## 2022-01-01 RX ADMIN — METOPROLOL TARTRATE 12.5 MG: 25 TABLET, FILM COATED ORAL at 09:34

## 2022-01-01 RX ADMIN — SENNOSIDES AND DOCUSATE SODIUM 1 TABLET: 50; 8.6 TABLET ORAL at 05:20

## 2022-01-01 RX ADMIN — ACETAMINOPHEN 650 MG: 650 SUPPOSITORY RECTAL at 00:14

## 2022-01-01 RX ADMIN — ENOXAPARIN SODIUM 40 MG: 100 INJECTION SUBCUTANEOUS at 09:35

## 2022-01-01 RX ADMIN — IBUPROFEN 800 MG: 400 TABLET, FILM COATED ORAL at 10:20

## 2022-01-01 RX ADMIN — PIPERACILLIN AND TAZOBACTAM 3.38 G: 3; .375 INJECTION, POWDER, LYOPHILIZED, FOR SOLUTION INTRAVENOUS at 08:56

## 2022-01-01 RX ADMIN — OXYCODONE HYDROCHLORIDE AND ACETAMINOPHEN 500 MG: 500 TABLET ORAL at 08:59

## 2022-01-01 RX ADMIN — HYDROMORPHONE HYDROCHLORIDE 1 MG: 1 INJECTION, SOLUTION INTRAMUSCULAR; INTRAVENOUS; SUBCUTANEOUS at 06:16

## 2022-01-01 RX ADMIN — SODIUM CHLORIDE, PRESERVATIVE FREE 10 ML: 5 INJECTION INTRAVENOUS at 05:51

## 2022-01-01 RX ADMIN — PIPERACILLIN AND TAZOBACTAM 3.38 G: 3; .375 INJECTION, POWDER, LYOPHILIZED, FOR SOLUTION INTRAVENOUS at 05:50

## 2022-01-01 RX ADMIN — FERROUS SULFATE TAB 325 MG (65 MG ELEMENTAL FE) 325 MG: 325 (65 FE) TAB at 09:33

## 2022-01-01 RX ADMIN — HYDROMORPHONE HYDROCHLORIDE 1 MG: 1 INJECTION, SOLUTION INTRAMUSCULAR; INTRAVENOUS; SUBCUTANEOUS at 16:43

## 2022-01-01 RX ADMIN — SODIUM CHLORIDE, PRESERVATIVE FREE 10 ML: 5 INJECTION INTRAVENOUS at 08:32

## 2022-01-01 RX ADMIN — HYDROMORPHONE HYDROCHLORIDE 2 MG: 1 INJECTION, SOLUTION INTRAMUSCULAR; INTRAVENOUS; SUBCUTANEOUS at 21:21

## 2022-01-01 RX ADMIN — MEGESTROL ACETATE 80 MG: 40 TABLET ORAL at 16:00

## 2022-01-01 RX ADMIN — KETOROLAC TROMETHAMINE 15 MG: 30 INJECTION, SOLUTION INTRAMUSCULAR at 12:12

## 2022-01-01 RX ADMIN — CALCIUM GLUCONATE 1000 MG: 20 INJECTION, SOLUTION INTRAVENOUS at 14:40

## 2022-01-01 RX ADMIN — SODIUM CHLORIDE, PRESERVATIVE FREE 10 ML: 5 INJECTION INTRAVENOUS at 08:17

## 2022-01-01 RX ADMIN — SODIUM CHLORIDE, PRESERVATIVE FREE 10 ML: 5 INJECTION INTRAVENOUS at 12:51

## 2022-01-01 RX ADMIN — PIPERACILLIN AND TAZOBACTAM 3.38 G: 3; .375 INJECTION, POWDER, LYOPHILIZED, FOR SOLUTION INTRAVENOUS at 21:21

## 2022-01-01 RX ADMIN — PIPERACILLIN AND TAZOBACTAM 3.38 G: 3; .375 INJECTION, POWDER, LYOPHILIZED, FOR SOLUTION INTRAVENOUS at 16:30

## 2022-01-01 RX ADMIN — IOPAMIDOL 100 ML: 755 INJECTION, SOLUTION INTRAVENOUS at 03:08

## 2022-01-01 RX ADMIN — HYDROMORPHONE HYDROCHLORIDE 0.2 MG: 2 INJECTION, SOLUTION INTRAMUSCULAR; INTRAVENOUS; SUBCUTANEOUS at 14:57

## 2022-01-01 RX ADMIN — HYDROMORPHONE HYDROCHLORIDE 1 MG: 1 INJECTION, SOLUTION INTRAMUSCULAR; INTRAVENOUS; SUBCUTANEOUS at 01:45

## 2022-01-01 RX ADMIN — DIPHENHYDRAMINE HYDROCHLORIDE 25 MG: 50 INJECTION INTRAMUSCULAR; INTRAVENOUS at 10:00

## 2022-01-01 RX ADMIN — ROCURONIUM BROMIDE 10 MG: 10 SOLUTION INTRAVENOUS at 13:25

## 2022-01-01 RX ADMIN — DEXMEDETOMIDINE HYDROCHLORIDE 4 MCG: 100 INJECTION, SOLUTION, CONCENTRATE INTRAVENOUS at 15:06

## 2022-01-01 RX ADMIN — PIPERACILLIN AND TAZOBACTAM 3.38 G: 3; .375 INJECTION, POWDER, LYOPHILIZED, FOR SOLUTION INTRAVENOUS at 13:17

## 2022-01-01 RX ADMIN — SODIUM CHLORIDE, PRESERVATIVE FREE 10 ML: 5 INJECTION INTRAVENOUS at 06:00

## 2022-01-01 RX ADMIN — HYDROMORPHONE HYDROCHLORIDE 1 MG: 1 INJECTION, SOLUTION INTRAMUSCULAR; INTRAVENOUS; SUBCUTANEOUS at 03:40

## 2022-01-01 RX ADMIN — PIPERACILLIN AND TAZOBACTAM 3.38 G: 3; .375 INJECTION, POWDER, LYOPHILIZED, FOR SOLUTION INTRAVENOUS at 15:10

## 2022-01-01 RX ADMIN — CEFEPIME 2 G: 2 INJECTION, POWDER, FOR SOLUTION INTRAVENOUS at 07:59

## 2022-01-01 RX ADMIN — FLUDEOXYGLUCOSE F-18 10.8 MILLICURIE: 200 INJECTION INTRAVENOUS at 16:00

## 2022-01-01 RX ADMIN — FAMOTIDINE 20 MG: 10 INJECTION INTRAVENOUS at 16:28

## 2022-01-01 RX ADMIN — SODIUM CHLORIDE, PRESERVATIVE FREE 10 ML: 5 INJECTION INTRAVENOUS at 01:53

## 2022-01-01 RX ADMIN — DEXAMETHASONE SODIUM PHOSPHATE 8 MG: 4 INJECTION, SOLUTION INTRAMUSCULAR; INTRAVENOUS at 13:38

## 2022-01-01 RX ADMIN — CEFAZOLIN SODIUM 2 G: 1 INJECTION, POWDER, FOR SOLUTION INTRAMUSCULAR; INTRAVENOUS at 20:31

## 2022-01-01 RX ADMIN — HYDROMORPHONE HYDROCHLORIDE 1 MG: 1 INJECTION, SOLUTION INTRAMUSCULAR; INTRAVENOUS; SUBCUTANEOUS at 20:10

## 2022-01-01 RX ADMIN — PIPERACILLIN AND TAZOBACTAM 3.38 G: 3; .375 INJECTION, POWDER, LYOPHILIZED, FOR SOLUTION INTRAVENOUS at 04:27

## 2022-01-01 RX ADMIN — PHENYLEPHRINE HYDROCHLORIDE 40 MCG/MIN: 10 INJECTION INTRAVENOUS at 14:25

## 2022-01-01 RX ADMIN — Medication 80 MCG: at 13:32

## 2022-01-01 RX ADMIN — HYDROMORPHONE HYDROCHLORIDE 2 MG: 1 INJECTION, SOLUTION INTRAMUSCULAR; INTRAVENOUS; SUBCUTANEOUS at 00:43

## 2022-01-01 RX ADMIN — FERROUS SULFATE TAB 325 MG (65 MG ELEMENTAL FE) 325 MG: 325 (65 FE) TAB at 17:55

## 2022-01-01 RX ADMIN — HYDROMORPHONE HYDROCHLORIDE 1 MG: 1 INJECTION, SOLUTION INTRAMUSCULAR; INTRAVENOUS; SUBCUTANEOUS at 01:29

## 2022-01-01 RX ADMIN — OXYCODONE HYDROCHLORIDE 10 MG: 10 TABLET, FILM COATED, EXTENDED RELEASE ORAL at 16:49

## 2022-01-01 RX ADMIN — ONDANSETRON 4 MG: 4 TABLET, ORALLY DISINTEGRATING ORAL at 00:38

## 2022-01-01 RX ADMIN — OXYCODONE HYDROCHLORIDE 10 MG: 10 TABLET, FILM COATED, EXTENDED RELEASE ORAL at 21:26

## 2022-01-01 RX ADMIN — SODIUM BICARBONATE: 84 INJECTION, SOLUTION INTRAVENOUS at 03:05

## 2022-01-01 RX ADMIN — VANCOMYCIN HYDROCHLORIDE 2500 MG: 10 INJECTION, POWDER, LYOPHILIZED, FOR SOLUTION INTRAVENOUS at 05:49

## 2022-01-01 RX ADMIN — OXYCODONE HYDROCHLORIDE AND ACETAMINOPHEN 500 MG: 500 TABLET ORAL at 08:54

## 2022-01-01 RX ADMIN — PIPERACILLIN AND TAZOBACTAM 3.38 G: 3; .375 INJECTION, POWDER, LYOPHILIZED, FOR SOLUTION INTRAVENOUS at 08:32

## 2022-01-01 RX ADMIN — SODIUM CHLORIDE 125 ML/HR: 9 INJECTION, SOLUTION INTRAVENOUS at 12:02

## 2022-01-01 RX ADMIN — CEFEPIME 2 G: 2 INJECTION, POWDER, FOR SOLUTION INTRAVENOUS at 15:49

## 2022-01-01 RX ADMIN — PIPERACILLIN AND TAZOBACTAM 3.38 G: 3; .375 INJECTION, POWDER, LYOPHILIZED, FOR SOLUTION INTRAVENOUS at 23:44

## 2022-01-01 RX ADMIN — Medication 80 MCG: at 14:25

## 2022-01-01 RX ADMIN — LORAZEPAM 1 MG: 2 INJECTION INTRAMUSCULAR; INTRAVENOUS at 21:44

## 2022-01-01 RX ADMIN — SODIUM CHLORIDE 2 G: 9 INJECTION INTRAMUSCULAR; INTRAVENOUS; SUBCUTANEOUS at 05:33

## 2022-01-01 RX ADMIN — FAMOTIDINE 20 MG: 10 INJECTION INTRAVENOUS at 14:43

## 2022-01-01 RX ADMIN — SODIUM CHLORIDE, PRESERVATIVE FREE 10 ML: 5 INJECTION INTRAVENOUS at 15:10

## 2022-01-01 RX ADMIN — OXYCODONE HYDROCHLORIDE 10 MG: 10 TABLET, FILM COATED, EXTENDED RELEASE ORAL at 14:46

## 2022-01-01 RX ADMIN — SODIUM CHLORIDE 125 ML/HR: 9 INJECTION, SOLUTION INTRAVENOUS at 13:13

## 2022-01-01 RX ADMIN — ACETAMINOPHEN 650 MG: 325 TABLET ORAL at 06:44

## 2022-01-01 RX ADMIN — ACETAMINOPHEN 650 MG: 325 TABLET ORAL at 21:26

## 2022-01-01 RX ADMIN — Medication: at 14:52

## 2022-01-01 RX ADMIN — FUROSEMIDE 20 MG: 10 INJECTION, SOLUTION INTRAMUSCULAR; INTRAVENOUS at 15:49

## 2022-01-01 RX ADMIN — FAMOTIDINE 20 MG: 10 INJECTION INTRAVENOUS at 14:46

## 2022-01-01 RX ADMIN — SODIUM CHLORIDE, POTASSIUM CHLORIDE, SODIUM LACTATE AND CALCIUM CHLORIDE 1000 ML: 600; 310; 30; 20 INJECTION, SOLUTION INTRAVENOUS at 20:12

## 2022-01-01 RX ADMIN — HYDROMORPHONE HYDROCHLORIDE 1 MG: 1 INJECTION, SOLUTION INTRAMUSCULAR; INTRAVENOUS; SUBCUTANEOUS at 13:18

## 2022-01-01 RX ADMIN — SODIUM CHLORIDE 125 ML/HR: 9 INJECTION, SOLUTION INTRAVENOUS at 17:22

## 2022-01-01 RX ADMIN — IBUPROFEN 800 MG: 400 TABLET, FILM COATED ORAL at 17:55

## 2022-01-01 RX ADMIN — HYDROMORPHONE HYDROCHLORIDE 0.5 MG: 2 INJECTION, SOLUTION INTRAMUSCULAR; INTRAVENOUS; SUBCUTANEOUS at 16:08

## 2022-01-01 RX ADMIN — HYDROMORPHONE HYDROCHLORIDE 0.4 MG: 2 INJECTION, SOLUTION INTRAMUSCULAR; INTRAVENOUS; SUBCUTANEOUS at 14:09

## 2022-01-01 RX ADMIN — LIDOCAINE HYDROCHLORIDE 70 MG: 20 INJECTION, SOLUTION EPIDURAL; INFILTRATION; INTRACAUDAL; PERINEURAL at 13:25

## 2022-01-01 RX ADMIN — IOPAMIDOL 80 ML: 755 INJECTION, SOLUTION INTRAVENOUS at 02:55

## 2022-01-01 RX ADMIN — ACETAMINOPHEN 650 MG: 325 TABLET ORAL at 10:00

## 2022-01-01 RX ADMIN — ALBUMIN (HUMAN) 250 ML: 12.5 INJECTION, SOLUTION INTRAVENOUS at 13:46

## 2022-01-01 RX ADMIN — SODIUM CHLORIDE, PRESERVATIVE FREE 10 ML: 5 INJECTION INTRAVENOUS at 05:32

## 2022-01-01 RX ADMIN — KETOROLAC TROMETHAMINE 15 MG: 30 INJECTION, SOLUTION INTRAMUSCULAR; INTRAVENOUS at 03:46

## 2022-01-01 RX ADMIN — Medication: at 19:39

## 2022-01-01 RX ADMIN — POLYETHYLENE GLYCOL 3350 17 G: 17 POWDER, FOR SOLUTION ORAL at 09:45

## 2022-01-01 RX ADMIN — MEGESTROL ACETATE 80 MG: 40 TABLET ORAL at 08:55

## 2022-01-01 RX ADMIN — SODIUM CHLORIDE 250 ML: 9 INJECTION, SOLUTION INTRAVENOUS at 09:12

## 2022-01-01 RX ADMIN — CEFEPIME HYDROCHLORIDE 1 G: 1 INJECTION, POWDER, FOR SOLUTION INTRAMUSCULAR; INTRAVENOUS at 12:46

## 2022-01-01 RX ADMIN — ONDANSETRON HYDROCHLORIDE 4 MG: 2 INJECTION, SOLUTION INTRAMUSCULAR; INTRAVENOUS at 15:27

## 2022-01-01 RX ADMIN — IOPAMIDOL 100 ML: 755 INJECTION, SOLUTION INTRAVENOUS at 14:21

## 2022-01-01 RX ADMIN — Medication: at 08:55

## 2022-01-01 RX ADMIN — BACITRACIN 1 PACKET: 500 OINTMENT TOPICAL at 10:36

## 2022-01-01 RX ADMIN — HYDROMORPHONE HYDROCHLORIDE 2 MG: 1 INJECTION, SOLUTION INTRAMUSCULAR; INTRAVENOUS; SUBCUTANEOUS at 05:48

## 2022-01-01 RX ADMIN — SENNOSIDES 8.6 MG: 8.6 TABLET, COATED ORAL at 09:45

## 2022-01-01 RX ADMIN — FERROUS SULFATE TAB 325 MG (65 MG ELEMENTAL FE) 325 MG: 325 (65 FE) TAB at 08:54

## 2022-01-01 RX ADMIN — PROCHLORPERAZINE EDISYLATE 10 MG: 5 INJECTION, SOLUTION INTRAMUSCULAR; INTRAVENOUS at 03:38

## 2022-01-01 RX ADMIN — ONDANSETRON HYDROCHLORIDE 4 MG: 2 SOLUTION INTRAMUSCULAR; INTRAVENOUS at 01:29

## 2022-01-01 RX ADMIN — PANTOPRAZOLE SODIUM 40 MG: 40 INJECTION, POWDER, FOR SOLUTION INTRAVENOUS at 01:49

## 2022-01-01 RX ADMIN — SODIUM CHLORIDE, PRESERVATIVE FREE 10 ML: 5 INJECTION INTRAVENOUS at 09:11

## 2022-01-01 RX ADMIN — PROCHLORPERAZINE EDISYLATE 10 MG: 5 INJECTION, SOLUTION INTRAMUSCULAR; INTRAVENOUS at 23:15

## 2022-01-01 RX ADMIN — POLYETHYLENE GLYCOL 3350 17 G: 17 POWDER, FOR SOLUTION ORAL at 05:20

## 2022-01-01 RX ADMIN — SODIUM CHLORIDE 125 ML/HR: 9 INJECTION, SOLUTION INTRAVENOUS at 00:24

## 2022-01-01 RX ADMIN — PIPERACILLIN AND TAZOBACTAM 3.38 G: 3; .375 INJECTION, POWDER, LYOPHILIZED, FOR SOLUTION INTRAVENOUS at 20:30

## 2022-01-01 RX ADMIN — SODIUM CHLORIDE 1000 ML: 900 INJECTION, SOLUTION INTRAVENOUS at 11:00

## 2022-01-01 RX ADMIN — CEFEPIME 2 G: 2 INJECTION, POWDER, FOR SOLUTION INTRAVENOUS at 20:00

## 2022-01-01 RX ADMIN — FENTANYL CITRATE 100 MCG: 50 INJECTION, SOLUTION INTRAMUSCULAR; INTRAVENOUS at 13:25

## 2022-01-01 RX ADMIN — SODIUM CHLORIDE 1000 ML: 9 INJECTION, SOLUTION INTRAVENOUS at 02:41

## 2022-01-01 RX ADMIN — DEXMEDETOMIDINE HYDROCHLORIDE 8 MCG: 100 INJECTION, SOLUTION, CONCENTRATE INTRAVENOUS at 16:04

## 2022-01-01 RX ADMIN — MEGESTROL ACETATE 40 MG: 40 TABLET ORAL at 22:39

## 2022-01-01 RX ADMIN — SODIUM CHLORIDE 100 ML/HR: 9 INJECTION, SOLUTION INTRAVENOUS at 11:21

## 2022-01-01 RX ADMIN — DIPHENHYDRAMINE HYDROCHLORIDE 25 MG: 50 INJECTION, SOLUTION INTRAMUSCULAR; INTRAVENOUS at 20:31

## 2022-01-01 RX ADMIN — SODIUM CHLORIDE: 900 INJECTION, SOLUTION INTRAVENOUS at 13:45

## 2022-01-01 RX ADMIN — SODIUM CHLORIDE, PRESERVATIVE FREE 10 ML: 5 INJECTION INTRAVENOUS at 21:25

## 2022-01-01 RX ADMIN — CEFTRIAXONE SODIUM 1 G: 1 INJECTION, POWDER, FOR SOLUTION INTRAMUSCULAR; INTRAVENOUS at 09:14

## 2022-01-01 RX ADMIN — Medication 80 MCG: at 13:25

## 2022-01-01 RX ADMIN — HYDROCODONE BITARTRATE AND ACETAMINOPHEN 1 TABLET: 5; 325 TABLET ORAL at 14:23

## 2022-01-01 RX ADMIN — OXYCODONE HYDROCHLORIDE AND ACETAMINOPHEN 500 MG: 500 TABLET ORAL at 17:55

## 2022-01-01 RX ADMIN — SODIUM CHLORIDE, PRESERVATIVE FREE 10 ML: 5 INJECTION INTRAVENOUS at 08:38

## 2022-01-01 RX ADMIN — GEMCITABINE 1836 MG: 38 INJECTION, SOLUTION INTRAVENOUS at 11:07

## 2022-01-01 RX ADMIN — IOPAMIDOL 100 ML: 755 INJECTION, SOLUTION INTRAVENOUS at 04:44

## 2022-01-01 RX ADMIN — ACETAMINOPHEN 650 MG: 325 TABLET ORAL at 00:43

## 2022-01-01 RX ADMIN — SODIUM CHLORIDE 125 ML/HR: 9 INJECTION, SOLUTION INTRAVENOUS at 23:04

## 2022-01-01 RX ADMIN — KETOROLAC TROMETHAMINE 15 MG: 30 INJECTION, SOLUTION INTRAMUSCULAR; INTRAVENOUS at 01:46

## 2022-01-01 RX ADMIN — SODIUM CHLORIDE, PRESERVATIVE FREE 10 ML: 5 INJECTION INTRAVENOUS at 05:30

## 2022-01-01 RX ADMIN — HYDROMORPHONE HYDROCHLORIDE 1 MG: 1 INJECTION, SOLUTION INTRAMUSCULAR; INTRAVENOUS; SUBCUTANEOUS at 12:48

## 2022-01-01 RX ADMIN — ACETAMINOPHEN 650 MG: 325 TABLET ORAL at 23:25

## 2022-01-01 RX ADMIN — ROCURONIUM BROMIDE 20 MG: 10 SOLUTION INTRAVENOUS at 14:04

## 2022-01-01 RX ADMIN — ONDANSETRON HYDROCHLORIDE 8 MG: 2 SOLUTION INTRAMUSCULAR; INTRAVENOUS at 01:48

## 2022-01-01 RX ADMIN — PROPOFOL 170 MG: 10 INJECTION, EMULSION INTRAVENOUS at 13:25

## 2022-01-01 RX ADMIN — PIPERACILLIN AND TAZOBACTAM 3.38 G: 3; .375 INJECTION, POWDER, LYOPHILIZED, FOR SOLUTION INTRAVENOUS at 12:47

## 2022-01-01 RX ADMIN — Medication 10 ML: at 15:19

## 2022-01-01 RX ADMIN — SUGAMMADEX 400 MG: 100 INJECTION, SOLUTION INTRAVENOUS at 15:53

## 2022-01-01 RX ADMIN — SODIUM CHLORIDE, PRESERVATIVE FREE 10 ML: 5 INJECTION INTRAVENOUS at 13:09

## 2022-01-01 RX ADMIN — POLYETHYLENE GLYCOL 3350 17 G: 17 POWDER, FOR SOLUTION ORAL at 12:37

## 2022-01-01 RX ADMIN — CEFEPIME 2 G: 2 INJECTION, POWDER, FOR SOLUTION INTRAVENOUS at 09:41

## 2022-01-01 RX ADMIN — DEXAMETHASONE SODIUM PHOSPHATE 4 MG: 4 INJECTION, SOLUTION INTRAMUSCULAR; INTRAVENOUS at 16:28

## 2022-01-01 RX ADMIN — ONDANSETRON 4 MG: 2 INJECTION INTRAMUSCULAR; INTRAVENOUS at 11:34

## 2022-01-01 RX ADMIN — SODIUM CHLORIDE 50 ML/HR: 9 INJECTION, SOLUTION INTRAVENOUS at 08:33

## 2022-01-01 RX ADMIN — DEXMEDETOMIDINE HYDROCHLORIDE 4 MCG: 100 INJECTION, SOLUTION, CONCENTRATE INTRAVENOUS at 14:57

## 2022-01-01 RX ADMIN — ACETAMINOPHEN 650 MG: 325 TABLET ORAL at 17:35

## 2022-01-01 RX ADMIN — HYDROMORPHONE HYDROCHLORIDE 1 MG: 1 INJECTION, SOLUTION INTRAMUSCULAR; INTRAVENOUS; SUBCUTANEOUS at 16:06

## 2022-01-01 RX ADMIN — Medication 80 MCG: at 15:12

## 2022-01-01 RX ADMIN — SODIUM CHLORIDE, POTASSIUM CHLORIDE, SODIUM LACTATE AND CALCIUM CHLORIDE 100 ML/HR: 600; 310; 30; 20 INJECTION, SOLUTION INTRAVENOUS at 04:05

## 2022-01-01 RX ADMIN — OXYCODONE AND ACETAMINOPHEN 2 TABLET: 10; 325 TABLET ORAL at 03:58

## 2022-01-01 RX ADMIN — SODIUM ZIRCONIUM CYCLOSILICATE 10 G: 10 POWDER, FOR SUSPENSION ORAL at 13:03

## 2022-01-01 RX ADMIN — POTASSIUM CHLORIDE 10 MEQ: 750 TABLET, EXTENDED RELEASE ORAL at 10:35

## 2022-01-01 RX ADMIN — HYDROMORPHONE HYDROCHLORIDE 0.2 MG: 2 INJECTION, SOLUTION INTRAMUSCULAR; INTRAVENOUS; SUBCUTANEOUS at 15:33

## 2022-01-01 RX ADMIN — MEGESTROL ACETATE 40 MG: 40 TABLET ORAL at 12:26

## 2022-01-01 RX ADMIN — SODIUM CHLORIDE 1000 ML: 9 INJECTION, SOLUTION INTRAVENOUS at 18:48

## 2022-02-01 NOTE — PROGRESS NOTES
[address] [address] HUNTER Naidu. Josey, [address] [place] [address] Chey Sherman (: 1980) is a 39 y.o. female, established patient, here for evaluation of the following chief complaint(s): Dizziness    SUBJECTIVE:    Pt would like to be evaluated for the problem(s) listed above:    Dizziness:  Started 2 days ago. States that it has gotten better. States that she felt a little \"woozy\" after waking up from a nap on the that. Check her temp and did not have any fever. However had some chills which got better with a shower. No chest pain, sob, cough, n/v.   States that she has also been in some pain and spotting as a result of her periods. 2 yo son diagnosed with Covid on 22 and currently doing well. Review of systems:    A comprehensive review of systems was negative except for that written in the History of Present Illness. PHYSICAL EXAM:    General: alert, cooperative, no distress   Mental  status: mental status: alert, oriented to person, place, and time, normal mood, behavior, speech, dress, motor activity, and thought processes   Resp: resp: normal effort and no respiratory distress   Neuro: neuro: no gross deficits   Skin: skin: no discoloration or lesions of concern on visible areas   Due to this being a TeleHealth evaluation, many elements of the physical examination are unable to be assessed. ASSESSMENT/PLAN:      ICD-10-CM ICD-9-CM    1. Dizziness  R42 780.4      1. Dizziness  Stable. States that she has been feeling better since yesterday. Likely triggered by her menstrual cramps vs early onset URI. - Offered reassurance and asked to call us back if any new symptoms. - consider tylenol prn if any headache or body ache. - po hydration    Return if symptoms worsen or fail to improve. We discussed the expected course, resolution and complications of the diagnosis(es) in detail.  Patient was in [place] at the time of consultation. Medication risks, benefits, costs, interactions, and alternatives were discussed as indicated. I advised her to contact the office if her condition worsens, changes or fails to improve as anticipated. She expressed understanding with the diagnosis(es) and plan. Patient understands that this encounter was a temporary measure, and the importance of further follow up and examination was emphasized. Patient verbalized understanding. Breana العلي is being evaluated by a Virtual Visit (video visit) encounter to address concerns as mentioned above. A caregiver was present when appropriate. Due to this being a TeleHealth encounter (During HFQCX-53 public health emergency), evaluation of the following organ systems was limited: Vitals/Constitutional/EENT/Resp/CV/GI//MS/Neuro/Skin/Heme-Lymph-Imm. Pursuant to the emergency declaration under the [address] [place] [address], [address] [place] [address] authority and the [employer] and [employer] Act, this Virtual Visit was conducted with patient's (and/or legal guardian's) consent, to reduce the patient's risk of exposure to COVID-19 and provide necessary medical care. The patient (and/or legal guardian) has also been advised to contact this office for worsening conditions or problems, and seek emergency medical treatment and/or call 911 if deemed necessary. Patient identification was verified at the start of the visit: YES    Services were provided through a video synchronous discussion virtually to substitute for in-person clinic visit. Patient was located at home and provider was located in office or at home. An electronic signature was used to authenticate this note. -- Lay Randolph MD     CPT Codes 46890-70383 for Established Patients may apply to this Telehealth Visit. POS code: 18.   Modifier GT

## 2022-02-01 NOTE — TELEPHONE ENCOUNTER
Pt. Calling to report excruciating pain in her lower abdomen radiating into vagina for 2 months. She reports that her last period was heavier than normal and she has been having some abnormal brown spotting. Patient was put on the schedule to see MD Daphney Titus for 02/04/22 at 1140. Pt. verbalized understanding and no further questions were present at this time.

## 2022-02-01 NOTE — TELEPHONE ENCOUNTER
Received call from [address] at [facility] with Red Flag Complaint. Subjective: Caller states \" Sunday night I ended up taking a nap and woke up out of my nap heart racing. When I went to stand up to walk to kitchen felt very lightheaded, had to slow down felt woozy. Went into kitchen to get something to drink. checked tempeture was 95.3 , grabbed baby it gave me 95. 7. took a shower laid down been the hour rechecked was 96. Something ? 7. Next morning 96.8 later on nagi to 97.1 \"     Current Symptoms: pt reports her temperature  Is 98. 1. pt reports does not feel dizzy today. Feels better today. yesterday when woke up had a headache no dizziness yesterday. Pt reports has been told she is pre-anemic. Pt was having stomach pains and legs pains not having any today. Pt did check temp with two different  Thermometer's and gave low readings on Sunday     Onset: Sunday night    Associated Symptoms: NA    Pain Severity:     Temperature: 98.1 orally    What has been tried: iron pills    LMP: 6th or 7th of jan Pregnant: No    Recommended disposition: SEE IN OFFICE TODAY:   * IF NO AVAILABLE APPOINTMENTS:  You need to be seen in the Urgent [facility]  there today. A nearby Urgent Care Center is often a good source of care. Another choice is to go to the Emergency Department. Care advice provided, patient verbalizes understanding; denies any other questions or concerns; instructed to call back for any new or worsening symptoms. Writer provided warm transfer to [place] at [facility] for appointment scheduling    Attention Provider: Thank you for allowing me to participate in the care of your patient. The patient was connected to triage in response to information provided to the [facility]. Please do not respond through this encounter as the response is not directed to a shared pool.       Reason for Disposition   Patient wants to be seen    Protocols used: [id number]

## 2022-02-01 NOTE — PROGRESS NOTES
Name and  Verified. Pharmacy verified    Chief Complaint   Patient presents with    Dizziness     x 2 days       Started  but has since gotten better. Denies any fainting or ear pain. Denies any injury. 1. Have you been to the ER, urgent care clinic since your last visit? Hospitalized since your last visit? No    2. Have you seen or consulted any other health care providers outside of the [address] [place] [address] since your last visit? Include any pap smears or colon screening.  No     Health Maintenance Due   Topic Date Due    COVID-19 Vaccine (1) Never done    Flu Vaccine (1) Never done

## 2022-02-04 PROBLEM — O47.00 THREATENED PREMATURE LABOR: Status: RESOLVED | Noted: 2018-10-16 | Resolved: 2022-01-01

## 2022-02-04 PROBLEM — E66.9 OBESITY: Status: ACTIVE | Noted: 2018-05-02

## 2022-02-04 NOTE — PROGRESS NOTES
Pelvic Pain    CC: Pelvic Pain    HPI: Ms. Roselia Acevedo is a 39 y.o.  female presenting for evaluation of pelvic pain. LMP end of January. She has not been evaluated for pelvic pain recently    Onset: several months  Location of pain: vaginal and pelvic pain  Duration: see above. Quality of pain: bothersome. Worse with menses? yes  Pain between menstrual cycles? yes    Timing: noted with menses  Context: menses  Modifying factors: heat helps. Other associated symptoms:   Denies vaginal discharge. Denies abdominal distension, constipation, diarrhea, dysuria, hematuria, incontinence, urinary urgency/frequency. Denies fevers/chills. Reports dyspareunia. Ob/Gyn Hx:    Menses: Regular monthly cycles? Regular  Contraception: none  STI- Denies  SA- yes    Pt notes that since the summer of 2021, her menses have been happening each month, lasting 4-7 days. Her flow ranges from light to very heavy. Each month she has severe cramping that is not associated with her flow. The first 2 days her cramping is the worst. She reports her menses in 2021 was heavier than normal and longer. Her menses in 2022 was painful and she has been spotting for 2 weeks. Next menses is supposed to start 22. She has noticed sex is more painful than normal, jose with insertion. She states her pain ends with her menses.        Past Medical History:   Diagnosis Date    Anemia 2018    Fibroids     Gestational hypertension     Gestational hypertension affecting first pregnancy 2018    History of PCOS     Infertility, female     Polycystic disease, ovaries     Psychiatric problem     anxiety - took medications as a teenager, no medications recently        Past Surgical History:   Procedure Laterality Date    HX  SECTION      HX OTHER SURGICAL  2009    wisdom tooth extraction x4       Family History   Problem Relation Age of Onset    Hypertension Mother     Hypertension Sister    [facility] Diabetes Sister     Hypertension Brother     Cancer Paternal Uncle     Diabetes Paternal Uncle     Diabetes Maternal Uncle     Breast Cancer Cousin        Social History     Socioeconomic History    Marital status:      Spouse name: Not on file    Number of children: Not on file    Years of education: Not on file    Highest education level: Not on file   Occupational History    Not on file   Tobacco Use    Smoking status: Never Smoker    Smokeless tobacco: Never Used   Vaping Use    Vaping Use: Never used   Substance and Sexual Activity    Alcohol use: No    Drug use: No    Sexual activity: Yes     Partners: Male     Birth control/protection: Condom     Comment: condoms- sometimes. Other Topics Concern     Service Not Asked    Blood Transfusions Not Asked    Caffeine Concern Not Asked    Occupational Exposure Not Asked    Hobby Hazards Not Asked    Sleep Concern Not Asked    Stress Concern Not Asked    Weight Concern Not Asked    Special Diet Not Asked    Back Care Not Asked    Exercise Not Asked    Bike Helmet Not Asked   [address] Not Asked    Self-Exams Not Asked   Social History Narrative    Not on file     Social Determinants of Health     Financial Resource Strain:     Difficulty of Paying Living Expenses: Not on file   Food Insecurity:     Worried About Running Out of Food in the Last Year: Not on file    Isis of Food in the Last Year: Not on file   Transportation Needs:     Lack of Transportation (Medical): Not on file    Lack of Transportation (Non-Medical):  Not on file   Physical Activity:     Days of Exercise per Week: Not on file    Minutes of Exercise per Session: Not on file   Stress:     Feeling of Stress : Not on file   Social Connections:     Frequency of Communication with Friends and Family: Not on file    Frequency of Social Gatherings with Friends and Family: Not on file    Attends Taoism Services: Not on file   [employer] or Organizations: Not on file    Attends Club or Organization Meetings: Not on file    Marital Status: Not on file   Intimate Partner Violence:     Fear of Current or Ex-Partner: Not on file    Emotionally Abused: Not on file    Physically Abused: Not on file    Sexually Abused: Not on file   Housing Stability:     Unable to Pay for Housing in the Last Year: Not on file    Number of [place] in the Last Year: Not on file    Unstable Housing in the Last Year: Not on file           No Known Allergies      Physical Exam    Visit Vitals  /84 (BP 1 Location: Left upper arm, BP Patient Position: Sitting)   Wt 225 lb (102.1 kg)   LMP  (LMP Unknown)   BMI 35.24 kg/m²       HENT  · Head and Face: appears normal    Gastrointestinal  · Abdominal Examination: abdomen non-tender to palpation, no masses present  · Liver and spleen: no hepatomegaly present, spleen not palpable  · Hernias: no hernias identified    Genitourinary  · External Genitalia: normal appearance for age, no discharge present, no tenderness present, no inflammatory lesions present, no masses present, no atrophy present  · Vagina: normal vaginal vault without central or paravaginal defects, scant brown discharge present, no inflammatory lesions present, no masses present  · Bladder: non-tender to palpation  · Urethra: appears normal  · Cervix: normal, no cervicitis,  CMT present  · Uterus: tender to palpation and movement.  Possible anterior fibroid noted in the lower uterine segment  · Adnexa: no adnexal tenderness present, no adnexal masses present  · Perineum: perineum within normal limits, no evidence of trauma, no rashes or skin lesions present    Skin  · General Inspection: no rash, no lesions identified    Neurologic/Psychiatric  · Mental Status:  · Orientation: grossly oriented to person, place and time  · Mood and Affect: mood normal, affect appropriate    Results for orders placed or performed in visit on 12/15/21   HEPATITIS C AB   Result Value Ref Range    Hep C virus Ab Interp. NONREACTIVE NONREACTIVE     METABOLIC PANEL, COMPREHENSIVE   Result Value Ref Range    Sodium 138 136 - 145 mmol/L    Potassium 4.7 3.5 - 5.1 mmol/L    Chloride 108 97 - 108 mmol/L    CO2 26 21 - 32 mmol/L    Anion gap 4 (L) 5 - 15 mmol/L    Glucose 95 65 - 100 mg/dL    BUN 12 6 - 20 MG/DL    Creatinine 0.73 0.55 - 1.02 MG/DL    BUN/Creatinine ratio 16 12 - 20      GFR est AA >60 >60 ml/min/1.73m2    GFR est non-AA >60 >60 ml/min/1.73m2    Calcium 9.2 8.5 - 10.1 MG/DL    Bilirubin, total 0.6 0.2 - 1.0 MG/DL    ALT (SGPT) 23 12 - 78 U/L    AST (SGOT) 20 15 - 37 U/L    Alk. phosphatase 69 45 - 117 U/L    Protein, total 6.8 6.4 - 8.2 g/dL    Albumin 3.9 3.5 - 5.0 g/dL    Globulin 2.9 2.0 - 4.0 g/dL    A-G Ratio 1.3 1.1 - 2.2     CBC W/O DIFF   Result Value Ref Range    WBC 5.9 3.6 - 11.0 K/uL    RBC 4.74 3.80 - 5.20 M/uL    HGB 11.2 (L) 11.5 - 16.0 g/dL    HCT 37.3 35.0 - 47.0 %    MCV 78.7 (L) 80.0 - 99.0 FL    MCH 23.6 (L) 26.0 - 34.0 PG    MCHC 30.0 30.0 - 36.5 g/dL    RDW 14.6 (H) 11.5 - 14.5 %    PLATELET 982 821 - 377 K/uL    MPV 10.0 8.9 - 12.9 FL    NRBC 0.0 0  WBC    ABSOLUTE NRBC 0.00 0.00 - 0.01 K/uL   HEMOGLOBIN A1C WITH EAG   Result Value Ref Range    Hemoglobin A1c 6.0 (H) 4.0 - 5.6 %    Est. average glucose 126 mg/dL   LIPID PANEL   Result Value Ref Range    Cholesterol, total 77 <200 MG/DL    Triglyceride 46 <150 MG/DL    HDL Cholesterol 41 MG/DL    LDL, calculated 26.8 0 - 100 MG/DL    VLDL, calculated 9.2 MG/DL    CHOL/HDL Ratio 1.9 0.0 - 5.0         US ABD LTD    Result Date: 12/29/2021  1. No evidence of cholelithiasis. 2.  Possible nonobstructing 0.9 cm right renal calculus. 3.  Mildly hyperechoic 3 cm subcapsular hepatic lesion which is indeterminate. In the absence of known malignancy or underlying hepatocellular disease, this most likely represents a benign etiology.  Top differential considerations include possible focal fat or hemangioma. If further characterization is indicated, multiphasic CT or abdominal MRI can be considered. Assessment/Plan:  39 y.o. with dysmenorrhea and endometritis based on examination. History of uterine fibroid. Discussed possible etiologies of pelvic pain including infectious verus gastrointestinal versus genitourinary versus idiopathic causes. STI testing declined. Urine culture UA  declined. Discussed TVUS to follow up fibroid in 3 weeks. Treat endometritis with doxycycline and flagyl for 14 days and toradol PRN pain. RTC:     In 3 weeks for US and follow up visit, or sooner prn for problems or concerns.  Handouts and instructions provided    Jorge Penaloza  2/4/2022  11:56 AM     Fady Gomez MD

## 2022-02-12 NOTE — ED PROVIDER NOTES
EMERGENCY DEPARTMENT HISTORY AND PHYSICAL EXAM      Date: 2/12/2022  Patient Name: Mara Morel    History of Presenting Illness     Chief Complaint   Patient presents with    Menstrual Problem     vaginal bleeding on and off; thinks maybe this is her period onset Tuesday; she woke up to blood in her bed; has had to change tampons often per pt. pain in lower back and lower abdomen.  Pelvic Pain     History Provided By: Patient    HPI: Mara Morel, 39 y.o. female with past medical history of PCOS, dysmenorrhea, fibroids, recent diagnosis of endometritis by OB and started on Flagyl and doxycycline, presents to the ED with cc of painful and heavy menstrual bleeding. Patient reports. This month has been irregular. States she started bleeding over a week ago. However, bleeding has been light until the past day. States she has been changing tampons every 1-2 hours due to passage of large quantities of blood as well as clots. She reports extreme pelvic, suprapubic, lower back, upper thigh pain associated with this. Describes pain as \"contraction pain. \"  She was given Toradol by her OB/GYN during her recent and office visit for similar pain symptoms, however, reports that she has been scared to start this medication because she did not want to \"act funny\" around her young children as she reports being overly sensitive to pain medications in the past.  Patient was not aware that Toradol is not a narcotic. Patient reports she is also concerned about worsening anemia in light of recent heavy bleeding as she does have a history of anemia. She denies any symptoms of severe anemia such as shortness of breath, palpitations, lightheadedness, dizziness, syncope. She denies being on a blood thinner. She denies any abnormal vaginal discharge. She has no urinary complaints. PCP: Ximena Hirsch MD    No current facility-administered medications on file prior to encounter.      Current Outpatient Medications on File Prior to Encounter   Medication Sig Dispense Refill    metroNIDAZOLE (FLAGYL) 500 mg tablet Take 1 Tablet by mouth two (2) times a day for 14 days. 28 Tablet 0    doxycycline (ADOXA) 100 mg tablet Take 1 Tablet by mouth two (2) times a day for 14 days. 28 Tablet 0       Past History     Past Medical History:  Past Medical History:   Diagnosis Date    Anemia 2018    Fibroids     Gestational hypertension     Gestational hypertension affecting first pregnancy 2018    History of PCOS     Infertility, female     Polycystic disease, ovaries     Psychiatric problem     anxiety - took medications as a teenager, no medications recently        Past Surgical History:  Past Surgical History:   Procedure Laterality Date    HX  SECTION      HX OTHER SURGICAL  2009    wisdom tooth extraction x4       Family History:  Family History   Problem Relation Age of Onset    Hypertension Mother     Hypertension Sister     Diabetes Sister     Hypertension Brother     Cancer Paternal Uncle     Diabetes Paternal Uncle     Diabetes Maternal Uncle     Breast Cancer Cousin        Social History:  Social History     Tobacco Use    Smoking status: Never Smoker    Smokeless tobacco: Never Used   Vaping Use    Vaping Use: Never used   Substance Use Topics    Alcohol use: No    Drug use: No       Allergies:  No Known Allergies      Review of Systems   Review of Systems   Constitutional: Negative for chills and fever. HENT: Negative for congestion and rhinorrhea. Eyes: Negative for visual disturbance. Respiratory: Negative for chest tightness and shortness of breath. Cardiovascular: Negative for chest pain and palpitations. Gastrointestinal: Positive for abdominal pain. Negative for diarrhea, nausea and vomiting. Genitourinary: Positive for menstrual problem, pelvic pain and vaginal bleeding. Negative for dysuria, flank pain, hematuria and vaginal discharge.    Musculoskeletal: Positive for back pain and myalgias. Negative for neck pain. Skin: Negative for rash. Allergic/Immunologic: Negative for immunocompromised state. Neurological: Negative for dizziness, speech difficulty, weakness and headaches. Hematological: Negative for adenopathy. Psychiatric/Behavioral: Negative for dysphoric mood and suicidal ideas. Physical Exam   Physical Exam  Vitals and nursing note reviewed. Constitutional:       General: She is not in acute distress. Appearance: She is not ill-appearing or toxic-appearing. HENT:      Head: Normocephalic and atraumatic. Nose: Nose normal.      Mouth/Throat:      Mouth: Mucous membranes are moist.   Eyes:      Extraocular Movements: Extraocular movements intact. Pupils: Pupils are equal, round, and reactive to light. Cardiovascular:      Rate and Rhythm: Normal rate and regular rhythm. Pulses: Normal pulses. Pulmonary:      Effort: Pulmonary effort is normal.      Breath sounds: No stridor. No wheezing or rhonchi. Abdominal:      General: Abdomen is flat. There is no distension. Palpations: Abdomen is soft. Tenderness: There is abdominal tenderness in the suprapubic area. There is no right CVA tenderness, left CVA tenderness, guarding or rebound. Musculoskeletal:         General: Normal range of motion. Cervical back: Normal range of motion and neck supple. Skin:     General: Skin is warm and dry. Neurological:      General: No focal deficit present. Mental Status: She is alert and oriented to person, place, and time.    Psychiatric:         Judgment: Judgment normal.         Diagnostic Study Results     Labs -     Recent Results (from the past 70 hour(s))   HCG URINE, QL. - POC    Collection Time: 02/12/22  6:10 PM   Result Value Ref Range    Pregnancy test,urine (POC) Negative NEG     CBC WITH AUTOMATED DIFF    Collection Time: 02/12/22  6:20 PM   Result Value Ref Range    WBC 8.0 3.6 - 11.0 K/uL    RBC 4.37 3.80 - 5.20 M/uL    HGB 10.4 (L) 11.5 - 16.0 g/dL    HCT 32.0 (L) 35.0 - 47.0 %    MCV 73.2 (L) 80.0 - 99.0 FL    MCH 23.8 (L) 26.0 - 34.0 PG    MCHC 32.5 30.0 - 36.5 g/dL    RDW 14.6 (H) 11.5 - 14.5 %    PLATELET 474 641 - 660 K/uL    MPV 9.7 8.9 - 12.9 FL    NRBC 0.0 0  WBC    ABSOLUTE NRBC 0.00 0.00 - 0.01 K/uL    NEUTROPHILS 63 32 - 75 %    LYMPHOCYTES 24 12 - 49 %    MONOCYTES 9 5 - 13 %    EOSINOPHILS 4 0 - 7 %    BASOPHILS 0 0 - 1 %    IMMATURE GRANULOCYTES 0 0.0 - 0.5 %    ABS. NEUTROPHILS 5.1 1.8 - 8.0 K/UL    ABS. LYMPHOCYTES 1.9 0.8 - 3.5 K/UL    ABS. MONOCYTES 0.7 0.0 - 1.0 K/UL    ABS. EOSINOPHILS 0.3 0.0 - 0.4 K/UL    ABS. BASOPHILS 0.0 0.0 - 0.1 K/UL    ABS. IMM. GRANS. 0.0 0.00 - 0.04 K/UL    DF AUTOMATED     METABOLIC PANEL, COMPREHENSIVE    Collection Time: 02/12/22  6:20 PM   Result Value Ref Range    Sodium 137 136 - 145 mmol/L    Potassium 3.6 3.5 - 5.1 mmol/L    Chloride 108 97 - 108 mmol/L    CO2 24 21 - 32 mmol/L    Anion gap 5 5 - 15 mmol/L    Glucose 94 65 - 100 mg/dL    BUN 13 6 - 20 MG/DL    Creatinine 0.76 0.55 - 1.02 MG/DL    BUN/Creatinine ratio 17 12 - 20      GFR est AA >60 >60 ml/min/1.73m2    GFR est non-AA >60 >60 ml/min/1.73m2    Calcium 8.5 8.5 - 10.1 MG/DL    Bilirubin, total 0.7 0.2 - 1.0 MG/DL    ALT (SGPT) 31 12 - 78 U/L    AST (SGOT) 24 15 - 37 U/L    Alk. phosphatase 60 45 - 117 U/L    Protein, total 6.7 6.4 - 8.2 g/dL    Albumin 3.8 3.5 - 5.0 g/dL    Globulin 2.9 2.0 - 4.0 g/dL    A-G Ratio 1.3 1.1 - 2.2     TYPE & SCREEN    Collection Time: 02/12/22  6:20 PM   Result Value Ref Range    Crossmatch Expiration 02/15/2022,2359     ABO/Rh(D) Reyes Hurd POSITIVE     Antibody screen NEG        Radiologic Studies -   No orders to display     CT Results  (Last 48 hours)    None        CXR Results  (Last 48 hours)    None          Medical Decision Making   ILynda MD am the first provider for this patient, and I am the attending of record for this patient encounter.     I reviewed the vital signs, available nursing notes, past medical history, past surgical history, family history and social history. Vital Signs-Reviewed the patient's vital signs. Patient Vitals for the past 24 hrs:   Temp Pulse Resp BP SpO2   02/12/22 1750 99.3 °F (37.4 °C) (!) 109 16 (!) 157/93 100 %     Records Reviewed: Nursing Notes and Old Medical Records    Provider Notes (Medical Decision Making):   Vitals are stable. Patient is well-appearing on exam, in no acute distress. She has minimal tenderness to palpation in the suprapubic region without peritoneal signs. Suspect symptoms are related to patient's history of leiomyoma as well as possible component of ongoing endometritis. Do not suspect acute surgical abdominal process. Doubt ovarian torsion/TOA. Doubt STI/PID. Will need to rule out for blood loss anemia, though patient has no symptoms of severe anemia. Will check labs. We will treat her with a dose of IV Toradol and reassess for improvement. Patient's hemoglobin is largely stable10.4. Previous hemoglobin of 10.2 in mid December 2021. However, patient fluctuates between 10-11 on review of old labs. Her MCV is low at 73.2. Question iron deficiency component? Discussed this with the patient, and advised for her to start taking iron supplements daily. On my repeat exam, patient reports that her presenting pain complaints have now completely resolved after receiving Toradol. She is made aware that Toradol is not a narcotic medication, instructed to start taking Toradol as needed at home for management of her symptoms. Discussed with patient risk versus benefit of starting Provera considering report of heavy menstrual bleeding and baseline anemia. Plan to provide her with Rx for Provera daily x10 days, just to have on hand. Patient is instructed to start Provera if bleeding continues to be heavy over the next 2 days, and she is saturating 1 pad per hour.   If she begins to experience lightheadedness, dizziness, presyncope or syncope-she is to start Provera sooner. Regardless, patient is advised to call for early follow-up with her OB/GYN to be reassessed. Patient felt comfortable with plan for discharge. All questions answered. Return precautions discussed. She was ambulatory independently out of the ED in stable and improved condition. ED Course:   Initial assessment performed. The patient's presenting problems have been discussed, and they are in agreement with the care plan formulated and outlined with them. I have encouraged them to ask questions as they arise throughout their visit. Ivette Mccain MD      Disposition:  Discharge      DISCHARGE PLAN:  1. Discharge Medication List as of 2/12/2022  9:12 PM      START taking these medications    Details   medroxyPROGESTERone (Provera) 10 mg tablet Take 1 Tablet by mouth daily for 10 days. , Normal, Disp-10 Tablet, R-0         CONTINUE these medications which have NOT CHANGED    Details   metroNIDAZOLE (FLAGYL) 500 mg tablet Take 1 Tablet by mouth two (2) times a day for 14 days. , Normal, Disp-28 Tablet, R-0      doxycycline (ADOXA) 100 mg tablet Take 1 Tablet by mouth two (2) times a day for 14 days. , Normal, Disp-28 Tablet, R-0           2. Follow-up Information     Follow up With Specialties Details Why Contact Info    Kraig Bajwa MD Obstetrics & Gynecology, Gynecology, Obstetrics In 1 week  [address]  [address] [place] [address]  [phone]          3. Return to ED if worse     Diagnosis     Clinical Impression:   1. Painful menstruation    2. Menorrhagia with irregular cycle    3. History of uterine leiomyoma    4. Microcytic anemia        Attestations:    Ivette Mccain MD    Please note that this dictation was completed with [employer], the [employer] voice recognition software.   Quite often unanticipated grammatical, syntax, homophones, and other interpretive errors are inadvertently transcribed by the computer software. Please disregard these errors. Please excuse any errors that have escaped final proofreading. Thank you.

## 2022-02-12 NOTE — TELEPHONE ENCOUNTER
Pt called call service 2/12/22 and was promptly called back. Pt is on her menses and states that since 3 am she is having extremely heavy bleeding that is requiring her to change a tampon every hour. She also admits to passage of large clots. Not on any birth control. Given her complaint, advised evaluation at urgent care or ER.      Wesley Garvey MD

## 2022-02-16 NOTE — PROGRESS NOTES
Chief Complaint   Patient presents with    Follow-up     6 week fu        1. Have you been to the ER, urgent care clinic since your last visit? Hospitalized since your last visit? Yes When: ED [facility] 2/12/22 for menstrual problem    2. Have you seen or consulted any other health care providers outside of the [address] [place] [address] since your last visit? Include any pap smears or colon screening.  No

## 2022-02-16 NOTE — PROGRESS NOTES
[address] [address] L[place] Costa. Josey, [address] [place] [address]  [phone]    C/C: RUQ pain follow up    HPI:    Tigist Gabriel is a 39 y.o. female who presents to clinic today for evaluation of the issues listed above. Subjective;    RUQ pain:  Ongoing for years, diagnosed with fatty liver disease in the past.  Pain is Intermittent, flares about 4 times per month. States that the pain is always there but she feels it when it flares. No clear association with food although she reports that it is sometimes worst shortly after eating. Gallbladder is intact. Recent US (12/29/21) - showed no evidence of cholelithiasis; possible nonobstructing 0.9 cm right renal calculus and 3cm subcapsular hepatic lesion, likely benign. Also Worked up in 2014 by previous pcp. Ultrasound showed possible Hepatic steatosis and nodule in the right, mid//lateral anterior abd wall which was possibly a small lipoma. Pt denies any  fever, chill, chest pain, SOB, DUBON, PND, n/v/d or blood in the stool.     Other Health Habits and social history:  Smoking history: no  Alcohol history: no  Occupation: [employer], now [employer].  Marital status: Patient is currently  and has one son (2 yo)      Allergies- reviewed:   No Known Allergies    Past Medical History- reviewed:  Past Medical History:   Diagnosis Date    Anemia 12/17/2018    Fibroids     Gestational hypertension     Gestational hypertension affecting first pregnancy 12/13/2018    History of PCOS     Infertility, female     Polycystic disease, ovaries     Psychiatric problem     anxiety - took medications as a teenager, no medications recently        Family History - reviewed:  Family History   Problem Relation Age of Onset    Hypertension Mother     Hypertension Sister     Diabetes Sister     Hypertension Brother     Cancer Paternal Uncle     Diabetes Paternal Uncle     Diabetes Maternal Uncle     Breast Cancer Cousin Social History - reviewed:  Social History     Socioeconomic History    Marital status:      Spouse name: Not on file    Number of children: Not on file    Years of education: Not on file    Highest education level: Not on file   Occupational History    Not on file   Tobacco Use    Smoking status: Never Smoker    Smokeless tobacco: Never Used   Vaping Use    Vaping Use: Never used   Substance and Sexual Activity    Alcohol use: No    Drug use: No    Sexual activity: Yes     Partners: Male     Birth control/protection: Condom     Comment: condoms- sometimes. Other Topics Concern     Service Not Asked    Blood Transfusions Not Asked    Caffeine Concern Not Asked    Occupational Exposure Not Asked    Hobby Hazards Not Asked    Sleep Concern Not Asked    Stress Concern Not Asked    Weight Concern Not Asked    Special Diet Not Asked    Back Care Not Asked    Exercise Not Asked    Bike Helmet Not Asked   [address] Not Asked    Self-Exams Not Asked   Social History Narrative    Not on file     Social Determinants of Health     Financial Resource Strain:     Difficulty of Paying Living Expenses: Not on file   Food Insecurity:     Worried About Running Out of Food in the Last Year: Not on file    Isis of Food in the Last Year: Not on file   Transportation Needs:     Lack of Transportation (Medical): Not on file    Lack of Transportation (Non-Medical):  Not on file   Physical Activity:     Days of Exercise per Week: Not on file    Minutes of Exercise per Session: Not on file   Stress:     Feeling of Stress : Not on file   Social Connections:     Frequency of Communication with Friends and Family: Not on file    Frequency of Social Gatherings with Friends and Family: Not on file    Attends Uats[employer] Services: Not on file    Active Member of Clubs or Organizations: Not on file    Attends Club or Organization Meetings: Not on file    Marital Status: Not on file Intimate Partner Violence:     Fear of Current or Ex-Partner: Not on file    Emotionally Abused: Not on file    Physically Abused: Not on file    Sexually Abused: Not on file   Housing Stability:     Unable to Pay for Housing in the Last Year: Not on file    Number of [place] in the Last Year: Not on file    Unstable Housing in the Last Year: Not on file       Depression screening:  3 most recent PHQ Screens 12/15/2021   Little interest or pleasure in doing things Not at all   Feeling down, depressed, irritable, or hopeless Not at all   Total Score PHQ 2 0         Review of systems:     A comprehensive review of systems was negative except for that written in the History of Present Illness. Visit Vitals  /78   Pulse 91   Temp 98.4 °F (36.9 °C) (Oral)   Resp 16   Ht 5' 7\" (1.702 m)   Wt 226 lb 6.4 oz (102.7 kg)   LMP 02/07/2022 (Exact Date)   SpO2 98%   Breastfeeding Yes   BMI 35.46 kg/m²       General: Alert and oriented, in no acute distress. Well nourished. EYE: PERRL. Sclera and conjuctival clear. Extraocular movements intact. EARS: External normal, canals clear, tympanic membranes normal.   NOSE: Mucosa healthy without drainage or ulceration. OROPHARYNX: No suspicious lesions, normal dentition, pharynx, tongue and tonsils normal.  NECK: Supple; no masses; thyroid normal.  LUNGS: Respirations unlabored; clear to auscultation bilaterally. CARDIOVASCULAR: Regular, rate, and rhythm without murmurs, gallops or rubs. ABDOMEN: Soft; nontender; nondistended; normoactive bowel sounds; no masses or organomegaly. Assessment/Plan       ICD-10-CM ICD-9-CM    1. Chronic RUQ pain  R10.11 789.01 REFERRAL TO GASTROENTEROLOGY    G89.29 338.29        1. Chronic RUQ pain    - Referral to gastroenterology   - advised to try PPI if it would help while awaiting GI appointment.     Follow up: as needed    I have discussed the diagnosis with the patient and the intended plan as seen in the above orders. The patient has received an after-visit summary and questions were answered concerning future plans. I have discussed medication side effects and warnings with the patient as well. Informed patient to return to the office if new symptoms arise.     Signed By: Henry Hernandez MD     February 16, 2022

## 2022-02-16 NOTE — PATIENT INSTRUCTIONS
GASTROENTEROLOGY:      Bam Soria, Dr. Jignesh Perrin or Dr. Tian Hinds, Dr. Nafisa Nicolas, Dr. Terrie Still or Dr. John Yates [id number], [place], [address]     [phone].       PEDIATRIC GI ([facility])    Scott Johnson MD

## 2022-02-20 NOTE — ED PROVIDER NOTES
EMERGENCY DEPARTMENT HISTORY AND PHYSICAL EXAM      Date: 2/20/2022  Patient Name: Ragini Pruitt    History of Presenting Illness     Chief Complaint   Patient presents with    Vaginal Bleeding     Pt ambulatory into triage with a cc of vaginal bleeding; pt states she has been bleeding for 3 weeks and started having clots and becoming heavier 8 days ago; pt is also complaining of aching in the right eye with a blood blister.  Leg Pain     pt is also complaining of left leg cramping that started this am       History Provided By: Patient    HPI: Ragini Pruitt, 39 y.o. female with PMHx significant for fibroids, anemia, PCOS, presents to the ED with cc of right eye redness, vaginal bleeding, leg cramps. For the last month, the patient has had intermittent vaginal bleeding. She reports it was initially spotting for the first 2 weeks and then became more heavy for the last 2 weeks. Over the last 2 days, the bleeding started to slow down and she is no longer passing clots. She is scheduled to have an ultrasound done in 2 days at her gyn office. She felt like she was getting better until she woke up this morning and noted bleeding to her right upper eye when she looked in the mirror. She did not injure her eye and is unsure what could've caused this. She is worried that it is related to the vaginal bleeding. She does note that her son sleeps with her and could have accidentally struck her eye while she was sleeping. She is also having cramping to her bilateral legs, which has been ongoing but worse this morning. She denies leg swelling or redness. She denies lightheadedness or dizziness. She denies visual changes. She does report dysuria. There are no other complaints, changes, or physical findings at this time. PCP: Leanne Decker MD    No current facility-administered medications on file prior to encounter.      Current Outpatient Medications on File Prior to Encounter   Medication Sig Dispense Refill    ketorolac (TORADOL) 10 mg tablet Take 10 mg by mouth every six (6) hours as needed for Pain. Past History     Past Medical History:  Past Medical History:   Diagnosis Date    Anemia 2018    Fibroids     Gestational hypertension     Gestational hypertension affecting first pregnancy 2018    History of PCOS     Infertility, female     Polycystic disease, ovaries     Psychiatric problem     anxiety - took medications as a teenager, no medications recently        Past Surgical History:  Past Surgical History:   Procedure Laterality Date    HX  SECTION      HX OTHER SURGICAL  2009    wisdom tooth extraction x4       Family History:  Family History   Problem Relation Age of Onset    Hypertension Mother     Hypertension Sister     Diabetes Sister     Hypertension Brother     Cancer Paternal Uncle     Diabetes Paternal Uncle     Diabetes Maternal Uncle     Breast Cancer Cousin        Social History:  Social History     Tobacco Use    Smoking status: Never Smoker    Smokeless tobacco: Never Used   Vaping Use    Vaping Use: Never used   Substance Use Topics    Alcohol use: No    Drug use: No       Allergies:  No Known Allergies      Review of Systems   Review of Systems   Constitutional: Negative for chills and fever. HENT: Negative for ear pain and sore throat. Eyes: Positive for redness. Negative for pain, discharge, itching and visual disturbance. Respiratory: Negative for cough and shortness of breath. Cardiovascular: Negative for chest pain and palpitations. Gastrointestinal: Negative for abdominal pain, nausea and vomiting. Genitourinary: Positive for dysuria and vaginal bleeding. Negative for hematuria, vaginal discharge and vaginal pain. Musculoskeletal: Positive for myalgias. Negative for back pain and gait problem. Skin: Negative for rash and wound. Neurological: Negative for dizziness and headaches.    Psychiatric/Behavioral: Negative for behavioral problems and confusion. All other systems reviewed and are negative. Physical Exam   Physical Exam  Constitutional:       Appearance: She is not toxic-appearing. HENT:      Head: Normocephalic and atraumatic. Mouth/Throat:      Mouth: Mucous membranes are moist.   Eyes:      Extraocular Movements: Extraocular movements intact. Pupils: Pupils are equal, round, and reactive to light. Comments: Subconjunctival hemorrhage to the right upper medial eye. No hyphema. Cardiovascular:      Rate and Rhythm: Normal rate and regular rhythm. Pulmonary:      Effort: Pulmonary effort is normal. No respiratory distress. Abdominal:      Comments: Abdomen is soft. No tenderness to palpation. Musculoskeletal:         General: No deformity. Normal range of motion. Cervical back: Normal range of motion and neck supple. Comments: Normal inspection of legs. No edema or erythema. Tenderness of the muscles of the bilateral upper legs. Skin:     General: Skin is warm and dry. Neurological:      General: No focal deficit present. Mental Status: She is alert and oriented to person, place, and time.    Psychiatric:         Behavior: Behavior normal.           Diagnostic Study Results     Labs -     Recent Results (from the past 12 hour(s))   URINALYSIS W/ REFLEX CULTURE    Collection Time: 02/20/22  8:24 AM    Specimen: Urine   Result Value Ref Range    Color KELSI      Appearance TURBID (A) CLEAR      Specific gravity 1.020 1.003 - 1.030      pH (UA) 5.5 5.0 - 8.0      Protein 100 (A) NEG mg/dL    Glucose Negative NEG mg/dL    Ketone TRACE (A) NEG mg/dL    Bilirubin Negative NEG      Blood LARGE (A) NEG      Urobilinogen 1.0 0.2 - 1.0 EU/dL    Nitrites Negative NEG      Leukocyte Esterase LARGE (A) NEG      UA:UC IF INDICATED URINE CULTURE ORDERED (A) CNI      WBC  0 - 4 /hpf    RBC >100 (H) 0 - 5 /hpf    Epithelial cells FEW FEW /lpf    Bacteria 1+ (A) NEG /hpf    Hyaline cast 2-5 0 - 5 /lpf       Radiologic Studies -   No orders to display     CT Results  (Last 48 hours)    None        CXR Results  (Last 48 hours)    None            Medical Decision Making   I am the first provider for this patient. I reviewed the vital signs, available nursing notes, past medical history, past surgical history, family history and social history. Vital Signs-Reviewed the patient's vital signs. Patient Vitals for the past 12 hrs:   Temp Pulse Resp BP SpO2   02/20/22 0806 99.1 °F (37.3 °C) (!) 102 16 137/87 100 %         Records Reviewed: Nursing Notes and Old Medical Records      Provider Notes (Medical Decision Making):   DDx: dysfunctional uterine bleeding, endometriosis, PCOS, fibroids, subconjunctival hemorrhage, muscle spasm/cramps    Patient presents to the ED with multiple complaints. She has evidence of a right subconjunctival hemorrhage. There is no hyphema or visual changes. She was educated that this will resolve on its own but will take 1-2 weeks. Advised to return if she develops visual changes. In regards to her vaginal bleeding, she was seen here for this 8 days ago and hemoglobin was stable at that time. She is not feeling dizzy or lightheaded today, her bleeding is slowing down, and her vital signs are stable, do not suspect worsening anemia at this time. Will defer labs today as she already has follow up scheduled with her OB/gyn in 2 days. Advised she return if she develops signs of anemia, severe abdominal pain, heavy bleeding, fevers. Urinalysis does show evidence of UTI and she complains of dysuria today, so will start on antibiotic for treatment of this. In regards to her leg cramps, she has no evidence of cellulitis nor DVT on exam. Recommended she drink plenty of water to stay hydrated. Discussed symptomatic treatment. ED Course:   Initial assessment performed.  The patients presenting problems have been discussed, and they are in agreement with the care plan formulated and outlined with them. I have encouraged them to ask questions as they arise throughout their visit. Disposition:  9:04 AM  The patient has been re-evaluated and is ready for discharge. Reviewed available results with patient. Counseled patient on diagnosis and care plan. Patient has expressed understanding, and all questions have been answered. Patient agrees with plan and agrees to follow up as recommended, or to return to the ED if their symptoms worsen. Discharge instructions have been provided and explained to the patient, along with reasons to return to the ED. PLAN:  1. Discharge Medication List as of 2/20/2022  9:04 AM      START taking these medications    Details   cephALEXin (Keflex) 500 mg capsule Take 1 Capsule by mouth two (2) times a day for 7 days. , Normal, Disp-14 Capsule, R-0         CONTINUE these medications which have NOT CHANGED    Details   ketorolac (TORADOL) 10 mg tablet Take 10 mg by mouth every six (6) hours as needed for Pain., Historical Med           2. Follow-up Information     Follow up With Specialties Details Why Contact Info    Rhona Alexander MD [facility] Medicine Schedule an appointment as soon as possible for a visit in 1 week  [address] [place] [id number]  [phone]      Your OB/gyn  Go on 2/22/2022 as scheduled     [facility] EMERGENCY DEPT Emergency Medicine Go to  If symptoms worsen [address] [place] [address]  [phone]        Return to ED if worse     Diagnosis     Clinical Impression:   1. Subconjunctival hemorrhage of right eye    2. Acute cystitis without hematuria    3. DUB (dysfunctional uterine bleeding)    4. Muscle cramps            Mel Fix.  JENNIFER Robins

## 2022-02-22 PROBLEM — D25.9 FIBROID UTERUS: Status: ACTIVE | Noted: 2022-01-01

## 2022-02-22 NOTE — PATIENT INSTRUCTIONS
Learning About Hysterectomy Surgery  What is a hysterectomy? A hysterectomy is surgery to take out the uterus. Sometimes the ovaries and fallopian tubes also are removed at the same time. How is this surgery done? There are many ways to do the surgery. The type you have depends on your medical condition. It also depends on your overall health. Talk with your doctor about which type is right for you. Abdominal surgery  This is done through a cut that the doctor makes in the lower belly. The cut is called an incision. It may be across the bikini line or straight up and down. The doctor takes out the uterus and the cervix. This procedure is most often done when the woman might have cancer. It's also often done when the uterus is hard to remove. This may be because of severe endometriosis, a lot of scar tissue (adhesions), or a very large uterus. Vaginal surgery  This is done through the vagina. The doctor makes a small cut in the vagina instead of the belly. This method isn't used when there's a chance that cancer may be in the uterus, cervix, or ovaries. It's used when the uterus is normal in size and easy to remove. Laparoscopic surgery  The doctor puts a lighted tube (laparoscope) through small cuts in the belly. The doctor can see your organs with the scope. The doctor can insert surgical tools to cut the tissue that holds your uterus in place. Then the uterus is removed. It may be removed through small cuts in the belly. (This is called laparoscopic abdominal hysterectomy.) Or it may be removed through the vagina. (This is called laparoscopically assisted vaginal hysterectomy.)  What can you expect after your surgery? Recovery can take 4 to 6 weeks. It depends on which type of surgery you have. You will need help around the house while you get better. You won't be able to do any heavy lifting. And you will have to take it easy for a few weeks.  It's common to feel more tired than usual. You also may notice that your emotions go up and down more than usual for a while. After surgery, you will no longer have periods. You won't be able to get pregnant. If there's a chance that you will want to have a baby in the future, talk to your doctor about other treatment options. The surgery should not lower your interest in sex after you have healed. In fact, some people enjoy sex more. They no longer have to worry about birth control or heavy bleeding. You may have vaginal dryness after the surgery. It can make sex less comfortable. A vaginal lubricant, such as Astroglide or K-Y Jelly, can help. You may need to take hormone pills after your surgery if your ovaries are removed and you haven't gone through menopause. Taking out the ovaries before menopause causes a sudden drop in the hormone estrogen. This raises your risk of getting weak and brittle bones (osteoporosis). Estrogen therapy lowers this risk. But it slightly raises the risk of some other problems. These include breast cancer and stroke. Talk with your doctor about the pros and cons of taking estrogen if you have your ovaries removed. Follow-up care is a key part of your treatment and safety. Be sure to make and go to all appointments, and call your doctor if you are having problems. It is also a good idea to know your test results and keep a list of the medicines you take. Where can you learn more? Go to http://www.gray.com/  Enter H610 in the search box to learn more about \"Learning About Hysterectomy Surgery. \"  Current as of: February 11, 2021               Content Version: 13.0  © 2006-2021 [employer]. Care instructions adapted under license by [employer] (which disclaims liability or warranty for this information).  If you have questions about a medical condition or this instruction, always ask your healthcare professional. [address] any warranty or liability for your use of this information.

## 2022-02-22 NOTE — PROGRESS NOTES
Patient presents today to discuss results of ultrasound, which was performed due to pelvic pain and fibroid. Per US today- TA ULTRASOUND  THE UTERUS IS ANTEVERTED, ENLARGED IN SIZE AND HETEROGENOUS IN ECHOGENICITY. THERE APPEARS  TO BE A FIBROID SEEN ML/LT SUBMUCOSAL VS. INTRAMURAL MEASURING 157 X 97 X 134 MM. THERE APPEARS  TO BE A PEDUNCULATED FIBROID SEEN RT POSTERIOR MEASURING 56 X 61 MM. THE ENDOMETRIUM MEASURES 13.5MM IN THICKNESS. THERE APPEARS TO BE HYPOECHOIC FLUID SEEN  WITHIN THE ENDOMETRIAL LINING, POSSIBLE BLOOD. RIGHT OVARY APPEARS WNL. LEFT OVARY APPEARS WNL. NO FREE FLUID IS SEEN IN THE CDS. We discussed findings of US today including uterine fibroid. We discussed options for mgmt today including cyclic progestin, [place], and davinci TLH, bilateral salpingectomy, cystoscopy. Risk/benefits reviewed. Questions answered. Pt opts for definitive surgical management with hysterectomy. Information sent to schedulers. Will do continuous provera until surgery.      Signed By: Ligia Tom     February 22, 2022        Jose R Rivers MD

## 2022-03-07 NOTE — TELEPHONE ENCOUNTER
Call received at 2:20PM        39year old patient last seen in the office on  2/22/2022    Patient calling about her prescription for provera     Patient stated that she did not pick her prescription right away . Patient was advised to check back with the pharmacy that the prescription looked to be ok, that they may have put the medication back . Patient will check with the pharmacy    Patient verbalized understanding.

## 2022-03-08 NOTE — ED PROVIDER NOTES
EMERGENCY DEPARTMENT HISTORY AND PHYSICAL EXAM      Date: 3/8/2022  Patient Name: James Warren    History of Presenting Illness     Chief Complaint   Patient presents with    Rapid Heart Rate     Pt ambulatory into triage with a cc of rapid HR x 1 week; pt states that rapid HR with any exertion and sob       History Provided By: Patient    HPI: James Warren, 39 y.o. female presents to the ED with cc of palpitations. Pt states for the past week she has noted palpitations and shortness of breath. Mild in severity at rest, worsens with exertions. She has had some lightheadedness as well with positional changes. She notes hx of heavy menstrual bleeding secondary to fibroids. She has planned hysterectomy at end of April. She is not currently on her menstrual cycle. She denies any headache. There has no been no fever or chills. She denies any cough or cold symptoms. There has been abd pain which she has pain meds for at home. She denies any n/v/d. There has been no pain or swelling of the lower extremities. There are no other complaints, changes, or physical findings at this time. PCP: Hussein Kinsey MD    No current facility-administered medications on file prior to encounter. Current Outpatient Medications on File Prior to Encounter   Medication Sig Dispense Refill    medroxyPROGESTERone (PROVERA) 10 mg tablet Take 1 Tablet by mouth daily for 90 days. 90 Tablet 2    ketorolac (TORADOL) 10 mg tablet Take 10 mg by mouth every six (6) hours as needed for Pain.          Past History     Past Medical History:  Past Medical History:   Diagnosis Date    Anemia 12/17/2018    Fibroids     Gestational hypertension     Gestational hypertension affecting first pregnancy 12/13/2018    History of PCOS     Infertility, female     Polycystic disease, ovaries     Psychiatric problem     anxiety - took medications as a teenager, no medications recently        Past Surgical History:  Past Surgical History: Procedure Laterality Date    HX  SECTION      HX OTHER SURGICAL  2009    wisdom tooth extraction x4       Family History:  Family History   Problem Relation Age of Onset    Hypertension Mother     Hypertension Sister     Diabetes Sister     Hypertension Brother     Cancer Paternal Uncle     Diabetes Paternal Uncle     Diabetes Maternal Uncle     Breast Cancer Cousin        Social History:  Social History     Tobacco Use    Smoking status: Never Smoker    Smokeless tobacco: Never Used   Vaping Use    Vaping Use: Never used   Substance Use Topics    Alcohol use: No    Drug use: No       Allergies:  No Known Allergies      Review of Systems   Review of Systems   Constitutional: Negative. Negative for appetite change, chills, fatigue and fever. HENT: Negative. Negative for congestion, rhinorrhea, sinus pressure and sore throat. Eyes: Negative. Respiratory: Positive for shortness of breath. Negative for cough, choking, chest tightness and wheezing. Cardiovascular: Positive for palpitations. Negative for chest pain and leg swelling. Gastrointestinal: Negative for abdominal pain, constipation, diarrhea, nausea and vomiting. Endocrine: Negative. Genitourinary: Positive for vaginal bleeding (Hx of heavy bleeding, hx of fibroids ). Negative for difficulty urinating, dysuria, flank pain and urgency. Musculoskeletal: Negative. Skin: Negative. Neurological: Positive for light-headedness. Negative for dizziness, speech difficulty, weakness, numbness and headaches. Psychiatric/Behavioral: Negative. All other systems reviewed and are negative. Physical Exam   Physical Exam  Vitals and nursing note reviewed. Constitutional:       General: She is not in acute distress. Appearance: She is well-developed. She is obese. She is not diaphoretic. HENT:      Head: Normocephalic and atraumatic.       Mouth/Throat:      Mouth: Mucous membranes are moist.      Pharynx: No oropharyngeal exudate. Eyes:      Extraocular Movements: Extraocular movements intact. Conjunctiva/sclera: Conjunctivae normal.      Pupils: Pupils are equal, round, and reactive to light. Comments: Pale conjunctiva      Neck:      Vascular: No JVD. Trachea: No tracheal deviation. Cardiovascular:      Rate and Rhythm: Regular rhythm. Tachycardia present. Heart sounds: Normal heart sounds. No murmur heard. Pulmonary:      Effort: Pulmonary effort is normal. No respiratory distress. Breath sounds: Normal breath sounds. No stridor. No wheezing or rales. Abdominal:      General: There is no distension. Palpations: Abdomen is soft. Tenderness: There is no abdominal tenderness. There is no guarding or rebound. Musculoskeletal:         General: No tenderness. Normal range of motion. Cervical back: Normal range of motion and neck supple. Skin:     General: Skin is warm and dry. Coloration: Skin is pale. Neurological:      Mental Status: She is alert and oriented to person, place, and time. Cranial Nerves: No cranial nerve deficit.       Comments: No gross motor or sensory deficits    Psychiatric:         Mood and Affect: Mood normal.         Behavior: Behavior normal.         Diagnostic Study Results     Labs -     Recent Results (from the past 12 hour(s))   CBC WITH AUTOMATED DIFF    Collection Time: 03/08/22  8:45 AM   Result Value Ref Range    WBC 10.4 3.6 - 11.0 K/uL    RBC 2.38 (L) 3.80 - 5.20 M/uL    HGB 5.3 (LL) 11.5 - 16.0 g/dL    HCT 17.7 (LL) 35.0 - 47.0 %    MCV 74.4 (L) 80.0 - 99.0 FL    MCH 22.3 (L) 26.0 - 34.0 PG    MCHC 29.9 (L) 30.0 - 36.5 g/dL    RDW 15.0 (H) 11.5 - 14.5 %    PLATELET 923 358 - 528 K/uL    MPV 10.1 8.9 - 12.9 FL    NRBC 0.0 0  WBC    ABSOLUTE NRBC 0.00 0.00 - 0.01 K/uL    NEUTROPHILS 78 (H) 32 - 75 %    LYMPHOCYTES 12 12 - 49 %    MONOCYTES 8 5 - 13 %    EOSINOPHILS 1 0 - 7 %    BASOPHILS 0 0 - 1 %    IMMATURE GRANULOCYTES 1 (H) 0.0 - 0.5 %    ABS. NEUTROPHILS 8.2 (H) 1.8 - 8.0 K/UL    ABS. LYMPHOCYTES 1.2 0.8 - 3.5 K/UL    ABS. MONOCYTES 0.8 0.0 - 1.0 K/UL    ABS. EOSINOPHILS 0.1 0.0 - 0.4 K/UL    ABS. BASOPHILS 0.0 0.0 - 0.1 K/UL    ABS. IMM. GRANS. 0.1 (H) 0.00 - 0.04 K/UL    DF SMEAR SCANNED      RBC COMMENTS ANISOCYTOSIS  1+        RBC COMMENTS POLYCHROMASIA  1+        RBC COMMENTS SCHISTOCYTES  PRESENT       METABOLIC PANEL, COMPREHENSIVE    Collection Time: 03/08/22  8:45 AM   Result Value Ref Range    Sodium 135 (L) 136 - 145 mmol/L    Potassium 3.5 3.5 - 5.1 mmol/L    Chloride 105 97 - 108 mmol/L    CO2 24 21 - 32 mmol/L    Anion gap 6 5 - 15 mmol/L    Glucose 124 (H) 65 - 100 mg/dL    BUN 10 6 - 20 MG/DL    Creatinine 0.79 0.55 - 1.02 MG/DL    BUN/Creatinine ratio 13 12 - 20      GFR est AA >60 >60 ml/min/1.73m2    GFR est non-AA >60 >60 ml/min/1.73m2    Calcium 8.5 8.5 - 10.1 MG/DL    Bilirubin, total 1.3 (H) 0.2 - 1.0 MG/DL    ALT (SGPT) 20 12 - 78 U/L    AST (SGOT) 22 15 - 37 U/L    Alk. phosphatase 51 45 - 117 U/L    Protein, total 6.9 6.4 - 8.2 g/dL    Albumin 3.4 (L) 3.5 - 5.0 g/dL    Globulin 3.5 2.0 - 4.0 g/dL    A-G Ratio 1.0 (L) 1.1 - 2.2     TROPONIN-HIGH SENSITIVITY    Collection Time: 03/08/22  8:45 AM   Result Value Ref Range    Troponin-High Sensitivity <4 0 - 51 ng/L       Radiologic Studies -   No orders to display     CT Results  (Last 48 hours)    None        CXR Results  (Last 48 hours)    None          Medical Decision Making   I am the first provider for this patient. I reviewed the vital signs, available nursing notes, past medical history, past surgical history, family history and social history. Vital Signs-Reviewed the patient's vital signs.   Patient Vitals for the past 12 hrs:   Temp Pulse Resp BP SpO2   03/08/22 1030  (!) 108 27 (!) 144/79    03/08/22 1000  (!) 105 19 130/73 100 %   03/08/22 0949  (!) 103 21 132/75 100 %   03/08/22 0934  (!) 114 20 (!) 141/77 100 % 03/08/22 0927  (!) 101 22  100 %   03/08/22 0907  (!) 111 26 (!) 141/73 100 %   03/08/22 0837 98.8 °F (37.1 °C) (!) 118 16 (!) 166/86 100 %     EKG:  Sinus tach, rated 115, normal axis/pr/qrs, NSST, Clois Miracle, DO    Records Reviewed: Nursing Notes, Old Medical Records, Previous Radiology Studies and Previous Laboratory Studies, Hx of Uterine fibroids last Hgb 10     Provider Notes (Medical Decision Making):   DDx- Anemia secondary to heavy menses related to fibroids     ED Course:   Initial assessment performed. The patients presenting problems have been discussed, and they are in agreement with the care plan formulated and outlined with them. I have encouraged them to ask questions as they arise throughout their visit. Patient tachycardic with dyspnea on exertion. Patient has had an acute blood loss from 10 down to 5.3 likely related to uterine fibroids and heavy vaginal bleeding. She does have a hysterectomy scheduled but not till the end of April. Given her symptomatic anemia we will transfuse 2 units prior to discharge. I have discussed with the patient the rationale for blood component transfusion; its benefits in treating or preventing fatigue, organ damage, or death; and its risk which includes mild transfusion reactions, rare risk of blood borne infection, or more serious but rare reactions. I have discussed the alternatives to transfusion, including the risk and consequences of not receiving transfusion. The patient had an opportunity to ask questions and had agreed to proceed with transfusion of blood components. Pt will be dc home following 2 units of blood. She has f/u with her GYN.      Critical Care Time:   CRITICAL CARE NOTE :    11:11 AM    IMPENDING DETERIORATION -Cardiovascular  ASSOCIATED RISK FACTORS - lightheaded, palpitations associated with blood loss  MANAGEMENT- Bedside Assessment and Supervision of Care  INTERPRETATION -  ECG, Blood Pressure, Cardiac Output Measures and labs  INTERVENTIONS - hemodynamic mngmt  CASE REVIEW - Nursing  TREATMENT RESPONSE -Improved  PERFORMED BY - Self    NOTES   :  I have spent 40 minutes of critical care time involved in lab review, consultations with specialist, family decision- making, bedside attention and documentation. This time excludes time spent in any separate billed procedures. During this entire length of time I was immediately available to the patient . Mani Fallon DO      Disposition:  DC home    DISCHARGE PLAN:  1. Current Discharge Medication List      No medication changes     2. Follow-up Information    None       3. Return to ED if worse     Diagnosis     Clinical Impression:   1. Anemia, unspecified type    2. Fibroids        Attestations:    Mani Fallon DO    Please note that this dictation was completed with [employer], the computer voice recognition software. Quite often unanticipated grammatical, syntax, homophones, and other interpretive errors are inadvertently transcribed by the computer software. Please disregard these errors. Please excuse any errors that have escaped final proofreading. Thank you.

## 2022-03-08 NOTE — Clinical Note
[address]  [facility] EMERGENCY DEPT  [address]  [place] [address]  [phone]    Work/School Note    Date: 3/8/2022    To Whom It May concern:      Geetha Duran was seen and treated today in the emergency room by the following provider(s):  Attending Provider: Ham Medeiros DO. Geetha Duran is excused from work/school on 03/08/22. She is clear to return to work/school on 03/09/22.         Sincerely,          Juliano Lies, DO

## 2022-03-08 NOTE — ED NOTES
Patient reports palpitations and increased HR with activity. Denies chest pain or dizziness with activity. Ambulatory to bathroom. Tolerating activity. Pending blood bank allocation for blood transfusion.

## 2022-03-09 NOTE — TELEPHONE ENCOUNTER
----- Message from Robert Sellers sent at 3/9/2022  9:03 AM EST -----  Subject: Message to Provider    QUESTIONS  Information for Provider? Pt is requesting a call back from either a nurse   or Tian Mcnulty, Pt had a blood transfusion last night, when Pt gets up   to walk her heart races, Pt would like to know if this is normal and if   she should give it sometime before this goes away.  ---------------------------------------------------------------------------  --------------  CALL BACK INFO  What is the best way for the office to contact you? OK to leave message on   voicemail  Preferred Call Back Phone Number? [phone]  ---------------------------------------------------------------------------  --------------  SCRIPT ANSWERS  Relationship to Patient?  Self

## 2022-03-09 NOTE — TELEPHONE ENCOUNTER
Called and spoke with patient. Per Dr. Johnny Burdick patient needs to stop the Provera and start the megace today to help stop the bleeding. Patient understood and will  the medication today and take it tonight.

## 2022-03-09 NOTE — TELEPHONE ENCOUNTER
Returned call to pt. Seen at ER last night for rapid heart rate, shortness of breath and weakness,  Dx with anemia,  hgb 5.3, received 2 Units of PRBS,  Denies sob and weakness, still has occasional rapid heart rate,  Normal at resting state.   ER follow up appt scheduled [email or web address]

## 2022-03-09 NOTE — TELEPHONE ENCOUNTER
Called pt back and advised her of MD recommendations. Pt gave verbal understanding and has no questions at this time.

## 2022-03-09 NOTE — TELEPHONE ENCOUNTER
39year old patient calling because she went to the ER yesterday since she was having a high heart rate, and feeling weak. Pt stated her HGB was 5.3 and HCT was 17.7 and had to have a blood transfusion done. Pt states she does feel better today and sh does not feel weak. Pt says that before her transfusion her heart rate was around 130 and after when she does activity it is around 118, and when she sits down and rest it is at 91. Pt wants to know if this is something she should be concerned about before her surgery for the partial hysterectomy. Should she get repeat blood work done before her surgery or not. Please advise. Thank you.

## 2022-03-09 NOTE — ED NOTES
2nd liter of PRBC completed at this time. Provider notified. Pending discharge at this time. Bedside and Verbal shift change report given to [address] (oncoming nurse) by Kristie Kirkland (offgoing nurse). Report included the following information SBAR, ED Summary, MAR, Recent Results and Cardiac Rhythm NSR.

## 2022-03-11 PROBLEM — D25.9 LEIOMYOMA OF BODY OF UTERUS: Status: ACTIVE | Noted: 2022-01-01

## 2022-03-11 PROBLEM — D62 ANEMIA ASSOCIATED WITH ACUTE BLOOD LOSS: Status: ACTIVE | Noted: 2022-01-01

## 2022-03-11 NOTE — H&P
Gynecology History and Physical    Name: Yue Jernigan MRN: [id number] SSN: [id number]    YOB: 1980  Age: 39 y.o. Sex: female       Subjective:      Chief complaint: \"I'm dizzy from my heavy periods. \"     Eugenio Rebolledo is a 39 y.o.  female [id number] with a history of menorrhagia and uterine fibroids presented to the ED with dizziness and chest palpitations. She denies vaginal bleeding at this time but had been bleeding the last 2 weeks with clots. Her GYN placed her on Megace 40mg BID 5 days ago and now it has improved. Significant to her HPI is the fact that she has had fibroids and chronic menorrhagia for the past 20 years. She was also transfused 3 days 2 units of blood for a HB around 5 gm. Today her HB is now 5.4 gm. Her private GYN has her scheduled for a hysterectomy in the coming future. Dr. Kristine Magaña, [facility] Hospitalist was consulted and agreed to outpatient admission to correct her anemia.   OB History        2    Para   1    Term   1            AB   1    Living   1       SAB   1    IAB        Ectopic        Molar        Multiple   0    Live Births   1              Past Medical History:   Diagnosis Date    Anemia 2018    Fibroids     Gestational hypertension     Gestational hypertension affecting first pregnancy 2018    History of PCOS     Infertility, female     Polycystic disease, ovaries     Psychiatric problem     anxiety - took medications as a teenager, no medications recently      Past Surgical History:   Procedure Laterality Date    HX  SECTION      HX OTHER SURGICAL  2009    wisdom tooth extraction x4     Social History     Occupational History    Not on file   Tobacco Use    Smoking status: Never Smoker    Smokeless tobacco: Never Used   Vaping Use    Vaping Use: Never used   Substance and Sexual Activity    Alcohol use: No    Drug use: No    Sexual activity: Yes     Partners: Male     Birth control/protection: Condom     Comment: condoms- sometimes. Family History   Problem Relation Age of Onset    Hypertension Mother     Hypertension Sister     Diabetes Sister     Hypertension Brother     Cancer Paternal Uncle     Diabetes Paternal Uncle     Diabetes Maternal Uncle     Breast Cancer Cousin         No Known Allergies  Prior to Admission medications    Medication Sig Start Date End Date Taking? Authorizing Provider   megestroL (MEGACE) 40 mg tablet Take 1 Tablet by mouth two (2) times a day for 60 days. 3/9/22 5/8/22  Will MD Jerrod   ketorolac (TORADOL) 10 mg tablet Take 10 mg by mouth every six (6) hours as needed for Pain. Patient not taking: Reported on 3/11/2022    Provider, Historical        Review of Systems  See HPI    Objective:     Vitals:    03/11/22 1725 03/11/22 1847   BP: (!) 154/91 128/75   Pulse: (!) 131 (!) 114   Resp: 18 15   Temp: 99.4 °F (37.4 °C)    SpO2: 100% 100%   Weight: 99.2 kg (218 lb 11.1 oz)    Height: 5' 7\" (1.702 m)        Physical Exam:  Patient without distress. Heart: Tachycardia  Lung: normal respiratory effort  Abdomen: soft, nontender, protuberant, without guarding, without rebound,   External Genitalia: normal general appearance  Urinary system: urethral meatus normal  Vagina: normal mucosa without prolapse or lesions and no blood present  Cervix: Deep in the posterior fornix, no lesions  Adnexa: normal bimanual exam  Uterus: anteverted, tender, enlarged and 15 weeks size with multiple myomas. Assessment/Plan:   Severe anemia secondary to menorrhagia. Large Uterine Fibroids. Acute Urinary Tract Infection. Plan: 1 Admit for transfusion. 2. Patient is supposed to have a hysterectomy in the near future with Dr. Cherise Abdalla. 3. Will continue Megace 40 mg BID.         Signed By:  Madelin Jo MD     March 11, 2022

## 2022-03-11 NOTE — TELEPHONE ENCOUNTER
Patient has appt. Scheduled for today 03/11/2022 @ 2:40pm. Will try to touch base ratliff=ith Dr. Smallwood Guard before appt. Or wait til appt. Patient acknowledged.

## 2022-03-11 NOTE — PROGRESS NOTES
[address] [address] [address]. [place], [address] [place] [address]  [phone]    Chief Complaint: ER follow up    Barbie Hill is a 39 y.o. female , established patient, here for evaluation of the concern(s) above;      ER follow up:    Evaluated in the ER on 3/8/22 for palpitations and sob in the setting ongoing vaginal bleeding. Hb was 5.3 (from 10.4). EKG showed sinus tach. She received 2 pRBCs. Pt called her OB/GYN the following day and was started on Megace 40mg daily. At today's visit, pt reports that the vaginal bleeding is still present though milder than before. She reports feeling dizzy. She also endorse palpations and sob. Has little energy that she cannot go up her stairs without running out of breath. Of note, she has planned Hysterectomy at the end of next month with Dr. Nathen Geiger. No fever, chills, nausea or vomiting. Allergies - reviewed:   No Known Allergies    Past Medical History - reviewed:  Past Medical History:   Diagnosis Date    Anemia 12/17/2018    Fibroids     Gestational hypertension     Gestational hypertension affecting first pregnancy 12/13/2018    History of PCOS     Infertility, female     Polycystic disease, ovaries     Psychiatric problem     anxiety - took medications as a teenager, no medications recently        Depression screening:  3 most recent PHQ Screens 3/11/2022   Little interest or pleasure in doing things Not at all   Feeling down, depressed, irritable, or hopeless More than half the days   Total Score PHQ 2 2         Review of systems:      A comprehensive review of systems was negative except for that written in the History of Present Illness.      Physical Exam  Visit Vitals  /77 (BP 1 Location: Right upper arm, BP Patient Position: Sitting)   Pulse (!) 116   Temp 99.2 °F (37.3 °C) (Oral)   Resp 20   Ht 5' 7\" (1.702 m)   Wt 219 lb (99.3 kg)   SpO2 100%   BMI 34.30 kg/m² General: Alert and oriented, in no acute distress. Well nourished. SKIN: No rash. No suspicious lesions or moles. EYE: PERRL. Sclera and conjuctival clear. Extraocular movements intact. EARS: External normal, canals clear, tympanic membranes normal.   NOSE: Mucosa healthy without drainage or ulceration. OROPHARYNX: No suspicious lesions, normal dentition, pharynx, tongue and tonsils normal.  NECK: Supple; no masses; thyroid normal.  LUNGS: Respirations unlabored; clear to auscultation bilaterally. CARDIOVASCULAR: Tachycardia. Regular rhythm without murmurs, gallops or rubs. Neurological: Alert and oriented X 3, normal strength and tone. Normal symmetric reflexes. Normal coordination and gait       Assessment/Plan    ICD-10-CM ICD-9-CM    1. Iron deficiency anemia due to chronic blood loss  D50.0 280.0 AMB POC HEMOGLOBIN (HGB)      CANCELED: HGB & HCT     1. Iron deficiency anemia due to chronic blood loss    - AMB POC HEMOGLOBIN (HGB) - 6.1    Given constellation of symptoms (palpitation, dizzy and sob) in the setting of continuous abnormal uterine bleeding, I think it is necessary that pt gets evaluated in the ER for a blood transfusion. Patient agrees to go to ER  for evaluation. Offered to call EMS but patient would rather have /family member drive. Follow up with patient when she returns        We discussed the expected course, resolution and complications of the diagnosis(es) in detail. Medication risks, benefits, costs, interactions, and alternatives were discussed as indicated. I advised him to contact the office if his condition worsens, changes or fails to improve as anticipated. He expressed understanding with the diagnosis(es) and plan. Patient understands that this encounter was a temporary measure, and the importance of further follow up and examination was emphasized. Patient verbalized understanding.       Signed By: Odalys Devlin MD     March 11, 2022

## 2022-03-11 NOTE — TELEPHONE ENCOUNTER
----- Message from Jordy Moore sent at 3/10/2022  6:44 PM EST -----  Subject: Message to Provider    QUESTIONS  Information for Provider? Pt is calling to ask if she can take iron pills. She has appt tomorrow, the 11th of March. Please reach out to pt or   discuss when she comes in to see PCP.  ---------------------------------------------------------------------------  --------------  CALL BACK INFO  What is the best way for the office to contact you? OK to leave message on   voicemail  Preferred Call Back Phone Number? [phone]  ---------------------------------------------------------------------------  --------------  SCRIPT ANSWERS  Relationship to Patient?  Self

## 2022-03-11 NOTE — PROGRESS NOTES
Chief Complaint   Patient presents with   [facility] Follow Up     1. \"Have you been to the ER, urgent care clinic since your last visit? Hospitalized since your last visit? \" Yes Where: [facility] ER-rapid heart rate-3/2022    2. \"Have you seen or consulted any other health care providers outside of the [address] [place] [address] since your last visit? \" No     3. For patients aged 39-70: Has the patient had a colonoscopy / FIT/ Cologuard? No      If the patient is female:    4. For patients aged 41-77: Has the patient had a mammogram within the past 2 years? Yes - no Care Gap present      5. For patients aged 21-65: Has the patient had a pap smear?  Yes - no Care Gap present

## 2022-03-12 NOTE — PROGRESS NOTES
In to see patient    She reports improvement in her bleeding    She is able to ambulate without difficulty. She denies chest pain, dizziness, dyspnea. She is just receiving her transfusion now    She would still like to go home if possible. Will place discharge order. She may go home once transfusion completes unless clinically changes.      Continue megace BID, patient has prescription and medication    Motrin OTC as needed for pain

## 2022-03-12 NOTE — PROGRESS NOTES
Comprehensive Nutrition Assessment    Type and Reason for Visit: Initial,Positive nutrition screen    Nutrition Recommendations/Plan:   · Continue regular diet as tolerated. · Ok to continue ensure shakes for now. · Please document % meals and supplements consumed in flowsheet I/O's under intake. Nutrition Assessment:     3/12: Chart reviewed; med noted for anemia associated with blood loss per uterine bleed. Pt is receiving megace to control bleeding which may also stimulate appetite. RD received auto-nutrition referral per mild weight loss identified on admission per MST. Pt tolerating a regular diet and weight loss has been mostly mild. Pt is hopeful to be discharged today but GYN will evaluate after blood transfusion. No acute nutrition needs identified at this time. No data found. Last Weight Metric  Weight Loss Metrics 3/11/2022 3/11/2022 3/8/2022 2/22/2022 2/20/2022 2/16/2022 2/12/2022   Today's Wt 218 lb 11.1 oz 219 lb 217 lb 13 oz 222 lb 222 lb 14.2 oz 226 lb 6.4 oz 226 lb 3.1 oz   BMI 34.25 kg/m2 34.3 kg/m2 34.11 kg/m2 34.77 kg/m2 34.91 kg/m2 35.46 kg/m2 35.43 kg/m2       Estimated Daily Nutrient Needs:  Energy (kcal): 2190 (BMR 1685 x 1. 3AF); Weight Used for Energy Requirements: Current  Protein (g): 100 (1.0-1.2 g/kg bw); Weight Used for Protein Requirements: Current  Fluid (ml/day): 6191-9172 ml/day; Method Used for Fluid Requirements: 1 ml/kcal    Nutrition Related Findings:  BM: none documented; Meds: reviewed; Labs: H/H 7.1/22.5      Wounds:    None       Current Nutrition Therapies:  ADULT DIET Regular    Anthropometric Measures:  · Height:  5' 7\" (170.2 cm)  · Current Body Wt:  99.2 kg (218 lb 11.1 oz)   · Ideal Body Wt:  135 lbs:  162 %   · BMI Category:  Obese class 1 (BMI 30.0-34. 9)       Nutrition Diagnosis:   No nutrition diagnosis at this time     Nutrition Interventions:   Food and/or Nutrient Delivery: Continue current diet  Nutrition Education and Counseling: No recommendations at this time  Coordination of Nutrition Care: Continue to monitor while inpatient    Goals:  Trend PO intake >50% of meals next 5-7 days       Nutrition Monitoring and Evaluation:   Behavioral-Environmental Outcomes: None identified  Food/Nutrient Intake Outcomes: Food and nutrient intake  Physical Signs/Symptoms Outcomes: Biochemical data,Skin,Weight    Discharge Planning:    Continue current diet     Electronically signed by Rosa Maria Gary RD on 3/12/2022 at 11:54 AM    Contact: DONNA

## 2022-03-12 NOTE — ED NOTES
Patient is being transferred to [facility], Room # 4976. Report given to Olman Messi on Keren Scherer for routine progression of care. Report consisted of the following information SBAR, Kardex, ED Summary and MAR. Patient transferred to receiving unit by: Transport tech (RN or tech name). Outstanding consults needed: No     Next labs due: No     The following personal items will be sent with the patient during transfer to the floor: All valuables:    Cardiac monitoring ordered: Yes    The following CURRENT information was reported to the receiving RN:    Code status: Prior at time of transfer    Last set of vital signs:  Vital Signs  Level of Consciousness: Alert (0) (03/11/22 1725)  Temp: 99.4 °F (37.4 °C) (03/11/22 1725)  Temp Source: Oral (03/11/22 1725)  Pulse (Heart Rate): (!) 114 (03/11/22 1847)  Cardiac Rhythm: Sinus Tachy (03/11/22 1851)  Resp Rate: 15 (03/11/22 1847)  BP: 128/75 (03/11/22 1847)  MAP (Monitor): 91 (03/11/22 1847)  MAP (Calculated): 93 (03/11/22 1847)  BP 1 Location: Left upper arm (03/11/22 1725)  BP 1 Method: Automatic (03/11/22 1725)  BP Patient Position: At rest (03/11/22 1725)  MEWS Score: 4 (03/11/22 1725)         Oxygen Therapy  O2 Sat (%): 100 % (03/11/22 1847)  Pulse via Oximetry: 114 beats per minute (03/11/22 1847)  O2 Device: None (Room air) (03/11/22 1725)      Last pain assessment:  Pain 1  Pain Scale 1: Numeric (0 - 10)  Pain Intensity 1: 0      Wounds: No     Urinary catheter: voiding  Is there a zamora order: No     LDAs:       Peripheral IV 03/11/22 Right Antecubital (Active)         Opportunity for questions and clarification was provided.     Pam Aviles RN

## 2022-03-12 NOTE — PROGRESS NOTES
Gynecology Progress Note    Patient doing better after two units of blood. She unfortunately did not get her night time dose of megace and has started to bleed again. She states it is heavy. Her Hemoglobin improved from 5--> 7.1. With the bleeding, she is having cramping. She has no chest pain or dyspnea    Vitals:  Blood pressure (!) 147/76, pulse 93, temperature 97.9 °F (36.6 °C), resp. rate 18, height 5' 7\" (1.702 m), weight 99.2 kg (218 lb 11.1 oz), SpO2 100 %, currently breastfeeding. Temp (24hrs), Av °F (37.2 °C), Min:97.9 °F (36.6 °C), Max:99.4 °F (37.4 °C)        Exam:  Patient without distress. Cardiac- regular rate and rhythm   Lungs- clear to auscultation     bilaterally   Abdomen- soft, tender to bilateral            lower quadrants. Labs:   Recent Results (from the past 24 hour(s))   AMB POC HEMOGLOBIN (HGB)    Collection Time: 22  3:37 PM   Result Value Ref Range    Hemoglobin (POC) 6.1 G/DL   CBC WITH AUTOMATED DIFF    Collection Time: 22  5:31 PM   Result Value Ref Range    WBC 13.4 (H) 3.6 - 11.0 K/uL    RBC 2.33 (L) 3.80 - 5.20 M/uL    HGB 5.4 (LL) 11.5 - 16.0 g/dL    HCT 17.2 (LL) 35.0 - 47.0 %    MCV 73.8 (L) 80.0 - 99.0 FL    MCH 23.2 (L) 26.0 - 34.0 PG    MCHC 31.4 30.0 - 36.5 g/dL    RDW 16.3 (H) 11.5 - 14.5 %    PLATELET 156 121 - 420 K/uL    MPV 9.7 8.9 - 12.9 FL    NRBC 0.1 (H) 0  WBC    ABSOLUTE NRBC 0.02 (H) 0.00 - 0.01 K/uL    NEUTROPHILS 80 (H) 32 - 75 %    LYMPHOCYTES 12 12 - 49 %    MONOCYTES 7 5 - 13 %    EOSINOPHILS 0 0 - 7 %    BASOPHILS 0 0 - 1 %    IMMATURE GRANULOCYTES 1 (H) 0.0 - 0.5 %    ABS. NEUTROPHILS 10.8 (H) 1.8 - 8.0 K/UL    ABS. LYMPHOCYTES 1.6 0.8 - 3.5 K/UL    ABS. MONOCYTES 0.9 0.0 - 1.0 K/UL    ABS. EOSINOPHILS 0.0 0.0 - 0.4 K/UL    ABS. BASOPHILS 0.0 0.0 - 0.1 K/UL    ABS. IMM.  GRANS. 0.1 (H) 0.00 - 0.04 K/UL    DF SMEAR SCANNED      RBC COMMENTS HYPOCHROMIA  1+        RBC COMMENTS ANISOCYTOSIS  1+        RBC COMMENTS POLYCHROMASIA  PRESENT        RBC COMMENTS MICROCYTOSIS  PRESENT        RBC COMMENTS TARGET CELLS  PRESENT       METABOLIC PANEL, COMPREHENSIVE    Collection Time: 03/11/22  5:31 PM   Result Value Ref Range    Sodium 134 (L) 136 - 145 mmol/L    Potassium 3.7 3.5 - 5.1 mmol/L    Chloride 104 97 - 108 mmol/L    CO2 24 21 - 32 mmol/L    Anion gap 6 5 - 15 mmol/L    Glucose 133 (H) 65 - 100 mg/dL    BUN 14 6 - 20 MG/DL    Creatinine 0.79 0.55 - 1.02 MG/DL    BUN/Creatinine ratio 18 12 - 20      GFR est AA >60 >60 ml/min/1.73m2    GFR est non-AA >60 >60 ml/min/1.73m2    Calcium 8.7 8.5 - 10.1 MG/DL    Bilirubin, total 1.2 (H) 0.2 - 1.0 MG/DL    ALT (SGPT) 18 12 - 78 U/L    AST (SGOT) 19 15 - 37 U/L    Alk.  phosphatase 49 45 - 117 U/L    Protein, total 6.8 6.4 - 8.2 g/dL    Albumin 3.3 (L) 3.5 - 5.0 g/dL    Globulin 3.5 2.0 - 4.0 g/dL    A-G Ratio 0.9 (L) 1.1 - 2.2     TROPONIN-HIGH SENSITIVITY    Collection Time: 03/11/22  5:31 PM   Result Value Ref Range    Troponin-High Sensitivity 4 0 - 51 ng/L   NT-PRO BNP    Collection Time: 03/11/22  5:31 PM   Result Value Ref Range    NT pro-BNP 31 <125 PG/ML   URINALYSIS W/ REFLEX CULTURE    Collection Time: 03/11/22  5:31 PM    Specimen: Urine   Result Value Ref Range    Color YELLOW/STRAW      Appearance CLEAR CLEAR      Specific gravity 1.011 1.003 - 1.030      pH (UA) 5.0 5.0 - 8.0      Protein Negative NEG mg/dL    Glucose Negative NEG mg/dL    Ketone TRACE (A) NEG mg/dL    Bilirubin Negative NEG      Blood LARGE (A) NEG      Urobilinogen 0.2 0.2 - 1.0 EU/dL    Nitrites Negative NEG      Leukocyte Esterase TRACE (A) NEG      WBC 10-20 0 - 4 /hpf    RBC 20-50 0 - 5 /hpf    Epithelial cells FEW FEW /lpf    Bacteria Negative NEG /hpf    UA:UC IF INDICATED URINE CULTURE ORDERED (A) CNI      Hyaline cast 0-2 0 - 5 /lpf   TYPE & SCREEN    Collection Time: 03/11/22  5:31 PM   Result Value Ref Range    Crossmatch Expiration 03/14/2022,2195     ABO/Rh(D) O POSITIVE     Antibody screen NEG     Unit number [id number]     Blood component type  LR     Unit division 00     Status of unit TRANSFUSED     Crossmatch result Compatible     Unit number [id number]     Blood component type  LR     Unit division 00     Status of unit ISSUED     Crossmatch result Compatible    EKG, 12 LEAD, INITIAL    Collection Time: 03/11/22  5:37 PM   Result Value Ref Range    Ventricular Rate 114 BPM    Atrial Rate 114 BPM    P-R Interval 126 ms    QRS Duration 78 ms    Q-T Interval 326 ms    QTC Calculation (Bezet) 449 ms    Calculated P Axis 37 degrees    Calculated R Axis 38 degrees    Calculated T Axis 47 degrees    Diagnosis       Sinus tachycardia  When compared with ECG of 08-MAR-2022 08:40,  No significant change was found     RBC, ALLOCATE    Collection Time: 03/11/22  6:15 PM   Result Value Ref Range    HISTORY CHECKED? 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 Acute blood loss anemia- given restarted moderate-heavy bleeding, will give third unit now. Continue to monitor. Patient would like discharge home today. Will see if after transfusion in afternoon how bleeding is going and how she is feeling. 2. Abnormal uterine bleeding- needs to continue the megace BID. May consider increase if still bleeding significantly this afternoon. She is anticipated to have hysterectomy next month.  Recommended she call her primary OB GYN on Monday to update on continued blood loss requiring transfusion and consideration of moving surgery closer. 3. Uterine fibroids    4. Urinary tract infection- received Ancef IV.      Sara Salvador MD

## 2022-03-12 NOTE — PROGRESS NOTES
/81, . Pt went to bathroom. Upon sitting back in bed, pt reported pressure in front part of head. Reassesed five minutes after complaint, pt reported pressure had subsided.

## 2022-03-12 NOTE — DISCHARGE SUMMARY
Gynecology Surgical Discharge Summary     Name: Luz Romero MRN: [id number]  SSN: [id number]    YOB: 1980  Age: 43 y.o. Sex: female      Admit date: 3/11/2022    Discharge Date: 3/12/2022      Attending Physician: Tabby Leon MD     Admission Diagnoses: Abnormal uterine bleeding, leiomyoma, acute blood loss anemia    Discharge Diagnoses: Active Problems:    Leiomyoma of body of uterus (3/11/2022)      Anemia associated with acute blood loss (3/11/2022)         Procedures: none  Blood transfusion    Hospital Course:  Patient was admitted for blood transfusion as outpatient. Completed 3 units with improvement in symptoms and hemoglobin. (7.1 after 2 units). She had no additional symptoms. She did have some additional bleeding after missing night time megace dose, but improved throughout the day. She was given precautions and recommended to follow up with her primary OBGYN, call Monday to see if planned hysterectomy could be done sooner given multiple transfusions in a short amount of time. Significant Diagnostic Studies:   Recent Results (from the past 24 hour(s))   AMB POC HEMOGLOBIN (HGB)    Collection Time: 03/11/22  3:37 PM   Result Value Ref Range    Hemoglobin (POC) 6.1 G/DL   CBC WITH AUTOMATED DIFF    Collection Time: 03/11/22  5:31 PM   Result Value Ref Range    WBC 13.4 (H) 3.6 - 11.0 K/uL    RBC 2.33 (L) 3.80 - 5.20 M/uL    HGB 5.4 (LL) 11.5 - 16.0 g/dL    HCT 17.2 (LL) 35.0 - 47.0 %    MCV 73.8 (L) 80.0 - 99.0 FL    MCH 23.2 (L) 26.0 - 34.0 PG    MCHC 31.4 30.0 - 36.5 g/dL    RDW 16.3 (H) 11.5 - 14.5 %    PLATELET 121 551 - 232 K/uL    MPV 9.7 8.9 - 12.9 FL    NRBC 0.1 (H) 0  WBC    ABSOLUTE NRBC 0.02 (H) 0.00 - 0.01 K/uL    NEUTROPHILS 80 (H) 32 - 75 %    LYMPHOCYTES 12 12 - 49 %    MONOCYTES 7 5 - 13 %    EOSINOPHILS 0 0 - 7 %    BASOPHILS 0 0 - 1 %    IMMATURE GRANULOCYTES 1 (H) 0.0 - 0.5 %    ABS. NEUTROPHILS 10.8 (H) 1.8 - 8.0 K/UL    ABS.  LYMPHOCYTES 1.6 0.8 - 3.5 K/UL ABS. MONOCYTES 0.9 0.0 - 1.0 K/UL    ABS. EOSINOPHILS 0.0 0.0 - 0.4 K/UL    ABS. BASOPHILS 0.0 0.0 - 0.1 K/UL    ABS. IMM. GRANS. 0.1 (H) 0.00 - 0.04 K/UL    DF SMEAR SCANNED      RBC COMMENTS HYPOCHROMIA  1+        RBC COMMENTS ANISOCYTOSIS  1+        RBC COMMENTS POLYCHROMASIA  PRESENT        RBC COMMENTS MICROCYTOSIS  PRESENT        RBC COMMENTS TARGET CELLS  PRESENT       METABOLIC PANEL, COMPREHENSIVE    Collection Time: 03/11/22  5:31 PM   Result Value Ref Range    Sodium 134 (L) 136 - 145 mmol/L    Potassium 3.7 3.5 - 5.1 mmol/L    Chloride 104 97 - 108 mmol/L    CO2 24 21 - 32 mmol/L    Anion gap 6 5 - 15 mmol/L    Glucose 133 (H) 65 - 100 mg/dL    BUN 14 6 - 20 MG/DL    Creatinine 0.79 0.55 - 1.02 MG/DL    BUN/Creatinine ratio 18 12 - 20      GFR est AA >60 >60 ml/min/1.73m2    GFR est non-AA >60 >60 ml/min/1.73m2    Calcium 8.7 8.5 - 10.1 MG/DL    Bilirubin, total 1.2 (H) 0.2 - 1.0 MG/DL    ALT (SGPT) 18 12 - 78 U/L    AST (SGOT) 19 15 - 37 U/L    Alk.  phosphatase 49 45 - 117 U/L    Protein, total 6.8 6.4 - 8.2 g/dL    Albumin 3.3 (L) 3.5 - 5.0 g/dL    Globulin 3.5 2.0 - 4.0 g/dL    A-G Ratio 0.9 (L) 1.1 - 2.2     TROPONIN-HIGH SENSITIVITY    Collection Time: 03/11/22  5:31 PM   Result Value Ref Range    Troponin-High Sensitivity 4 0 - 51 ng/L   NT-PRO BNP    Collection Time: 03/11/22  5:31 PM   Result Value Ref Range    NT pro-BNP 31 <125 PG/ML   URINALYSIS W/ REFLEX CULTURE    Collection Time: 03/11/22  5:31 PM    Specimen: Urine   Result Value Ref Range    Color YELLOW/STRAW      Appearance CLEAR CLEAR      Specific gravity 1.011 1.003 - 1.030      pH (UA) 5.0 5.0 - 8.0      Protein Negative NEG mg/dL    Glucose Negative NEG mg/dL    Ketone TRACE (A) NEG mg/dL    Bilirubin Negative NEG      Blood LARGE (A) NEG      Urobilinogen 0.2 0.2 - 1.0 EU/dL    Nitrites Negative NEG      Leukocyte Esterase TRACE (A) NEG      WBC 10-20 0 - 4 /hpf    RBC 20-50 0 - 5 /hpf    Epithelial cells FEW FEW /lpf Bacteria Negative NEG /hpf    UA:UC IF INDICATED URINE CULTURE ORDERED (A) CNI      Hyaline cast 0-2 0 - 5 /lpf   TYPE & SCREEN    Collection Time: 03/11/22  5:31 PM   Result Value Ref Range    Crossmatch Expiration 03/14/2022,2359     ABO/Rh(D) Mariam Solitario POSITIVE     Antibody screen NEG     Unit number [id number]     Blood component type RC LR     Unit division 00     Status of unit TRANSFUSED     Crossmatch result Compatible     Unit number [id number]     Blood component type RC LR     Unit division 00     Status of unit ISSUED     Crossmatch result Compatible     Unit number [id number]     Blood component type RC LR,2     Unit division 00     Status of unit ISSUED     Crossmatch result Compatible    CULTURE, URINE    Collection Time: 03/11/22  5:31 PM    Specimen: Urine   Result Value Ref Range    Special Requests: NO SPECIAL REQUESTS  Reflexed from [id number]        [place] Count [id number]  COLONIES/mL        Culture result: GRAM NEGATIVE RODS (A)     EKG, 12 LEAD, INITIAL    Collection Time: 03/11/22  5:37 PM   Result Value Ref Range    Ventricular Rate 114 BPM    Atrial Rate 114 BPM    P-R Interval 126 ms    QRS Duration 78 ms    Q-T Interval 326 ms    QTC Calculation (Bezet) 449 ms    Calculated P Axis 37 degrees    Calculated R Axis 38 degrees    Calculated T Axis 47 degrees    Diagnosis       Sinus tachycardia  When compared with ECG of 08-MAR-2022 08:40,  No significant change was found  Normal except for the rate  Confirmed by Niharika Johnson ([id number]) on 3/12/2022 10:52:12 AM     RBC, ALLOCATE    Collection Time: 03/11/22  6:15 PM   Result Value Ref Range    HISTORY CHECKED? Historical check performed    HGB & HCT    Collection Time: 03/12/22  7:56 AM   Result Value Ref Range    HGB 7.1 (L) 11.5 - 16.0 g/dL    HCT 22.5 (L) 35.0 - 47.0 %   RBC, ALLOCATE    Collection Time: 03/12/22  9:45 AM   Result Value Ref Range    HISTORY CHECKED?  Historical check performed        Patient Instructions:   Current Discharge Medication List      CONTINUE these medications which have NOT CHANGED    Details   megestroL (MEGACE) 40 mg tablet Take 1 Tablet by mouth two (2) times a day for 60 days. Qty: 60 Tablet, Refills: 1      ketorolac (TORADOL) 10 mg tablet Take 10 mg by mouth every six (6) hours as needed for Pain. Activity: regular activities     No orders of the defined types were placed in this encounter.        Signed By:  Stefan Marcial MD     March 12, 2022

## 2022-03-13 PROBLEM — D25.9 UTERINE FIBROID: Status: ACTIVE | Noted: 2022-01-01

## 2022-03-13 PROBLEM — D62 ACUTE BLOOD LOSS ANEMIA: Status: ACTIVE | Noted: 2022-01-01

## 2022-03-13 PROBLEM — N93.9 ABNORMAL UTERINE BLEEDING: Status: ACTIVE | Noted: 2022-01-01

## 2022-03-13 NOTE — PROGRESS NOTES
Patient received 1 PRBC overnight, tolerated well. Hgb this AM 7.9. PRN pain medications given around the clock, patient stating this has helped her pain subside and she overall feels better. HR remains elevated in the 110s at times rising to 120-130s when patient becomes anxious. Patient up to bedside commode x1 to urinate, small clot the size of a dime noted, no other bleeding observed.

## 2022-03-13 NOTE — PROGRESS NOTES
During change of shift bed side report with the oncoming nurse. Pt shared with us she felt as if there was blood clots coming out of her onto her briefs. Both oncoming and off going nurse assessed the clots. 1st one was the size approx of a walnut  2nd clot was the size of palm of hand. Pt was assisted x 2 to the bathroom where she broke out into a sweat. Pt become unresponsive, Called a [facility]. Team put pt back into bed Zahra Sanchez MD GYN came to [facility] and ordered Labs, fluids and medication given to oncoming nurse.

## 2022-03-13 NOTE — PROGRESS NOTES
DISCHARGE NOTE FROM [facility] NURSE    Patient determined to be stable for discharge by attending provider. I have reviewed the discharge instructions and follow-up appointments with the patient and spouse. They verbalized understanding and all questions were answered to their satisfaction. No complaints or further questions were expressed. Medications sent to pharmacy. Appropriate educational materials and medication side effect teaching were provided. PIV and cardiac monitoring were removed prior to discharge. Patient did not discharge with any line, zamora, or drain. All personal items collected during admission were returned to the patient prior to discharge. Post-op patient: Yes- Patient given post-op discharge kit and instructed on use.        Vicky Peterson RN

## 2022-03-13 NOTE — PROGRESS NOTES
Gynecology Progress Note    Patient did well overnight. She states no additional clots or bleeding since increased megace dose. She still has some lower abdominal pain, but significantly improved after medications. She does not like how percocet makes her feel. She feels a bit nauseated this morning. Had a small BM yesterday, denies constipation    Vitals:  Blood pressure (!) 146/91, pulse (!) 104, temperature 98 °F (36.7 °C), resp. rate 16, height 5' 7\" (1.702 m), weight 99.2 kg (218 lb 11.1 oz), SpO2 100 %, currently breastfeeding. Temp (24hrs), Av.4 °F (36.9 °C), Min:98 °F (36.7 °C), Max:99.9 °F (37.7 °C)        Exam:  Patient without distress. Cardiac- sinus tachycardia   Lungs- clear to auscultation               Abdomen soft,  Bowel sounds      present                   Labs:   Recent Results (from the past 24 hour(s))   HGB & HCT    Collection Time: 22  7:56 AM   Result Value Ref Range    HGB 7.1 (L) 11.5 - 16.0 g/dL    HCT 22.5 (L) 35.0 - 47.0 %   RBC, ALLOCATE    Collection Time: 22  9:45 AM   Result Value Ref Range    HISTORY CHECKED?  Historical check performed    GLUCOSE, POC    Collection Time: 22  7:35 PM   Result Value Ref Range    Glucose (POC) 156 (H) 65 - 117 mg/dL    Performed by Hellen Childs RN    CBC W/O DIFF    Collection Time: 22  7:46 PM   Result Value Ref Range    WBC 13.5 (H) 3.6 - 11.0 K/uL    RBC 2.75 (L) 3.80 - 5.20 M/uL    HGB 7.2 (L) 11.5 - 16.0 g/dL    HCT 21.3 (L) 35.0 - 47.0 %    MCV 77.5 (L) 80.0 - 99.0 FL    MCH 26.2 26.0 - 34.0 PG    MCHC 33.8 30.0 - 36.5 g/dL    RDW 16.7 (H) 11.5 - 14.5 %    PLATELET 020 311 - 142 K/uL    MPV 10.0 8.9 - 12.9 FL    NRBC 0.4 (H) 0  WBC    ABSOLUTE NRBC 0.06 (H) 0.00 - 6.28 K/uL   METABOLIC PANEL, BASIC    Collection Time: 22  7:46 PM   Result Value Ref Range    Sodium 138 136 - 145 mmol/L    Potassium 3.6 3.5 - 5.1 mmol/L    Chloride 110 (H) 97 - 108 mmol/L    CO2 18 (L) 21 - 32 mmol/L    Anion gap 10 5 - 15 mmol/L    Glucose 142 (H) 65 - 100 mg/dL    BUN 11 6 - 20 MG/DL    Creatinine 0.68 0.55 - 1.02 MG/DL    BUN/Creatinine ratio 16 12 - 20      GFR est AA >60 >60 ml/min/1.73m2    GFR est non-AA >60 >60 ml/min/1.73m2    Calcium 8.5 8.5 - 10.1 MG/DL   RBC, ALLOCATE    Collection Time: 22  8:30 PM   Result Value Ref Range    HISTORY CHECKED? Historical check performed    CBC WITH AUTOMATED DIFF    Collection Time: 22  3:51 AM   Result Value Ref Range    WBC 16.0 (H) 3.6 - 11.0 K/uL    RBC 2.98 (L) 3.80 - 5.20 M/uL    HGB 7.9 (L) 11.5 - 16.0 g/dL    HCT 23.2 (L) 35.0 - 47.0 %    MCV 77.9 (L) 80.0 - 99.0 FL    MCH 26.5 26.0 - 34.0 PG    MCHC 34.1 30.0 - 36.5 g/dL    RDW 16.4 (H) 11.5 - 14.5 %    PLATELET 601 681 - 937 K/uL    MPV 10.4 8.9 - 12.9 FL    NRBC 0.2 (H) 0  WBC    ABSOLUTE NRBC 0.04 (H) 0.00 - 0.01 K/uL    NEUTROPHILS 81 (H) 32 - 75 %    LYMPHOCYTES 10 (L) 12 - 49 %    MONOCYTES 7 5 - 13 %    EOSINOPHILS 1 0 - 7 %    BASOPHILS 0 0 - 1 %    IMMATURE GRANULOCYTES 1 (H) 0.0 - 0.5 %    ABS. NEUTROPHILS 12.9 (H) 1.8 - 8.0 K/UL    ABS. LYMPHOCYTES 1.6 0.8 - 3.5 K/UL    ABS. MONOCYTES 1.2 (H) 0.0 - 1.0 K/UL    ABS. EOSINOPHILS 0.1 0.0 - 0.4 K/UL    ABS. BASOPHILS 0.1 0.0 - 0.1 K/UL    ABS. IMM. GRANS. 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  Acute blood loss anemia- s/p 4 units of blood, Hbg 7.9, did receive 1 liter fluid bolus last night. Bleeding seems to be improving with increased Megace dose, continue to monitor    2. Abnormal uterine bleeding- megace 80mg BID. If bleeding is better today, no additional clots and patient is other wise asymptomatic from anemia, may consider discharge in afternoon. Ultrasound is pending for the morning. Changed oxycodone to hydrocodone    3.  Karli Sahni MD

## 2022-03-13 NOTE — PROGRESS NOTES
Reason for Admission: Anemia                   RUR Score: N/A                    Plan for utilizing home health: N/A, the patient is completely independent in her ADLs and IADLs         PCP: First and Last name:  Ag Chapin MD   Name of Practice:    Are you a current patient: Yes/No: Yes   Approximate date of last visit: 3/11/22   Can you participate in a virtual visit with your PCP: Yes                    Current Advanced Directive/Advance Care Plan: Prior    Healthcare Decision Maker: Edwardo Mcginnis, , Phone: [phone]                 Transition of Care Plan:      CM met with the patient at bedside to discuss dispo plan. The patient resides in a 1 level home with 5 steps to enter with her  and 2 y/o son. She has 1 sister that lives locally that is supportive. Her family is very supportive. At baseline, the patient is independent in her ADLs and IADLs. She uses no assistive device when ambulating. She is able to drive and her sister transported her to the hospital. Her  will transport her home upon d/c. The patient is employed full-time with [employer] in [place]. The patient uses the [facility] on [facility] for her medications. She has never used [facility] services or been to an IPR/SNF facility in the past.     The patient has no CM needs. From CM perspective, the patient can be discharged. Care Management Interventions  PCP Verified by CM: Yes  Last Visit to PCP: 03/11/22  Mode of Transport at Discharge:  Other (see comment) (The patient's  will transport her home on d/c )  Transition of Care Consult (CM Consult): Discharge Planning  MyChart Signup: No  Discharge Durable Medical Equipment: No  Physical Therapy Consult: No  Occupational Therapy Consult: No  Speech Therapy Consult: No  Support Systems: Other Family Member(s),Spouse/Significant Other,Child(danni)  Confirm Follow Up Transport: Self  The Patient and/or Patient Representative was Provided with a Choice of Provider and Agrees with the Discharge Plan?: Yes  Name of the Patient Representative Who was Provided with a Choice of Provider and Agrees with the Discharge Plan: [address] [place] [address] Provided?: No  Discharge Location  Patient Expects to be Discharged to[de-identified] Home     [place]

## 2022-03-13 NOTE — PROGRESS NOTES
Patient seen. Vital stable. No dizzyness with ambulation. Bleeding is just spotting, no clots. Patient has no pain. Reviewed U/S showed enlarged fibroid uterus. No other abnormalities. Patient wants to go home. Will discharge with bleeding precautions. To follow up with OP GYN tomorrow. Will take 80 mg Megace BID using prior RX, Advil/Tylenol as directed. OTC Fe supplementation.

## 2022-03-13 NOTE — PROGRESS NOTES
GYN progress notes    S: Received call from nursing that a rapid response had been called on patient. She went to the bathroom, just prior had passed walnut sized clot, then while up, passed 6 oz \"steak sized\" clot. She then had a syncopal episode while on the toliet. She is having a lot of cramping/pain right now. Her discharge has been removed    Visit Vitals  /78   Pulse (!) 102   Temp 98.1 °F (36.7 °C)   Resp 18   Ht 5' 7\" (1.702 m)   Wt 99.2 kg (218 lb 11.1 oz)   SpO2 100%   BMI 34.25 kg/m²     General- moaning in pain  Cardiac- sinus tachycardia  Lungs- clear to auscultation bilaterally  Abdomen- bowel sounds present, tender lower quadrants  : scant blood on peripad    A/P: 44 y/o  with acute blood loss anemia, abnormal uterine bleeding and fibroid uterus    1. Acute blood loss anemia and syncope- has received 3 units of blood. Hbg only improved 5 --> 7.1--->7.2. Have increased her megace dose tonight and continue going forward to help. Giving fluid bolus now for syncopal episode, patient was tachycardiac, but blood pressure was normal range. Will repeat CBC in the AM and give 1 additional blood unit. Discontinued motrin in event patient needs to go for hysterectomy. 2. Abnormal uterine bleeding- ultimately, needs hysterectomy. Discussed with family and patient will consider as emergent if bleeding worsens. There is scant blood on her peripad. Reviewed precautions with nursing regarding worsening, do expect patient to pass additional clots, but soaking pads would be worsening. Megace 40mg now for total of 80mg night time dose, continue BID going forward.  Ordered US for AM.     3. Fibroid- ultrasound tomorrow

## 2022-03-13 NOTE — DISCHARGE SUMMARY
Gynecology Discharge Summary     Patient ID:  Keren Scherer  [id number]  15 y.o.  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    Anemia D64.9    Obesity E66.9    Fibroid uterus D25.9    Leiomyoma of body of uterus D25.9    Anemia associated with acute blood loss D62    Acute blood loss anemia D62    Uterine fibroid D25.9    Abnormal uterine bleeding N93.9       Procedures for this admission: TV Ultrasound, 4 Units PRBC    Hospital Course:    Disposition: Home or self care    Discharged Condition: good            Patient Instructions:   Current Discharge Medication List   Megace 80 mg( two 40 mg tabs) po BID  Advil 800 mg po Q 8 hrs  Tylenol 650 Mg po Q 4 hrs  OTC Fe QD   STOP taking these medications       megestroL (MEGACE) 40 mg tablet Comments:   Reason for Stopping:         ketorolac (TORADOL) 10 mg tablet Comments:   Reason for Stopping:             Activity: Activity as tolerated and no driving for today  Diet: Regular Diet  Wound Care: Keep wound clean and dry and None needed    Follow-up with OP GYN in 1 day.     Signed:  Gayle Ordonez MD  3/13/2022  2:06 PM

## 2022-03-13 NOTE — PROGRESS NOTES
Problem: Pain  Goal: *Control of Pain  Outcome: Resolved/Met     Problem: Falls - Risk of  Goal: *Absence of Falls  Description: Document Fadia Fall Risk and appropriate interventions in the flowsheet.   Outcome: Resolved/Met     Problem: Patient Education: Go to Patient Education Activity  Goal: Patient/Family Education  Outcome: Resolved/Met

## 2022-03-13 NOTE — PROGRESS NOTES
End of Shift Note    Bedside shift change report given to [address] (oncoming nurse) by Clemencia Castellano (offgoing nurse).   Report included the following information SBAR, Kardex, Intake/Output and MAR    Shift worked:  07a-7p     Shift summary and any significant changes:    Pt received 1 unit of RPBC  Pt RAPID response episode   Room air   Family present  Passing blood clots      Concerns for physician to address: none   Zone phone for oncoming shift:  None      A

## 2022-03-13 NOTE — PROGRESS NOTES
Responded to Rapid Response called on this patient for syncopal episode while up to the bathroom. Patient admitted for uterine bleeding and anemia, received 3 units of PRBC's over the day for low HGB. Just before call patient passed a walnut sized clot, then another larger one while up in the bathroom, then had a syncopal episode. Patient assisted back to bed by ST staff, VSS, patient complains of cramping and pain. Call placed to OB/GYN hospitalist, orders for STAT labs. CBC, BMP drawn and sent. Enmanuel Mas MD OB/GYN in to evaluate patient. Call ended with patient awake and c/o pain,  at bedside, Enmanuel Mas writing additional orders.

## 2022-03-15 NOTE — PROGRESS NOTES
Uterine Fibroids note    Chief Complaint   Fibroids      HPI  History:  43 y.o. female,  No LMP recorded. , presents with a history of symptomatic uterine fibroids as which began 2 months ago. Her symptoms include: heavy vaginal bleeding w/ pain, anemia. She went to the ER on 3/11/232-HGB on arrival was 5.4. After 6 units fof RBC's her HGB went to 7.9. Was seen by Dr. Pratibha Vital at [facility] and referred for consideration of surgery. Current Method of Contraception is: condoms. No desire or plan for more babies. Had a couple fibroids at delivery of her only child 4 years ago. She describes her bleeding as heavy w/ large clots lasting up to 2 months. At the heaviest bleeding, she used several pads or tampons per day, changing every 10-15 minutes. Bleeding is associated with flooding. She states the bleeding  is associated with clots. Her bleeding is associated with pain. Ultrasound on 3/13/22 showed: The uterus measures 18.7 x 13.1 x 15.8 cm. The endometrial stripe is not well  seen due to distortion by multiple large fibroids, though none able to be  discretely measured.       Additional/Aggravating/Alleviating factors:  Associated symptoms:  Pelvic pressure, pelvic pain, dyspareunia, urinary pressure and frequency, low back pain, constipation    The patient reports the following aggravating factors: none  The patient the following alleviating factors: none     She reports the following past medical history that is notable: none  She states that she had this condition in the past.    She  is ready to have a hysterectomy    Past Medical History:   Diagnosis Date    Anemia 2018    Anxiety     anxiety - took medications as a teenager, no medications recently     Fibroids     Gestational hypertension affecting first pregnancy 2018    History of PCOS     Infertility, female     Polycystic disease, ovaries      Past Surgical History:   Procedure Laterality Date    HX  SECTION  HX WISDOM TEETH EXTRACTION  2009     Social History     Occupational History    Not on file   Tobacco Use    Smoking status: Never Smoker    Smokeless tobacco: Never Used   Vaping Use    Vaping Use: Never used   Substance and Sexual Activity    Alcohol use: No    Drug use: No    Sexual activity: Yes     Partners: Male     Birth control/protection: Condom     Comment: condoms- sometimes.      Family History   Problem Relation Age of Onset    Hypertension Mother     Hypertension Sister     Diabetes Sister     Hypertension Brother     Cancer Paternal Uncle     Diabetes Paternal Uncle     Diabetes Maternal Uncle     Breast Cancer Cousin      No Known Allergies  Prior to Admission medications    Not on File        Review of Systems - History obtained from the patient  Constitutional: negative for weight loss, fever, night sweats  HEENT: negative for hearing loss, earache, congestion, snoring, sorethroat  CV: negative for chest pain, palpitations, edema  Resp: negative for cough, shortness of breath, wheezing  Breast: negative for breast lumps, nipple discharge, galactorrhea  GI: negative for change in bowel habits, abdominal pain, black or bloody stools  : negative for frequency, dysuria, hematuria, vaginal discharge  MSK: negative for back pain, joint pain, muscle pain  Skin: negative for itching, rash, hives  Neuro: negative for dizziness, headache, confusion, weakness  Psych: negative for anxiety, depression, change in mood  Heme/lymph: negative for bleeding, bruising, pallor    Objective:  Visit Vitals  Breastfeeding No     PHYSICAL EXAMINATION    Constitutional  · Appearance: well-nourished, well developed, alert, in no acute distress    HENT  · Head and Face: appears normal    Gastrointestinal  · Abdominal Examination: abdomen mass present at about her umbilicus, somewhat tender to palpation, normal bowel sounds   · Liver and spleen: no hepatomegaly present, spleen not palpable  · Hernias: no hernias identified    Skin  · General Inspection: no rash, no lesions identified    Neurologic/Psychiatric  · Mental Status:  · Orientation: grossly oriented to person, place and time  · Mood and Affect: mood normal, affect appropriate    Assessment:   Uterine fibroids--highly symptomatic with life-endangering anemia  Discussed RBA of surgery, including risks to bowel, bladder, malignancy, morcellation. Plan:    Surgery: [address] [place] [address]. IC obtained and pt states NFQ.

## 2022-03-16 NOTE — PATIENT INSTRUCTIONS
Uterine Fibroids: Care Instructions  Overview     Uterine fibroids are growths in the uterus. Fibroids aren't cancer. Doctors don't know what causes fibroids. But they are more common as you age, especially from your 35s and 45s and through menopause. After menopause, fibroids often shrink and go away. Fibroids can grow on the inside of the uterus, in the muscle wall of the uterus, or near the outside wall of the uterus. Fibroids can cause painful cramps and heavy periods. In these cases, an intrauterine device (IUD) can help decrease symptoms. It can also help to take anti-inflammatory medicines or hormone birth control. Sometimes surgery is needed to treat fibroids. But if you are near menopause, you may want to wait and see if your symptoms get better. Follow-up care is a key part of your treatment and safety. Be sure to make and go to all appointments, and call your doctor if you are having problems. It's also a good idea to know your test results and keep a list of the medicines you take. How can you care for yourself at home? · If your doctor gave you medicine, take it as exactly as prescribed. Be safe with medicines. Call your doctor if you think you are having a problem with your medicine. · Take anti-inflammatory medicines for pain. These include ibuprofen and naproxen. Read and follow all instructions on the label. · Use heat, such as a hot water bottle or a heating pad set on low, or a warm bath to relax tense muscles and relieve cramping. Put a thin cloth between the heating pad and your skin. Never go to sleep with a heating pad on. · Lie down and put a pillow under your knees. Or lie on your side and bring your knees up to your chest. These positions may help relieve belly pain or pressure. · Keep track of how many sanitary pads or tampons you use each day. · Get at least 30 minutes of exercise on most days of the week. Walking is a good choice.  You also may want to do other activities, such as running, swimming, cycling, or playing tennis or team sports. · If you bleed longer than usual or have heavy bleeding, take a daily multivitamin with iron. When should you call for help? Call your doctor now or seek immediate medical care if:    · You have severe vaginal bleeding.     · You have new or worse belly or pelvic pain. Watch closely for changes in your health, and be sure to contact your doctor if:    · You have unusual vaginal bleeding.     · You do not get better as expected. Where can you learn more? Go to http://www.gray.com/  Enter B121 in the search box to learn more about \"Uterine Fibroids: Care Instructions. \"  Current as of: November 22, 2021               Content Version: 13.2  © 6567-8733 Healthwise, Incorporated. Care instructions adapted under license by [facility] (which disclaims liability or warranty for this information). If you have questions about a medical condition or this instruction, always ask your healthcare professional. [place] [address] any warranty or liability for your use of this information.

## 2022-03-17 PROBLEM — N85.8 UTERINE MASS: Status: ACTIVE | Noted: 2022-01-01

## 2022-03-17 PROBLEM — D64.9 ACUTE ON CHRONIC ANEMIA: Status: ACTIVE | Noted: 2022-01-01

## 2022-03-17 NOTE — PROGRESS NOTES
Transition of Care Plan  1. COVID-19 Rule Out Pending  2. RUR- 16%  3. DISPOSITION: TBD/subject to change pending recommendations; pending medical progression   -Anticipate home with family assistance once medically stable   -Gynecology Oncology consulted and following  4. F/U with PCP/Specialist    5. Transport: Family    CM will continue to follow and assist with SHAYY needs as they arise. Reason for Readmission: Acute on chronic anemia,  Uterine fibroid,  Uterine mass           RUR Score/Risk Level:  16%       PCP: YES First and Last name:  Javier Tijerina .[phone]   Name of Practice: [facility]   Are you a current patient: Yes/No: YES   Approximate date of last visit: 3/11/22   Can you participate in a virtual visit with your PCP: YES    Is a Care Conference indicated:   Not at this time. Did you attend your follow up appointment (s): If not, why not: YES         Resources/supports as identified by patient/family:   Family       Top Challenges facing patient (as identified by patient/family and CM): Current health challenges        Finances/Medication cost?    Currently employed with [employer]. Insurance verified: [employer]. [facility] is utilized for prescriptions. Transportation    Family    Support system or lack thereof? Spouse, Brandy Cousins [phone], 1year old child, and supportive sister who lives locally. Living arrangements? Resides with spouse and 1year old son in their own home. Self-care/ADLs/Cognition? AOx3. Independent with ADL's and IADL's. No DME utilized. Current Advanced Directive/Advance Care Plan:  No ACP documentation           Plan for utilizing home health:   Not at this time. TBD/subject to change pending recommendations. Care Management Interventions  PCP Verified by CM:  Yes  Last Visit to PCP: 03/11/22  Palliative Care Criteria Met (RRAT>21 & CHF Dx)?: No  Mode of Transport at Discharge: Other (see comment) (Family)  Transition of Care Consult (CM Consult):  (No current CM consults)  Discharge Durable Medical Equipment: No  Physical Therapy Consult: No  Occupational Therapy Consult: No  Speech Therapy Consult: No  Support Systems: Spouse/Significant Other,Child(danni),Other Family Member(s)  Confirm Follow Up Transport: Family  Discharge Location  Patient Expects to be Discharged to[de-identified] Home with family assistance (TBD/subject to change pending recommendations)                  Readmission Assessment  Number of days since last admission?: 1-7 days  Previous disposition: Home with Family  Who is being interviewed?: Patient  What was the patient's/caregiver's perception as to why they think they needed to return back to the hospital?: Other (Comment)  Did you visit your Primary Care Physician after you left the hospital, before you returned this time?: No  Why weren't you able to visit your PCP?: Other (Comment)  Did you see a specialist, such as Cardiac, Pulmonary, Orthopedic Physician, etc. after you left the hospital?: Yes  Who advised the patient to return to the hospital?: Self-referral  Does the patient report anything that got in the way of taking their medications?: No  In our efforts to provide the best possible care to you and others like you, can you think of anything that we could have done to help you after you left the hospital the first time, so that you might not have needed to return so soon?: Additional Community resources available for illness support,Other (Comment)    BHARATH Franco/MELODY  Care Management  9:43 AM

## 2022-03-17 NOTE — PROGRESS NOTES
After starting blood pt vitals signs were 153/82, 120, 100% on room air, 100 temp. Pt c/o abd pain rating it 7-8 aching pain no c/o n/v , sob, or any other problems. Pt had tylenol and dilaudid earlier. Called talked with Dr. Ruth Ann Gillespie about pt's vss, and pain. He was aware pt is receiving blood. Orders received. MD wanted to continue blood is aware of pulse . Will continue to monitor.

## 2022-03-17 NOTE — PROGRESS NOTES
Pt vital signs down to 99.5 oral, pulse 116, blood pressure 160/83 no sob noted pt on room air 100% pt states pain has decreased since receiving the dilaudid

## 2022-03-17 NOTE — ED TRIAGE NOTES
Pt has fibroid issue and has needed blood transfusion in past.  Bleeding started again and has severe pain in right hip that runs down leg. Pt right ribs are throbbing also.

## 2022-03-17 NOTE — ANESTHESIA PREPROCEDURE EVALUATION
Anesthetic History   No history of anesthetic complications            Review of Systems / Medical History  Patient summary reviewed, nursing notes reviewed and pertinent labs reviewed    Pulmonary  Within defined limits                 Neuro/Psych         Psychiatric history     Cardiovascular  Within defined limits  Hypertension                   GI/Hepatic/Renal  Within defined limits              Endo/Other        Obesity and anemia     Other Findings   Comments: covid+         Physical Exam    Airway  Mallampati: II  TM Distance: > 6 cm  Neck ROM: normal range of motion   Mouth opening: Normal     Cardiovascular  Regular rate and rhythm,  S1 and S2 normal,  no murmur, click, rub, or gallop             Dental  No notable dental hx       Pulmonary  Breath sounds clear to auscultation               Abdominal  GI exam deferred       Other Findings            Anesthetic Plan    ASA: 3, emergent  Anesthesia type: general          Induction: Intravenous  Anesthetic plan and risks discussed with: Patient

## 2022-03-17 NOTE — PROGRESS NOTES
Admission Medication Reconciliation:    Information obtained from:  Patient  RxQuery data available¹:  YES    Comments/Recommendations: Updated PTA meds/reviewed patient's allergies. 1)  Spoke with patient in the room. Patient's last dose of megestrol was yesterday evening. Patient uses occasional Advil for pain and takes iron supplement daily. 2)  Medication changes (since last review): Added  - Megestrol 40mg 1tab po twice daily  - Ferrous sulfate 325mg 1tab po daily  - Ibuprofen 200mg 1tab po as needed     [facility] benefit data reflects medications filled and processed through the patient's insurance, however   this data does NOT capture whether the medication was picked up or is currently being taken by the patient. Allergies:  Patient has no known allergies. Significant PMH/Disease States:   Past Medical History:   Diagnosis Date    Anemia 12/17/2018    Anxiety     anxiety - took medications as a teenager, no medications recently     Fibroids     Gestational hypertension affecting first pregnancy 12/13/2018    History of PCOS     Infertility, female     Polycystic disease, ovaries      Chief Complaint for this Admission:    Chief Complaint   Patient presents with    Lethargy     Prior to Admission Medications:   Prior to Admission Medications   Prescriptions Last Dose Informant Taking?   ferrous sulfate 325 mg (65 mg iron) tablet   Yes   Sig: Take 325 mg by mouth Daily (before breakfast). ibuprofen (AdviL) 200 mg tablet   Yes   Sig: Take 200 mg by mouth every eight (8) hours as needed for Pain.   megestroL (MEGACE) 40 mg tablet 3/16/2022 at 1830  Yes   Sig: Take 40 mg by mouth two (2) times a day. Facility-Administered Medications: None     Please contact the main inpatient pharmacy with any questions or concerns at [phone] and we will direct you to the clinical pharmacist covering this patient's care while in-house.    Nehemias Parada, PHARMD

## 2022-03-17 NOTE — ED PROVIDER NOTES
HPI     43 old female with a history of fibroids, anemia, anxiety, polycystic ovarian disease, presents emergency department with lethargy, tachycardia, severe abdomen and flank pain. Patient was recently admitted for vaginal bleeding with fibroids and anemia. She states she had 4 units of blood at [facility] and was discharged on . She saw her OB/GYN yesterday she is not having any vaginal bleeding and he scheduled her for a hysterectomy next Wednesday the . She states she started with some bleeding this evening, has not even used 1 pad, but now has severe back pain. There is no fever. No nausea vomiting. Urinating normally. She is on iron so her stools are always dark. Past Medical History:   Diagnosis Date    Anemia 2018    Anxiety     anxiety - took medications as a teenager, no medications recently     Fibroids     Gestational hypertension affecting first pregnancy 2018    History of PCOS     Infertility, female     Polycystic disease, ovaries        Past Surgical History:   Procedure Laterality Date    HX  SECTION      HX WISDOM TEETH EXTRACTION           Family History:   Problem Relation Age of Onset    Hypertension Mother     Hypertension Sister     Diabetes Sister     Hypertension Brother     Cancer Paternal Uncle     Diabetes Paternal Uncle     Diabetes Maternal Uncle     Breast Cancer Cousin        Social History     Socioeconomic History    Marital status:      Spouse name: Not on file    Number of children: Not on file    Years of education: Not on file    Highest education level: Not on file   Occupational History    Not on file   Tobacco Use    Smoking status: Never Smoker    Smokeless tobacco: Never Used   Vaping Use    Vaping Use: Never used   Substance and Sexual Activity    Alcohol use: No    Drug use: No    Sexual activity: Yes     Partners: Male     Birth control/protection: Condom     Comment: condoms- sometimes. Other Topics Concern     Service Not Asked    Blood Transfusions Not Asked    Caffeine Concern Not Asked    Occupational Exposure Not Asked    Hobby Hazards Not Asked    Sleep Concern Not Asked    Stress Concern Not Asked    Weight Concern Not Asked    Special Diet Not Asked    Back Care Not Asked    Exercise Not Asked    Bike Helmet Not Asked   [address] Not Asked    Self-Exams Not Asked   Social History Narrative    Not on file     Social Determinants of Health     Financial Resource Strain:     Difficulty of Paying Living Expenses: Not on file   Food Insecurity:     Worried About Running Out of Food in the Last Year: Not on file    Isis of Food in the Last Year: Not on file   Transportation Needs:     Lack of Transportation (Medical): Not on file    Lack of Transportation (Non-Medical): Not on file   Physical Activity:     Days of Exercise per Week: Not on file    Minutes of Exercise per Session: Not on file   Stress:     Feeling of Stress : Not on file   Social Connections:     Frequency of Communication with Friends and Family: Not on file    Frequency of Social Gatherings with Friends and Family: Not on file    Attends Scientologist Services: Not on file    Active Member of [address] [place] [address] or Organizations: Not on file    Attends Club or Organization Meetings: Not on file    Marital Status: Not on file   Intimate Partner Violence:     Fear of Current or Ex-Partner: Not on file    Emotionally Abused: Not on file    Physically Abused: Not on file    Sexually Abused: Not on file   Housing Stability:     Unable to Pay for Housing in the Last Year: Not on file    Number of [place] in the Last Year: Not on file    Unstable Housing in the Last Year: Not on file         ALLERGIES: Patient has no known allergies. Review of Systems   Constitutional: Negative for fever. HENT: Negative for congestion. Eyes: Negative for visual disturbance.    Respiratory: Negative for cough and shortness of breath. Cardiovascular: Positive for palpitations. Negative for chest pain. Gastrointestinal: Positive for abdominal pain. Negative for nausea and vomiting. Endocrine: Negative for polyuria. Genitourinary: Positive for flank pain and pelvic pain. Musculoskeletal: Positive for back pain. Negative for gait problem. Neurological: Negative for headaches. Psychiatric/Behavioral: Negative for dysphoric mood. Vitals:    03/16/22 2338   BP: 131/77   Pulse: (!) 113   Resp: 20   Temp: 97.8 °F (36.6 °C)   SpO2: 100%   Weight: 97.5 kg (215 lb)   Height: 5' 7\" (1.702 m)            Physical Exam  Constitutional:       General: She is not in acute distress. Appearance: She is well-developed. HENT:      Head: Normocephalic and atraumatic. Mouth/Throat:      Pharynx: No oropharyngeal exudate. Eyes:      General: No scleral icterus. Right eye: No discharge. Left eye: No discharge. Pupils: Pupils are equal, round, and reactive to light. Neck:      Vascular: No JVD. Cardiovascular:      Rate and Rhythm: Regular rhythm. Tachycardia present. Heart sounds: Normal heart sounds. No murmur heard. Pulmonary:      Effort: Pulmonary effort is normal. No respiratory distress. Breath sounds: Normal breath sounds. No stridor. No wheezing or rales. Chest:      Chest wall: No tenderness. Abdominal:      General: Bowel sounds are normal. There is no distension. Palpations: Abdomen is soft. There is no mass. Tenderness: There is abdominal tenderness. There is no guarding or rebound. Musculoskeletal:         General: Normal range of motion. Cervical back: Normal range of motion and neck supple. Comments: cva tenderness on right   Skin:     General: Skin is warm and dry. Capillary Refill: Capillary refill takes less than 2 seconds. Findings: No rash. Neurological:      Mental Status: She is oriented to person, place, and time. Psychiatric:         Behavior: Behavior normal.         Thought Content: Thought content normal.         Judgment: Judgment normal.          MDM     45 minutes of critical care time. Procedures      Hb 6.0  Surprising given minimal vaginal bleeding. Given degree of abdominal pain/back pain, CT bleeding scan was ordered. CT scan shows: Massively enlarged and heterogenous appearing uterus without clear delineation  of ovarian structures. There is associated hyperdense ascites/hemoperitoneum, in  addition there are bilateral pulmonary nodular densities demonstrated.      Imaging findings are concerning for atypical/aggressive uterine fibroids versus  uterine neoplasm. Gynecological consultation is recommended.     Mild hydronephrosis on the right with punctate calculus at the renal pelvis on  the right. Consult to Dr. Loc Bah. He will admit    I have discussed with the patient the rationale for blood component transfusion; its benefits in treating or preventing fatigue, organ damage, or death; and its risk which includes mild transfusion reactions, rare risk of blood borne infection, or more serious but rare reactions. I have discussed the alternatives to transfusion, including the risk and consequences of not receiving transfusion. The patient had an opportunity to ask questions and had agreed to proceed with transfusion of blood components.

## 2022-03-17 NOTE — H&P
[facility]  [address] [facility], [address]  P [phone]  F [phone]    Inpatient History and Physical  Patient ID:  Name:  Clara Chaves  MRN:  [id number]  :  1980/42 y.o. Date:  3/17/2022      HISTORY OF PRESENT ILLNESS:  Ms. Clara Chaves is a 43 y.o. female who presented to the ER with abdominal pain and anemia and found to have a large fibromatous uterus with concerns for possible sarcoma. The patient reports that about 2 months ago she began to have increasing pain and bloating. Seen in [facility] ER on 3/11/2022 and found to have a fibroid uterus with Hgb of 5.2. Received 4 units of PRBC at that time. Seen by Dr. Tracee Jackson on 3/15/2022 and was scheduled for surgery for next week. Presented to ER last night (3/16/86618) with worsening pain. Found to have a hgb of 6.0. CT A/P demonstrated \"Massively enlarged and heterogenous appearing uterus without clear delineation  of ovarian structures. There is associated hyperdense ascites/hemoperitoneum, in  addition there are bilateral pulmonary nodular densities demonstrated. \"         Imaging Review:   CT A/P 3/17/2022;  FINDINGS:   LOWER THORAX: There is elevation of the left hemidiaphragm. There is a pulmonary  nodule left lung base. There are additional pulmonary nodules at the right lung  base. LIVER: Hepatic steatosis  BILIARY TREE: Gallbladder is within normal limits. CBD is not dilated. SPLEEN: within normal limits. PANCREAS: No mass or ductal dilatation. ADRENALS: Unremarkable. KIDNEYS: Right renal calculus. Mild hydronephrosis on the right with punctate  calculus at the renal pelvis on the right. STOMACH: Unremarkable. SMALL BOWEL: No dilatation or wall thickening. COLON: No dilatation or wall thickening. APPENDIX: Not visualized  PERITONEUM: Moderate ascites. Moderately hyperdense  RETROPERITONEUM: No lymphadenopathy or aortic aneurysm. REPRODUCTIVE ORGANS: The uterus is distorted.  There are large uterine mass  lesions demonstrated. These are numerous. The dome of the uterus lies above the  umbilicus. URINARY BLADDER: Nondistended, not evaluated  BONES: No destructive bone lesion. ABDOMINAL WALL: No mass or hernia. ADDITIONAL COMMENTS: N/A  IMPRESSION  Massively enlarged and heterogenous appearing uterus without clear delineation  of ovarian structures. There is associated hyperdense ascites/hemoperitoneum, in  addition there are bilateral pulmonary nodular densities demonstrated. Imaging findings are concerning for atypical/aggressive uterine fibroids versus  uterine neoplasm. Gynecological consultation is recommended. Mild hydronephrosis on the right with punctate calculus at the renal pelvis on  the right. ROS:  A comprehensive review of systems was negative except for that written in the History of Present Illness. , 10 point ROS    OB/GYN ROS:  Patient denies significant menstrual problems.     ECOG ndGndrndanddndend:nd nd2nd Problem List:  Patient Active Problem List    Diagnosis Date Noted    Acute blood loss anemia 2022    Uterine fibroid 2022    Abnormal uterine bleeding 2022    Leiomyoma of body of uterus 2022    Anemia associated with acute blood loss 2022    Fibroid uterus 2022    Anemia 2018    Obesity 2018    PCOS (polycystic ovarian syndrome) 2016    Infertility associated with anovulation 2016    Benign neoplasm of muscle 2015    Dysmenorrhea 2014     PMH:  Past Medical History:   Diagnosis Date    Anemia 2018    Anxiety     anxiety - took medications as a teenager, no medications recently     Fibroids     Gestational hypertension affecting first pregnancy 2018    History of PCOS     Infertility, female     Polycystic disease, ovaries       PSH:  Past Surgical History:   Procedure Laterality Date    HX  SECTION      HX WISDOM TEETH EXTRACTION        Social History:  Social History Tobacco Use    Smoking status: Never Smoker    Smokeless tobacco: Never Used   Substance Use Topics    Alcohol use: No      Family History:  Family History   Problem Relation Age of Onset    Hypertension Mother     Hypertension Sister     Diabetes Sister     Hypertension Brother     Cancer Paternal Uncle     Diabetes Paternal Uncle     Diabetes Maternal Uncle     Breast Cancer Cousin       Medications: (reviewed)  Current Facility-Administered Medications   Medication Dose Route Frequency    0.9% sodium chloride infusion 250 mL  250 mL IntraVENous PRN    HYDROmorphone (DILAUDID) injection 0.5 mg  0.5 mg IntraVENous ONCE     No current outpatient medications on file. Allergies: (reviewed)  No Known Allergies     Gyn History:   Last pap: 2/2020, normal      OBJECTIVE:  Physical Exam:  Visit Vitals  /82   Pulse (!) 103   Temp 98.9 °F (37.2 °C)   Resp 26   Ht 5' 7\" (1.702 m)   Wt 215 lb (97.5 kg)   SpO2 99%   BMI 33.67 kg/m²      General: Alert and oriented. No acute distress. Well-nourished  HEENT: No thyroid enlargment. Neck supple without restrictions. Sclera normal. Normal occular motion. Moist mucous membranes. Lymphatics: No evidence of axillary, cervical, or subclavicular adenopathy. Respiratory: clear to auscultation and percussion to the bases. No CVAT. Cardiovascular: regular rate and rhythm. No murmurs, rubs, or gallops. Gastrointestinal: soft, large uterus in lower abdomen, tender to touch. Non-distended. Well-healed incision. Musculoskeletal: normal gait. No joint tenderness, deformity or swelling. No muscular tenderness. Extremities: extremities normal, atraumatic, no cyanosis or edema. Pelvic: deferred while inpatient  Neuro: Grossly intact. Normal gait and movement. No acute deficit  Skin: No evidence of rashes or skin changes.      Lab Date as available:    Lab Results   Component Value Date/Time    WBC 13.1 (H) 03/16/2022 11:50 PM    HGB 6.0 (L) 03/16/2022 11:50 PM HCT 18.9 (L) 03/16/2022 11:50 PM    PLATELET 782 51/55/7235 11:50 PM    MCV 80.1 03/16/2022 11:50 PM     Lab Results   Component Value Date/Time    Sodium 135 (L) 03/16/2022 11:50 PM    Potassium 3.5 03/16/2022 11:50 PM    Chloride 106 03/16/2022 11:50 PM    CO2 23 03/16/2022 11:50 PM    Anion gap 6 03/16/2022 11:50 PM    Glucose 118 (H) 03/16/2022 11:50 PM    BUN 9 03/16/2022 11:50 PM    Creatinine 0.59 03/16/2022 11:50 PM    BUN/Creatinine ratio 15 03/16/2022 11:50 PM    GFR est AA >60 03/16/2022 11:50 PM    GFR est non-AA >60 03/16/2022 11:50 PM    Calcium 8.6 03/16/2022 11:50 PM         IMPRESSION/PLAN:    Ms. Carrie Cedeno is a 43 y.o. female with a large 23 week size uterus with concerns for degenerating fibroids versus sarcoma, complicated by possible intra-abdominal ascites or hemorrhage. Problems:     Patient Active Problem List    Diagnosis Date Noted    Acute blood loss anemia 03/13/2022    Uterine fibroid 03/13/2022    Abnormal uterine bleeding 03/13/2022    Leiomyoma of body of uterus 03/11/2022    Anemia associated with acute blood loss 03/11/2022    Fibroid uterus 02/22/2022    Anemia 12/17/2018    Obesity 05/02/2018    PCOS (polycystic ovarian syndrome) 12/02/2016    Infertility associated with anovulation 12/02/2016    Benign neoplasm of muscle 01/20/2015    Dysmenorrhea 08/13/2014       -Gyn Oncology: Counseled patient regarding her imaging findings and concerns for possible sarcoma. She has some nodular densities in her lungs, which we require a follow-up CT Chest. However, given the size of the uterus and the likely intraperitoneal bleeding, the patient requires surgical resection of her uterus. Plan for exploratory laparotomy, ELBA BRAVO. I expressed my concerns that we may not be able to preserve her ovaries, but we will attempt to if possible. Will send the uterus for frozen section to obtain a diagnosis.  Counseled patient that given the size of the uterus she will require a laparotomy and that laparoscopy is just not feasible. -CV: s/p 1 unit PRBC in ER. Hgb 6.0 on admission. Will require additional blood products during the OR. Tachycardia improved somewhat with 1 unit PRBC. -Pulm: Will need to follow-up nodular densities with CT Chest, most likely as an outpatient and pending pathology review of her uterus.      -Renal: Mild right hydro, but normal creatinine. Likely secondary to compression. -ID: WBC 13, but afebrile. No signs of infection.     -Prophylaxis: SCDs. -Disposition: NPO today for surgery. Will sign consents in holding. Already counseled patient in the ER. Spoke with OR, and will have her follow my second case. An electronic signature was used to authenticate this note.      Alan Maria MD

## 2022-03-18 NOTE — PROGRESS NOTES
[facility]  [address] [place] [address], [address]  P [phone]  F [phone]       Patient Name: Keren Scherer   Admit Date: 3/17/2022   OR Date: 3/18/2022   Visit Date: 3/18/2022        SUBJECTIVE:  Patient for OR this afternoon. Rapid response called this morning. Likely vasovagal while in bathroom. Has been tachy through the night. Rapid COVID returned + last night. PCR + today. Patient's WBC increasing overnight. Complains of abdominal pain. Mild vaginal bleeding. 1 additional unit of blood last night. Mild fever. Concern for possible infection in her uterus. OBJECTIVE:    Patient Vitals for the past 24 hrs:   Temp Pulse Resp BP SpO2   03/18/22 0833 98.8 °F (37.1 °C) (!) 133 18 (!) 149/87 97 %   03/18/22 0540 98.7 °F (37.1 °C) (!) 133 18 (!) 142/85 97 %   03/18/22 0040 98.1 °F (36.7 °C) (!) 124 18 (!) 140/85 100 %   03/17/22 2229 99.2 °F (37.3 °C) (!) 123 18 (!) 143/87 98 %   03/17/22 2055 98.2 °F (36.8 °C) (!) 126 18 (!) 154/85 100 %   03/17/22 1904 99.5 °F (37.5 °C) (!) 116 18 (!) 160/83 100 %   03/17/22 1820 100 °F (37.8 °C) (!) 120 18 (!) 153/82 100 %   03/17/22 1651 99.2 °F (37.3 °C) (!) 105 18 139/80 100 %   03/17/22 1227 99.8 °F (37.7 °C) (!) 107 18 (!) 155/90 99 %       Date 03/17/22 0700 - 03/18/22 0659 03/18/22 0700 - 03/19/22 0659   Shift 5340-0507 8274-7554 24 Hour Total 5170-2921 9574-7117 24 Hour Total   INTAKE   Blood  337.5 337.5        Volume (TRANSFUSE PACKED RBC'S)  337.5 337.5      Shift Total(mL/kg)  337.5(3.5) 337.5(3.5)      OUTPUT   Shift Total(mL/kg)         NET  337.5 337.5      Weight (kg) 97.5 97.5 97.5 97.5 97.5 97.5       Physical Exam     General:  alert, cooperative. Fatigued. Cardiac:  Regular rhythm. EKG ordered. Sinus tachycardia        Lungs:  clear to auscultation bilaterally  Abdomen:  Soft. Large mass/uterus in lower abdomen.  Tender to palpation   Extremity: extremities normal, atraumatic, no cyanosis or edema    Data Review  Lab Results   Component Value Date/Time    WBC 20.6 (H) 03/18/2022 06:06 AM    ABS. NEUTROPHILS 18.0 (H) 03/18/2022 06:06 AM    HGB 7.8 (L) 03/18/2022 09:04 AM    HCT 25.1 (L) 03/18/2022 09:04 AM    MCV 82.6 03/18/2022 06:06 AM    MCH 27.0 03/18/2022 06:06 AM    PLATELET 962 69/09/0532 06:06 AM     Lab Results   Component Value Date/Time    Sodium 132 (L) 03/18/2022 06:04 AM    Potassium 4.5 03/18/2022 06:04 AM    Chloride 103 03/18/2022 06:04 AM    CO2 19 (L) 03/18/2022 06:04 AM    Glucose 135 (H) 03/18/2022 06:04 AM    BUN 18 03/18/2022 06:04 AM    Creatinine 1.52 (H) 03/18/2022 06:04 AM    Calcium 8.9 03/18/2022 06:04 AM    Albumin 2.9 (L) 03/18/2022 06:04 AM    Bilirubin, total 3.4 (H) 03/18/2022 06:04 AM    ALT (SGPT) 30 03/18/2022 06:04 AM    Alk. phosphatase 54 03/18/2022 06:04 AM     IMPRESSION/PLAN:    Day of Surgery Procedure(s):  LAPAROTOMY EXPLORATORY/ SHELLY/ BSO/ ER TO 3RD FLOOR for * No post-op diagnosis entered *    Oncologic:  Ms. Jose Quinteros is a 43 y.o. female with a large 23 week size uterus with concerns for degenerating fibroids versus sarcoma, complicated by possible intra-abdominal ascites or hemorrhage. possibly developing infection as well. Was considering delaying surgery due to her recent COVID + test if she remained stable however it does not seem that she will be able to wait.  + leukocytosis, increasing pain. Patient becoming tachycardic. For OR this afternoon. Heme/CV:  Hgb 7.8. Has stayed stable overnight. Likely have blood available for post op period. Tachy but BP stable   Renal:  elevated creatinine overnight. CT contrast? Vs anemia. Continue IVF. Bolus prior to surgery. FEN/GI:  NPO    ID/Wound:  Tmax 100. Leukocytosis. Start zosyn   PPX:  hold for now in light of upcoming surgery. Will restart lovenox post op   Dispostion:  for OR this afternoon. She understands the plan for surgery. Spoke with her  to update him.           Cj Narvaez JENNIFER Persaud  3/18/2022  12:14 PM

## 2022-03-18 NOTE — PROGRESS NOTES
Bedside and Verbal shift change report given to Lynsey Crespo RN (oncoming nurse) by Sarah Morse RN (offgoing nurse). Report included the following information SBAR, Kardex, ED Summary, Procedure Summary, Intake/Output, MAR, Accordion, Recent Results and Med Rec Status.          2230: VS at completion of blood transfusion:  Visit Vitals  BP (!) 143/87 (BP 1 Location: Right upper arm, BP Patient Position: At rest)   Pulse (!) 123   Temp 99.2 °F (37.3 °C)   Resp 18   Ht 5' 7\" (1.702 m)   Wt 97.5 kg (215 lb)   SpO2 98%   BMI 33.67 kg/m²

## 2022-03-18 NOTE — PROGRESS NOTES
Bedside and Verbal shift change report given to Heidi Lang and Eneida Le RN (oncoming nurse) by Brigida Marquez RN (offgoing nurse). Report included the following information SBAR, Kardex, Intake/Output and MAR.

## 2022-03-18 NOTE — ANESTHESIA PROCEDURE NOTES
Central Line Placement    Start time: 3/18/2022 1:38 PM  End time: 3/18/2022 1:42 PM  Performed by: Cassius Gray MD  Authorized by: Cassius Gray MD     Indications: vascular access  Preanesthetic Checklist: patient identified, risks and benefits discussed, anesthesia consent, site marked, patient being monitored and timeout performed    Timeout Time: 13:38 EDT       Pre-procedure: All elements of maximal sterile barrier technique followed?  Yes    2% Chlorhexidine for cutaneous antisepsis, Hand hygiene performed prior to catheter insertion and Ultrasound guidance    Sterile Ultrasound Technique followed?: Yes            Procedure:   Prep:  Chlorhexidine  Location: internal jugular  Orientation:  Right  Patient position:  Trendelenburg  Catheter type:  Quad lumen  Catheter size:  8.5 Fr  Catheter length:  16 cm  Number of attempts:  1  Successful placement: Yes      Assessment:   Post-procedure:  Catheter secured, sterile dressing applied and sterile dressing with CHG applied  Assessment:  Blood return through all ports, free fluid flow and guidewire removal verified  Insertion:  Uncomplicated  Patient tolerance:  Patient tolerated the procedure well with no immediate complications

## 2022-03-18 NOTE — PROGRESS NOTES
Called Kervin DHILLON with results of lactic acid orders written. PA aware of other lab work results and ekg.

## 2022-03-18 NOTE — PROGRESS NOTES
Dr. Linda Young up to see pt.  MD signed surgery consent and made was aware of am and resent lab work result also that was unable to get lab work for troponin level

## 2022-03-18 NOTE — INTERVAL H&P NOTE
Date of Surgery Update:  Yue Jernigan was seen and examined. History and physical has been reviewed. The patient has been examined.  There have been no significant clinical changes since the completion of the originally dated History and Physical.    Signed By: Aubrey Cardozo MD     March 18, 2022 12:02 PM
General

## 2022-03-18 NOTE — PROGRESS NOTES
TRANSFER - IN REPORT:    Verbal report received from Theodora RN (name) on Adriana Reed  being received from PACU (unit) for routine post - op      Report consisted of patients Situation, Background, Assessment and   Recommendations(SBAR). Information from the following report(s) SBAR, Kardex, OR Summary, Intake/Output and MAR was reviewed with the receiving nurse. Opportunity for questions and clarification was provided. Assessment completed upon patients arrival to unit and care assumed.

## 2022-03-18 NOTE — ROUTINE PROCESS
Patient: Javier Shultz MRN: [id number]  SSN: [id number]   YOB: 1980  Age: 43 y.o. Sex: female     Patient is status post Procedure(s):  LAPAROTOMY EXPLORATORY/ SHELLY/ BSO/OMENTECTOMY.     Surgeon(s) and Role:     * Jordon Torres MD - Primary    Local/Dose/Irrigation:                Nadara Faden 03/18/22 Right (Active)        Peripheral IV 03/16/22 Left Antecubital (Active)   Site Assessment Clean, dry, & intact 03/18/22 0833   Phlebitis Assessment 0 03/18/22 0833   Infiltration Assessment 0 03/18/22 0833   Dressing Status Clean, dry, & intact 03/18/22 0833   Dressing Type Tape;Transparent 03/18/22 0833   Hub Color/Line Status Infusing 03/18/22 0833   Alcohol Cap Used Yes 03/18/22 0833                           Dressing/Packing:  Incision 03/18/22 Abdomen-Dressing/Treatment: Other (Comment) (PRIMASEAL) (03/18/22 1600)    Splint/Cast:  ]    Other:  DEWITT; SCDS; NG TUBE

## 2022-03-18 NOTE — PROGRESS NOTES
Walked pt to bathroom to void upon pt setting down on toilet pt said she was lite headed and felt she was going to pass out. Pt felt slightly cool to touch pt had pulse no c/o chest pain and no resp distress noted. Pt eyes remained open but at times pt did not respond to verbal stumuli when calling out her name responded to touch. Called rapid response . Got patient back to bed with assistance once in bed pt was coming around and more responsive. Vital signs ok except for hr was elevated. Dr. Wong Nolan and Yogi DHILLON. Yogi DHILLON in room while during rapid response. Orders written. Laxmi Cardona Nurse Manger aware and in room during rapid response.

## 2022-03-18 NOTE — PROGRESS NOTES
Clinical Pharmacy Note - Extended Infusion Piperacillin/Tazobactam Dosing    This patient has been ordered piperacillin/tazobactam at 3.375 g IV every 6 hours. P&T protocol allows automatic substitution to extended infusion piperacillin/tazobactam and dose adjustment based on indication and renal function (adjustment required if creatinine clearance < 20 mL/min). Indication: intra-abdominal infection    CrCl: 58 mL/min    Per P&T protocol, the piperacillin/tazobactam dosing will be adjusted to 3.375 g IV every 8 hours (each dose will be infused over 4 hours). Please call pharmacy with any questions.

## 2022-03-18 NOTE — BRIEF OP NOTE
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      Procedure(s):  LAPAROTOMY EXPLORATORY/ SHELLY/ BSO/OMENTECTOMY    Surgeon(s): Vick Valerio MD    Surgical Assistant: Physician Assistant: Skip Barrera PA-C    Anesthesia: General     Estimated Blood Loss (mL): 822    Complications: None    Specimens:   ID Type Source Tests Collected by Time Destination   1 : UTERUS, CERVIX, BILATERAL FALLOPIAN TUBES, BILATERAL OVARIES Fresh Uterus with Bilateral Fallopian tubes and Ovaries  Vick Valerio MD 3/18/2022 1456 Pathology   2 : RIGHT PELVIC WALL PERITONEAL BIOPSY Fresh Peritoneal cavity  Vick Valerio MD 3/18/2022 1507 Pathology   3 : omentum Fresh Omentum  Vick Valerio MD 3/18/2022 1519 Pathology        Implants: * No implants in log *    Drains: * No LDAs found *    Findings: On laparotomy, there was a large hemoperitoneum with 2500cc of blood suctioned and another 1000cc of blood clot removed. Her uterus had a tumor that had ruptured both anteriorly and posteriorly. The cervix was normal. Bilateral tubes and ovaries were normal appearing, although not able to be saved secondary to the size of the uterus and tumor. The uterus was approximately 20 weeks size. Normal appearing appendix, small bowel, right colon and rectosigmoid colon. No obvious lymphadenopathy on palpation.      Electronically Signed by Margie Gonzales MD on 3/18/2022 at 4:02 PM

## 2022-03-19 NOTE — PROGRESS NOTES
..Bedside and Verbal shift change report given to Mir Echevarria RN (oncoming nurse) by Gustavo Salcedo RN (offgoing nurse). Report included the following information SBAR, Kardex, Procedure Summary, Intake/Output, MAR, Accordion, Recent Results and Med Rec Status. 2057 Pt HR continues to be elevated 123. Temp 100.4. Incentive Spirometer encouraged. MD notified. No orders received.  Will continue to monitor

## 2022-03-19 NOTE — PROGRESS NOTES
[facility]  [address] [place] [address], [address], [address]  P [phone]  F [phone]       Patient Name: Waleska Chapa   Admit Date: 3/17/2022   OR Date: 3/18/2022   Visit Date: 3/19/2022        SUBJECTIVE:  POD #1 s/p Exploratory laparotomy/SHELLY/BSO. 3 liters hemoperitoneum at the time of surgery. Large uterine mass ruptured at the time of surgery. Mildly elevated temps overnight, but all less than 100.4. Tachycardia slowly improving. NGT with minimal output. Pain much improved. She would like to eat.      OBJECTIVE:    Patient Vitals for the past 24 hrs:   Temp Pulse Resp BP SpO2   03/19/22 0347 99.9 °F (37.7 °C) (!) 118 18 (!) 146/87 99 %   03/19/22 0300 98.7 °F (37.1 °C) (!) 124 18 (!) 144/91 99 %   03/19/22 0108 99.3 °F (37.4 °C)       03/19/22 0004 100.3 °F (37.9 °C) (!) 121 18 (!) 147/93 99 %   03/18/22 2209 99.4 °F (37.4 °C)       03/18/22 2027 100.4 °F (38 °C) (!) 123 18 (!) 142/83 99 %   03/18/22 1900 100.1 °F (37.8 °C) (!) 120 18 (!) 141/83 100 %   03/18/22 1804 99.9 °F (37.7 °C) (!) 123 16 (!) 148/96 99 %   03/18/22 1705 99.1 °F (37.3 °C) (!) 116 18 (!) 151/98 100 %   03/18/22 1649  (!) 117 21 (!) 146/96 100 %   03/18/22 1644  (!) 116 11 (!) 150/77 100 %   03/18/22 1640  (!) 117 15 (!) 146/87 100 %   03/18/22 1635  (!) 118 16 (!) 143/93 100 %   03/18/22 1630  (!) 120 17 136/85 100 %   03/18/22 1625 97.9 °F (36.6 °C) (!) 118 17 (!) 136/97 95 %   03/18/22 1620  (!) 118 18 (!) 136/97 100 %   03/18/22 1615  (!) 119 17 (!) 116/91 100 %   03/18/22 1610  (!) 118 19 (!) 143/86 100 %   03/18/22 1608 97.6 °F (36.4 °C) (!) 115 12 (!) 149/75 100 %   03/18/22 1605  (!) 117 14 130/84 100 %   03/18/22 0833 98.8 °F (37.1 °C) (!) 133 18 (!) 149/87 97 %       Date 03/18/22 0700 - 03/19/22 0659 03/19/22 0700 - 03/20/22 0659   Shift 8313-6756 6189-7791 24 Hour Total 9418-7543 3344-0049 24 Hour Total   INTAKE   I.V.(mL/kg/hr) 1950(1.7)  1950(0.8)        Volume (0.9% sodium chloride infusion) 400  400        Volume (lactated Ringers infusion) 1300  1300        Volume (albumin human 5% (BUMINATE) solution) 250  250      Blood 892  892        Volume (TRANSFUSE PACKED RBC'S) 272  272        Volume (TRANSFUSE PACKED RBC'S) 310  310        Volume (TRANSFUSE PACKED RBC'S) 310  310      Shift Total(mL/kg) 2842(29.1)  2190(13.6)      OUTPUT   Urine(mL/kg/hr) 900(0.8) 425(0.4) 1325(0.6)        Urine Output 350  350        Urine Output (mL) (Urinary Catheter 03/18/22 2- way; Burger) 550 425 975      Emesis/NG output  0 0        Output (ml) (Nasogastric Tube 03/18/22)  0 0      Other 2100  2100        Other Output 2100  2100      Blood 500  500        Estimated Blood Loss 500  500      Shift Total(mL/kg) 3500(35.9) 425(4.4) 3925(40.2)      NET -963 -116 -0319      Weight (kg) 97.5 97.5 97.5 97.5 97.5 97.5       Physical Exam     General:  alert, cooperative. Fatigued. Cardiac:  Regular rhythm. EKG ordered. Sinus tachycardia        Lungs:  clear to auscultation bilaterally  Abdomen:  Soft. Appropriately tender for surgery. Dressing intact with minimal staining. Extremity: extremities normal, atraumatic, no cyanosis or edema    Data Review  Lab Results   Component Value Date/Time    WBC 17.4 (H) 03/19/2022 03:55 AM    ABS.  NEUTROPHILS 14.5 (H) 03/19/2022 03:55 AM    HGB 8.2 (L) 03/19/2022 03:55 AM    HCT 24.9 (L) 03/19/2022 03:55 AM    MCV 83.8 03/19/2022 03:55 AM    MCH 27.6 03/19/2022 03:55 AM    PLATELET 484 71/88/7210 03:55 AM     Lab Results   Component Value Date/Time    Sodium 136 03/19/2022 03:55 AM    Potassium 4.1 03/19/2022 03:55 AM    Chloride 109 (H) 03/19/2022 03:55 AM    CO2 22 03/19/2022 03:55 AM    Glucose 146 (H) 03/19/2022 03:55 AM    BUN 24 (H) 03/19/2022 03:55 AM    Creatinine 0.85 03/19/2022 03:55 AM    Calcium 7.8 (L) 03/19/2022 03:55 AM    Albumin 2.9 (L) 03/18/2022 06:04 AM    Bilirubin, total 3.4 (H) 03/18/2022 06:04 AM    ALT (SGPT) 30 03/18/2022 06:04 AM Alk. phosphatase 54 03/18/2022 06:04 AM     IMPRESSION/PLAN:    Day of Surgery Procedure(s):  LAPAROTOMY EXPLORATORY/ SHELLY/ BSO/ ER TO 3RD FLOOR for * No post-op diagnosis entered *    Oncologic:  Ms. Carrie Cedeno is a 43 y.o. female who presented with a large 23 week size uterus with concerns for degenerating fibroids versus sarcoma, complicated by possible intra-abdominal ascites or hemorrhage. On 3/18/2022 underwent Exploratory laparotomy/SHELLY/BSO. 3 liters hemoperitoneum at the time of surgery. Large uterine mass ruptured at the time of surgery. Awaiting final pathology. Heme/CV:  Hgb 8.2 this morning. Tachycardia slowly improving. Renal:  Creatinine now normalizing s/p surgery. Good UOP. Remove zamora today. Continue IVFs until able to tolerate oral intake. FEN/GI:  Minimal output of NGT overnight. Plan to remove NGT today. ID/Wound:  Tmax 100.4 overnight. Leukocytosis improving, now 17k down from 20k. Likely reactive temps secondary to ruptured uterine mass and hemoperitoneum. CXR negative. Continue Zosyn for now, although I suspect she will continue to improve now that she is s/p surgical resection. PPX:  Hgb stable, so will start Lovenox prophylaxis today. IS. Encourage ambulation. Dispostion:  Continue routine postop care. VTE prophylaxis. Clear liquid diet today. NGT tube out. Zamora in for now until improvement in UOP, currently 30cc/hr, but dark delano.          Adore Loera MD  3/19/2022  12:14 PM

## 2022-03-20 NOTE — PROGRESS NOTES
[facility]  [address] [place] [address], [address], [address]  P [phone]  F [phone]       Patient Name: Wes Vo   Admit Date: 3/17/2022   OR Date: 3/18/2022   Visit Date: 3/20/2022        SUBJECTIVE:  POD #1 s/p Exploratory laparotomy/SHELLY/BSO. 3 liters hemoperitoneum at the time of surgery. Large uterine mass ruptured at the time of surgery. Mildly elevated temps overnight, but all less than 99.4 Tachycardia improving. NGT out yesterday. Some nausea overnight. No emesis. No flatus as of yet. Pain controlled. OBJECTIVE:    Patient Vitals for the past 24 hrs:   Temp Pulse Resp BP SpO2   03/20/22 0024 (P) 99 °F (37.2 °C) (!) (P) 102 (P) 16 (P) 122/77 (P) 100 %   03/19/22 2045 99.6 °F (37.6 °C) (!) 103 18 138/77 99 %   03/19/22 1512 99.3 °F (37.4 °C) (!) 120 16 131/73 100 %   03/19/22 1152 98.3 °F (36.8 °C) (!) 112 16 132/72 100 %   03/19/22 0851  (!) 132  137/78 100 %   03/19/22 0837  (!) 126  130/75 100 %   03/19/22 0836 98.8 °F (37.1 °C) (!) 119 16 (!) 144/83 100 %       Date 03/19/22 0700 - 03/20/22 0659 03/20/22 0700 - 03/21/22 0659   Shift 4932-9223 7196-3201 24 Hour Total 9962-1083 5551-2980 24 Hour Total   INTAKE   P.O. 500  500        P. O. 500  500      Shift Total(mL/kg) 500(5.1)  500(5.1)      OUTPUT   Urine(mL/kg/hr) 600(0.5) 450(0.4) 1050(0.4)        Urine Voided  450 450        Urine Output (mL) ([REMOVED] Urinary Catheter 03/18/22 2- way; Burger) 600  600      Shift Total(mL/kg) 600(6.2) 450(4.6) 1050(10.8)      NET -100 -450 -550      Weight (kg) 97.5 97.5 97.5 97.5 97.5 97.5       Physical Exam     General:  alert, cooperative. Fatigued. Cardiac:  Regular rhythm. EKG ordered. Sinus tachycardia        Lungs:  clear to auscultation bilaterally  Abdomen:  Soft. Appropriately tender for surgery. Dressing intact with minimal staining.     Extremity: extremities normal, atraumatic, no cyanosis or edema    Data Review  Lab Results   Component Value Date/Time    WBC 14.0 (H) 03/20/2022 01:39 AM    ABS. NEUTROPHILS 14.5 (H) 03/19/2022 03:55 AM    HGB 6.6 (L) 03/20/2022 01:39 AM    HCT 20.3 (L) 03/20/2022 01:39 AM    MCV 84.9 03/20/2022 01:39 AM    MCH 27.6 03/20/2022 01:39 AM    PLATELET 493 84/97/0327 01:39 AM     Lab Results   Component Value Date/Time    Sodium 138 03/20/2022 01:39 AM    Potassium 3.9 03/20/2022 01:39 AM    Chloride 111 (H) 03/20/2022 01:39 AM    CO2 25 03/20/2022 01:39 AM    Glucose 106 (H) 03/20/2022 01:39 AM    BUN 10 03/20/2022 01:39 AM    Creatinine 0.48 (L) 03/20/2022 01:39 AM    Calcium 7.4 (L) 03/20/2022 01:39 AM    Albumin 2.9 (L) 03/18/2022 06:04 AM    Bilirubin, total 3.4 (H) 03/18/2022 06:04 AM    ALT (SGPT) 30 03/18/2022 06:04 AM    Alk. phosphatase 54 03/18/2022 06:04 AM     IMPRESSION/PLAN:    Day of Surgery Procedure(s):  LAPAROTOMY EXPLORATORY/ SHELLY/ BSO/ ER TO 3RD FLOOR for * No post-op diagnosis entered *    Oncologic:  Ms. Rosemarie Fong is a 43 y.o. female who presented with a large 23 week size uterus with concerns for degenerating fibroids versus sarcoma, complicated by possible intra-abdominal ascites or hemorrhage. On 3/18/2022 underwent Exploratory laparotomy/SHELLY/BSO. 3 liters hemoperitoneum at the time of surgery. Large uterine mass ruptured at the time of surgery. Awaiting final pathology. Heme/CV:  Hgb 6.6 this morning. Plan to transfuse 1 unit PRBC. Renal:  Creatinine now normalizing s/p surgery. Good UOP. Burger out. Continue IVFs until able to tolerate oral intake. FEN/GI:  Minimal output of NGT overnight. Plan to remove NGT today. ID/Wound:  Tmax 99.4 overnight. . Leukocytosis improving, now 14k down from 17k down from 20k. Likely reactive temps secondary to ruptured uterine mass and hemoperitoneum. CXR negative. Continue Zosyn for now, although I suspect she will continue to improve now that she is s/p surgical resection. PPX:  Hold Lovenox today for transfusion. IS. Encourage ambulation. Dispostion:  Continue routine postop care. VTE prophylaxis. Clear liquid diet today. NGT out 3/19/2022. Burger out yesterday.          Silver Greenfield MD  3/20/2022  12:14 PM

## 2022-03-21 NOTE — OP NOTES
Gynecologic Oncology Operative Report    [facility]  3/18/2022    Pre-operative dx:  1) Large uterine mass concerning for degenerating fibroid versus sarcoma; 2) Hemoperitoneum     Post-operative dx:  1) Large uterine mass concerning for degenerating fibroid versus sarcoma; 2) Hemoperitoneum      Procedure:    Exploratory laparotomy, total abdominal hysterectomy with bilateral salpingo-oophorectomy, omentectomy     Surgeon:  Maureen Momin MD     Assistant:  Audrey Carlton surgical assistance was required throughout the case due to the complexity of the procedure. Anika assisted with dissection, development of the retroperitoneal spaces, and identification of pertinent anatomy during the procedure. Anesthesia:  GETA     EBL:  500cc    Antibiotics: Zosyn     VTE Prophylaxis: SCDs     Complications:  None    Implants: None     Specimens:  Uterus, cervix, bilateral tubes/ovaries     Operative indications:  43 y.o. female with with large uterine fibroids versus sarcoma with hemoperitoneum    Operative findings: On laparotomy, there was a large hemoperitoneum with 2500cc of blood suctioned and another 1000cc of blood clot removed. Her uterus had a tumor that had ruptured both anteriorly and posteriorly. The cervix was normal. Bilateral tubes and ovaries were normal appearing, although not able to be saved secondary to the size of the uterus and tumor. The uterus was approximately 20 weeks size. Normal appearing appendix, small bowel, right colon and rectosigmoid colon. No obvious lymphadenopathy on palpation. Operative report: After informed consent was obtained, the patient was taken to the operating room where general endotracheal anesthesia was performed without difficulty. The patient was positioned in the dorsal lithotomy position in [address] [place] [address] and the usual precautions taken with her arms tucked bilaterally. She was prepped and draped in normal sterile fashion.  Time-out was performed and a Burger catheter was placed into the bladder. A vertical midline incision was made and carried down to the underlying fascia. The fascia was incised in the midline, and the peritoneum was then entered. On entry in to the abdomen, there was a large hemoperitoneum under pressure and glood began to come out of the incision like a fountain. The blood products were suctioned. After the incision was extended both inferiorly and superiorly, the remaining hemoperitoneum was suctioned. There was also another 1 liter of blood clot anterior to the uterus and posteriorly. These were removed. At this time, the above operative findings were noted, including the rupture uterine mass both anteriorly and posteriorly. A portion of ileum was stuck to a part posteriorly, but these was taken down easily, and was merely stuck secondary to the blood clot formation. The bowel was freed up and placed up into the upper abdomen, packed away and a Bookwalter abdominal retractor was then placed. At this point,, attention was turned to the hysterectomy. The areas of rupture were still oozing and were packed while attention was directed to the hysterecotmy. Round ligaments were then divided on each side with electrocautery and the retroperitoneal space was opened bilaterally. The ureters were identified and preserved. The infundibulopelvic ligaments were then skeletonized, sealed and divided with the LigaSure device. The bladder flap was created with electrocautery and the bladder was dissected down off the lower uterine segment and cervix. The uterine arteries were then skeletonized bilaterally, clamped with Zeppelin clamps, divided, and then suture ligated. Additional pedicles along the cervix were taken similarly until to the level of the cervical-vaginal junction. The vaginal angles were then clamped with right-angle Zeppelin clamps, divide, suture ligated with 0-Vicryl, and tagged.  The specimen was then amputated from the vagina using AlveCluepediat scissors and the vaginal walls were grasped with Adrienne clamps. The vaginal cuff was then closed with 0 Vicryl suture in a figure-of-eight fashion with careful attention to include the vaginal cuff angles and the vaginal mucosa within the closure and avoid the bladder. The pelvis was then irrigated and made hemostatic. FloSeal and Jessika were placed along a few areas of raw peritoneum that were oozing. This was packed and attention was turned to examination of the remainder of the abdomen, which was overall normal. Small bowel was ran from the ileocecal junction to the ligament of Treitz, and was normal. Normal appearing appendix. No evidence of metastatic disease. At this point, an infracolic omentectomy was performed. The omentum was dissected free of the transverse colon by skeletonizing the omental vessels, and using the LigaSure to seal and divide vascular pedicles. All of the pedicles were hemostatic and the omentum was sent for permanent pathology. After the abdomen was irrigated and all pedicles felt to be hemostatic the laparotomy packs were removed and the fascia was then reapproximated in a running mass abdominal wall closure using 1 loop Maxon. The subcutaneous space was then irrigated, and the skin was closed with staples. An Aquacel dressing was placed in the operating room. The patient tolerated the procedure well. Sponge, lap, needle and instrument counts were correct x2 at the end of the procedure.      Eliud David MD  3/18/2022

## 2022-03-21 NOTE — ANESTHESIA POSTPROCEDURE EVALUATION
Procedure(s):  LAPAROTOMY EXPLORATORY/ SHELLY/ BSO/OMENTECTOMY. general    Anesthesia Post Evaluation      Multimodal analgesia: multimodal analgesia used between 6 hours prior to anesthesia start to PACU discharge  Patient location during evaluation: PACU  Patient participation: complete - patient participated  Level of consciousness: awake  Pain score: 2  Pain management: adequate  Airway patency: patent  Anesthetic complications: no  Cardiovascular status: acceptable  Respiratory status: acceptable  Hydration status: acceptable  Comments: I have evaluated the patient and meets criteria for discharge from PACU.  Graciela Contreras MD  Post anesthesia nausea and vomiting:  controlled  Final Post Anesthesia Temperature Assessment:  Normothermia (36.0-37.5 degrees C)      INITIAL Post-op Vital signs:   Vitals Value Taken Time   /98 03/18/22 1705   Temp 37.3 °C (99.1 °F) 03/18/22 1705   Pulse 116 03/18/22 1705   Resp 18 03/18/22 1705   SpO2 100 % 03/18/22 1705

## 2022-03-21 NOTE — PROGRESS NOTES
[facility]  [address] [facility], [address], [address]  P [phone]  F [phone]       Patient Name: Abdirahman Hooks   Admit Date: 3/17/2022   OR Date: 3/18/2022   Visit Date: 3/21/2022        SUBJECTIVE:  Feeling well this morning. States that she is feeling stronger daily  Walked 5 times yesterday. + flatus. + BM x 2   no vomiting. Mild nausea. Pain controlled. No using PCA    OBJECTIVE:    Patient Vitals for the past 24 hrs:   Temp Pulse Resp BP SpO2   03/21/22 0335 98.7 °F (37.1 °C) 89 16 (!) 142/74    03/20/22 2012 99.1 °F (37.3 °C) 91 16 125/68    03/20/22 1730 99.2 °F (37.3 °C) (!) 104 16 136/78 100 %   03/20/22 1407 98.7 °F (37.1 °C) 96 16 136/81 100 %   03/20/22 1309 98.9 °F (37.2 °C) 99 16 (!) 144/85 99 %   03/20/22 1212 98.9 °F (37.2 °C) (!) 103 16 (!) 149/86 100 %   03/20/22 1107 98.5 °F (36.9 °C) (!) 109 16 132/81 100 %   03/20/22 1037 98.5 °F (36.9 °C) (!) 106 16 (!) 145/87 100 %   03/20/22 1023 98.7 °F (37.1 °C) (!) 107 16 (!) 148/97 99 %   03/20/22 0957 99 °F (37.2 °C) (!) 111 16 (!) 131/92 100 %   03/20/22 0800 98.7 °F (37.1 °C) (!) 114 18 (!) 142/86 98 %       Date 03/20/22 0700 - 03/21/22 0659 03/21/22 0700 - 03/22/22 0659   Shift 4631-8938 5573-9974 24 Hour Total 1828-0940 8901-5119 24 Hour Total   INTAKE   Blood 269.6  269.6        Volume (TRANSFUSE PACKED RBC'S) 269.6  269.6      Shift Total(mL/kg) 269.6(2.8)  269.6(2.8)      OUTPUT   Urine(mL/kg/hr) 1100(0.9) 440(0.4) 1540(0.7)        Urine Voided 2798 540 6971      Stool           Stool Occurrence(s)  1 x 1 x      Shift Total(mL/kg) 1100(11.3) 440(4.5) 1540(15.8)      NET -830.4 -440 -1270.4      Weight (kg) 97.5 97.5 97.5 97.5 97.5 97.5       Physical Exam     General:  alert, cooperative. Oriented. Cardiac:  Regular rate and rhythm. Lungs:  clear to auscultation bilaterally  Abdomen:  Soft. Appropriately tender for surgery.  Dressing intact with mild shadowing   Extremity: extremities normal, atraumatic, no cyanosis or edema    Data Review  Lab Results   Component Value Date/Time    WBC 12.8 (H) 03/21/2022 03:56 AM    ABS. NEUTROPHILS 11.0 (H) 03/21/2022 03:56 AM    HGB 7.6 (L) 03/21/2022 03:56 AM    HCT 23.1 (L) 03/21/2022 03:56 AM    MCV 84.0 03/21/2022 03:56 AM    MCH 27.6 03/21/2022 03:56 AM    PLATELET 638 89/29/0238 03:56 AM     Lab Results   Component Value Date/Time    Sodium 139 03/21/2022 03:56 AM    Potassium 3.4 (L) 03/21/2022 03:56 AM    Chloride 110 (H) 03/21/2022 03:56 AM    CO2 23 03/21/2022 03:56 AM    Glucose 97 03/21/2022 03:56 AM    BUN 10 03/21/2022 03:56 AM    Creatinine 0.43 (L) 03/21/2022 03:56 AM    Calcium 7.7 (L) 03/21/2022 03:56 AM    Albumin 2.9 (L) 03/18/2022 06:04 AM    Bilirubin, total 3.4 (H) 03/18/2022 06:04 AM    ALT (SGPT) 30 03/18/2022 06:04 AM    Alk. phosphatase 54 03/18/2022 06:04 AM     IMPRESSION/PLAN:    Day of Surgery Procedure(s):  s/p Exploratory laparotomy/SHELLY/BSO. 3 liters hemoperitoneum at the time of surgery. Large uterine mass ruptured at the time of surgery. Oncologic:  Ms. Javier Shultz is a 43 y.o. female who presented with a large 23 week size uterus with concerns for degenerating fibroids versus sarcoma, complicated by possible intra-abdominal ascites or hemorrhage. On 3/18/2022 underwent Exploratory laparotomy/SHELLY/BSO. 3 liters hemoperitoneum at the time of surgery. Large uterine mass ruptured at the time of surgery. Awaiting final pathology. Heme/CV:  Hgb 7.6 this morning. Tachycardia improving. BP normalizing as well    Renal:  Creatinine now normalized s/p surgery. Good UOP. Voiding without difficulty. Will decrease IVF to 50ml/hr. Encourage PO fluids       FEN/GI:  NGT out. Occasional nausea. Not much appetite.  + flatus and BM. Advance diet to regular. ID/Wound:  Tmax 99.2 overnight. Leukocytosis improving, now 13K down 14k down from 17k down from 20k.  Likely reactive temps secondary to ruptured uterine mass and hemoperitoneum. CXR negative. Continue Zosyn for now, although I suspect she will continue to improve now that she is s/p surgical resection. PPX:  Hgb has been stable. Restart Lovenox. Will need to go home on lovenox as well. Continue OOB. Encourage incentive spirometer   Dispostion:  Continue routine postop care. VTE prophylaxis. Advance diet today. Making good progress. Hopefully will be able to go home in next day or two.          Rylee Stern PA-C  3/21/2022  12:14 PM

## 2022-03-22 NOTE — DISCHARGE INSTRUCTIONS
[facility] [address], [address]  P [phone]  F [phone]     Formerly [facility]      Dear MsBeti Carrie Cedeno,      Please review your instructions with your nurse and ask any questions so you have all the information you need to recover well at home. If you do not feel you have everything you need to succeed and be safe after you leave the hospital, please discuss these concerns with your nurse. As always, call for any questions at home. Your doctor: Dr. Casey Johnston  Diagnosis: Acute on chronic anemia [D64.9]  Uterine fibroid [D25.9]  Uterine mass [N85.8]  Procedure: Procedure(s):  LAPAROTOMY EXPLORATORY/ SHELLY/ BSO/OMENTECTOMY  Date of Discharge: Take Home Medications     See Discharge Medication Review provided to you by your nurse. If you did not receive one, request this prior to your discharge. · It is important that you take your medications as they are prescribed. Your prescriptions are sent to your pharmacy on record. · Keep your medications in the bottles provided by the pharmacist and keep a list of the medication names, dosages, times to be taken and what they are for in your wallet. · Do not take other medications without consulting your doctor. · You may take acetaminophen (Tylenol) for pain. If this is not sufficient for your pain, take tramadol or other pain medication you were provided. · You should take a daily gentle stool softener such as a colace pill or dulcolax suppository for constipation as this is not uncommon after surgery and/or while on pain medication. If not prescribed, this can be found over the counter. If constipation persists for >24 hours you should take a dose of Milk of Magnesia. Call if your constipation continues. Diet    · Stay hydrated and drink fluids such as gatorade and water. This will also help prevent constipation and dehydration.  Limit somewhat any usual caffeine intake of beverages such as soda, tea and coffee as this may serve to dehydrate you. · For the first 2-3 days keep a low fiber diet avoiding raw vegetables or fruits with skin. A diet consisting of soup, cereal, yogurt, eggs, fish, Boost/Ensure. Avoid fatty/greasy foods. · If nauseated, keep your diet limited to liquids and call if this persists. Activity    · If possible, have someone with you at all times until you feel stable. · Gradually increase your activity each day. There are generally no restrictions on walking, climbing stairs or riding in a car. Walk at least 4 times per day. Walking will help reduce the risk of blood clots and constipation. · Showers are okay. If you have an incision, no tub bathing/swimming for two weeks. · No driving for 2 week and/or while on pain medication. · The following restrictions apply:  1. No heavy lifting greater than 15 lbs for 4 weeks, then 25 lbs for the next 4 weeks. 2. If you had any vaginal procedure or a hysterectomy, nothing per vagina for 6-8 weeks. 3. You may experience vaginal spotting. Should the bleeding require more that 4 pads a day please call our office. Incision    · You should expect some discomfort in the area of your incision, particularly as you increase your activity. If you notice an area of increasing redness or new drainage, please call your doctor. · Your incisions will have buried, absorbable sutures, which do not need to be removed and are covered by protective glue. Please allow this to fall off on its own over time and refrain from peeling it off unless it is no longer covering the incision. Causes For Concern    If any of the following occur, please call our office and speak with the Nurse/aid who will help you with your problem or ask the doctor to call you.  Problems with the incision, including increasing pain, swelling, redness or drainage.     Inability to pass urine    Increasing abdominal pain despite medication   Persisting nausea or vomiting.  Fever or chills and a temperature >101F   Constipation (no bowel movement for three days).  Diarrhea (more than three watery stools within 24 hours).  Excessive vaginal or wound bleeding.  If after hours and you cannot reach an on-call physician, call 911. Follow-Up    Call [phone] to schedule the following appointments with Dr. Casey Johnston:   Telephone virtual-visit in 10-14 days to discuss your pathology results.  In-office visit for your postoperative exam in 6 weeks from surgery.  Please also make an appointment to have your staples removed in about 10-14 days. Information obtained by :  I understand that if any problems occur once I am at home I am to contact my physician. I understand and acknowledge receipt of the instructions indicated above.                                                                                                                                            Physician's or R.N.'s Signature                                                                  Date/Time                                                                                                                                              Patient or Representative Signature                                                          Date/Time

## 2022-03-22 NOTE — PROGRESS NOTES
[facility]COL[facility]  [address] [place] [address], [address], [address]  P [phone]  F [phone]       Patient Name: Bessy Simon   Admit Date: 3/17/2022   OR Date: 3/18/2022   Visit Date: 3/22/2022        SUBJECTIVE:  Feeling well this morning. States that she is feeling stronger daily  Walked 5 times yesterday. + flatus. + BM x 2 tolerating reg diet. No N/V. Pain controlled. OBJECTIVE:    Patient Vitals for the past 24 hrs:   Temp Pulse Resp BP SpO2   03/22/22 0000 98.7 °F (37.1 °C) 82 16 (!) 144/78 100 %   03/21/22 2030 98.8 °F (37.1 °C) 85 16 (!) 143/94 100 %   03/21/22 1301 98.9 °F (37.2 °C) 91 16 (!) 146/88 100 %   03/21/22 0830 98.7 °F (37.1 °C) 92 16 126/66 100 %       Date 03/21/22 0700 - 03/22/22 0659 03/22/22 0700 - 03/23/22 0659   Shift 4507-1335 6381-0928 24 Hour Total 3322-7607 7325-9139 24 Hour Total   INTAKE   Shift Total(mL/kg)         OUTPUT   Urine(mL/kg/hr) 200(0.2)  200(0.1)        Urine Voided 200  200      Shift Total(mL/kg) 200(2.1)  200(2.1)      NET -200  -200      Weight (kg) 97.5 97.5 97.5 97.5 97.5 97.5       Physical Exam     General:  alert, cooperative. Oriented. Cardiac:  Regular rate and rhythm. Lungs:  clear to auscultation bilaterally  Abdomen:  Soft. NT. BS+   Extremity: extremities normal, atraumatic, no cyanosis or edema    Data Review  Lab Results   Component Value Date/Time    WBC 8.9 03/22/2022 05:59 AM    ABS.  NEUTROPHILS 6.9 03/22/2022 05:59 AM    HGB 7.5 (L) 03/22/2022 05:59 AM    HCT 23.1 (L) 03/22/2022 05:59 AM    MCV 83.7 03/22/2022 05:59 AM    MCH 27.2 03/22/2022 05:59 AM    PLATELET 858 (H) 94/68/5084 05:59 AM     Lab Results   Component Value Date/Time    Sodium 137 03/22/2022 05:59 AM    Potassium 3.2 (L) 03/22/2022 05:59 AM    Chloride 109 (H) 03/22/2022 05:59 AM    CO2 24 03/22/2022 05:59 AM    Glucose 100 03/22/2022 05:59 AM    BUN 7 03/22/2022 05:59 AM    Creatinine 0.42 (L) 03/22/2022 05:59 AM    Calcium 7.8 (L) 03/22/2022 05:59 AM    Albumin 2.9 (L) 03/18/2022 06:04 AM    Bilirubin, total 3.4 (H) 03/18/2022 06:04 AM    ALT (SGPT) 30 03/18/2022 06:04 AM    Alk. phosphatase 54 03/18/2022 06:04 AM     IMPRESSION/PLAN:    Day of Surgery Procedure(s):  s/p Exploratory laparotomy/SHELLY/BSO. 3 liters hemoperitoneum at the time of surgery. Large uterine mass ruptured at the time of surgery. Oncologic:  Ms. Jose Quinteros is a 43 y.o. female who presented with a large 23 week size uterus with concerns for degenerating fibroids versus sarcoma, complicated by possible intra-abdominal ascites or hemorrhage. On 3/18/2022 underwent Exploratory laparotomy/SHELLY/BSO. 3 liters hemoperitoneum at the time of surgery. Large uterine mass ruptured at the time of surgery. Awaiting final pathology. Heme/CV:  Hgb stable. VSS, HTN   Renal:  Creatinine normal.       FEN/GI:  tolerating diet. + flatus and BM. Regular diet   ID/Wound:  afebrile, no leukocytosis. CXR negative. PPX:  home on lovenox, ambulate, IS. Dispostion:  Home today expected DC instructions, medications and follow-up discussed and questions were answered.            Celestine Shah PA-C  3/22/2022

## 2022-03-22 NOTE — PROGRESS NOTES
Bedside and Verbal shift change report given to Toro Quiros RN (oncoming nurse) by Lina Sosa RN (offgoing nurse). Report included the following information SBAR, Kardex, ED Summary, OR Summary, Procedure Summary, Intake/Output, MAR, Accordion, Recent Results and Med Rec Status.

## 2022-03-22 NOTE — PROGRESS NOTES
Bedside and Verbal shift change report given to LISSETTE Francois Mc and Amadou Rodriguez RN (oncoming nurse) by Hortencia Reid RN (offgoing nurse). Report included the following information SBAR, Kardex, Intake/Output and MAR. 1045- IJ removed using sterile technique, pressure applied- no bleeding noted. 1050- I have reviewed discharge instructions with the patient. The patient verbalized understanding.      Judy Dao RN

## 2022-04-01 NOTE — DISCHARGE SUMMARY
[address] [place] [address] [address], [address]  P [phone]  F [phone]        Discharge Summary  Patient ID:  Ling Doyle  [id number]  62 y.o.  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 Ling Doyle is a 43 y.o. female who presented to the ER with abdominal pain and anemia and found to have a large fibromatous uterus with concerns for possible sarcoma. She presents with a 2 month history of increasing abdominal pain and bloating. On 3/11/2022, she was seen at the [facility] ED and found to have a Hgb of 5.2. She was discharged after receiving 4 units of PRBCs and told to follow up with GYN for subsequent surgery. Her symptoms worsened and she returned to the ED, this time to [facility].  Her Hgb was 6.0. CT scan of the abdomen/pelvis found a \"Massively enlarged and heterogenous appearing uterus without clear delineation of ovarian structures. There is associated hyperdense ascites/hemoperitoneum, in addition there are bilateral pulmonary nodular densities demonstrated. \" She was admitted for further management. Her pain and vaginal bleeding were controlled. She developed a low grade fever as well as leukocytosis raising concern for intraabdominal infection vs tumor reaction. It was decided that she would have surgery during this admission. She underwent the above procedures. See op note for details, but briefly: On laparotomy, there was a large hemoperitoneum with 2500cc of blood suctioned and another 1000cc of blood clot removed. Her uterus had a tumor that had ruptured both anteriorly and posteriorly. The cervix was normal. Bilateral tubes and ovaries were normal appearing, although not able to be saved secondary to the size of the uterus and tumor. The uterus was approximately 20 weeks size. Normal appearing appendix, small bowel, right colon and rectosigmoid colon. No obvious lymphadenopathy on palpation.     Final path returned as below. She convalesced postop without complication. Diet was advanced as GI function returned. She was voiding, ambulating and her pain was controlled. She was deemed ready for release on POD 4. She was afebrile with a benign abdomen and wound. Followup, meds and education provided as below. Pathology:   * * *FINAL PATHOLOGIC DIAGNOSIS* * *     1.  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)   Benign bilateral ovaries without evidence of involvement by sarcoma   Benign bilateral fallopian tubes without evidence of involvement by   sarcoma   Refer to synoptic report below          SYNOPTIC REPORT - UTERUS (SARCOMA)    SPECIMEN       Procedure:  Total hysterectomy and bilateral salpingo-oophorectomy       Specimen Integrity: Distorted with rupture of anterior and posterior   uterine wall    TUMOR       Histologic Type: Undifferentiated uterine sarcoma       Tumor Size: At least 8.0 x 7.8 cm       Other Tissue / Organ Involvement: Not identified       Lymphovascular Invasion: Not identified    MARGINS       Margins: Cannot be assessed - rupture of anterior and posterior   uterine wall    LYMPH NODES       Regional Lymph Nodes: No lymph nodes submitted or found    PATHOLOGIC STAGE CLASSIFICATION (pTNM, AJCC 8th ed.)       Primary Tumor (pT): pT1b       Regional Lymph Nodes (pN): pNX            2.  Right pelvic peritoneal, biopsy:        Fibrovascular tissue   No tumor identified     3.  Omentum, omentectomy:        Benign mature adipose tissue        No tumor identified     Consults: None    Significant Diagnostic Studies:  Lab Results   Component Value Date/Time    WBC 8.9 03/22/2022 05:59 AM    ABS. NEUTROPHILS 6.9 03/22/2022 05:59 AM    HGB 7.5 (L) 03/22/2022 05:59 AM    HCT 23.1 (L) 03/22/2022 05:59 AM    MCV 83.7 03/22/2022 05:59 AM    MCH 27.2 03/22/2022 05:59 AM    PLATELET 641 (H) 03/80/7523 05:59 AM     Lab Results   Component Value Date/Time    Sodium 137 03/22/2022 05:59 AM    Potassium 3.2 (L) 03/22/2022 05:59 AM    Chloride 109 (H) 03/22/2022 05:59 AM    CO2 24 03/22/2022 05:59 AM    Glucose 100 03/22/2022 05:59 AM    BUN 7 03/22/2022 05:59 AM    Creatinine 0.42 (L) 03/22/2022 05:59 AM    Calcium 7.8 (L) 03/22/2022 05:59 AM    Albumin 2.9 (L) 03/18/2022 06:04 AM    Bilirubin, total 3.4 (H) 03/18/2022 06:04 AM    ALT (SGPT) 30 03/18/2022 06:04 AM    Alk. phosphatase 54 03/18/2022 06:04 AM       Condition on Discharge: good    Disposition: home    Patient Instructions:   Discharge Medication List as of 3/22/2022 10:11 AM      START taking these medications    Details   potassium chloride SR (KLOR-CON 10) 10 mEq tablet Take 1 Tablet by mouth two (2) times a day., Normal, Disp-28 Tablet, R-0      traMADoL (ULTRAM) 50 mg tablet Take 1 Tablet by mouth every six (6) hours as needed for Pain for up to 5 days.  Max Daily Amount: 200 mg., Normal, Disp-20 Tablet, R-0      enoxaparin (LOVENOX) 40 mg/0.4 mL 0.4 mL by SubCUTAneous route every twenty-four (24) hours for 28 days. Indications: deep vein thrombosis prevention, treatment to prevent a blood clot in the lung, Normal, Disp-11.2 mL, R-0         CONTINUE these medications which have NOT CHANGED    Details   ferrous sulfate 325 mg (65 mg iron) tablet Take 325 mg by mouth Daily (before breakfast). , Historical Med         STOP taking these medications       megestroL (MEGACE) 40 mg tablet Comments:   Reason for Stopping:         ibuprofen (AdviL) 200 mg tablet Comments:   Reason for Stopping:             Activity: no driving for 2 weeks and/or while on analgesics, no heavy lifting for 6 weeks. Nothing per vagina for 8 weeks  Walk four or more times daily  Showers okay, no tub bathing for 2 weeks if surgery  Stool softner for constipation  Diet: Regular Diet and Encourage fluids  Wound Care: Keep wound clean and dry, Reinforce dressing PRN and Ice to area for comfort    Follow-up with Dr. Ghulam Wetzel in 6 weeks.     Signed:  Rafi Lim PA-C  4/1/2022/1:49 PM

## 2022-04-01 NOTE — TELEPHONE ENCOUNTER
I called and spoke to patient, per Dr. Armida Mancuso, pt does not need to restart her iron supplement. , pt verbalized understanding

## 2022-04-05 PROBLEM — C54.9 UTERINE SARCOMA (HCC): Status: ACTIVE | Noted: 2022-01-01

## 2022-04-05 NOTE — PROGRESS NOTES
[facility] GYNECOLOGIC ONCOLOGY  [address] [facility], [address]  P [phone]  F [phone]    Inpatient History and Physical  Patient ID:  Name:  Akshat Brown  MRN:  [id number]  :  1980/42 y.o. Date:  2022      HISTORY OF PRESENT ILLNESS:  Ms. Akhsat Brown is a 43 y.o. female who on 3/18/2022 underwent an urgent surgery, Exploratory laparotomy, total abdominal hysterectomy with bilateral salpingo-oophorectomy, omentectomy. 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 Presents today for pathology review via virtual visit. Doing well since surgery. Initial History:  Ms. Akshat Brown is a 43 y.o. female who presented to the ER with abdominal pain and anemia and found to have a large fibromatous uterus with concerns for possible sarcoma. The patient reports that about 2 months ago she began to have increasing pain and bloating. Seen in [facility] ER on 3/11/2022 and found to have a fibroid uterus with Hgb of 5.2. Received 4 units of PRBC at that time. Seen by Dr. Dorothy Hernandez on 3/15/2022 and was scheduled for surgery for next week. Presented to ER last night (3/16/91098) with worsening pain. Found to have a hgb of 6.0. CT A/P demonstrated \"Massively enlarged and heterogenous appearing uterus without clear delineation  of ovarian structures. There is associated hyperdense ascites/hemoperitoneum, in  addition there are bilateral pulmonary nodular densities demonstrated. \"         Pathology Review:   3/18/2022:  * * *FINAL PATHOLOGIC DIAGNOSIS* * *   1.  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)   Benign bilateral ovaries without evidence of involvement by sarcoma   Benign bilateral fallopian tubes without evidence of involvement by   sarcoma   Refer to synoptic report below        SYNOPTIC REPORT - UTERUS (SARCOMA)    SPECIMEN       Procedure: Total hysterectomy and bilateral salpingo-oophorectomy       Specimen Integrity: Distorted with rupture of anterior and posterior   uterine wall    TUMOR       Histologic Type: Undifferentiated uterine sarcoma       Tumor Size: At least 8.0 x 7.8 cm       Other Tissue / Organ Involvement: Not identified       Lymphovascular Invasion: Not identified    MARGINS       Margins: Cannot be assessed - rupture of anterior and posterior   uterine wall    LYMPH NODES       Regional Lymph Nodes: No lymph nodes submitted or found    PATHOLOGIC STAGE CLASSIFICATION (pTNM, AJCC 8th ed.)       Primary Tumor (pT): pT1b       Regional Lymph Nodes (pN): pNX   2.  Right pelvic peritoneal, biopsy:        Fibrovascular tissue   No tumor identified   3.  Omentum, omentectomy:        Benign mature adipose tissue        No tumor identified   * * *Comment* * *   Immunohistochemical stains are performed.  The tumor is positive for CD10,   p16, and vimentin while negative for smooth muscle actin, desmin,   pankeratin AE1/AE3, p53, cyclin D1, ER, , and DOG1.  This staining   pattern supports the above rendered diagnosis.     Block 1F is sent for Sarcoma Comprehensive NGS Fusion Panel; results will   be reported in an addendum. This case is seen in consultation with one or more additional pathologists   who agree with the above rendered diagnoses. Imaging Review:   CT A/P 3/17/2022;  FINDINGS:   LOWER THORAX: There is elevation of the left hemidiaphragm. There is a pulmonary  nodule left lung base. There are additional pulmonary nodules at the right lung  base. LIVER: Hepatic steatosis  BILIARY TREE: Gallbladder is within normal limits. CBD is not dilated. SPLEEN: within normal limits. PANCREAS: No mass or ductal dilatation. ADRENALS: Unremarkable. KIDNEYS: Right renal calculus.  Mild hydronephrosis on the right with punctate  calculus at the renal pelvis on the right.  STOMACH: Unremarkable. SMALL BOWEL: No dilatation or wall thickening. COLON: No dilatation or wall thickening. APPENDIX: Not visualized  PERITONEUM: Moderate ascites. Moderately hyperdense  RETROPERITONEUM: No lymphadenopathy or aortic aneurysm. REPRODUCTIVE ORGANS: The uterus is distorted. There are large uterine mass  lesions demonstrated. These are numerous. The dome of the uterus lies above the  umbilicus. URINARY BLADDER: Nondistended, not evaluated  BONES: No destructive bone lesion. ABDOMINAL WALL: No mass or hernia. ADDITIONAL COMMENTS: N/A  IMPRESSION  Massively enlarged and heterogenous appearing uterus without clear delineation  of ovarian structures. There is associated hyperdense ascites/hemoperitoneum, in  addition there are bilateral pulmonary nodular densities demonstrated. Imaging findings are concerning for atypical/aggressive uterine fibroids versus  uterine neoplasm. Gynecological consultation is recommended. Mild hydronephrosis on the right with punctate calculus at the renal pelvis on  the right. ROS:  A comprehensive review of systems was negative except for that written in the History of Present Illness. , 10 point ROS    OB/GYN ROS:  Patient denies significant menstrual problems.     ECOG ndGndrndanddndend:nd nd2nd Problem List:  Patient Active Problem List    Diagnosis Date Noted    Uterine mass 03/17/2022    Acute on chronic anemia 03/17/2022    Acute blood loss anemia 03/13/2022    Uterine fibroid 03/13/2022    Abnormal uterine bleeding 03/13/2022    Leiomyoma of body of uterus 03/11/2022    Anemia associated with acute blood loss 03/11/2022    Fibroid uterus 02/22/2022    Anemia 12/17/2018    Obesity 05/02/2018    PCOS (polycystic ovarian syndrome) 12/02/2016    Infertility associated with anovulation 12/02/2016    Benign neoplasm of muscle 01/20/2015    Dysmenorrhea 08/13/2014     PMH:  Past Medical History:   Diagnosis Date    Anemia 12/17/2018    Anxiety anxiety - took medications as a teenager, no medications recently     Fibroids     Gestational hypertension affecting first pregnancy 2018    History of PCOS     Infertility, female     Polycystic disease, ovaries       PSH:  Past Surgical History:   Procedure Laterality Date    HX  SECTION      HX GYN  2022    Exploratory laparotomy, total abdominal hysterectomy with bilateral salpingo-oophorectomy, omentectomy    HX WISDOM TEETH EXTRACTION        Social History:  Social History     Tobacco Use    Smoking status: Never Smoker    Smokeless tobacco: Never Used   Substance Use Topics    Alcohol use: No      Family History:  Family History   Problem Relation Age of Onset    Hypertension Mother     Hypertension Sister     Diabetes Sister     Hypertension Brother     Cancer Paternal Uncle     Diabetes Paternal Uncle     Diabetes Maternal Uncle     Breast Cancer Cousin       Medications: (reviewed)  Current Outpatient Medications   Medication Sig    potassium chloride SR (KLOR-CON 10) 10 mEq tablet Take 1 Tablet by mouth two (2) times a day.  enoxaparin (LOVENOX) 40 mg/0.4 mL 0.4 mL by SubCUTAneous route every twenty-four (24) hours for 28 days. Indications: deep vein thrombosis prevention, treatment to prevent a blood clot in the lung    ferrous sulfate 325 mg (65 mg iron) tablet Take 325 mg by mouth Daily (before breakfast). (Patient not taking: Reported on 2022)     No current facility-administered medications for this visit. Allergies: (reviewed)  No Known Allergies     Gyn History:   Last pap: 2020, normal      OBJECTIVE:  Physical Exam:  Visit Vitals  LMP 2022 (Exact Date)      General: Alert and oriented. No acute distress. Well-nourished  HEENT: No thyroid enlargment. Neck supple without restrictions. Sclera normal. Normal occular motion. Moist mucous membranes. Lymphatics: No evidence of axillary, cervical, or subclavicular adenopathy. Respiratory: clear to auscultation and percussion to the bases. No CVAT. Cardiovascular: regular rate and rhythm. No murmurs, rubs, or gallops. Gastrointestinal: soft, large uterus in lower abdomen, tender to touch. Non-distended. Well-healed incision. Musculoskeletal: normal gait. No joint tenderness, deformity or swelling. No muscular tenderness. Extremities: extremities normal, atraumatic, no cyanosis or edema. Pelvic: deferred while inpatient  Neuro: Grossly intact. Normal gait and movement. No acute deficit  Skin: No evidence of rashes or skin changes. Lab Date as available:    Lab Results   Component Value Date/Time    WBC 8.9 03/22/2022 05:59 AM    HGB 7.5 (L) 03/22/2022 05:59 AM    HCT 23.1 (L) 03/22/2022 05:59 AM    PLATELET 214 (H) 96/67/4640 05:59 AM    MCV 83.7 03/22/2022 05:59 AM     Lab Results   Component Value Date/Time    Sodium 137 03/22/2022 05:59 AM    Potassium 3.2 (L) 03/22/2022 05:59 AM    Chloride 109 (H) 03/22/2022 05:59 AM    CO2 24 03/22/2022 05:59 AM    Anion gap 4 (L) 03/22/2022 05:59 AM    Glucose 100 03/22/2022 05:59 AM    BUN 7 03/22/2022 05:59 AM    Creatinine 0.42 (L) 03/22/2022 05:59 AM    BUN/Creatinine ratio 17 03/22/2022 05:59 AM    GFR est AA >60 03/22/2022 05:59 AM    GFR est non-AA >60 03/22/2022 05:59 AM    Calcium 7.8 (L) 03/22/2022 05:59 AM         IMPRESSION/PLAN:    Ms. Markus Bautista is a 43 y.o. female who initially presented via the ER with a large 23 week size uterus with concerns for degenerating fibroids versus sarcoma, complicated by possible intra-abdominal ascites or hemorrhage. On 3/18/2022 underwent an urgent surgery, Exploratory laparotomy, total abdominal hysterectomy with bilateral salpingo-oophorectomy, omentectomy. 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      Problems:     Patient Active Problem List    Diagnosis Date Noted    Uterine mass 03/17/2022    Acute on chronic anemia 03/17/2022    Acute blood loss anemia 03/13/2022    Uterine fibroid 03/13/2022    Abnormal uterine bleeding 03/13/2022    Leiomyoma of body of uterus 03/11/2022    Anemia associated with acute blood loss 03/11/2022    Fibroid uterus 02/22/2022    Anemia 12/17/2018    Obesity 05/02/2018    PCOS (polycystic ovarian syndrome) 12/02/2016    Infertility associated with anovulation 12/02/2016    Benign neoplasm of muscle 01/20/2015    Dysmenorrhea 08/13/2014     Reviewed patient's course to date, including her surgical pathology as per above. Her pathology is consistent with a Stage IB uterine sarcoma based on the size of the tumor. The tumor had ruptured at the time of surgery with a large amount of hemoperitoneum, although there was no dissemination of cancer and it was completely resected. It is unclear if this should represent a Stage IB versus Stage II disease. Having said that, the literature for adjuvant therapy remains the same. Neither radiation nor chemotherapy seems to reduce the risk of recurrence, which is 40-50%. Will obtain PET scan now and will have patient return to clinic for results. If her PET scan is negative, then I have recommended only observation. Will plan for q 6month CT scans and q3 month exams for surveillance. All questions and concerns were addressed with the patient and she is comfortable with the plan. An electronic signature was used to authenticate this note. Cheryl Neal MD    Pursuant to the emergency declaration unde the [facility], [facility] authority and the [employer] and [employer] Act, this Virtual Visit was conducted, with patient's consent, to reduce the patient's risk of exposure to COVID-19 and provide continuity of care for an established patient.      Patient identification was verified at the start of the visit: Yes    Services were provided through a video synchronous discussion virtually to substitute for in-person clinic visit. Patient was at her individual home, while the provider was in his/her respective office.     I spent at least 25 minutes with this established patient, and >50% of the time was spent counseling and/or coordinating care regarding pathology and adjuvant treatment discussion    Michael Zayas MD

## 2022-04-11 NOTE — TELEPHONE ENCOUNTER
Pt , she states her allergies are pretty bad right now, wanted to know if she could take claritin while taking Lovenox. Also wanted to know if she can take Theraflu, consulted with Dr. Chester Case to make sure no contraindications with Lovenox, and he stated none, she can take both medications, also she states gas has gotten worse, advised her to take Gas-x.   She states she is having bowel movements every other day

## 2022-04-13 NOTE — NURSE NAVIGATOR
[address] [facility]   Gyn/Onc and Brain Navigator Encounter    Name:    Heath Martinez  Age:    43 y.o. Encounter type:  []Patient Initiated  [x]Navigator Follow-up []Pre-op  []Post-op  []Check-in Prior to First Treatment []Treatment Modality Change  []1st point of Contact []Referral []Other:       Narrative:  Patient reached out to me regarding gas pains and issues dealing with these. We discussed Gas X and how this can help, we also discussed walking as much as she can tolerate to really get those bowels moving and gas out. We also discussed diet and what causes more gas and how to keep these things in moderation until its more under control and passing normally. She was appreciative of all of this information. Reminded her to reach out if and when she needs anything! Marvin MURRAY, RN.   Gyn Oncology/ Primary Brain Tumor [address], [facility] 22.  Office: [phone]  Cell: [phone]  F: [phone]  [email or web address]  Good Help to Those in [facility]

## 2022-04-14 PROBLEM — R00.0 TACHYCARDIA: Status: ACTIVE | Noted: 2022-01-01

## 2022-04-14 NOTE — ED PROVIDER NOTES
HPI     43year old female with anxiety, anemia, uterine sarcoma s/p resection presents with right sided pleuritic cp with sob and tachycardia for 2 days. SOB with exertion. On lovenox post surgery in march. Pain or swelling. She does report a temperature of 100 at home. She has not been vaccinated for COVID but tested positive for COVID in March. Denies nausea or vomiting. No history of DVT or PE. Past Medical History:   Diagnosis Date    Anemia 2018    Anxiety     anxiety - took medications as a teenager, no medications recently     Fibroids     Gestational hypertension affecting first pregnancy 2018    History of PCOS     Infertility, female     Polycystic disease, ovaries        Past Surgical History:   Procedure Laterality Date    HX  SECTION      HX GYN  2022    Exploratory laparotomy, total abdominal hysterectomy with bilateral salpingo-oophorectomy, omentectomy    HX WISDOM TEETH EXTRACTION           Family History:   Problem Relation Age of Onset    Hypertension Mother     Hypertension Sister     Diabetes Sister     Hypertension Brother     Cancer Paternal Uncle     Diabetes Paternal Uncle     Diabetes Maternal Uncle     Breast Cancer Cousin        Social History     Socioeconomic History    Marital status:      Spouse name: Not on file    Number of children: Not on file    Years of education: Not on file    Highest education level: Not on file   Occupational History    Not on file   Tobacco Use    Smoking status: Never Smoker    Smokeless tobacco: Never Used   Vaping Use    Vaping Use: Never used   Substance and Sexual Activity    Alcohol use: No    Drug use: No    Sexual activity: Yes     Partners: Male     Birth control/protection: Condom     Comment: condoms- sometimes.    Other Topics Concern     Service Not Asked    Blood Transfusions Not Asked    Caffeine Concern Not Asked    Occupational Exposure Not Asked    Hobby Hazards Not Asked    Sleep Concern Not Asked    Stress Concern Not Asked    Weight Concern Not Asked    Special Diet Not Asked    Back Care Not Asked    Exercise Not Asked    Bike Helmet Not Asked   [address] Not Asked    Self-Exams Not Asked   Social History Narrative    Not on file     Social Determinants of Health     Financial Resource Strain:     Difficulty of Paying Living Expenses: Not on file   Food Insecurity:     Worried About Running Out of Food in the Last Year: Not on file    Isis of Food in the Last Year: Not on file   Transportation Needs:     Lack of Transportation (Medical): Not on file    Lack of Transportation (Non-Medical): Not on file   Physical Activity:     Days of Exercise per Week: Not on file    Minutes of Exercise per Session: Not on file   Stress:     Feeling of Stress : Not on file   Social Connections:     Frequency of Communication with Friends and Family: Not on file    Frequency of Social Gatherings with Friends and Family: Not on file    Attends [employer] Services: Not on file    Active Member of [address] [place] [address] or Organizations: Not on file    Attends Club or Organization Meetings: Not on file    Marital Status: Not on file   Intimate Partner Violence:     Fear of Current or Ex-Partner: Not on file    Emotionally Abused: Not on file    Physically Abused: Not on file    Sexually Abused: Not on file   Housing Stability:     Unable to Pay for Housing in the Last Year: Not on file    Number of [place] in the Last Year: Not on file    Unstable Housing in the Last Year: Not on file         ALLERGIES: Patient has no known allergies. Review of Systems   Constitutional: Positive for fever. HENT: Negative for congestion. Eyes: Negative for visual disturbance. Respiratory: Positive for shortness of breath. Negative for cough. Cardiovascular: Positive for chest pain and palpitations. Negative for leg swelling.    Gastrointestinal: Negative for abdominal pain, nausea and vomiting. Endocrine: Negative for polyuria. Genitourinary: Negative for dysuria. Musculoskeletal: Negative for gait problem. Skin: Negative for rash. Neurological: Negative for headaches. Psychiatric/Behavioral: Negative for dysphoric mood. Vitals:    04/14/22 0046   BP: (!) 132/91   Pulse: (!) 130   Resp: 20   Temp: 98.7 °F (37.1 °C)   SpO2: 100%   Weight: 88 kg (194 lb)   Height: 5' 7\" (1.702 m)            Physical Exam  Constitutional:       General: She is not in acute distress. Appearance: She is well-developed. HENT:      Head: Normocephalic and atraumatic. Mouth/Throat:      Pharynx: No oropharyngeal exudate. Eyes:      General: No scleral icterus. Right eye: No discharge. Left eye: No discharge. Pupils: Pupils are equal, round, and reactive to light. Neck:      Vascular: No JVD. Cardiovascular:      Rate and Rhythm: Regular rhythm. Tachycardia present. Heart sounds: Normal heart sounds. No murmur heard. Pulmonary:      Effort: Pulmonary effort is normal. No respiratory distress. Breath sounds: Normal breath sounds. No stridor. No wheezing or rales. Chest:      Chest wall: No tenderness. Abdominal:      General: Bowel sounds are normal. There is no distension. Palpations: Abdomen is soft. There is no mass. Tenderness: There is no abdominal tenderness. There is no guarding or rebound. Musculoskeletal:         General: Normal range of motion. Cervical back: Normal range of motion and neck supple. Skin:     General: Skin is warm and dry. Capillary Refill: Capillary refill takes less than 2 seconds. Findings: No rash. Neurological:      Mental Status: She is oriented to person, place, and time. Psychiatric:         Behavior: Behavior normal.         Thought Content:  Thought content normal.         Judgment: Judgment normal.          MDM       Procedures    ED EKG interpretation:  Rhythm: normal sinus rhythm; and regular . Rate (approx.): 96; Axis: normal; P wave: normal; QRS interval: normal ; ST/T wave: non-specific changes; This EKG was interpreted by Rebecca Ford MD,ED Provider. CT chest with no PE but concerning for mets. Also with loculated fluid LUQ. I spoke with Dr. Nirmala Littlejohn, gyn onc- likely reoccurance of tumar with mets. If CT doesn't show anything acute- admit to medicine. Ct scan shows reoccurance of tumar with ascites. Spoke with dr. Nirmala Littlejohn. Iv fluids and reassess. If feeling better, heart rate down, tolerating po's will likely d/c. Patient does not necessarily want to be admitted. He will send his NP in to see patient this am.     Care signed over to Dr. Angela Mazariegos at change of shift. Dr. Nirmala Littlejohn has seen the patient. Will reassess after fluids and po challenge. Admit if no improvement in heart rate.

## 2022-04-14 NOTE — ED TRIAGE NOTES
Patient arrives from home with CC of SOB x2 days and a rapid heart rate. She also complains on right abdominal/flank pain. Recently had a hysterectomy.

## 2022-04-14 NOTE — CONSULTS
Dr. Jose Antonio Logan. [phone]            Cardiology Consult/Progress Note      Requesting/referring provider: Chelsea Rogers MD     Reason for Consult: Tachycardia    Assessment/Plan:  1. 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  1 month postop after hysterectomy  5. Anemia multifactorial    Ms. Chel Salazar was seen for tachycardia which appears to be predominantly sinus tachycardia. There is no clinical evidence to suggest any ventricular arrhythmias. EKGs performed over the last 1 month both perioperatively as well as in the ER has showed either sinus rhythm or sinus tachycardia with no significant ischemic changes. She has no prior known coronary or structural cardiac disease. We will however perform an echocardiogram to rule out such problems. CT scan performed in the emergency room did not demonstrate any evidence of pericardial effusion or pulmonary embolism contributing to/tachycardia. Assuming she does not have any structural heart disease, no further intervention would be required or recommended for sinus tachycardia which appears to be likely situational driven by her recent surgery, low-grade  Anemia. Dyspnea is related to metastatic disease and anemia    Investigations ordered    []    High complexity decision making was performed  []    Patient is at high-risk of decompensation with multiple organ involvement  []    Complex/difficult social determinants of health outcomes  Total of ** minutes were spent on reviewing the records, analyzing investigations and documentation in the chart, on the day of visit including time for examination and time spent with the patient  Investigations personally reviewed and interpreted  ECG performed on 14 April 2022 shows sinus rhythm and otherwise is normal  ECG performed on March 18 shows sinus tachycardia and is otherwise normal.  Investigations reviewed  CT chest performed in the emergency room shows pleural-based nodules/lesions which are likely metastatic lesions. PET performed few days ago also demonstrate hypermetabolic lesions in these areas. No PE was noted on the CTA  HPI: Jocelyn Nixon, a 43y.o. year-old who is seen for evaluation and management of tachycardia. 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 She also has prior history of infertility, PCOS. For her uterine sarcoma she underwent radical hysterectomy about a month ago. Postoperative course was not unusual but on recent PET CT as well as on CT scan performed in the emergency room she was noted to have pleural-based lesions which are likely metastasis with hypermetabolic characteristics on PET scan consistent with malignancy. That seems to be contributing to chest discomfort which has been going on for the past 2 days and is located in the right lateral aspect is pleuritic in nature. Additionally she reports recent dyspnea. She  has a past medical history of Anemia (12/17/2018), Anxiety, Fibroids, Gestational hypertension affecting first pregnancy (12/13/2018), History of PCOS, Infertility, female, and Polycystic disease, ovaries. She has no past medical history of Abnormal Papanicolaou smear of cervix, Asthma, Breast disorder, Chlamydia, Complication of anesthesia, Diabetes ([address] Epilepsy ([address] Genital herpes, Gestational diabetes, Gonorrhea, Heart abnormality, Herpes gestationis, Herpes simplex virus (HSV) infection, Human immunodeficiency virus (HIV) disease ([address] Kidney disease, Liver disease, Nicotine vapor product user, Non-nicotine vapor product user, Phlebitis and thrombophlebitis, Pituitary disorder ([address] Postpartum depression, Rhesus isoimmunization affecting pregnancy, Sickle cell disease ([address] Sickle cell trait syndrome ([address] Syphilis, Systemic lupus erythematosus ([address] Thyroid activity decreased, or Trauma. Review of system:Patient reports no PND/Orthpnea. She reports no cough/fever/focal neurological deficits/abdominal pain. All other systems negative except as above. Family History   Problem Relation Age of Onset    Hypertension Mother     Hypertension Sister     Diabetes Sister     Hypertension Brother     Cancer Paternal Uncle     Diabetes Paternal Uncle     Diabetes Maternal Uncle     Breast Cancer Cousin       Social History     Socioeconomic History    Marital status:    Tobacco Use    Smoking status: Never Smoker    Smokeless tobacco: Never Used   Vaping Use    Vaping Use: Never used   Substance and Sexual Activity    Alcohol use: No    Drug use: No    Sexual activity: Yes     Partners: Male     Birth control/protection: Condom     Comment: condoms- sometimes. PE  Vitals:    04/14/22 0830 04/14/22 1225 04/14/22 1300 04/14/22 1518   BP: 132/86 (!) 139/90 131/82 (!) 142/85   Pulse: (!) 110 (!) 109 (!) 113 (!) 107   Resp: 25 19 30 16   Temp: 98.5 °F (36.9 °C) 98.6 °F (37 °C)  100.2 °F (37.9 °C)   SpO2: 100% 100% 100% 100%   Weight:       Height:        Body mass index is 30.38 kg/m². General:    Alert, cooperative, no distress. Psychiatric:    Normal Mood and affect    Eye/ENT:      Pupils equal, No asymmetry, Conjunctival pink. Able to hear voice at normal amplitude   Lungs:      Visibly symmetric chest expansion, No palpable tenderness. Clear to auscultation bilaterally. Heart[de-identified]    Regular rate and rhythm, S1, S2 normal, no murmur, click, rub or gallop. No JVD, Normal palpable peripheral pulses. No cyanosis   Abdomen:     Soft, non-tender. Bowel sounds normal. No masses,  No      organomegaly. Extremities:   Extremities normal, atraumatic, no edema. Neurologic:   CN II-XII grossly intact.  No gross focal deficits           Recent Labs:  Lab Results   Component Value Date/Time    Cholesterol, total 77 12/15/2021 09:17 AM    HDL Cholesterol 41 12/15/2021 09:17 AM LDL, calculated 26.8 12/15/2021 09:17 AM    Triglyceride 46 12/15/2021 09:17 AM    CHOL/HDL Ratio 1.9 12/15/2021 09:17 AM     Lab Results   Component Value Date/Time    Creatinine 0.72 04/14/2022 01:13 AM     Lab Results   Component Value Date/Time    BUN 7 04/14/2022 01:13 AM     Lab Results   Component Value Date/Time    Potassium 4.2 04/14/2022 01:13 AM     Lab Results   Component Value Date/Time    Hemoglobin A1c 6.0 (H) 12/15/2021 09:17 AM     Lab Results   Component Value Date/Time    Hemoglobin (POC) 8.3 (L) 03/18/2022 03:47 PM    Hemoglobin (POC) 6.1 03/11/2022 03:37 PM    HGB 9.2 (L) 04/14/2022 01:13 AM     Lab Results   Component Value Date/Time    PLATELET 873 44/73/9328 01:13 AM       Reviewed:  Past Medical History:   Diagnosis Date    Anemia 12/17/2018    Anxiety     anxiety - took medications as a teenager, no medications recently     Fibroids     Gestational hypertension affecting first pregnancy 12/13/2018    History of PCOS     Infertility, female     Polycystic disease, ovaries      Social History     Tobacco Use   Smoking Status Never Smoker   Smokeless Tobacco Never Used     Social History     Substance and Sexual Activity   Alcohol Use No     No Known Allergies  Family History   Problem Relation Age of Onset    Hypertension Mother     Hypertension Sister     Diabetes Sister     Hypertension Brother     Cancer Paternal Uncle     Diabetes Paternal Uncle     Diabetes Maternal Uncle     Breast Cancer Cousin         Current Facility-Administered Medications   Medication Dose Route Frequency    sodium chloride (NS) flush 5-40 mL  5-40 mL IntraVENous Q8H    sodium chloride (NS) flush 5-40 mL  5-40 mL IntraVENous PRN    enoxaparin (LOVENOX) injection 40 mg  40 mg SubCUTAneous DAILY    sodium chloride (NS) flush 5-40 mL  5-40 mL IntraVENous Q8H    sodium chloride (NS) flush 5-40 mL  5-40 mL IntraVENous PRN    acetaminophen (TYLENOL) tablet 650 mg  650 mg Oral Q4H PRN    traMADoL (ULTRAM) tablet  mg   mg Oral Q6H PRN    HYDROmorphone (DILAUDID) injection 1 mg  1 mg IntraVENous Q4H PRN    [START ON 4/15/2022] metoprolol tartrate (LOPRESSOR) tablet 12.5 mg  12.5 mg Oral DAILY         ATTENTION:   This medical record was transcribed using an electronic medical records/speech recognition system. Although proofread, it may and can contain electronic, spelling and other errors. Corrections may be executed at a later time. Please feel free to contact us for any clarifications as needed.     [employer] [facility] [place]  [facility], [place], [place]. [phone]

## 2022-04-14 NOTE — ED NOTES
TRANSFER - OUT REPORT:    Verbal report given to Susan Han RN(name) on Don Ng  being transferred to 3E(unit) for routine progression of care       Report consisted of patients Situation, Background, Assessment and   Recommendations(SBAR). Information from the following report(s) SBAR, ED Summary, Intake/Output, MAR, Recent Results and Cardiac Rhythm ST was reviewed with the receiving nurse. Lines:   Peripheral IV 04/14/22 Left Hand (Active)   Site Assessment Clean, dry, & intact 04/14/22 0830   Phlebitis Assessment 0 04/14/22 0830   Infiltration Assessment 0 04/14/22 0830   Dressing Status Clean, dry, & intact 04/14/22 0830   Dressing Type Transparent;Tape 04/14/22 0830   Hub Color/Line Status Pink;Patent; Infusing 04/14/22 0830       Peripheral IV 04/14/22 Right Antecubital (Active)   Site Assessment Clean, dry, & intact 04/14/22 0830   Phlebitis Assessment 0 04/14/22 0830   Infiltration Assessment 0 04/14/22 0830   Dressing Status Clean, dry, & intact 04/14/22 0830   Dressing Type Transparent;Tape 04/14/22 0830   Hub Color/Line Status Pink;Patent 04/14/22 0830        Opportunity for questions and clarification was provided.       Patient transported with:  transport

## 2022-04-14 NOTE — ED NOTES
MEDICAL DECISION MAKIN y.o. female presents with Shortness of Breath, Flank Pain, and Rapid Heart Rate    Turnover from Dr. Trista Srinivasan at 7:00 AM, pending HR reassessment. 8:58 AM  Patient HR still elevated. Plan for patient to be admitted to Dr. Jackson Villalpando service. LABORATORY TESTS:  Labs Reviewed   CBC WITH AUTOMATED DIFF - Abnormal; Notable for the following components:       Result Value    RBC 3.77 (*)     HGB 9.2 (*)     HCT 28.5 (*)     MCV 75.6 (*)     MCH 24.4 (*)     RDW 17.0 (*)     NEUTROPHILS 77 (*)     LYMPHOCYTES 11 (*)     ABS. NEUTROPHILS 8.3 (*)     ABS. MONOCYTES 1.2 (*)     All other components within normal limits   METABOLIC PANEL, COMPREHENSIVE - Abnormal; Notable for the following components:    Sodium 134 (*)     Glucose 131 (*)     BUN/Creatinine ratio 10 (*)     Albumin 3.4 (*)     A-G Ratio 0.9 (*)     All other components within normal limits   D DIMER - Abnormal; Notable for the following components:    D-dimer 2.42 (*)     All other components within normal limits   LIPASE   TROPONIN-HIGH SENSITIVITY   NT-PRO BNP   SAMPLES BEING HELD       IMAGING RESULTS:  CT ABD PELV WO CONT   Final Result   1. Decreased size of previously noted pelvic mass. 2.  Decreased abdominal pelvic ascites with residual.      3.  Pulmonary nodules. See dedicated chest CT. CTA CHEST W OR W WO CONT   Final Result      1. Multiple lung masses some of which are pleural-based consistent with   metastatic disease. 2.  Loculated fluid in the left upper abdomen.       3.  No evidence of pulmonary embolus             MEDICATIONS GIVEN:  Medications   sodium chloride (NS) flush 5-40 mL (10 mL IntraVENous Given 22 1340)   sodium chloride (NS) flush 5-40 mL (has no administration in time range)   iopamidoL (ISOVUE-370) 76 % injection 100 mL (80 mL IntraVENous Given 22 2786)   ketorolac (TORADOL) injection 15 mg (15 mg IntraVENous Given 22 2765)   sodium chloride 0.9 % bolus infusion 1,000 mL (1,000 mL IntraVENous New Bag 4/14/22 0558)   sodium chloride 0.9 % bolus infusion 1,000 mL (1,000 mL IntraVENous New Bag 4/14/22 0558)            CONSULTS:  Dr. John Salvador, gyn onc    IMPRESSION:  1. Tachycardia    2.  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

## 2022-04-14 NOTE — PROGRESS NOTES
Spiritual Care Assessment/Progress Note  [facility]      NAME: Clifton Jasso      MRN: [id number]  AGE: 43 y.o. SEX: female  Adventist Affiliation: Islam   Language: English     4/14/2022     Total Time (in minutes): 44     Spiritual Assessment begun in [address] through conversation with:         [x]Patient        [x] Family    [] Friend(s)        Reason for Consult: Emergency Department visit,Request by staff,Initial/Spiritual assessment, patient floor     Spiritual beliefs: (Please include comment if needed)     [x] Identifies with a leslie tradition: Islam        [] Supported by a leslie community:            [] Claims no spiritual orientation:           [] Seeking spiritual identity:                [] Adheres to an individual form of spirituality:           [] Not able to assess:                           Identified resources for coping:      [x] Prayer                               [] Music                  [] Guided Imagery     [x] Family/friends                 [] Pet visits     [] Devotional reading                         [] Unknown     [] Other:                                             Interventions offered during this visit: (See comments for more details)    Patient Interventions: Affirmation of emotions/emotional suffering,Affirmation of leslie,Coping skills reviewed/reinforced,Crisis,End of life issues discussed,Initial/Spiritual assessment, patient floor,Prayer (actual),Prayer (assurance of)     Family/Friend(s):  Affirmation of emotions/emotional suffering,Coping skills reviewed/reinforced,Affirmation of leslie,Prayer (actual),Prayer (assurance of)     Plan of Care:     [x] Support spiritual and/or cultural needs    [] Support AMD and/or advance care planning process      [x] Support grieving process   [] Coordinate Rites and/or Rituals    [] Coordination with community clergy   [] No spiritual needs identified at this time   [] Detailed Plan of Care below (See Comments)  [] Make referral to Music Therapy  [] Make referral to Pet Therapy     [] Make referral to Addiction services  [] Make referral to [facility]  [] Make referral to Spiritual Care Partner  [] No future visits requested        [] Contact Spiritual Care for further referrals     Comments: Staff Request for support in ER 6. Marek Mao was lying on the bed alone initially. Her spouse John Means arrived during the visit. Marek Mao was welcoming to visit and she immediately began to emotionally talk about the overwhelming diagnosis she  had been given. She verbally and emotionally expressed how overwhelmed she was feeling; her desire to be able to take care of her son and watch him grow; dreams and hopes she had for her future; desires to see her 2 yo son Bruno [employer] graduate, get , and have children. Discussed ideas to assist with her anxiety and assist in memory making such as writing letters to her son; picture presentations, etc  Phoebe Ghosh shared she enjoyed writing). Marek Mao also talked about her supportive family: spouse John Means; her mother & father; mother-n-law and brother. She also shared about the support she has with her leslie community at [employer] in [address] Marek Mao is a devout women of leslie. She is hoping and believing in a miracle; prayer is her coping resource. Chart reviewed. Explored spiritual, emotional, and relational needs; facilitated anxiety containment and    ; provided sacred space for open expression or inner thoughts and feelings, hospitality, and prayer; and cultivated a relationship of trust, care, and support. Visited by: Josie Garrett, [address] [place] [address] paging Service [phone])

## 2022-04-14 NOTE — H&P
[facility] [address]  P [phone]  F [phone]       Raul Landry       [id number]  1980    Admitted 2022 Date 2022     Admit dx: No admission diagnoses are documented for this encounter. HPI:    Raul Landry is a 43 y.o.  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 She was discharged w/o incident on POD 4. She present back now with right sided pleuritic cp with sob and tachycardia for 2 days. SOB with exertion. On lovenox post surgery in march. Pain or swelling. She does report a temperature of 100 at home. She has not been vaccinated for COVID but tested positive for COVID in March. Denies nausea or vomiting. No history of DVT or PE. She has been monitored in ED with variable tachycardia, remains 120 at time of visit. Denies CP/SOB at rest.  Right sided pains/discomfort continues. PET/CT yesterday shows multiple hypermetabolic nodules c/w metastatic sarcoma. The CTA chest today is negative for PE/pleural effusion, normal heart. Troponin, BNP normal. No leukocytosis. Mild anemia hgb 9.2. Abdomen with soft tissue density ?tumor vs residual postop findings with minimal tumor. She had no residual disease at the time of surgery. Family and patient desire admission for further workup.      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       Past Medical History:   Diagnosis Date    Anemia 2018    Anxiety     anxiety - took medications as a teenager, no medications recently     Fibroids     Gestational hypertension affecting first pregnancy 2018    History of PCOS     Infertility, female     Polycystic disease, ovaries       Past Surgical History:   Procedure Laterality Date    HX  SECTION      HX GYN  2022    Exploratory laparotomy, total abdominal hysterectomy with bilateral salpingo-oophorectomy, omentectomy    HX WISDOM TEETH EXTRACTION        Social History     Tobacco Use    Smoking status: Never Smoker    Smokeless tobacco: Never Used   Substance Use Topics    Alcohol use: No      Family History   Problem Relation Age of Onset    Hypertension Mother     Hypertension Sister     Diabetes Sister     Hypertension Brother     Cancer Paternal Uncle     Diabetes Paternal Uncle     Diabetes Maternal Uncle     Breast Cancer Cousin       Current Facility-Administered Medications   Medication Dose Route Frequency    sodium chloride (NS) flush 5-40 mL  5-40 mL IntraVENous Q8H    sodium chloride (NS) flush 5-40 mL  5-40 mL IntraVENous PRN     Current Outpatient Medications   Medication Sig    potassium chloride SR (KLOR-CON 10) 10 mEq tablet Take 1 Tablet by mouth two (2) times a day.  enoxaparin (LOVENOX) 40 mg/0.4 mL 0.4 mL by SubCUTAneous route every twenty-four (24) hours for 28 days. Indications: deep vein thrombosis prevention, treatment to prevent a blood clot in the lung     No Known Allergies     Review of Systems  A comprehensive review of systems was negative except for that written in the History of Present Illness.  , 10 point ROS    OBJECTIVE:    Physical Exam  Visit Vitals  /86   Pulse (!) 110   Temp 98.5 °F (36.9 °C)   Resp 25   Ht 5' 7\" (1.702 m)   Wt 194 lb (88 kg)   LMP 2022 (Exact Date)   SpO2 100%   BMI 30.38 kg/m²     General appearance: alert, cooperative, no distress, appears stated age  Eyes: conjunctivae/corneas clear. PER, EOM's intact. anicteric  Back: symmetric, no curvature. ROM normal. No CVA tenderness. Lungs: clear to auscultation bilaterally  Heart: tachycardia, reg rhythm, S1, S2 normal, no murmur, click, rub or gallop  Abdomen: soft, obese, incision healed w/o hernia. No fluid. Tender right abd/RUQ. Pelvic: deferred  Extremities: extremities normal, atraumatic, no cyanosis or edema  Pulses: 2+ and symmetric  Skin: Skin color, texture, turgor normal. No rashes or lesions  Lymph nodes: Cervical, supraclavicular nodes normal.  Neurologic: Grossly normal    Data Review      Recent Results (from the past 24 hour(s))   GLUCOSE, POC    Collection Time: 04/13/22  3:59 PM   Result Value Ref Range    Glucose (POC) 116 65 - 117 mg/dL    Performed by Ghazala Aaron    EKG, 12 LEAD, INITIAL    Collection Time: 04/14/22  1:08 AM   Result Value Ref Range    Ventricular Rate 96 BPM    Atrial Rate 96 BPM    P-R Interval 142 ms    QRS Duration 72 ms    Q-T Interval 348 ms    QTC Calculation (Bezet) 439 ms    Calculated P Axis 50 degrees    Calculated R Axis 43 degrees    Calculated T Axis 40 degrees    Diagnosis       Normal sinus rhythm  Normal ECG  When compared with ECG of 18-MAR-2022 08:35,  No significant change was found     CBC WITH AUTOMATED DIFF    Collection Time: 04/14/22  1:13 AM   Result Value Ref Range    WBC 10.8 3.6 - 11.0 K/uL    RBC 3.77 (L) 3.80 - 5.20 M/uL    HGB 9.2 (L) 11.5 - 16.0 g/dL    HCT 28.5 (L) 35.0 - 47.0 %    MCV 75.6 (L) 80.0 - 99.0 FL    MCH 24.4 (L) 26.0 - 34.0 PG    MCHC 32.3 30.0 - 36.5 g/dL    RDW 17.0 (H) 11.5 - 14.5 %    PLATELET 578 489 - 650 K/uL    MPV 10.3 8.9 - 12.9 FL    NRBC 0.0 0  WBC    ABSOLUTE NRBC 0.00 0.00 - 0.01 K/uL    NEUTROPHILS 77 (H) 32 - 75 %    LYMPHOCYTES 11 (L) 12 - 49 %    MONOCYTES 11 5 - 13 %    EOSINOPHILS 0 0 - 7 %    BASOPHILS 0 0 - 1 %    IMMATURE GRANULOCYTES 0 0.0 - 0.5 %    ABS. NEUTROPHILS 8.3 (H) 1.8 - 8.0 K/UL    ABS.  LYMPHOCYTES 1.2 0.8 - 3.5 K/UL    ABS. MONOCYTES 1.2 (H) 0.0 - 1.0 K/UL    ABS. EOSINOPHILS 0.0 0.0 - 0.4 K/UL    ABS. BASOPHILS 0.0 0.0 - 0.1 K/UL    ABS. IMM. GRANS. 0.0 0.00 - 0.04 K/UL    DF AUTOMATED     METABOLIC PANEL, COMPREHENSIVE    Collection Time: 04/14/22  1:13 AM   Result Value Ref Range    Sodium 134 (L) 136 - 145 mmol/L    Potassium 4.2 3.5 - 5.1 mmol/L    Chloride 104 97 - 108 mmol/L    CO2 22 21 - 32 mmol/L    Anion gap 8 5 - 15 mmol/L    Glucose 131 (H) 65 - 100 mg/dL    BUN 7 6 - 20 MG/DL    Creatinine 0.72 0.55 - 1.02 MG/DL    BUN/Creatinine ratio 10 (L) 12 - 20      GFR est AA >60 >60 ml/min/1.73m2    GFR est non-AA >60 >60 ml/min/1.73m2    Calcium 9.7 8.5 - 10.1 MG/DL    Bilirubin, total 1.0 0.2 - 1.0 MG/DL    ALT (SGPT) 36 12 - 78 U/L    AST (SGOT) 18 15 - 37 U/L    Alk. phosphatase 65 45 - 117 U/L    Protein, total 7.1 6.4 - 8.2 g/dL    Albumin 3.4 (L) 3.5 - 5.0 g/dL    Globulin 3.7 2.0 - 4.0 g/dL    A-G Ratio 0.9 (L) 1.1 - 2.2     LIPASE    Collection Time: 04/14/22  1:13 AM   Result Value Ref Range    Lipase 97 73 - 393 U/L   TROPONIN-HIGH SENSITIVITY    Collection Time: 04/14/22  1:13 AM   Result Value Ref Range    Troponin-High Sensitivity 5 0 - 51 ng/L   D DIMER    Collection Time: 04/14/22  1:13 AM   Result Value Ref Range    D-dimer 2.42 (H) 0.00 - 0.65 mg/L FEU   NT-PRO BNP    Collection Time: 04/14/22  1:13 AM   Result Value Ref Range    NT pro-BNP 22 <125 PG/ML       Imaging  CT Results (most recent):  Results from [place] encounter on 04/14/22    CT ABD PELV WO CONT    Narrative  EXAM: CT ABD PELV WO CONT    INDICATION: abdominal pain    COMPARISON: 3/17/2022    CONTRAST:  None. TECHNIQUE:  Thin axial images were obtained through the abdomen and pelvis. Coronal and  sagittal reformats were generated. Oral contrast was not administered.  CT dose  reduction was achieved through use of a standardized protocol tailored for this  examination and automatic exposure control for dose modulation. The absence of intravenous contrast material reduces the sensitivity for  evaluation of the vasculature and solid organs. FINDINGS: Evaluation of the solid organs is limited by lack of intravenous  contrast. There is some residual contrast in place from the previous contrast  bolus. LOWER THORAX: Multiple pulmonary nodules. LIVER: No mass. BILIARY TREE: Gallbladder is within normal limits. CBD is not dilated. SPLEEN: within normal limits. PANCREAS: No focal abnormality. ADRENALS: Unremarkable. KIDNEYS/URETERS: Residual contrast excretion. No evidence of hydronephrosis. STOMACH: Unremarkable. SMALL BOWEL: No dilatation or wall thickening. COLON: No dilatation or wall thickening. APPENDIX: Not visualized  PERITONEUM: Loculated fluid in the left upper quadrant decreased from the prior  study. Abdominal and pelvic ascites decrease from the prior study  RETROPERITONEUM: No lymphadenopathy or aortic aneurysm. REPRODUCTIVE ORGANS: Complex soft tissue density in the pelvis. This is  decreased in size. On the current study, this measures 11.3 x 9.7 cm and is  suspicious for residual tumor. URINARY BLADDER: No mass or calculus. BONES: No destructive bone lesion. ABDOMINAL WALL: No mass or hernia. ADDITIONAL COMMENTS: N/A    Impression  1. Decreased size of previously noted pelvic mass. 2.  Decreased abdominal pelvic ascites with residual.    3.  Pulmonary nodules. See dedicated chest CT.         IMPRESSION/PLAN:    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    Anemia D64.9    Obesity E66.9    Fibroid uterus D25.9    Leiomyoma of body of uterus D25.9    Anemia associated with acute blood loss D62    Acute blood loss anemia D62    Uterine fibroid D25.9    Abnormal uterine bleeding N93.9    Uterine mass N85.8    Acute on chronic anemia D64.9    Uterine sarcoma ([facility] [address] C54.9       I reviewed with Jillian Zhao her medical records, physical exam, and review of symptoms. 43 y.o. 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 Patient and family desire further workup while here. Admission for observation and consultation to cardiology for opinion. Discussed findings of PET with family. Plan for office f/u Dr. Marisol Ochoa to discussion therapy planning.       Signed By: Stefanie Grant PA-C     4/14/2022/11:32 AM

## 2022-04-15 NOTE — PROGRESS NOTES
Bedside and Verbal shift change report given to Sabrina Chino RN (oncoming nurse) by Rai Levi RN (offgoing nurse). Report included the following information SBAR, Kardex, MAR and Recent Results.

## 2022-04-15 NOTE — PROGRESS NOTES
Dr. Jefferson Huber. [phone]            Cardiology Consult/Progress Note      Requesting/referring provider: Bossman Espinosa MD     Reason for Consult: Tachycardia interim: Overnight no new issues. Does not report of any shortness of breath or worsening chest pain. Heart rates have been rather stable. Assessment/Plan:  1. 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  1 month postop after hysterectomy  5. Anemia multifactorial    Ms. Janece Osler was seen for tachycardia which appears to be predominantly sinus tachycardia. There is no clinical evidence to suggest any ventricular arrhythmias. EKGs performed over the last 1 month both perioperatively as well as in the ER has showed either sinus rhythm or sinus tachycardia with no significant ischemic changes. She has no prior known coronary or structural cardiac disease. We will however perform an echocardiogram to rule out such problems. CT scan performed in the emergency room did not demonstrate any evidence of pericardial effusion or pulmonary embolism contributing to/tachycardia. Assuming she does not have any structural heart disease, no further intervention would be required or recommended for sinus tachycardia which appears to be likely situational driven by her recent surgery, low-grade  Anemia. Dyspnea is related to metastatic disease and anemia. He did not demonstrate any new symptoms. I do not suspect any urgent cardiac issues. Reasonable to perform an echocardiogram which can be done as an outpatient with clinic follow-up. Stable to discharge from a cardiac standpoint.   Investigations ordered    []    High complexity decision making was performed  []    Patient is at high-risk of decompensation with multiple organ involvement  []    Complex/difficult social determinants of health outcomes     Investigations personally reviewed and interpreted  ECG performed on 14 April 2022 shows sinus rhythm and otherwise is normal  ECG performed on March 18 shows sinus tachycardia and is otherwise normal.  Investigations reviewed  CT chest performed in the emergency room shows pleural-based nodules/lesions which are likely metastatic lesions. PET performed few days ago also demonstrate hypermetabolic lesions in these areas. No PE was noted on the CTA  HPI: Lary Lundberg, a 43y.o. year-old who is seen for evaluation and management of tachycardia. 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 She also has prior history of infertility, PCOS. For her uterine sarcoma she underwent radical hysterectomy about a month ago. Postoperative course was not unusual but on recent PET CT as well as on CT scan performed in the emergency room she was noted to have pleural-based lesions which are likely metastasis with hypermetabolic characteristics on PET scan consistent with malignancy. That seems to be contributing to chest discomfort which has been going on for the past 2 days and is located in the right lateral aspect is pleuritic in nature. Additionally she reports recent dyspnea. She  has a past medical history of Anemia (12/17/2018), Anxiety, Fibroids, Gestational hypertension affecting first pregnancy (12/13/2018), History of PCOS, Infertility, female, and Polycystic disease, ovaries.     She has no past medical history of Abnormal Papanicolaou smear of cervix, Asthma, Breast disorder, Chlamydia, Complication of anesthesia, Diabetes ([address] Epilepsy ([address] Genital herpes, Gestational diabetes, Gonorrhea, Heart abnormality, Herpes gestationis, Herpes simplex virus (HSV) infection, Human immunodeficiency virus (HIV) disease ([address] Kidney disease, Liver disease, Nicotine vapor product user, Non-nicotine vapor product user, Phlebitis and thrombophlebitis, Pituitary disorder ([address] Postpartum depression, Rhesus isoimmunization affecting pregnancy, Sickle cell disease ([facility] [address] Sickle cell trait syndrome ([facility] [address] Syphilis, Systemic lupus erythematosus ([address] Thyroid activity decreased, or Trauma. Review of system:Patient reports no PND/Orthpnea. She reports no cough/fever/focal neurological deficits/abdominal pain. All other systems negative except as above. Family History   Problem Relation Age of Onset    Hypertension Mother     Hypertension Sister     Diabetes Sister     Hypertension Brother     Cancer Paternal Uncle     Diabetes Paternal Uncle     Diabetes Maternal Uncle     Breast Cancer Cousin       Social History     Socioeconomic History    Marital status:    Tobacco Use    Smoking status: Never Smoker    Smokeless tobacco: Never Used   Vaping Use    Vaping Use: Never used   Substance and Sexual Activity    Alcohol use: No    Drug use: No    Sexual activity: Yes     Partners: Male     Birth control/protection: Condom     Comment: condoms- sometimes. PE  Vitals:    04/14/22 1748 04/14/22 2055 04/15/22 0000 04/15/22 0758   BP:  118/81 129/84 127/81   Pulse:  (!) 105 (!) 107 (!) 117   Resp:  16 16 16   Temp: 99 °F (37.2 °C) 99.1 °F (37.3 °C) 99.1 °F (37.3 °C) 99.2 °F (37.3 °C)   SpO2:  98% 100% 99%   Weight:       Height:        Body mass index is 30.38 kg/m². General:    Alert, cooperative, no distress. Psychiatric:    Normal Mood and affect    Eye/ENT:      Pupils equal, No asymmetry, Conjunctival pink. Able to hear voice at normal amplitude   Lungs:      Visibly symmetric chest expansion, No palpable tenderness. Clear to auscultation bilaterally. Heart[de-identified]    Regular rate and rhythm, S1, S2 normal, no murmur, click, rub or gallop. No JVD, Normal palpable peripheral pulses. No cyanosis   Abdomen:     Soft, non-tender. Bowel sounds normal. No masses,  No      organomegaly. Extremities:   Extremities normal, atraumatic, no edema. Neurologic:   CN II-XII grossly intact.  No gross focal deficits           Recent Labs:  Lab Results   Component Value Date/Time    Cholesterol, total 77 12/15/2021 09:17 AM    HDL Cholesterol 41 12/15/2021 09:17 AM    LDL, calculated 26.8 12/15/2021 09:17 AM    Triglyceride 46 12/15/2021 09:17 AM    CHOL/HDL Ratio 1.9 12/15/2021 09:17 AM     Lab Results   Component Value Date/Time    Creatinine 0.51 (L) 04/15/2022 04:18 AM     Lab Results   Component Value Date/Time    BUN 6 04/15/2022 04:18 AM     Lab Results   Component Value Date/Time    Potassium 3.8 04/15/2022 04:18 AM     Lab Results   Component Value Date/Time    Hemoglobin A1c 6.0 (H) 12/15/2021 09:17 AM     Lab Results   Component Value Date/Time    Hemoglobin (POC) 8.3 (L) 03/18/2022 03:47 PM    Hemoglobin (POC) 6.1 03/11/2022 03:37 PM    HGB 9.2 (L) 04/14/2022 01:13 AM     Lab Results   Component Value Date/Time    PLATELET 165 17/49/5640 01:13 AM       Reviewed:  Past Medical History:   Diagnosis Date    Anemia 12/17/2018    Anxiety     anxiety - took medications as a teenager, no medications recently     Fibroids     Gestational hypertension affecting first pregnancy 12/13/2018    History of PCOS     Infertility, female     Polycystic disease, ovaries      Social History     Tobacco Use   Smoking Status Never Smoker   Smokeless Tobacco Never Used     Social History     Substance and Sexual Activity   Alcohol Use No     No Known Allergies  Family History   Problem Relation Age of Onset    Hypertension Mother     Hypertension Sister     Diabetes Sister     Hypertension Brother     Cancer Paternal Uncle     Diabetes Paternal Uncle     Diabetes Maternal Uncle     Breast Cancer Cousin         Current Facility-Administered Medications   Medication Dose Route Frequency    sodium chloride (NS) flush 5-40 mL  5-40 mL IntraVENous Q8H    sodium chloride (NS) flush 5-40 mL  5-40 mL IntraVENous PRN    enoxaparin (LOVENOX) injection 40 mg  40 mg SubCUTAneous DAILY    sodium chloride (NS) flush 5-40 mL 5-40 mL IntraVENous Q8H    sodium chloride (NS) flush 5-40 mL  5-40 mL IntraVENous PRN    acetaminophen (TYLENOL) tablet 650 mg  650 mg Oral Q4H PRN    traMADoL (ULTRAM) tablet  mg   mg Oral Q6H PRN    HYDROmorphone (DILAUDID) injection 1 mg  1 mg IntraVENous Q4H PRN    metoprolol tartrate (LOPRESSOR) tablet 12.5 mg  12.5 mg Oral DAILY         ATTENTION:   This medical record was transcribed using an electronic medical records/speech recognition system. Although proofread, it may and can contain electronic, spelling and other errors. Corrections may be executed at a later time. Please feel free to contact us for any clarifications as needed.     [facility] [place]  [facility] HospitalApril [place], [place]. [phone]

## 2022-04-15 NOTE — PROGRESS NOTES
[place] [facility]patient Observation Notice (Justice Granger) provided to patient/representative with verbal explanation of the notice. Time allotted for questions regarding the notice. Patient /representative provided a completed copy of the VOON notice. Copy placed on bedside chart.  Susan Chacko Care Management Assistant

## 2022-04-15 NOTE — PROGRESS NOTES
Bedside and Verbal shift change report given to Anette Cordova RN (oncoming nurse) by Victor Hugo Heller RN (offgoing nurse). Report included the following information SBAR, Kardex, ED Summary, Procedure Summary, Intake/Output, MAR, Accordion, Recent Results and Med Rec Status.

## 2022-04-15 NOTE — DISCHARGE INSTRUCTIONS
Please call to make an appointment to see Dr. Wayne Wong (Cardiology)     [address]  [address] [facility], [address], [address]  P [phone]  F [phone]     Rose Marie Quiros      Dear Ms. Don Ng,      Please review your instructions with your nurse and ask any questions so you have all the information you need to recover well at home. If you do not feel you have everything you need to succeed and be safe after you leave the hospital, please discuss these concerns with your nurse. As always, call for any questions at home. Your doctor: Dr. Charli Aguilera  Diagnosis: Tachycardia [R00.0]  Procedure:   Date of Discharge: 4/15/2022      Take Home Medications     See Discharge Medication Review provided to you by your nurse. If you did not receive one, request this prior to your discharge. · It is important that you take your medications as they are prescribed. Your prescriptions are sent to your pharmacy on record. · Keep your medications in the bottles provided by the pharmacist and keep a list of the medication names, dosages, times to be taken and what they are for in your wallet. · Do not take other medications without consulting your doctor. · You may take acetaminophen (Tylenol) and ibuprofen (Advil) for pain. If this is not sufficient for your pain, take tramadol or other pain medication you were provided. · You should take a daily gentle stool softener such as a colace pill or dulcolax suppository for constipation as this is not uncommon after surgery and/or while on pain medication. If not prescribed, this can be found over the counter. If constipation persists for >24 hours you should take a dose of Milk of Magnesia. Call if your constipation continues. Diet    · Stay hydrated and drink fluids such as gatorade and water. This will also help prevent constipation and dehydration.  Limit somewhat any usual caffeine intake of beverages such as soda, tea and coffee as this may serve to dehydrate you. · For the first 2-3 days keep a low fiber diet avoiding raw vegetables or fruits with skin. A diet consisting of soup, cereal, yogurt, eggs, fish, Boost/Ensure. Avoid fatty/greasy foods. · If nauseated, keep your diet limited to liquids and call if this persists. Activity    · If possible, have someone with you at all times until you feel stable. · Gradually increase your activity each day. There are generally no restrictions on walking, climbing stairs or riding in a car. Walk at least 4 times per day. Walking will help reduce the risk of blood clots and constipation. · Showers are okay. If you have an incision, no tub bathing/swimming for two weeks. · No driving for 2 week and/or while on pain medication. · The following restrictions apply:  1. No heavy lifting greater than 15 lbs for 4 weeks, then 25 lbs for the next 4 weeks. 2. If you had any vaginal procedure or a hysterectomy, nothing per vagina for 6-8 weeks. 3. You may experience vaginal spotting. Should the bleeding require more that 4 pads a day please call our office. Incision    · You should expect some discomfort in the area of your incision, particularly as you increase your activity. If you notice an area of increasing redness or new drainage, please call your doctor. · Your incisions will have buried, absorbable sutures, which do not need to be removed and are covered by protective glue. Please allow this to fall off on its own over time and refrain from peeling it off unless it is no longer covering the incision. Causes For Concern    If any of the following occur, please call our office and speak with the Nurse/aid who will help you with your problem or ask the doctor to call you.  Problems with the incision, including increasing pain, swelling, redness or drainage.     Inability to pass urine    Increasing abdominal pain despite medication   Persisting nausea or vomiting.  Fever or chills and a temperature >101F   Constipation (no bowel movement for three days).  Diarrhea (more than three watery stools within 24 hours).  Excessive vaginal or wound bleeding.  If after hours and you cannot reach an on-call physician, call 911. Follow-Up    Call [phone] to schedule the following appointments with Dr. Soheila Lu:   Telephone virtual-visit in 10-14 days to discuss your pathology results.  In-office visit for your postoperative exam in 6 weeks from surgery. Information obtained by :  I understand that if any problems occur once I am at home I am to contact my physician. I understand and acknowledge receipt of the instructions indicated above.                                                                                                                                            Physician's or R.N.'s Signature                                                                  Date/Time                                                                                                                                              Patient or Representative Signature                                                          Date/Time

## 2022-04-15 NOTE — PROGRESS NOTES
[facility]  [address] [facility], [address], [address]  P [phone]  F [phone]       Patient Name: Laurie Dickson   Admit Date: 4/14/2022   OR Date: * No surgery found *   Visit Date: 4/15/2022        SUBJECTIVE:    Feeling well this morning. Complaints of right flank discomfort. No nausea. Pain overall well controlled. Tolerating diet. OBJECTIVE:    Patient Vitals for the past 24 hrs:   Temp Pulse Resp BP SpO2   04/15/22 0758 99.2 °F (37.3 °C) (!) 117 16 127/81 99 %   04/15/22 0000 99.1 °F (37.3 °C) (!) 107 16 129/84 100 %   04/14/22 2055 99.1 °F (37.3 °C) (!) 105 16 118/81 98 %   04/14/22 1748 99 °F (37.2 °C)       04/14/22 1518 100.2 °F (37.9 °C) (!) 107 16 (!) 142/85 100 %   04/14/22 1300  (!) 113 30 131/82 100 %   04/14/22 1225 98.6 °F (37 °C) (!) 109 19 (!) 139/90 100 %       Date 04/14/22 0700 - 04/15/22 0659 04/15/22 0700 - 04/16/22 0659   Shift 4803-5494 2048-8912 24 Hour Total 1893-8404 0094-3487 24 Hour Total   INTAKE   I.V.(mL/kg/hr) 2000(1.9)  2000(0.9)        Volume (sodium chloride 0.9 % bolus infusion 1,000 mL) 1000  1000        Volume (sodium chloride 0.9 % bolus infusion 1,000 mL) 1000  1000      Shift Total(mL/kg) 2000(22.7)  2000(22.7)      OUTPUT   Urine(mL/kg/hr)           Urine Occurrence(s) 2 x  2 x      Shift Total(mL/kg)         NET 2000 2000      Weight (kg) 88 88 88 88 88 88       Physical Exam     General:  alert, cooperative, no distress     Cardiac:  Regular rate and rhythm        Lungs:  clear to auscultation bilaterally  Abdomen:  soft, nondistended. Mild expected tenderness. Wound:  clean, dry   Extremity: extremities normal, atraumatic, no cyanosis or edema    Data Review  Lab Results   Component Value Date/Time    WBC 10.8 04/14/2022 01:13 AM    ABS.  NEUTROPHILS 8.3 (H) 04/14/2022 01:13 AM    HGB 9.2 (L) 04/14/2022 01:13 AM    HCT 28.5 (L) 04/14/2022 01:13 AM    MCV 75.6 (L) 04/14/2022 01:13 AM    MCH 24.4 (L) 04/14/2022 01:13 AM    PLATELET 594 99/22/3736 01:13 AM     Lab Results   Component Value Date/Time    Sodium 137 04/15/2022 04:18 AM    Potassium 3.8 04/15/2022 04:18 AM    Chloride 107 04/15/2022 04:18 AM    CO2 23 04/15/2022 04:18 AM    Glucose 108 (H) 04/15/2022 04:18 AM    BUN 6 04/15/2022 04:18 AM    Creatinine 0.51 (L) 04/15/2022 04:18 AM    Calcium 8.0 (L) 04/15/2022 04:18 AM    Albumin 3.4 (L) 04/14/2022 01:13 AM    Bilirubin, total 1.0 04/14/2022 01:13 AM    ALT (SGPT) 36 04/14/2022 01:13 AM    Alk. phosphatase 65 04/14/2022 01:13 AM     IMPRESSION/PLAN:    Oncologic:  43 y.o. 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 Heme/CV:  still with mild tachycardia this morning. Appreciate Cardiology consult yesterday. EKG shows sinus rhythm. No further inpatient cardiology workup needed. Recommended ECHO outpatient and follow up in their office. Tachycardia likely related to mild anemia and post op recovery. Renal:  creatinine 0.51. Voiding well. Good urine output. Will send UA with reflex culture. Plan on sending home with PO antibiotics to treat presumptive UTI       FEN/GI:  no nausea/vomiting. Tolerating regular diet. ID/Wound:  incisions healing well. Will send home on PO Abx. PPX:  SCDs. Continue OOB. Dispostion:  shortness of breath resolved. Feeling better. Will arrange to outpatient cardiology follow up. She is to see Dr. Jessica Thomas next week.          Elsa Smith PA-C  4/15/2022  8:45 AM

## 2022-04-15 NOTE — TELEPHONE ENCOUNTER
I called patient back, she states she has already texted Lesly Esqueda and Lesly Esqueda told her what to do, to get colace and miralax

## 2022-04-18 NOTE — PROGRESS NOTES
Virtual visit to discuss PET scan results from 4/13/2022, pt called office yesterday to states that her heart rate was high, the highest was 140 with movement, sitting it does come back down to 110, her follow up is now 5/2/2022, it was 5/18/2022. I had advised her yesterday to call the cardiologist and see if she could get it moved up, pt also states her UTI symptoms have not improved, she is also having constipation issues which she is taking colace and miralax    1. Have you been to the ER, urgent care clinic since your last visit? Hospitalized since your last visit? Yes, to ER on 4/14/22 for Tachycardia, hospitalized from 4/14/22-4/15/22    2. Have you seen or consulted any other health care providers outside of the [address] [place] [address] since your last visit? Include any pap smears or colon screening.    no

## 2022-04-18 NOTE — PROGRESS NOTES
[facility]YNECOLOGIC ONCOLOGY  [address] [place] [address], [address], [address]  P [phone]  F [phone]    Inpatient History and Physical  Patient ID:  Name:  Cedric Haider  MRN:  [id number]  :  1980/42 y.o. Date:  2022      HISTORY OF PRESENT ILLNESS:  Ms. Cedric Haider is a 43 y.o. female who on 3/18/2022 underwent an urgent surgery, Exploratory laparotomy, total abdominal hysterectomy with bilateral salpingo-oophorectomy, omentectomy. 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 Presents today for imaging review and treatment discussion. Admitted to [facility] on 4/15/2022 with tachycardia and shortness of breath. Negative CTPE. CT Chest 2022 demonstrated multiple pulmonary nodules. CT A/P 2022 concerns for recurrence in the pelvis. PET 2022 demonstrates pulmonary mets, but does not demonstrate metabolic activity in the pelvis, which suggests that may be blood products (possible hemoperitoneum). The patient had low-grade temperatures while inpatient. Found to have a UTI. Discharged home on 4/15/2022 after Cardiology consult. Tachycardia improved. Discharged home with Bactrim for a UTI. Since discharge the patient reports low-grade temps in 99s. Still have some dyspnea on exertion. Reports normal bowel habits. She is anxious today over our discussion. Reports some nausea. Initial History:  Ms. Cedric Haider is a 43 y.o. female who presented to the ER with abdominal pain and anemia and found to have a large fibromatous uterus with concerns for possible sarcoma. The patient reports that about 2 months ago she began to have increasing pain and bloating. Seen in [facility] ER on 3/11/2022 and found to have a fibroid uterus with Hgb of 5.2. Received 4 units of PRBC at that time. Seen by Dr. Racheal Marquez on 3/15/2022 and was scheduled for surgery for next week. Presented to ER last night (3/16/71914) with worsening pain. Found to have a hgb of 6.0. CT A/P demonstrated \"Massively enlarged and heterogenous appearing uterus without clear delineation  of ovarian structures. There is associated hyperdense ascites/hemoperitoneum, in  addition there are bilateral pulmonary nodular densities demonstrated. \"         Pathology Review:   3/18/2022:  * * *FINAL PATHOLOGIC DIAGNOSIS* * *   1.  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)   Benign bilateral ovaries without evidence of involvement by sarcoma   Benign bilateral fallopian tubes without evidence of involvement by   sarcoma   Refer to synoptic report below        SYNOPTIC REPORT - UTERUS (SARCOMA)    SPECIMEN       Procedure:  Total hysterectomy and bilateral salpingo-oophorectomy       Specimen Integrity: Distorted with rupture of anterior and posterior   uterine wall    TUMOR       Histologic Type: Undifferentiated uterine sarcoma       Tumor Size: At least 8.0 x 7.8 cm       Other Tissue / Organ Involvement: Not identified       Lymphovascular Invasion: Not identified    MARGINS       Margins: Cannot be assessed - rupture of anterior and posterior   uterine wall    LYMPH NODES       Regional Lymph Nodes: No lymph nodes submitted or found    PATHOLOGIC STAGE CLASSIFICATION (pTNM, AJCC 8th ed.)       Primary Tumor (pT): pT1b       Regional Lymph Nodes (pN): pNX   2.  Right pelvic peritoneal, biopsy:        Fibrovascular tissue   No tumor identified   3.  Omentum, omentectomy:        Benign mature adipose tissue        No tumor identified   * * *Comment* * *   Immunohistochemical stains are performed.  The tumor is positive for CD10,   p16, and vimentin while negative for smooth muscle actin, desmin,   pankeratin AE1/AE3, p53, cyclin D1, ER, , and DOG1.  This staining   pattern supports the above rendered diagnosis.     Block 1F is sent for Sarcoma Comprehensive NGS Fusion Panel; results will   be reported in an addendum. This case is seen in consultation with one or more additional pathologists   who agree with the above rendered diagnoses. Imaging Review:   PET 4/13/2022:  FINDINGS:  HEAD/NECK: No apparent foci of abnormal hypermetabolism. Cerebral evaluation is  limited by normal intense activity. CHEST:   Multiple hypermetabolic pulmonary nodules, max SUV 6.4. No wei foci of abnormal hypermetabolism. ABDOMEN/PELVIS: Physiologic activity in bowel. No convincing neoplastic foci of  abnormal hypermetabolism. Ametabolic soft tissue density in the hysterectomy  bed, likely residual hemoperitoneum or other benign process. SKELETON: No foci of abnormal hypermetabolism in the axial and visualized  appendicular skeleton. IMPRESSION  1. Multiple hypermetabolic pulmonary nodules, presumed metastases. 2. Otherwise negative PET. CT Chest 4/14/2022:  FINDINGS: This is a good quality study for the evaluation of pulmonary embolism  to the first subsegmental arterial level. There is no pulmonary embolism to this  level. No axillary or supraclavicular lymphadenopathy  THYROID: No nodule. MEDIASTINUM: No mediastinal adenopathy  ZEYAD: No mass or lymphadenopathy. THORACIC AORTA: No aneurysm. HEART: Normal in size. ESOPHAGUS: No wall thickening or dilatation. TRACHEA/BRONCHI: Patent. PLEURA: No effusion or pneumothorax. LUNGS: Multiple lung nodules and lung masses are identified. The larger lesions  measures 2.0 x 1.4 cm. UPPER ABDOMEN: Loculated fluid in the left upper quadrant of the abdomen. BONES: No aggressive bone lesion or fracture. IMPRESSION  1. Multiple lung masses some of which are pleural-based consistent with  metastatic disease. 2.  Loculated fluid in the left upper abdomen. 3.  No evidence of pulmonary embolus    CT A/P 3/17/2022;  FINDINGS:   LOWER THORAX: There is elevation of the left hemidiaphragm.  There is a pulmonary  nodule left lung base. There are additional pulmonary nodules at the right lung  base. LIVER: Hepatic steatosis  BILIARY TREE: Gallbladder is within normal limits. CBD is not dilated. SPLEEN: within normal limits. PANCREAS: No mass or ductal dilatation. ADRENALS: Unremarkable. KIDNEYS: Right renal calculus. Mild hydronephrosis on the right with punctate  calculus at the renal pelvis on the right. STOMACH: Unremarkable. SMALL BOWEL: No dilatation or wall thickening. COLON: No dilatation or wall thickening. APPENDIX: Not visualized  PERITONEUM: Moderate ascites. Moderately hyperdense  RETROPERITONEUM: No lymphadenopathy or aortic aneurysm. REPRODUCTIVE ORGANS: The uterus is distorted. There are large uterine mass  lesions demonstrated. These are numerous. The dome of the uterus lies above the  umbilicus. URINARY BLADDER: Nondistended, not evaluated  BONES: No destructive bone lesion. ABDOMINAL WALL: No mass or hernia. ADDITIONAL COMMENTS: N/A  IMPRESSION  Massively enlarged and heterogenous appearing uterus without clear delineation  of ovarian structures. There is associated hyperdense ascites/hemoperitoneum, in  addition there are bilateral pulmonary nodular densities demonstrated. Imaging findings are concerning for atypical/aggressive uterine fibroids versus  uterine neoplasm. Gynecological consultation is recommended. Mild hydronephrosis on the right with punctate calculus at the renal pelvis on  the right. ROS:  A comprehensive review of systems was negative except for that written in the History of Present Illness. , 10 point ROS    OB/GYN ROS:  Patient denies significant menstrual problems.     ECOG ndGndrndanddndend:nd nd2nd Problem List:  Patient Active Problem List    Diagnosis Date Noted    Tachycardia 04/14/2022    Uterine sarcoma ([address] 04/05/2022    Uterine mass 03/17/2022    Acute on chronic anemia 03/17/2022    Acute blood loss anemia 03/13/2022    Uterine fibroid 03/13/2022    Abnormal uterine bleeding 2022    Leiomyoma of body of uterus 2022    Anemia associated with acute blood loss 2022    Fibroid uterus 2022    Anemia 2018    Obesity 2018    PCOS (polycystic ovarian syndrome) 2016    Infertility associated with anovulation 2016    Benign neoplasm of muscle 2015    Dysmenorrhea 2014     PMH:  Past Medical History:   Diagnosis Date    Anemia 2018    Anxiety     anxiety - took medications as a teenager, no medications recently     Fibroids     Gestational hypertension affecting first pregnancy 2018    History of PCOS     Infertility, female     Polycystic disease, ovaries       PSH:  Past Surgical History:   Procedure Laterality Date    HX  SECTION      HX GYN  2022    Exploratory laparotomy, total abdominal hysterectomy with bilateral salpingo-oophorectomy, omentectomy    HX WISDOM TEETH EXTRACTION        Social History:  Social History     Tobacco Use    Smoking status: Never Smoker    Smokeless tobacco: Never Used   Substance Use Topics    Alcohol use: No      Family History:  Family History   Problem Relation Age of Onset    Hypertension Mother     Hypertension Sister     Diabetes Sister     Hypertension Brother     Cancer Paternal Uncle     Diabetes Paternal Uncle     Diabetes Maternal Uncle     Breast Cancer Cousin       Medications: (reviewed)  Current Outpatient Medications   Medication Sig    traMADoL (ULTRAM) 50 mg tablet Take 1-2 Tablets by mouth every six (6) hours as needed for Pain for up to 3 days. Max Daily Amount: 400 mg.  trimethoprim-sulfamethoxazole (BACTRIM DS, SEPTRA DS) 160-800 mg per tablet Take 1 Tablet by mouth two (2) times a day for 5 days.  potassium chloride SR (KLOR-CON 10) 10 mEq tablet Take 1 Tablet by mouth two (2) times a day.  enoxaparin (LOVENOX) 40 mg/0.4 mL 0.4 mL by SubCUTAneous route every twenty-four (24) hours for 28 days. Indications: deep vein thrombosis prevention, treatment to prevent a blood clot in the lung     No current facility-administered medications for this visit. Allergies: (reviewed)  No Known Allergies     Gyn History:   Last pap: 2/2020, normal      OBJECTIVE:  *deferred today given video-conference visit for ongoing COVID-19 pandemic*   Physical Exam:  Visit Vitals  LMP 02/07/2022 (Exact Date)      General: Alert and oriented. No acute distress. Well-nourished  HEENT: No thyroid enlargment. Neck supple without restrictions. Sclera normal. Normal occular motion. Moist mucous membranes. Lymphatics: No evidence of axillary, cervical, or subclavicular adenopathy. Respiratory: clear to auscultation and percussion to the bases. No CVAT. Cardiovascular: regular rate and rhythm. No murmurs, rubs, or gallops. Gastrointestinal: soft, large uterus in lower abdomen, tender to touch. Non-distended. Well-healed incision. Musculoskeletal: normal gait. No joint tenderness, deformity or swelling. No muscular tenderness. Extremities: extremities normal, atraumatic, no cyanosis or edema. Pelvic: deferred while inpatient  Neuro: Grossly intact. Normal gait and movement. No acute deficit  Skin: No evidence of rashes or skin changes.      Lab Date as available:    Lab Results   Component Value Date/Time    WBC 10.8 04/14/2022 01:13 AM    HGB 9.2 (L) 04/14/2022 01:13 AM    HCT 28.5 (L) 04/14/2022 01:13 AM    PLATELET 563 25/80/3744 01:13 AM    MCV 75.6 (L) 04/14/2022 01:13 AM     Lab Results   Component Value Date/Time    Sodium 137 04/15/2022 04:18 AM    Potassium 3.8 04/15/2022 04:18 AM    Chloride 107 04/15/2022 04:18 AM    CO2 23 04/15/2022 04:18 AM    Anion gap 7 04/15/2022 04:18 AM    Glucose 108 (H) 04/15/2022 04:18 AM    BUN 6 04/15/2022 04:18 AM    Creatinine 0.51 (L) 04/15/2022 04:18 AM    BUN/Creatinine ratio 12 04/15/2022 04:18 AM    GFR est AA >60 04/15/2022 04:18 AM    GFR est non-AA >60 04/15/2022 04:18 AM Calcium 8.0 (L) 04/15/2022 04:18 AM         IMPRESSION/PLAN:    Ms. Tesfaye Toussaint is a 43 y.o. female who initially presented via the ER with a large 23 week size uterus with concerns for degenerating fibroids versus sarcoma, complicated by possible intra-abdominal ascites or hemorrhage. On 3/18/2022 underwent an urgent surgery, Exploratory laparotomy, total abdominal hysterectomy with bilateral salpingo-oophorectomy, omentectomy. 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 Admitted to [facility] on 4/15/2022 with tachycardia and shortness of breath. Negative CTPE. CT Chest 4/14/2022 demonstrated multiple pulmonary nodules. CT A/P 4/14/2022 concerns for recurrence in the pelvis. PET 4/13/2022 demonstrates pulmonary mets, but does not demonstrate metabolic activity in the pelvis, which suggests that may be blood products (possible hemoperitoneum). Problems:     Patient Active Problem List    Diagnosis Date Noted    Tachycardia 04/14/2022    Uterine sarcoma [address] [address] 04/05/2022    Uterine mass 03/17/2022    Acute on chronic anemia 03/17/2022    Acute blood loss anemia 03/13/2022    Uterine fibroid 03/13/2022    Abnormal uterine bleeding 03/13/2022    Leiomyoma of body of uterus 03/11/2022    Anemia associated with acute blood loss 03/11/2022    Fibroid uterus 02/22/2022    Anemia 12/17/2018    Obesity 05/02/2018    PCOS (polycystic ovarian syndrome) 12/02/2016    Infertility associated with anovulation 12/02/2016    Benign neoplasm of muscle 01/20/2015    Dysmenorrhea 08/13/2014     Reviewed patient's course to date, including her surgical pathology and imaging results consistent with metastatic disease to the lungs on PET 4/13/2022. Counseled patient regarding her stage IV disease and the role of adjuvant chemotherapy. Unfortunately long-term survival is low.  The patient was very emotional today, which is expected given the change in her life the last month regarding her surgery, diagnosis, and now the need for adjuvant therapy. Recommended adjuvant gemcitabine 900mg/m2 days 1 and 8, along with docetaxel 75mg/m2 day 8. Plan to initiate next week. Plan for 3 cycles followed by interval imaging. Discussed that radiation and surgery for her lung lesions is not an option. Discussed the advanced nature of her cancer, and overall the difficulty in treating this type of cancer. I also addressed the pelvic mass seen on CT, which on PET seems to be likely a hemoperitoneum. We will need to watch this area to be sure it is not metastatic disease, but also to be sure she does not develop an infection in this area. Will have our chemo-nurse, Sarah William RN, reach out to the patient for chemo-teaching. All questions and concerns were addressed with the patient and she is comfortable with the plan. An electronic signature was used to authenticate this note. Rosanna Castellano MD    Pursuant to the emergency declaration unde the [facility], [facility] waiver authority and the [facility] and [employer] Act, this Virtual Visit was conducted, with patient's consent, to reduce the patient's risk of exposure to COVID-19 and provide continuity of care for an established patient. Patient identification was verified at the start of the visit: Yes    Services were provided through a video synchronous discussion virtually to substitute for in-person clinic visit. Patient was at her individual home, while the provider was in his/her respective office.     I spent at least 25 minutes with this established patient, and >50% of the time was spent counseling and/or coordinating care regarding pathology and adjuvant treatment discussion    Rosanna Castellano MD

## 2022-04-18 NOTE — TELEPHONE ENCOUNTER
Pt  and states her HR is high today, the highest was 140, with movement, sitting is does come back down to 110, her follow up with her cardiologist is not until 5/18/22, was advised to call her cardiologist and if she cannot reach cardiologist to go to ER, she states she is also feeling light headed

## 2022-04-19 PROBLEM — C78.01 MALIGNANT NEOPLASM METASTATIC TO BOTH LUNGS (HCC): Status: ACTIVE | Noted: 2022-01-01

## 2022-04-19 PROBLEM — C78.02 MALIGNANT NEOPLASM METASTATIC TO BOTH LUNGS (HCC): Status: ACTIVE | Noted: 2022-01-01

## 2022-04-21 NOTE — TELEPHONE ENCOUNTER
Pt states she is still having her UTI symptoms, she feels like it has not cleared, yesterday was her last day of ABX. Should she repeat the urine cx or send her another round of ABX?

## 2022-04-21 NOTE — TELEPHONE ENCOUNTER
Per Dr. Jayce Kohli, I would like to start her on Augmentin 500/125 three times daily for 10 days. I'm not sure it's just her UTI. If she is not improving after 2-3 days on this antibiotic then she needs to come to clinic or the ER.    Pt was notified and verbalized understanding

## 2022-04-22 NOTE — TELEPHONE ENCOUNTER
Spoke with pt and her  on 3 way call and they prefer Friday's at [facility] for chemotherapy. I will schedule an appt to see me for chemo education/sign consents, and MD/lab/chemotherapy appts.
16
Statement Selected

## 2022-04-25 NOTE — TELEPHONE ENCOUNTER
Requested Prescriptions     Signed Prescriptions Disp Refills    ondansetron (ZOFRAN ODT) 4 mg disintegrating tablet 30 Tablet 2     Sig: Take 1 Tablet by mouth every eight (8) hours as needed for Nausea. Indications: nausea and vomiting caused by cancer drugs     Authorizing Provider: Jeremiah Ellsworth     Ordering User: MARY KATE Granados    dexAMETHasone (DECADRON) 4 mg tablet 36 Tablet 2     Sig: Take 2 tablets with breakfast the day before chemo and for 2 days after chemo with Day 8 of each chemotherapy treatment. Authorizing Provider: Jeremiah Spring User: Danny Merlin lidocaine-prilocaine (EMLA) topical cream 30 g 3     Sig: Apply small amount over port area and cover with band aid one hour before chemo     Authorizing Provider: Jeremiah Ellsworth     Ordering User: Grover Abraham     Prescriptions sent to pharmacy per vo Dr. Hugo Dunlap to be used during chemotherapy treatments.

## 2022-04-26 NOTE — PROGRESS NOTES
Pt arrived with Mother at 9:45 am as scheduled at 10:00 am to have chemotherapy education. Pt c/o sob, running a fever of 102 during the night, abdominal pain 8/10 on pain scale (states d/t constipation). Vital signs taken, temp oral today 99.5 after taking Tylenol. Pt states she does not have a port a cath to begin chemotherapy on 4/29/22. Pt will be scheduled for this procedure if approved by [employer]ON.   Pt given lab req for labs to be drawn stat today, that can also be used for chemotherapy on 4/29/22

## 2022-04-26 NOTE — PROGRESS NOTES
Formerly [facility] GYNECOLOGIC ONCOLOGY  [address] [facility], [address], [address]  P [phone]  F [phone]        OFFICE VISIT     CC: Postop check    Gyn Onc MD: Dr. Anabel Hernandez  PCP: Renetta Yepez MD        HPI:  Lary Lundberg is a 43 y.o. 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 She was discharged w/o incident on POD 4. PET scan on 4/14 showed hypermetabolic pulmonary nodules, otherwise negative though notable for soft tissue density in the pelvis, suggestive of hemoperitoneum. She was admitted to the hospital on 4/14 for tachycardia, SOB. CTA was negative for PE, CT A/P Complex soft tissue density in the pelvis measures 11.3 x 9.7 cm. She was seen by cardiology for tachycardia and has followup in early May. She was released on abx though her UA was negative. Recently started on augmentin for dysuria. SUBJECTIVE:   Presents today for chemo education. Reports constipation, abd pain. No Bm x 3 days, BM today in office. Reported fever at home of ~100 to 100.2 using Tylenol with nocturnal sweats. Still bladder spasms with void. Nonbloody urine. Fatigue is limiting with DUBON. No CP. Abd discomfort is intermittent and positional.  No N/V. Appetite is somewhat poor.  No LE swelling.       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) C78.01, C78.02       Past Medical History:   Diagnosis Date    Anemia 12/17/2018    Anxiety     anxiety - took medications as a teenager, no medications recently     Fibroids     Gestational hypertension affecting first pregnancy 2018    History of PCOS     Infertility, female     Polycystic disease, ovaries        Past Surgical History:   Procedure Laterality Date    HX  SECTION      HX GYN  2022    Exploratory laparotomy, total abdominal hysterectomy with bilateral salpingo-oophorectomy, omentectomy    HX WISDOM TEETH EXTRACTION         Social History     Socioeconomic History    Marital status:      Spouse name: Not on file    Number of children: Not on file    Years of education: Not on file    Highest education level: Not on file   Occupational History    Not on file   Tobacco Use    Smoking status: Never Smoker    Smokeless tobacco: Never Used   Vaping Use    Vaping Use: Never used   Substance and Sexual Activity    Alcohol use: No    Drug use: No    Sexual activity: Yes     Partners: Male     Birth control/protection: Condom     Comment: condoms- sometimes. Other Topics Concern     Service Not Asked    Blood Transfusions Not Asked    Caffeine Concern Not Asked    Occupational Exposure Not Asked    Hobby Hazards Not Asked    Sleep Concern Not Asked    Stress Concern Not Asked    Weight Concern Not Asked    Special Diet Not Asked    Back Care Not Asked    Exercise Not Asked    Bike Helmet Not Asked    [address] Not Asked    Self-Exams Not Asked   Social History Narrative    Not on file     Social Determinants of Health     Financial Resource Strain:     Difficulty of Paying Living Expenses: Not on file   Food Insecurity:     Worried About Running Out of Food in the Last Year: Not on file    Isis of Food in the Last Year: Not on file   Transportation Needs:     Lack of Transportation (Medical): Not on file    Lack of Transportation (Non-Medical):  Not on file   Physical Activity:     Days of Exercise per Week: Not on file    Minutes of Exercise per Session: Not on file   Stress:     Feeling of Stress : Not on file   Social Connections:     Frequency of Communication with Friends and Family: Not on file    Frequency of Social Gatherings with Friends and Family: Not on file    Attends Restorationism Services: Not on file    Active Member of [address] [place] [address] or Organizations: Not on file    Attends Club or Organization Meetings: Not on file    Marital Status: Not on file   Intimate Partner Violence:     Fear of Current or Ex-Partner: Not on file    Emotionally Abused: Not on file    Physically Abused: Not on file    Sexually Abused: Not on file   Housing Stability:     Unable to Pay for Housing in the Last Year: Not on file    Number of [place] in the Last Year: Not on file    Unstable Housing in the Last Year: Not on file       Family History   Problem Relation Age of Onset    Hypertension Mother     Hypertension Sister     Diabetes Sister     Hypertension Brother     Cancer Paternal Uncle     Diabetes Paternal Uncle     Diabetes Maternal Uncle     Breast Cancer Cousin        Current Outpatient Medications on File Prior to Visit   Medication Sig Dispense Refill    ondansetron (ZOFRAN ODT) 4 mg disintegrating tablet Take 1 Tablet by mouth every eight (8) hours as needed for Nausea. Indications: nausea and vomiting caused by cancer drugs 30 Tablet 2    dexAMETHasone (DECADRON) 4 mg tablet Take 2 tablets with breakfast the day before chemo and for 2 days after chemo with Day 8 of each chemotherapy treatment. 36 Tablet 2    lidocaine-prilocaine (EMLA) topical cream Apply small amount over port area and cover with band aid one hour before chemo 30 g 3    amoxicillin-clavulanate (AUGMENTIN) 500-125 mg per tablet Take 1 Tablet by mouth three (3) times daily for 10 days. 30 Tablet 0    polyethylene glycol (Miralax) 17 gram/dose powder Take 17 g by mouth daily.  docusate sodium (COLACE PO) Take  by mouth.       potassium chloride SR (KLOR-CON 10) 10 mEq tablet Take 1 Tablet by mouth two (2) times a day. (Patient not taking: Reported on 4/26/2022) 28 Tablet 0     No current facility-administered medications on file prior to visit. No Known Allergies      Exam:  Visit Vitals  /70 (BP 1 Location: Left upper arm, BP Patient Position: Sitting, BP Cuff Size: Large adult)   Pulse (!) 115   Temp 99.5 °F (37.5 °C)   Ht 5' 7\" (1.702 m)   Wt 196 lb 9.6 oz (89.2 kg)   LMP 02/07/2022 (Exact Date)   BMI 30.79 kg/m²     General: alert, cooperative, no distress, anicteric  Neuro: alert, oriented x3  Abdomen: soft, protuberant, tenderness mild - low abd, without guarding, mass, located in the RLQmass  Wound: Incision clean and dry   Pelvic: deferred  Ext: extremities normal, atraumatic, no cyanosis or edema      Data Review  Lab Results   Component Value Date/Time    WBC 10.8 04/14/2022 01:13 AM    ABS. NEUTROPHILS 8.3 (H) 04/14/2022 01:13 AM    HGB 9.2 (L) 04/14/2022 01:13 AM    HCT 28.5 (L) 04/14/2022 01:13 AM    MCV 75.6 (L) 04/14/2022 01:13 AM    MCH 24.4 (L) 04/14/2022 01:13 AM    PLATELET 725 44/90/3484 01:13 AM     Lab Results   Component Value Date/Time    Sodium 137 04/15/2022 04:18 AM    Potassium 3.8 04/15/2022 04:18 AM    Chloride 107 04/15/2022 04:18 AM    CO2 23 04/15/2022 04:18 AM    Glucose 108 (H) 04/15/2022 04:18 AM    BUN 6 04/15/2022 04:18 AM    Creatinine 0.51 (L) 04/15/2022 04:18 AM    Calcium 8.0 (L) 04/15/2022 04:18 AM    Albumin 3.4 (L) 04/14/2022 01:13 AM    Bilirubin, total 1.0 04/14/2022 01:13 AM    ALT (SGPT) 36 04/14/2022 01:13 AM    Alk. phosphatase 65 04/14/2022 01:13 AM     Prior to Admission medications    Medication Sig Start Date End Date Taking? Authorizing Provider   ondansetron (ZOFRAN ODT) 4 mg disintegrating tablet Take 1 Tablet by mouth every eight (8) hours as needed for Nausea.  Indications: nausea and vomiting caused by cancer drugs 4/25/22  Yes Yvette Mclaughlin MD   dexAMETHasone (DECADRON) 4 mg tablet Take 2 tablets with breakfast the day before chemo and for 2 days after chemo with Day 8 of each chemotherapy treatment. 4/25/22  Yes Pj Flores MD   lidocaine-prilocaine (EMLA) topical cream Apply small amount over port area and cover with band aid one hour before chemo 4/25/22  Yes Pj Flores MD   amoxicillin-clavulanate (AUGMENTIN) 500-125 mg per tablet Take 1 Tablet by mouth three (3) times daily for 10 days. 4/21/22 5/1/22 Yes Pj Flores MD   polyethylene glycol (Miralax) 17 gram/dose powder Take 17 g by mouth daily. Yes Provider, Historical   docusate sodium (COLACE PO) Take  by mouth. Yes Provider, Historical   potassium chloride SR (KLOR-CON 10) 10 mEq tablet Take 1 Tablet by mouth two (2) times a day. Patient not taking: Reported on 4/26/2022 3/22/22   Katherine Fernandez         IMPRESSION/PLAN:  43 y.o. BLACK/ seen today postop stable. Encounter Diagnoses   Name Primary? 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 Pulmonary metastatic disease. Still no port, chemo Friday. Schedule port. Fevers/night sweats, surgical menopause. F/u labs today. No UA proven thus far. Stop antibiotics unless UA on check today. [address] labs.   Stool softners      JACQUIE Riley  Gyn Onc

## 2022-04-26 NOTE — PROGRESS NOTES
Addendum to Care 4/26/2022     Hgb 5.6, labs pending. Scheduled for transfusion tomorrow morning. Discussed verbally on telephone blood transfusion. I have discussed with the patient the rationale for blood component transfusion; its benefits in treating or preventing fatigue, organ damage, or death; and its risk which includes mild transfusion reactions, rare risk of blood borne infection, or more serious but rare reactions. I have discussed the alternatives to transfusion, including the risk and consequences of not receiving transfusion. The patient had an opportunity to ask questions and had agreed to proceed with transfusion of blood components.

## 2022-04-26 NOTE — TELEPHONE ENCOUNTER
Called pt to inform her of low HGB drawn today (5.8) and per Scott DHILLON pt scheduled for type and cross match with 1 unit PRBC for 4/27/22 at 8:00 am, and a 2nd unit on 4/28/22 at 10:0 am scheduled with Pranav at [place] [address].

## 2022-04-27 PROBLEM — N30.01 ACUTE CYSTITIS WITH HEMATURIA: Status: ACTIVE | Noted: 2022-01-01

## 2022-04-27 PROBLEM — D72.825 BANDEMIA: Status: ACTIVE | Noted: 2022-01-01

## 2022-04-27 NOTE — DISCHARGE INSTRUCTIONS
Patient Education   OUTPATIENT INFUSION CENTER    DISCHARGE INSTRUCTIONS FOR:  BLOOD TRANSFUSION    We hope you are feeling better after your blood transfusion. Some mild tenderness or slight bruising at your IV site is normal.  Avoid lifting or heavy use of that extremity for the rest of the day. Drink plenty of fluids, eat a normal diet and get some rest.    There are some important signs that you need to watch for in case you experience a delayed reaction to the blood you have received. Call your physician immediately if you develop any of the following symptoms:    1. Severe headache or backache;    2. Fever above 100 degrees;    3. Chills;    4. Difficulty breathing;    5.  Blood or red color in urine;    6. The feeling of weakness or constant fatigue;    7. Yellowing of the whites of your eyes or skin (jaundice). If your physician is not available, call or go to the nearest emergency room, or dial 911. Maureen Ricardo, Signature: ___________________________ 4/27/2022  Miguel Oconnell RN      Learning About Blood Transfusions  What is a blood transfusion? Blood transfusion is a medical treatment to replace the blood or parts of blood that your body has lost. The blood goes through a tube from a bag to an intravenous (IV) catheter and into your vein. You may need a blood transfusion after losing blood from an injury, a major surgery, an illness that causes bleeding, or an illness that destroys blood cells. Transfusions are also used to give you the parts of blood--such as platelets, plasma, or substances that cause clotting--that your body needs to fight an illness or stop bleeding. How is a blood transfusion done? Before you receive a blood transfusion, your blood is tested to find out what your blood type is. Blood or blood parts that are a match with your blood type are ordered by your doctor. Blood is typed as A, B, AB, or O. It is also typed as Rh-positive or Rh-negative.   Your blood is also screened to look for antibodies that might react with the blood that is given to you. The blood you are getting is checked and rechecked to make sure that it's the right type for you. A sample of your blood is mixed with a sample of the blood you will receive to check for problems. Before actually giving you the transfusion, a doctor and nurses will look at the label on the package of blood and compare it to your hospital ID bracelet and medical records. The transfusion begins only when all agree that this is the correct blood and that you are the correct person to receive it. To receive the transfusion, you will have an intravenous (IV) catheter inserted into a vein. A tube connects the catheter to the bag containing the blood, which is placed higher than your body. The blood then flows slowly into your vein. A doctor or nurse will check you several times during the transfusion to watch for a reaction or other problems. What are the possible risks? Blood transfusions have many benefits and are often life-saving. But they also have a few risks. Possible risks include:  · Your body's reaction to receiving new blood. This may include:  ? Fever. ? Breathing problems. ? Allergic reaction, such as hives, swelling, or a new rash. · An infection from the blood. This risk is small because of the strict rules placed on handling and storing blood. Getting a viral infection, such as HIV or hepatitis B or C, through blood transfusions has become very rare. The U.S. Food and Drug Administration (FDA) enforces strict guidelines on the collection, testing, storage, and use of blood. · Getting the wrong blood type by accident. Severe reactions, which can be life-threatening, are very rare. · An infection at the transfusion site, such as redness, swelling, pain, bleeding, or pus. How can you care for yourself at home?   To prevent infection at the transfusion site  · Wash the area daily with warm, soapy water, and pat it dry. Don't use hydrogen peroxide or alcohol, which can slow healing. You may cover the area with a gauze bandage if it weeps or rubs against clothing. Change the bandage every day. · Keep the area clean and dry. When should you call for help? Call 911 anytime you think you may need emergency care. For example, call if:  · You have severe trouble breathing. Call your doctor now or seek immediate medical care if:  · You have signs of an allergic reaction, such as hives, swelling, or a new rash. · You have a fever. · You feel weaker or more tired than usual.  · You have a yellow tint to your skin or the whites of your eyes. · You have signs of an infection at the transfusion site, such as redness, swelling, pain, bleeding, or pus. Watch closely for changes in your health, and be sure to contact your doctor if you have any problems. Follow-up care is a key part of your treatment and safety. Be sure to make and go to all appointments, and call your doctor if you are having problems. It's also a good idea to know your test results and keep a list of the medicines you take. Where can you learn more? Go to http://www.gray.com/  Enter V588 in the search box to learn more about \"Learning About Blood Transfusions. \"  Current as of: November 29, 2021               Content Version: 13.2  © 8166-0069 Healthwise, Inc[employer]. Care instructions adapted under license by [employer] (which disclaims liability or warranty for this information). If you have questions about a medical condition or this instruction, always ask your healthcare professional. [place] [address] any warranty or liability for your use of this information.

## 2022-04-27 NOTE — PROGRESS NOTES
Per Cassidy DHILLON, pharmacist Adan Wellington at [place] [address] will send orders to him for Mrs. Odalys Vergara for Ceftriaxone 1 gm to be given in opic today and tomorrow. Message sent to nurse to inform pt to continue taking Augmentin at home. Add on request faxed to [employer] at [phone] for a urine culture.

## 2022-04-27 NOTE — PROGRESS NOTES
Called to discuss labs with patient. WBC 16 with neutrophilia  UTI, cx pending  Anemia hgb 5.6  Metabolic panel normal limits. Feels better with BM today, still overall fatigued  Continue transfusion today/tomorrow with antibiotics IV/PO, await C&S. If pain, fevers worsen, may need admission to r/o pyelo or pelvic abscess  Holding on chemo/port at this time.       Taya Arevalo PA-C

## 2022-04-27 NOTE — PROGRESS NOTES
[facility] Progress Note    Date: 2022    Name: Clifton Jasso    MRN: [id number]         : 1980      0800: Pt arrived at [facility] ambulatory, patient c/o severe right flank patient in tears, for a  transfusion of ONE units PRBCs, type and cross to be done today. Patient brought in to [facility] and placed in bed per patient request.  Mother here for support. [facility] COVID-19 SCREENING      The patient was asked the following questions and answered as documented below:    1. Do you have any symptoms of COVID-19? SOB, coughing, fever, or generally not feeling well? NO  2. Have you been exposed to COVID-19 recently? NO  3. Have you had any recent contact with family/friend that has a pending COVID test? NO      Follow Up: Proceed with treatment    Type and cross drawn and processed. Assessment completed, no new complaints voiced. Per referring provider based off labs done yesterday patient has a UTI, IV antibiotics added on for today. Lines:   Peripheral IV 22 Right Forearm (Active)   Site Assessment Clean, dry, & intact 22 1043   Phlebitis Assessment 0 22 1043   Infiltration Assessment 0 22 1043   Dressing Status Clean, dry, & intact;New;Occlusive 22 1043   Dressing Type Transparent 22 1043   Hub Color/Line Status Blue;Flushed;Patent 22 1043        Education provided regarding reason for blood transfusion and possible reactions. All questions and concerns regarding transfusion answered, patient voiced understanding.       Medications received:  Medications Administered       0.9% sodium chloride infusion 250 mL       Admin Date  2022 Action  New Bag Dose  250 mL Rate  15 mL/hr Route  IntraVENous Administered By  Maggie Olson RN              cefTRIAXone (ROCEPHIN) 1 g in 0.9% sodium chloride (MBP/ADV) 50 mL MBP       Admin Date  2022 Action  New Bag Dose  1 g Rate  100 mL/hr Route  IntraVENous Administered By  Maggie Olson RN sodium chloride (NS) flush 5-10 mL       Admin Date  04/27/2022 Action  Given Dose  10 mL Route  IntraVENous Administered By  Travis Hartman RN               Admin Date  04/27/2022 Action  Given Dose  10 mL Route  IntraVENous Administered By  Travis Hartman RN                    3632:  1st unit PRBCs started and infusing without difficulty, observed x 15 minutes  1442:  1st unit completed without adverse reaction noted, NS flushing line. Recent Results (from the past 12 hour(s))   TYPE & SCREEN    Collection Time: 04/27/22  8:10 AM   Result Value Ref Range    Crossmatch Expiration 04/27/2022,2359     ABO/Rh(D) Angela Nicolas POSITIVE     Antibody screen NEG     Unit number [id number]     Blood component type  LR     Unit division 00     Status of unit ALLOCATED     Crossmatch result Compatible     Unit number [id number]     Blood component type  LR     Unit division 00     Status of unit ISSUED     Crossmatch result Compatible    RBC, ALLOCATE    Collection Time: 04/27/22  8:15 AM   Result Value Ref Range    HISTORY CHECKED? Historical check performed    RBC, ALLOCATE    Collection Time: 04/27/22  2:30 PM   Result Value Ref Range    HISTORY CHECKED?  Historical check performed    HGB & HCT    Collection Time: 04/27/22  3:20 PM   Result Value Ref Range    HGB 6.7 (L) 11.5 - 16.0 g/dL    HCT 20.7 (L) 35.0 - 47.0 %   URINALYSIS W/ REFLEX CULTURE    Collection Time: 04/27/22  3:20 PM    Specimen: Urine   Result Value Ref Range    Color YELLOW/STRAW      Appearance CLEAR CLEAR      Specific gravity 1.015      pH (UA) 6.5 5.0 - 8.0      Protein Negative NEG mg/dL    Glucose Negative NEG mg/dL    Ketone Negative NEG mg/dL    Bilirubin Negative NEG      Blood Negative NEG      Urobilinogen 0.2 0.2 - 1.0 EU/dL    Nitrites Negative NEG      Leukocyte Esterase Negative NEG      WBC 0-4 0 - 4 /hpf    RBC 0-5 0 - 5 /hpf    Epithelial cells FEW FEW /lpf    Bacteria Negative NEG /hpf    UA:UC IF INDICATED CULTURE NOT INDICATED BY UA RESULT CNI         Patient Vitals for the past 12 hrs:   Temp Pulse Resp BP SpO2   04/27/22 1330 99.3 °F (37.4 °C) (!) 118 16 126/63    04/27/22 1230 98.9 °F (37.2 °C) (!) 110 16 121/72    04/27/22 1210 99.1 °F (37.3 °C) (!) 105 16 123/76    04/27/22 0825  (!) 110 18 120/67 100 %       1530 Tolerated transfusion  well, no adverse reaction noted. D/C instructions reviewed, copy to pt, voiced understanding. Patient declined 1 hour post transfusion observation/vital signs. D/Cd from [facility] ambulatory, and in no distress accompanied by mother.       Patient provided with AVS , which includes future appointment and written educational material.     Future Appointments   Date Time Provider Brooklyn Donohue   4/28/2022  9:00 AM [address] [place] [address]   4/29/2022 10:00 AM A1 MAULIK MED [address]   5/2/2022  9:00 AM KASEY GAONA BS AMB   5/2/2022  9:40 AM Manule Santiago NP CAVREY BS AMB   5/3/2022 10:45 AM MD DANIEL Jernigan BS AMB   5/6/2022  7:30 AM A3 MAULIK MED [address]   5/17/2022  3:00 PM MD DANIEL Jernigan BS AMB   5/20/2022  1:30 PM A1 MAULIK MED [address]   5/27/2022  8:00 AM D1 MAULIK LONG [address] H   6/7/2022  2:15 PM MD DANIEL Jernigan BS AMB   6/10/2022 10:00 AM A1 MAULIK MED [address] H   6/17/2022  1:30 PM D4 MAULIK  [address], RN  April 27, 2022

## 2022-04-28 NOTE — PROGRESS NOTES
[facility] Progress Note    Date: 2022    Name: Raul Landry    MRN: [id number]         : 1980      0855: Pt arrived at [facility] ambulatory and in no distress for transfusion of ONE units PRBCs and second dose or Ceftriaxone 1gm. [facility] COVID-19 SCREENING      The patient was asked the following questions and answered as documented below:    1. Do you have any symptoms of COVID-19? SOB, coughing, fever, or generally not feeling well? NO  2. Have you been exposed to COVID-19 recently? NO  3. Have you had any recent contact with family/friend that has a pending COVID test? NO      Follow Up: Proceed with treatment    Assessment completed, no new complaints voiced. Problem: Anemia Care Plan (Adult and Pediatric)  Goal: *Labs within defined limits  Outcome: Progressing Towards Goal     Problem: Knowledge Deficit  Goal: *Verbalizes understanding of procedures and medications  Outcome: Progressing Towards Goal     Problem: Patient Education:  Go to Education Activity  Goal: Patient/Family Education  Outcome: Progressing Towards Goal    Lines: 22 gauge IV placed to the right inner FA. Peripheral IV 22 Right Forearm (Active)   Site Assessment Clean, dry, & intact 22 0911   Phlebitis Assessment 0 22 0911   Infiltration Assessment 0 22 0911   Dressing Status Clean, dry, & intact;New;Occlusive 22 0911   Dressing Type Transparent 22 0911   Hub Color/Line Status Blue;Flushed;Patent 22 [id number]        Education provided regarding reason for blood transfusion and possible reactions. All questions and concerns regarding transfusion answered, patient voiced understanding.       Medications received:  Medications Administered     0.9% sodium chloride infusion 250 mL     Admin Date  2022 Action  New Bag Dose  250 mL Rate  15 mL/hr Route  IntraVENous Administered By  Anniece Bamberger, IRAJ          cefTRIAXone (ROCEPHIN) 1 g in 0.9% sodium chloride (MBP/ADV) 50 mL MBP Admin Date  04/28/2022 Action  New Bag Dose  1 g Rate  100 mL/hr Route  IntraVENous Administered By  Chilo Grewal RN          sodium chloride (NS) flush 5-10 mL     Admin Date  04/28/2022 Action  Given Dose  10 mL Route  IntraVENous Administered By  Chilo Grewal RN           Admin Date  04/28/2022 Action  Given Dose  10 mL Route  IntraVENous Administered By  Chilo Grewal RN                1010:  1st unit PRBCs started and infusing without difficulty, observed x 15 minutes  1218:  1st unit completed without adverse reaction noted, NS flushing line. Patient Vitals for the past 12 hrs:   Temp Pulse Resp BP SpO2   04/28/22 0912 98.8 °F (37.1 °C) (!) 107 18 123/88 100 %       1255 Tolerated transfusion  well, no adverse reaction noted. D/C instructions reviewed, copy to pt, voiced understanding. Patient declined 1 hour post transfusion observation/vital signs. D/Cd from [facility] ambulatory and in no distress accompanied by mom.       Patient provided with AVS , which includes future appointment and written educational material.     Future Appointments   Date Time Provider Brooklyn Donohue   4/29/2022 10:00 AM A1 MAULIK MED [address] [place] [address]   5/2/2022  9:00 AM KASEY GAONA BS AMB   5/2/2022  9:40 AM Juan Santiago NP CAVREY BS AMB   5/3/2022 10:45 AM MD DANIEL Mann BS AMB   5/6/2022  7:30 AM A3 MAULIK MED [address] [place] [address]   5/17/2022  3:00 PM MD DANIEL Mann BS AMB   5/20/2022  1:30 PM A1 MAULIK MED [address] [place] [address]   5/27/2022  8:00 AM D1 MAULIK LONG [address] [place] [address]   6/7/2022  2:15 PM MD DANIEL Mann BS AMB   6/10/2022 10:00 AM A1 MAULIK MED [address] [place] [address]   6/17/2022  1:30 PM D4 MAULIK  [address], RN  April 28, 2022

## 2022-04-28 NOTE — TELEPHONE ENCOUNTER
Chaitanya DHILLON pt informed her chemotherapy will be held for tomorrow, she is to continue her antibiotics, and have a CBC drawn on 5/2/22 at [facility].  Pt states she is feeling much better now.

## 2022-04-28 NOTE — DISCHARGE INSTRUCTIONS
Patient Education   Ceftriaxone (By injection)   Ceftriaxone (mhm-itly-WP-one)  Treats infections. This medicine is a cephalosporin antibiotic. Brand Name(s): [employer]inet Choice cefTRIAXone, PremierPro Rx cefTRIAXone, cefTRIAXone Novaplus   There may be other brand names for this medicine. When This Medicine Should Not Be Used: This medicine is not right for everyone. Do not use it if you had an allergic reaction to any other cephalosporin antibiotic. How to Use This Medicine:   Injectable  · Your doctor will prescribe your exact dose and tell you how often it should be given. This medicine is given as a shot into a muscle or through a needle placed into a vein. · A nurse or other health provider will give you this medicine. · Missed dose: You must use this medicine on a fixed schedule. Call your doctor or pharmacist if you miss a dose. Drugs and Foods to Avoid:      Ask your doctor or pharmacist before using any other medicine, including over-the-counter medicines, vitamins, and herbal products. Warnings While Using This Medicine:   · Tell your doctor if you are pregnant or breastfeeding, or if you have kidney disease, liver disease, anemia, gallbladder disease, pancreas problems, or a history of stomach or bowel disease, such as colitis. Tell your doctor if you are allergic to penicillin. · This medicine can cause diarrhea. Call your doctor if the diarrhea becomes severe, does not stop, or is bloody. Do not take any medicine to stop diarrhea until you have talked to your doctor. Diarrhea can occur 2 months or more after you stop taking this medicine. · Your doctor will do lab tests at regular visits to check on the effects of this medicine. Keep all appointments. · Take all of the medicine in your prescription to clear up your infection, even if you feel better after the first few doses. · Call your doctor if your symptoms do not improve or if they get worse.   Possible Side Effects While Using This Medicine:   Call your doctor right away if you notice any of these side effects:  · Allergic reaction: Itching or hives, swelling in your face or hands, swelling or tingling in your mouth or throat, chest tightness, trouble breathing  · Dark urine or pale stools, nausea, vomiting, loss of appetite, stomach pain, yellow skin or eyes  · Severe diarrhea, diarrhea that contains blood, or vomiting  · Shortness of breath, tiredness, uneven heartbeat  · Unusual bleeding, bruising, or weakness  If you notice these less serious side effects, talk with your doctor:   · Change or loss of taste  · Dizziness or headache  · Mild rash or itching skin  · Pain, redness, or swelling where the shot is given  · Vaginal itching or discharge  If you notice other side effects that you think are caused by this medicine, tell your doctor. Call your doctor for medical advice about side effects. You may report side effects to FDA at 4-824-FDA-1031  © 2017 [facility] Information is for End User's use only and may not be sold, redistributed or otherwise used for commercial purposes. The above information is an  only. It is not intended as medical advice for individual conditions or treatments. Talk to your doctor, nurse or pharmacist before following any medical regimen to see if it is safe and effective for you. [facility]    DISCHARGE INSTRUCTIONS FOR:  BLOOD TRANSFUSION    We hope you are feeling better after your blood transfusion. Some mild tenderness or slight bruising at your IV site is normal.  Avoid lifting or heavy use of that extremity for the rest of the day. Drink plenty of fluids, eat a normal diet and get some rest.    There are some important signs that you need to watch for in case you experience a delayed reaction to the blood you have received. Call your physician immediately if you develop any of the following symptoms:    1. Severe headache or backache;    2.   Fever above 100 degrees;    3. Chills;    4. Difficulty breathing;    5.  Blood or red color in urine;    6. The feeling of weakness or constant fatigue;    7. Yellowing of the whites of your eyes or skin (jaundice). If your physician is not available, call or go to the nearest emergency room, or dial 911.     Raul Landry, Signature: ___________________________ 4/28/2022  Frandy Jovel RN

## 2022-05-01 NOTE — PROGRESS NOTES
HPI: Osmany Myers, a 43y.o. year-old who presents for follow up regarding tachycardia. 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 She also has prior history of infertility, PCOS. For her uterine sarcoma she underwent radical hysterectomy about a month ago. Postoperative course was not unusual but on recent PET CT as well as on CT scan performed in the emergency room she was noted to have pleural-based lesions which are likely metastasis with hypermetabolic characteristics on PET scan consistent with malignancy. During admission she had pleuritic chest pain and dyspnea. She also had sinus tachycardia which appeared to be in response to her recent surgery and anemia. Her hemoglobin was 6.7 at discharge. She was discharged with plans for an outpatient echocardiogram.    Preliminary echo today looks normal, EF normal and no structural abnormalities  She is in a lot of pain, right hip pain and remains tachycardic  Says she drinks a lot of water, no dizziness or lightheadedness or syncope  Discussed causes of tachycardia today, including pain and anemia  She plans to have repeat labs drawn for GYN ONC to recheck her hemoglobin  No chest pain or dyspnea  She can feel her heart beating fast from time to time but it is not constant even though she is constantly tachycardic    Assessment/Plan:    1. 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  1 month postop after hysterectomy  5. Anemia multifactorial    Ms. Mary Rodrigues has sinus tachycardia. There is no clinical evidence to suggest any arrhythmias. EKGs performed over the last 1 month both perioperatively as well as in the ER has showed either sinus rhythm or sinus tachycardia with no significant ischemic changes. Discussed causes of tachycardia and explained that her tachycardia is likely due to anemia and pain.   Discussed her echocardiogram today which is essentially normal.  Normal EF and no structural abnormalities. Advised her to have follow up labs ordered by Dr. Ludivina Crawford to follow up on her anemia and told her that if she remains tachycardic after pain improves and anemia improves she should come back for further evaluation in 3-4 months. She  has a past medical history of Anemia (12/17/2018), Anxiety, Fibroids, Gestational hypertension affecting first pregnancy (12/13/2018), History of PCOS, Infertility, female, and Polycystic disease, ovaries. She has no past medical history of Abnormal Papanicolaou smear of cervix, Asthma, Breast disorder, Chlamydia, Complication of anesthesia, Diabetes ([facility] [address] Epilepsy ([facility] [address] Genital herpes, Gestational diabetes, Gonorrhea, Heart abnormality, Herpes gestationis, Herpes simplex virus (HSV) infection, Human immunodeficiency virus (HIV) disease ([facility] [address] Kidney disease, Liver disease, Nicotine vapor product user, Non-nicotine vapor product user, Phlebitis and thrombophlebitis, Pituitary disorder ([address] Postpartum depression, Rhesus isoimmunization affecting pregnancy, Sickle cell disease ([facility] [address] Sickle cell trait syndrome ([facility] [address] Syphilis, Systemic lupus erythematosus ([facility] [address] Thyroid activity decreased, or Trauma. Cardiovascular ROS: no chest pain or dyspnea on exertion  Respiratory ROS: no cough, shortness of breath, or wheezing  Neurological ROS: no TIA or stroke symptoms  All other systems negative except as above. PE  Vitals:    05/02/22 1009   BP: 132/74   Pulse: (!) 115   Resp: 18   SpO2: 99%   Weight: 197 lb (89.4 kg)   Height: 5' 7\" (1.702 m)    Body mass index is 30.85 kg/m².    General appearance - alert, in pain   Mental status - affect appropriate to mood  Eyes - sclera anicteric, moist mucous membranes  Neck - supple  Lymphatics - not assessed  Chest - clear to auscultation, no wheezes, rales or rhonchi  Heart - tachycardic, regular rhythm, normal S1, S2, no murmurs, rubs, clicks or gallops  Abdomen - soft, nontender, nondistended  Back exam - full range of motion, no tenderness  Neurological - cranial nerves II through XII grossly intact, no focal deficit  Musculoskeletal - no muscular tenderness noted, normal strength  Extremities - peripheral pulses normal, no pedal edema  Skin - normal coloration  no rashes    Recent Labs:  Lab Results   Component Value Date/Time    Cholesterol, total 77 12/15/2021 09:17 AM    HDL Cholesterol 41 12/15/2021 09:17 AM    LDL, calculated 26.8 12/15/2021 09:17 AM    Triglyceride 46 12/15/2021 09:17 AM    CHOL/HDL Ratio 1.9 12/15/2021 09:17 AM     Lab Results   Component Value Date/Time    Creatinine 0.65 04/26/2022 12:12 PM     Lab Results   Component Value Date/Time    BUN 8 04/26/2022 12:12 PM     Lab Results   Component Value Date/Time    Potassium 4.7 04/26/2022 12:12 PM     Lab Results   Component Value Date/Time    Hemoglobin A1c 6.0 (H) 12/15/2021 09:17 AM     Lab Results   Component Value Date/Time    HGB 6.7 (L) 04/27/2022 03:20 PM     Lab Results   Component Value Date/Time    PLATELET 790 ([facility]) 28/93/1215 12:12 PM       Reviewed:  Past Medical History:   Diagnosis Date    Anemia 12/17/2018    Anxiety     anxiety - took medications as a teenager, no medications recently     Fibroids     Gestational hypertension affecting first pregnancy 12/13/2018    History of PCOS     Infertility, female     Polycystic disease, ovaries      Social History     Tobacco Use   Smoking Status Never Smoker   Smokeless Tobacco Never Used     Social History     Substance and Sexual Activity   Alcohol Use No     No Known Allergies    Current Outpatient Medications   Medication Sig    acetaminophen (Tylenol Extra Strength) 500 mg tablet Take 1,000 mg by mouth every six (6) hours as needed for Pain.  traMADoL (ULTRAM) 50 mg tablet Take 1 Tablet by mouth every six (6) hours as needed for Pain for up to 5 days.  Max Daily Amount: 200 mg.    amoxicillin-clavulanate (AUGMENTIN) 500-125 mg per tablet Take 1 Tablet by mouth two (2) times a day for 5 days. Take this in addition to your current prescription dated 4/21 for total of 15 days    senna-docusate (PERICOLACE) 8.6-50 mg per tablet Take 1 Tablet by mouth nightly. Stop for loose stools. Indications: constipation    bisacodyL (DULCOLAX) 10 mg supp Insert 10 mg into rectum daily. Indications: constipation    polyethylene glycol (Miralax) 17 gram/dose powder Take 17 g by mouth daily. No current facility-administered medications for this visit.        Theresa Dang, SERGIO, [address] [facility]  [facility]  [address] , [address] [address],[address]  [address]  (P) [phone] (P) [phone]

## 2022-05-02 NOTE — PROGRESS NOTES
Post op Visit, surgery was on 3/18/2022, pt reports fever of 101 last evening, she states she was getting hot and cold, temp in office was 99.3 orally    1. Have you been to the ER, urgent care clinic since your last visit? Hospitalized since your last visit? Yes, surgery with Dr. Mickey Taylor on 3/18/2022    2. Have you seen or consulted any other health care providers outside of the [address] [place] [address] since your last visit? Include any pap smears or colon screening.    no

## 2022-05-02 NOTE — PATIENT INSTRUCTIONS
Your echocardiogram looked ok  Your fast heart rate is likely related to anemia and pain  If your heart rate continues to be fast in 3-4 months you are welcome to come back for an evaluation

## 2022-05-02 NOTE — TELEPHONE ENCOUNTER
Per [facility] fax, plan does not cvoer oxycodone capsules, only tablets    Requested Prescriptions     Pending Prescriptions Disp Refills    oxyCODONE IR (ROXICODONE) 5 mg immediate release tablet 20 Tablet 0     Sig: Take 1 Tablet by mouth every six (6) hours as needed for Pain for up to 5 days. Max Daily Amount: 20 mg.

## 2022-05-02 NOTE — TELEPHONE ENCOUNTER
Pt , she states Steve sent her Tramadol for her pain but she states it is not helping, she states she took it over the weekend with no relief, she states she is having hip pain 10/10 on pain scale, she is requesting a new pain medication be sent to the pharmacy on file

## 2022-05-02 NOTE — TELEPHONE ENCOUNTER
Per Dr. Anthonette Bloch, send oxycodone 6 mg tablet take one every 6 hours as needed for pain    Requested Prescriptions     Pending Prescriptions Disp Refills    oxyCODONE (OXYIR) 5 mg capsule 20 Capsule 0     Sig: Take 1 Capsule by mouth every six (6) hours as needed for Pain for up to 5 days. Max Daily Amount: 20 mg.

## 2022-05-04 NOTE — PROGRESS NOTES
Virtual visit to discuss CT scan results from 5/3/2022, pt states she did not run a fever last evening    1. Have you been to the ER, urgent care clinic since your last visit? Hospitalized since your last visit?  no    2. Have you seen or consulted any other health care providers outside of the [address] [place] [address] since your last visit? Include any pap smears or colon screening.    no

## 2022-05-06 NOTE — PROGRESS NOTES
[address] [facility] Mathias Moritz [address], [address]  P [phone]  F [phone]      Patient ID:  Name:  Joanie Knox  MRN:  [id number]  :  1980/42 y.o. Date:  2022          Current Agent: Gemzar[employer]  Cycle: 1, Day 1      HPI:  Ms. Joanie Knox is a 43 y.o. female was seen in [address] [place], [place] [address]as [facility] ER on 3/11/2022 and found to have a fibroid uterus with Hgb of 5.2. Received 4 units of PRBC at that time. Seen by Dr. Kierra Powers on 3/15/2022 and was scheduled for surgery for the following week. Presented to [facility] ER on 3/16/18933 with worsening pain. Found to have a hgb of 6.0. CT A/P demonstrated \"Massively enlarged and heterogenous appearing uterus without clear delineation of ovarian structures. There is associated hyperdense ascites/hemoperitoneum, in addition there are bilateral pulmonary nodular densities demonstrated. \"       On 3/18/2022, she underwent an urgent surgery, Exploratory laparotomy, total abdominal hysterectomy with bilateral salpingo-oophorectomy, omentectomy. 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 Subsequently admitted to [facility] on 4/15/2022 with tachycardia and shortness of breath. Negative CTPE. CT Chest 2022 demonstrated multiple pulmonary nodules. CT A/P 2022 concerns for recurrence in the pelvis. PET 2022 demonstrates pulmonary mets, but does not demonstrate metabolic activity in the pelvis, which suggests that may be blood products (possible hemoperitoneum). The patient had low-grade temperatures while inpatient. Found to have a UTI. Discharged home on 4/15/2022 after Cardiology consult. Tachycardia improved. Discharged home with Bactrim for a UTI.      Since discharge the patient reports low-grade temps in 99s. Still have some dyspnea on exertion. Reports normal bowel habits. She is anxious today over our discussion. Reports some nausea. Antibiotics have been stopped. Considering Pulmonary lesions, her disease is now stage IV. Treatment already delayed one week for infection workup. Suspect fevers may be related to tumor and not infection. Also suspect that mass demonstrated in the pelvis is likely rapidly recurrent disease. SUBJECTIVE:  Ms. Jennifer Brandt presents to the [facility] today for Cycle 1, day 1 of chemotherapy. She is scheduled to receive Gemzar today. \[facility]\"" nurse called to the office to report that patient was not feeling well. Patient complains of fatigue. She also notes new right lateral abdominal pain which has been controlled with pain medication. \[facility]\"" reports that patient's temperature is 100.3. Patient states that she continues to run low grade fevers at home. She continues to have mild intermittent nausea. Has been able to eat/drink with no vomiting though. OBJECTIVE:  Physical Exam  Visit Vitals  /66 (BP 1 Location: Left upper arm, BP Patient Position: Sitting)   Pulse (!) 117   Temp 99.8 °F (37.7 °C)   Resp 18   LMP 02/07/2022 (Exact Date)   SpO2 99%   Breastfeeding No        General:  alert, fatigued, no distress     Cardiac:  Tachycardia. Regular rhythm        Lungs:  clear to auscultation bilaterally          Port:  clean, dry  Abdomen:  protuberant, distended. Bowel sounds hypoactive. Discomfort to palpation.        Lymph:  no lymphadenopathy   Extremity: extremities normal, atraumatic, no cyanosis or edema    Recent Labs     05/05/22  0910   WBC 12.7*   ANEU 10.3*   HGB 6.8*   HCT 22.3*   MCV 76.4*   MCH 23.3*   *     Recent Labs     05/05/22  0910   *   K 4.3   CL 97   *   BUN 6   CREA 0.54*   CA 8.5   ALB 2.1*   TBILI 0.8   ALT 15             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 Authorizing Provider   acetaminophen (Tylenol Extra Strength) 500 mg tablet Take 1,000 mg by mouth every six (6) hours as needed for Pain. Provider, Historical   oxyCODONE IR (ROXICODONE) 5 mg immediate release tablet Take 1 Tablet by mouth every six (6) hours as needed for Pain for up to 5 days. Max Daily Amount: 20 mg. 5/2/22 5/7/22  Chucky Smith MD   senshanel-docusate (PERICOLACE) 8.6-50 mg per tablet Take 1 Tablet by mouth nightly. Stop for loose stools. Indications: constipation  Patient not taking: Reported on 5/3/2022 4/26/22   Lelo Torres PA-C   bisacodyL (DULCOLAX) 10 mg supp Insert 10 mg into rectum daily. Indications: constipation 4/26/22   Steve Tse PA-C   polyethylene glycol (Miralax) 17 gram/dose powder Take 17 g by mouth daily. Provider, Historical     No Known Allergies        CT Results (most recent):  Results from Hospital Encounter encounter on 05/03/22    CT ABD PELV W CONT    Narrative  EXAM: CT ABD PELV W CONT    INDICATION: Uterine sarcoma    COMPARISON: April 14, 2022    CONTRAST: 100 mL of Isovue-370. ORAL CONTRAST: Oral contrast was administered to better evaluate the bowel. TECHNIQUE:  Following the uneventful intravenous administration of contrast, thin axial  images were obtained through the abdomen and pelvis. Coronal and sagittal  reconstructions were generated.  CT dose reduction was achieved through use of a  standardized protocol tailored for this examination and automatic exposure  control for dose modulation. FINDINGS:  LOWER THORAX: Numerous bilateral pulmonary nodules are significantly decreased  in size, now measuring up to 2.5 cm (previously up to 1.1 cm). Newly enlarged  pericardiophrenic lymph nodes are seen, measuring up to 1.3 cm in short axis  diameter. Unchanged elevation of the left hemidiaphragm. LIVER: No mass. BILIARY TREE: Gallbladder is within normal limits. CBD is not dilated. SPLEEN: within normal limits. PANCREAS: No mass or ductal dilatation. ADRENALS: Unremarkable. KIDNEYS: Punctate nonobstructing right renal calculi. No hydronephrosis. STOMACH: Unremarkable. SMALL BOWEL: No dilatation or wall thickening. COLON: No dilatation or wall thickening. APPENDIX: Not visualized. PERITONEUM: New small to moderate ascites. RETROPERITONEUM: Multiple new enlarged mesenteric masses measuring up to 4.8 cm. REPRODUCTIVE ORGANS: Heterogeneous mass arising from the pelvis has  significantly increased in size, from 11.3 x 9.7 x 11.0 on the 4/14/2022  examination to 21.8 x 16.1 x 17.9 cm on today's examination. The mass displaces  bowel superiorly and to the left. URINARY BLADDER: Decompressed. BONES: No destructive bone lesion. ABDOMINAL WALL: No mass or hernia. ADDITIONAL COMMENTS: N/A    Impression  Significant interval increase in size of pelvic mass, now measuring 21.8 cm in  maximal diameter. Multiple new mesenteric masses. New pericardiophrenic  lymphadenopathy. Significant progression of pulmonary metastatic disease. New  small to moderate ascites. The findings were called to Dr. Mickey Taylor on 5/3/2022 at 2:55 PM by Dr. Anderson Russell. [phone]    IMPRESSION/PLAN:  43 y.o.  female with Stage IV undifferentiated uterine sarcoma, s/p Exploratory laparotomy, total abdominal hysterectomy with bilateral salpingo-oophorectomy, omentectomy. Now with rapid post-op recurrence of disease and rapidly progressing pulmonary and mesenteric lesions. Pericardiophrenic lymphadenopathy also noted. Small to moderate ascites. ECO    Labs/chart reviewed with patient. Medication reconciliation. Chemotherapy: Cycle 1, Day 1 Gemzar. Patient has new right sided abdominal pain and continues to run low grade fevers. She is not feeling well though her lab values are stable. Abdominal pain is being managed with analgesics but these also lead to fatigue and sleepiness. I think delaying chemotherapy would be a disservice to her. Suspect that current symptoms are related to her rapidly growing recurrent tumor and metastases. Will proceed with chemotherapy. Patient and  agree with importance of starting treatment as soon as possible. Anemia: Hgb 6.8 today. Likely due to disease. At time of surgery, patient was found to have a hemoperitoneum. The recurrent tumor is likely continuing to bleed. Will transfuse one unit of PRBCs today. I have discussed with the patient and spouse the rationale for blood component transfusion; its benefits in treating or preventing fatigue, organ damage, or death; and its risk which includes mild transfusion reactions, rare risk of blood borne infection, or more serious but rare reactions. I have discussed the alternatives to transfusion, including the risk and consequences of not receiving transfusion. The patient and spouse had an opportunity to ask questions and had agreed to proceed with transfusion of blood components. Leukocytosis:  Continues to trend down. Do not suspect active infection. No sign of UTI with last urinalysis. Hyponatremia: IVF today. Encourage PO fluid intake. No recent vomiting or diarrhea. Psychosocial: here with her . Well supported by family. She has a young son. Finds much support in her leslie. No financial concerns currently. Electronically signed.      Dilia Munoz PA-C

## 2022-05-06 NOTE — PROGRESS NOTES
[facility] Chemo Progress Note      0745 Ms. Cris Spencer Arrived to [facility] for Gemzar (C1D1) via wheelchair. Assessment completed. Labs drawn 5/2 for today's treatment. Type and screen sent for processing, hemoglobin = 6.8. Peripheral IV established in left arm. Per JACQUIE Smith to proceed with treatment today. Order received for 1 unit of PRBCs. Chemotherapy Flowsheet 5/6/2022   Cycle C1D1   Date 5/6/2022   Drug / Regimen Gemzar   Pre Hydration given   Pre Meds given   Notes given       Ms. Jorgensen Legacy vitals were reviewed. Patient Vitals for the past 12 hrs:   Temp Pulse Resp BP SpO2   05/06/22 1544  (!) 126  132/76    05/06/22 1535 (!) 102.3 °F (39.1 °C) (!) 124 18 137/79    05/06/22 1320 99.8 °F (37.7 °C) (!) 117 18 126/66    05/06/22 1301 100 °F (37.8 °C) (!) 110 18 127/79    05/06/22 0747 100.3 °F (37.9 °C) (!) 123 22 (!) 141/80 99 %         Lab results were obtained and reviewed. Recent Results (from the past 12 hour(s))   TYPE & SCREEN    Collection Time: 05/06/22  8:10 AM   Result Value Ref Range    Crossmatch Expiration 05/09/2022,2359     ABO/Rh(D) Colman Sicard POSITIVE     Antibody screen NEG     Unit number [id number]     Blood component type RC LR     Unit division 00     Status of unit ISSUED     Crossmatch result Compatible    RBC, ALLOCATE    Collection Time: 05/06/22  9:45 AM   Result Value Ref Range    HISTORY CHECKED? Historical check performed        Pre-medications  were administered as ordered and chemotherapy was initiated.   Medications Administered     0.9% sodium chloride infusion     Admin Date  05/06/2022 Action  New Bag Dose  25 mL/hr Rate  25 mL/hr Route  IntraVENous Administered By  Ed Morris, IRAJ          gemcitabine (GEMZAR) 1,836 mg in 0.9% sodium chloride 250 mL, overfill volume 25 mL chemo infusion     Admin Date  05/06/2022 Action  New Bag Dose  1,836 mg Rate  215.5 mL/hr Route  IntraVENous Administered By  Ed Morris RN          ondansetron [facility]) injection 8 mg     Admin Date  05/06/2022 Action  Given Dose  8 mg Route  IntraVENous Administered By  Florentino Hall RN          sodium chloride (NS) flush 10 mL     Admin Date  05/06/2022 Action  Given Dose  10 mL Route  IntraVENous Administered By  Florentino Hall RN          sodium chloride 0.9 % bolus infusion 500 mL     Admin Date  05/06/2022 Action  New Bag Dose  500 mL Route  IntraVENous Administered By  Florentino Hall RN              0255 Patient tolerated treatment well. PIV maintained positive blood return throughout treatment. PIV removed without difficulty. Arm wrapped with 2x2 and coban. Patient declined 1 hour post observation period following blood transfusion. Patient was discharged from [facility] via wheelchair in stable condition. Patient aware of next appointment.      Future Appointments   Date Time Provider Brooklyn Donohue   5/13/2022  1:00 PM [address] [facility]   5/24/2022  2:15 PM MD SATHISH EsquedaO BS VENICE Berkowitz, IRAJ  May 6, 2022

## 2022-05-08 PROBLEM — R65.10 SIRS (SYSTEMIC INFLAMMATORY RESPONSE SYNDROME) (HCC): Status: ACTIVE | Noted: 2022-01-01

## 2022-05-08 NOTE — PROGRESS NOTES
Day #1 of Cefepime  Indication:  Sepsis  Current regimen:  2 grams q12h  Abx regimen: Vancomycin 2500 mg x1 and Cefepime  Recent Labs     22  0113 22  0910   WBC 20.6* 12.7*   CREA 0.82 0.54*   BUN 17 6     Est CrCl: >100 ml/min  Temp (24hrs), Av.9 °F (37.7 °C), Min:99.9 °F (37.7 °C), Max:99.9 °F (37.7 °C)    Cultures: blood and urine    Plan: Change to 2 grams q8h

## 2022-05-08 NOTE — PROGRESS NOTES
Occupational therapy  05/08/22     OT eval order received and acknowledged. OT eval attempted at 9:43 AM however discussed with PT and patient's HR in 150s at rest with significant pain and PCA pump recently started. Will continue to follow patient and attempt OT eval at a later time.      Thank you,  Elana Mororw, OTR/L

## 2022-05-08 NOTE — H&P
History & Physical    Primary Care Provider: Mago Stephenson MD  Source of Information: Patient and chart review    History of Presenting Illness:   Tamiko Christian is a 43 y.o. female with history of uterine sarcoma s/p total abdominal hysterectomy with bilateral salpingo-oophorectomy and omentectomy, history of polycystic ovarian syndrome, anemia and generalized anxiety disorder who presents to hospital with complaints of shortness of breath and severe abdominal pain. Per chart review and surgical oncology notes, patient underwent urgent surgery on  and was subsequently readmitted on April 15 for tachycardia and dyspnea. She had negative CT PE with PET scan at the time demonstrating pleural metastasis. She was diagnosed with acute cystitis and subsequently discharged on Bactrim. The patient denies any fever, chills, chest pain, nausea, vomiting, cough, congestion, recent illness, palpitations, or dysuria. Remarkable vitals on ER Presentations: Heart rate 129, /91, respiratory rate to 34  Labs Remarkable for: Hemoglobin 7.2, WBC 20.6, sodium 125, procalcitonin 8.31  ER Images: CT PE negative. Interval increase in size of numerous pulmonary nodules. CT abdomen pelvis: Stable size of uterine pelvic tumor and stable metastatic disease and retroperitoneal carcinomatosis. ER treatment: Cefepime, Dilaudid, 1 L normal saline bolus. Review of Systems:  A comprehensive review of systems was negative except for that written in the History of Present Illness.      Past Medical History:   Diagnosis Date    Anemia 2018    Anxiety     anxiety - took medications as a teenager, no medications recently     Fibroids     Gestational hypertension affecting first pregnancy 2018    History of PCOS     Infertility, female     Polycystic disease, ovaries       Past Surgical History:   Procedure Laterality Date    HX  SECTION      HX GYN 03/18/2022    Exploratory laparotomy, total abdominal hysterectomy with bilateral salpingo-oophorectomy, omentectomy    HX WISDOM TEETH EXTRACTION  2009     Prior to Admission medications    Medication Sig Start Date End Date Taking? Authorizing Provider   acetaminophen (Tylenol Extra Strength) 500 mg tablet Take 1,000 mg by mouth every six (6) hours as needed for Pain. Provider, Historical   oxyCODONE IR (ROXICODONE) 5 mg immediate release tablet Take 1 Tablet by mouth every six (6) hours as needed for Pain for up to 5 days. Max Daily Amount: 20 mg. 5/2/22 5/7/22  MD khoi Rosenberg-docusate (PERICOLACE) 8.6-50 mg per tablet Take 1 Tablet by mouth nightly. Stop for loose stools. Indications: constipation  Patient not taking: Reported on 5/3/2022 4/26/22   Lauren Christie PA-C   bisacodyL (DULCOLAX) 10 mg supp Insert 10 mg into rectum daily. Indications: constipation 4/26/22   Steve Tse PA-C   polyethylene glycol (Miralax) 17 gram/dose powder Take 17 g by mouth daily.     Provider, Historical     No Known Allergies   Family History   Problem Relation Age of Onset    Hypertension Mother     Hypertension Sister     Diabetes Sister     Hypertension Brother     Cancer Paternal Uncle     Diabetes Paternal Uncle     Diabetes Maternal Uncle     Breast Cancer Cousin         SOCIAL HISTORY:  Patient resides:  Independently x   Assisted Living    SNF    With family care x      Smoking history:   None x   Former    Chronic      Alcohol history:   None x   Social    Chronic      Ambulates:   Independently x   w/cane    w/walker    w/wc    CODE STATUS:  DNR    Full x   Other      Objective:     Physical Exam:     Visit Vitals  BP (!) 140/91   Pulse (!) 125   Temp 99.9 °F (37.7 °C)   Resp (!) 31   Ht 5' 7\" (1.702 m)   Wt 100.2 kg (221 lb)   LMP 02/07/2022 (Exact Date)   SpO2 100%   BMI 34.61 kg/m²    O2 Flow Rate (L/min): 2 l/min O2 Device: Nasal cannula    General:  Alert, cooperative, no distress, appears stated age. Head:  Normocephalic, without obvious abnormality, atraumatic. Eyes:  Conjunctivae/corneas clear. PERRL, EOMs intact. Nose: Nares normal. Septum midline. Mucosa normal.        Neck: Supple, symmetrical, trachea midline,. Lungs:   Clear to auscultation bilaterally. Chest wall:  No tenderness or deformity. Heart:  Tachy, S1, S2 normal, no murmur, click, rub or gallop. Abdomen:   Soft, diffusely tender. Distant bowel sounds. Distended. Bowel sounds normal. No masses,  No organomegaly. Extremities: Extremities normal, atraumatic, no cyanosis or edema. Pulses: 2+ and symmetric all extremities. Skin: Skin color, texture, turgor normal. No rashes or lesions   Neurologic: CNII-XII intact. EKG: Sinus tachycardia  Data Review:     Recent Days:  Recent Labs     05/08/22  0113 05/05/22  0910   WBC 20.6* 12.7*   HGB 7.2* 6.8*   HCT 23.0* 22.3*    446*     Recent Labs     05/08/22  0113 05/05/22  0910   * 129*   K 5.3* 4.3   CL 93* 97   CO2 25 28   * 110*   BUN 17 6   CREA 0.82 0.54*   CA 8.6 8.5   ALB 1.6* 2.1*   ALT 15 15     No results for input(s): PH, PCO2, PO2, HCO3, FIO2 in the last 72 hours.     24 Hour Results:  Recent Results (from the past 24 hour(s))   EKG, 12 LEAD, INITIAL    Collection Time: 05/08/22  1:04 AM   Result Value Ref Range    Ventricular Rate 127 BPM    Atrial Rate 127 BPM    P-R Interval 114 ms    QRS Duration 68 ms    Q-T Interval 302 ms    QTC Calculation (Bezet) 438 ms    Calculated P Axis 24 degrees    Calculated R Axis 10 degrees    Calculated T Axis -9 degrees    Diagnosis       Sinus tachycardia  Otherwise normal ECG  When compared with ECG of 14-APR-2022 01:08,  T wave inversion now evident in Inferior leads     CBC WITH AUTOMATED DIFF    Collection Time: 05/08/22  1:13 AM   Result Value Ref Range    WBC 20.6 (H) 3.6 - 11.0 K/uL    RBC 2.99 (L) 3.80 - 5.20 M/uL    HGB 7.2 (L) 11.5 - 16.0 g/dL    HCT 23.0 (L) 35.0 - 47.0 % MCV 76.9 (L) 80.0 - 99.0 FL    MCH 24.1 (L) 26.0 - 34.0 PG    MCHC 31.3 30.0 - 36.5 g/dL    RDW 24.1 (H) 11.5 - 14.5 %    PLATELET 227 034 - 834 K/uL    MPV 10.2 8.9 - 12.9 FL    NRBC 0.0 0  WBC    ABSOLUTE NRBC 0.00 0.00 - 0.01 K/uL    NEUTROPHILS 97 (H) 32 - 75 %    LYMPHOCYTES 2 (L) 12 - 49 %    MONOCYTES 0 (L) 5 - 13 %    EOSINOPHILS 0 0 - 7 %    BASOPHILS 0 0 - 1 %    IMMATURE GRANULOCYTES 1 (H) 0.0 - 0.5 %    ABS. NEUTROPHILS 20.0 (H) 1.8 - 8.0 K/UL    ABS. LYMPHOCYTES 0.4 (L) 0.8 - 3.5 K/UL    ABS. MONOCYTES 0.0 0.0 - 1.0 K/UL    ABS. EOSINOPHILS 0.0 0.0 - 0.4 K/UL    ABS. BASOPHILS 0.0 0.0 - 0.1 K/UL    ABS. IMM. GRANS. 0.2 (H) 0.00 - 0.04 K/UL    DF SMEAR SCANNED      RBC COMMENTS ANISOCYTOSIS  2+        RBC COMMENTS MICROCYTOSIS  1+        RBC COMMENTS HYPOCHROMIA  1+        RBC COMMENTS KARL CELLS  1+        RBC COMMENTS TARGET CELLS  1+        RBC COMMENTS SCHISTOCYTES  1+        RBC COMMENTS POLYCHROMASIA  PRESENT       METABOLIC PANEL, COMPREHENSIVE    Collection Time: 05/08/22  1:13 AM   Result Value Ref Range    Sodium 125 (L) 136 - 145 mmol/L    Potassium 5.3 (H) 3.5 - 5.1 mmol/L    Chloride 93 (L) 97 - 108 mmol/L    CO2 25 21 - 32 mmol/L    Anion gap 7 5 - 15 mmol/L    Glucose 122 (H) 65 - 100 mg/dL    BUN 17 6 - 20 MG/DL    Creatinine 0.82 0.55 - 1.02 MG/DL    BUN/Creatinine ratio 21 (H) 12 - 20      GFR est AA >60 >60 ml/min/1.73m2    GFR est non-AA >60 >60 ml/min/1.73m2    Calcium 8.6 8.5 - 10.1 MG/DL    Bilirubin, total 1.2 (H) 0.2 - 1.0 MG/DL    ALT (SGPT) 15 12 - 78 U/L    AST (SGOT) 52 (H) 15 - 37 U/L    Alk.  phosphatase 68 45 - 117 U/L    Protein, total 6.3 (L) 6.4 - 8.2 g/dL    Albumin 1.6 (L) 3.5 - 5.0 g/dL    Globulin 4.7 (H) 2.0 - 4.0 g/dL    A-G Ratio 0.3 (L) 1.1 - 2.2     NT-PRO BNP    Collection Time: 05/08/22  1:13 AM   Result Value Ref Range    NT pro-BNP 90 <125 PG/ML   TROPONIN-HIGH SENSITIVITY    Collection Time: 05/08/22  1:13 AM   Result Value Ref Range    Troponin-High Sensitivity 5 0 - 51 ng/L   LIPASE    Collection Time: 05/08/22  1:13 AM   Result Value Ref Range    Lipase 69 (L) 73 - 393 U/L   SAMPLES BEING HELD    Collection Time: 05/08/22  1:13 AM   Result Value Ref Range    SAMPLES BEING HELD 1PST,1RED,1LAV,1BLUE     COMMENT        Add-on orders for these samples will be processed based on acceptable specimen integrity and analyte stability, which may vary by analyte. PROCALCITONIN    Collection Time: 05/08/22  3:35 AM   Result Value Ref Range    Procalcitonin 8.31 ng/mL   LACTIC ACID    Collection Time: 05/08/22  3:35 AM   Result Value Ref Range    Lactic acid 1.9 0.4 - 2.0 MMOL/L         Imaging:     Assessment:     Marlen Waggoner is a 43 y.o. female with history of Chery sarcoma s/p total abdominal hysterectomy with bilateral salpingo-oophorectomy and omentectomy, history of polycystic ovarian syndrome, anemia and generalized anxiety disorder who is admitted for symptomatic anemia and intractable cancer-related pain.        Plan:       Metastatic Uterine sarcoma   Intractable malignancy related pain  -Admit to monitor on telemetry  -Pain regimen: dilaudid pca  -Narcan with parameters  -[facility] oncology consult    Symptomatic anemia  -Multifactorial anemia: Blood loss, iron deficiency and chronic disease  -Check iron profile and ferritin  -Hold additional saline volume resuscitation as this will worsen anemia  -In event of iron deficiency, start IV iron infusions  -Type and cross and transfuse 1 unit PRBC    Tachycardia  -Likely secondary to anemia  -Management of anemia as outlined above    Hyponatremia  -Likely hypovolemic hyponatremia  -Repeat BMP after volume resuscitation and PRBC transfusion    SIRS  -No identifiable infectious source at this time  -Follow blood cultures, urine cultures, urinalysis  -Empiric cefepime          FEN/GI -  PO hydration  Activity - as tolerated  DVT prophylaxis - SCDs  GI prophylaxis -  NI  Disposition - TBD    CODE STATUS:  Full code Signed By: Dagoberto Stover MD     May 8, 2022

## 2022-05-08 NOTE — PROGRESS NOTES
[address] [facility]  Hospitalist Group                                                                                          Hospitalist Progress Note  Magda Riggs DO  Answering service: [phone] OR [phone] from in house phone        Date of Service:  2022  NAME:  Addison Greer  :  1980  MRN:  [id number]      Admission Summary:   \"37 y.o. female with history of uterine sarcoma s/p total abdominal hysterectomy with bilateral salpingo-oophorectomy and omentectomy, history of polycystic ovarian syndrome, anemia and generalized anxiety disorder who presents to hospital with complaints of shortness of breath and severe abdominal pain. Per chart review and surgical oncology notes, patient underwent urgent surgery on  and was subsequently readmitted on April 15 for tachycardia and dyspnea. She had negative CT PE with PET scan at the time demonstrating pleural metastasis. She was diagnosed with acute cystitis and subsequently discharged on Bactrim. The patient denies any fever, chills, chest pain, nausea, vomiting, cough, congestion, recent illness, palpitations, or dysuria. Remarkable vitals on ER Presentations: Heart rate 129, /91, respiratory rate to 34  Labs Remarkable for: Hemoglobin 7.2, WBC 20.6, sodium 125, procalcitonin 8.31  ER Images: CT PE negative. Interval increase in size of numerous pulmonary nodules. CT abdomen pelvis: Stable size of uterine pelvic tumor and stable metastatic disease and retroperitoneal carcinomatosis. ER treatment: Cefepime, Dilaudid, 1 L normal saline bolus. \"       Interval history / Subjective: Follow up SIRS. Patient seen and examined. In acute distress, HR high 140s, febrile >102. Nursing at bedside. Has significant pain, states better with PCA pump.  Pain is lower pelvic      Assessment & Plan:       SIRS (tachycardia, fever), POA  -No identifiable infectious source   -Follow blood cultures  -UA fine  -CT with increase numerous pulmonary nodules/masses, stable peritoneal carcinomatosis, small to moderate volume ascites  -Continue cefepime, vancomycin  -Aggressively treat fever  -Empiric cefepime, vancomycin   -s/p fluids, continue maintenance     Metastatic Uterine sarcoma   Intractable malignancy related pain  -Pain regimen: dilaudid pca  -Narcan with parameters  -Gyn Onc consult     Symptomatic anemia  -Multifactorial anemia: Blood loss, iron deficiency and chronic disease  -Check iron profile and ferritin  -Type and cross and transfuse 1 unit PRBC     Tachycardia  -Likely secondary to anemia  -Management of anemia as outlined above     Hyponatremia  -Likely hypovolemic hyponatremia  -Repeat BMP in PM     Patient critically ill. Updated family at bedside.            Code status: full   Prophylaxis: holding due to anemia  Care Plan discussed with: patient, nurse, family   Anticipated Disposition: >48 hours     Hospital Problems  Date Reviewed: 5/3/2022          Codes Class Noted POA    SIRS (systemic inflammatory response syndrome) ([facility], [place] [address] ICD-10-CM: R65.10  ICD-9-CM: 995.90  5/8/2022 Unknown                Review of Systems:   Negative unless stated above      Vital Signs:    Last 24hrs VS reviewed since prior progress note. Most recent are:  Visit Vitals  BP (!) 140/86   Pulse (!) 137   Temp (!) 102.1 °F (38.9 °C)   Resp (!) 35   Ht 5' 7\" (1.702 m)   Wt 100.2 kg (221 lb)   SpO2 100%   BMI 34.61 kg/m²       No intake or output data in the 24 hours ending 05/08/22 1005     Physical Examination:     I had a face to face encounter with this patient and independently examined them on 5/8/2022 as outlined below:          Constitutional:  + acute distress, young female    ENT:  Oral mucosa moist, oropharynx benign. Resp:  CTA bilaterally. No wheezing/rhonchi/rales. No accessory muscle use.     CV:  tachycardic, no murmurs, gallops, rubs    GI:  +distended, hypoactive bowel sounds, central scar, no fluid wave appreciated, no hepatosplenomegaly Musculoskeletal:  No edema, warm, 2+ pulses throughout    Neurologic:  Moves all extremities            Data Review:    Review and/or order of clinical lab test  Review and/or order of tests in the radiology section of CPT  Review and/or order of tests in the medicine section of CPT      Labs:     Recent Labs     05/08/22 0113   WBC 20.6*   HGB 7.2*   HCT 23.0*        Recent Labs     05/08/22 0113   *   K 5.3*   CL 93*   CO2 25   BUN 17   CREA 0.82   *   CA 8.6   MG 2.0   PHOS 4.1     Recent Labs     05/08/22 0113   ALT 15   AP 68   TBILI 1.2*   TP 6.3*   ALB 1.6*   GLOB 4.7*   LPSE 69*     No results for input(s): INR, PTP, APTT, INREXT in the last 72 hours. Recent Labs     05/08/22 0113   TIBC 133*   PSAT 25   FERR 1,529*      Lab Results   Component Value Date/Time    Folate 6.2 05/08/2022 01:13 AM      No results for input(s): PH, PCO2, PO2 in the last 72 hours. No results for input(s): CPK, CKNDX, TROIQ in the last 72 hours.     No lab exists for component: CPKMB  Lab Results   Component Value Date/Time    Cholesterol, total 77 12/15/2021 09:17 AM    HDL Cholesterol 41 12/15/2021 09:17 AM    LDL, calculated 26.8 12/15/2021 09:17 AM    Triglyceride 46 12/15/2021 09:17 AM    CHOL/HDL Ratio 1.9 12/15/2021 09:17 AM     Lab Results   Component Value Date/Time    Glucose (POC) 116 04/13/2022 03:59 PM    Glucose (POC) 158 (H) 03/18/2022 08:41 AM    Glucose (POC) 156 (H) 03/12/2022 07:35 PM     Lab Results   Component Value Date/Time    Color DARK YELLOW 05/08/2022 05:50 AM    Appearance CLEAR 05/08/2022 05:50 AM    Specific gravity 1.020 05/08/2022 05:50 AM    Specific gravity 1.015 04/27/2022 03:20 PM    pH (UA) 5.0 05/08/2022 05:50 AM    Protein 30 (A) 05/08/2022 05:50 AM    Glucose Negative 05/08/2022 05:50 AM    Ketone TRACE (A) 05/08/2022 05:50 AM    Bilirubin Negative 05/02/2022 11:27 AM    Urobilinogen 1.0 05/08/2022 05:50 AM    Nitrites Negative 05/08/2022 05:50 AM    Leukocyte Esterase TRACE (A) 05/08/2022 05:50 AM    Epithelial cells FEW 05/08/2022 05:50 AM    Bacteria Negative 05/08/2022 05:50 AM    WBC 0-4 05/08/2022 05:50 AM    RBC 0-5 05/08/2022 05:50 AM         Medications Reviewed:     Current Facility-Administered Medications   Medication Dose Route Frequency    sodium chloride (NS) flush 5-10 mL  5-10 mL IntraVENous PRN    0.9% sodium chloride infusion 250 mL  250 mL IntraVENous PRN    oxyCODONE IR (ROXICODONE) tablet 10 mg  10 mg Oral Q4H PRN    naloxone (NARCAN) injection 0.4 mg  0.4 mg IntraVENous EVERY 2 MINUTES AS NEEDED    cefepime (MAXIPIME) 2 g in 0.9% sodium chloride (MBP/ADV) 100 mL MBP  2 g IntraVENous Q8H    sodium chloride (NS) flush 5-40 mL  5-40 mL IntraVENous Q8H    sodium chloride (NS) flush 5-40 mL  5-40 mL IntraVENous PRN    acetaminophen (TYLENOL) tablet 650 mg  650 mg Oral Q6H PRN    Or    acetaminophen (TYLENOL) suppository 650 mg  650 mg Rectal Q6H PRN    polyethylene glycol (MIRALAX) packet 17 g  17 g Oral DAILY PRN    ondansetron (ZOFRAN ODT) tablet 4 mg  4 mg Oral Q8H PRN    Or    ondansetron (ZOFRAN) injection 4 mg  4 mg IntraVENous Q6H PRN    HYDROmorphone 30 mg/30 mL (DILAUDID) PCA   IntraVENous TITRATE     ______________________________________________________________________  EXPECTED LENGTH OF STAY: - - -  ACTUAL LENGTH OF STAY:          0                 Magda Ortega DO

## 2022-05-08 NOTE — PROGRESS NOTES
Met with Patient and family at bedside. Patient is intermittently answering questions and falling asleep. Of concern HR is 145 at rest. Patient initially agreed to try and sit EOB but then deferred secondary to pain. Agree that it is not appropriate to mobilize at this time. Patient reports that she has only been walking a few steps to and from the bathroom for the last month since her hysterectomy. Will follow up tomorrow as appropriate. Thanks.     Missy Cox PT, DPT  Geriatric Clinical Specialist

## 2022-05-08 NOTE — ED PROVIDER NOTES
27-year-old female with significant past medical history of metastatic uterine carcinoma. Patient had recent CT of the chest in April showing multiple lung masses which are pleural-based no PE at that time and a CT of the abdomen showing increased pelvic mass 21.8 cm in diameter. Patient reports taking pain medications at home however having worsening pain having worsening abdominal distention nausea without vomiting. Started on bowel meds but also reported having worsening shortness of breath. Has history of anemia and required blood transfusion in the past recently. Reports no fevers or chills but having worsening shortness of breath worsened with laying down. Is post to start on chemo but has not started yet. 43y.o. year-old who presents for follow up regarding tachycardia. 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  She also has prior history of infertility, PCOS. For her uterine sarcoma she underwent radical hysterectomy about a month ago. Postoperative course was not unusual but on recent PET CT as well as on CT scan performed in the emergency room she was noted to have pleural-based lesions which are likely metastasis with hypermetabolic characteristics on PET scan consistent with malignancy.    During admission she had pleuritic chest pain and dyspnea. She also had sinus tachycardia which appeared to be in response to her recent surgery and anemia. CT abd/pel 5/3/22  IMPRESSION  Significant interval increase in size of pelvic mass, now measuring 21.8 cm in  maximal diameter. Multiple new mesenteric masses. New pericardiophrenic  lymphadenopathy. Significant progression of pulmonary metastatic disease. New  small to moderate ascites.     CTA chest 4/14/22  IMPRESSION     1.  Multiple lung masses some of which are pleural-based consistent with  metastatic disease.   2.  Loculated fluid in the left upper abdomen.   3.  No evidence of pulmonary embolus              Past Medical History:   Diagnosis Date    Anemia 2018    Anxiety     anxiety - took medications as a teenager, no medications recently     Fibroids     Gestational hypertension affecting first pregnancy 2018    History of PCOS     Infertility, female     Polycystic disease, ovaries        Past Surgical History:   Procedure Laterality Date    HX  SECTION      HX GYN  2022    Exploratory laparotomy, total abdominal hysterectomy with bilateral salpingo-oophorectomy, omentectomy    HX WISDOM TEETH EXTRACTION           Family History:   Problem Relation Age of Onset    Hypertension Mother     Hypertension Sister     Diabetes Sister     Hypertension Brother     Cancer Paternal Uncle     Diabetes Paternal Uncle     Diabetes Maternal Uncle     Breast Cancer Cousin        Social History     Socioeconomic History    Marital status:      Spouse name: Not on file    Number of children: Not on file    Years of education: Not on file    Highest education level: Not on file   Occupational History    Not on file   Tobacco Use    Smoking status: Never Smoker    Smokeless tobacco: Never Used   Vaping Use    Vaping Use: Never used   Substance and Sexual Activity    Alcohol use: No    Drug use: No    Sexual activity: Yes     Partners: Male     Birth control/protection: Condom     Comment: condoms- sometimes.    Other Topics Concern     Service Not Asked    Blood Transfusions Not Asked    Caffeine Concern Not Asked    Occupational Exposure Not Asked    Hobby Hazards Not Asked    Sleep Concern Not Asked    Stress Concern Not Asked    Weight Concern Not Asked    Special Diet Not Asked    Back Care Not Asked    Exercise Not Asked    Bike Helmet Not Asked    [address] Not Asked    Self-Exams Not Asked   Social History Narrative    Not on file     Social Determinants of Health     Financial Resource Strain:     Difficulty of Paying Living Expenses: Not on file   Food Insecurity:     Worried About [employer] in the Last Year: Not on file    Ran Out of Food in the Last Year: Not on file   Transportation Needs:     Lack of Transportation (Medical): Not on file    Lack of Transportation (Non-Medical): Not on file   Physical Activity:     Days of Exercise per Week: Not on file    Minutes of Exercise per Session: Not on file   Stress:     Feeling of Stress : Not on file   Social Connections:     Frequency of Communication with Friends and Family: Not on file    Frequency of Social Gatherings with Friends and Family: Not on file    Attends Rastafari Services: Not on file    Active Member of [address] [place] [address] or Organizations: Not on file    Attends Club or Organization Meetings: Not on file    Marital Status: Not on file   Intimate Partner Violence:     Fear of Current or Ex-Partner: Not on file    Emotionally Abused: Not on file    Physically Abused: Not on file    Sexually Abused: Not on file   Housing Stability:     Unable to Pay for Housing in the Last Year: Not on file    Number of [place] in the Last Year: Not on file    Unstable Housing in the Last Year: Not on file         ALLERGIES: Patient has no known allergies. Review of Systems   Constitutional: Positive for fatigue. Negative for chills and fever. HENT: Negative for congestion and sore throat. Eyes: Negative for pain. Respiratory: Positive for cough and shortness of breath. Cardiovascular: Positive for chest pain. Gastrointestinal: Positive for abdominal pain and nausea. Negative for diarrhea and vomiting. Genitourinary: Negative for dysuria and flank pain. Musculoskeletal: Negative for back pain and neck pain. Skin: Negative for rash. Neurological: Negative for dizziness and headaches. All other systems reviewed and are negative.       Vitals:    05/08/22 0103   BP: (!) 140/88   Pulse: (!) 129   Resp: 22   Temp: 99.9 °F (37.7 °C) SpO2: 100%   Weight: 100.2 kg (221 lb)   Height: 5' 7\" (1.702 m)            Physical Exam  Constitutional:       Appearance: She is well-developed. HENT:      Head: Normocephalic. Eyes:      Conjunctiva/sclera: Conjunctivae normal.   Cardiovascular:      Rate and Rhythm: Regular rhythm. Tachycardia present. Pulmonary:      Effort: Pulmonary effort is normal. Tachypnea present. No respiratory distress. Breath sounds: Examination of the right-lower field reveals decreased breath sounds. Examination of the left-lower field reveals decreased breath sounds. Decreased breath sounds present. Abdominal:      General: Bowel sounds are normal. There is distension. Palpations: Abdomen is soft. There is mass. Tenderness: There is generalized abdominal tenderness. Musculoskeletal:         General: Normal range of motion. Cervical back: Normal range of motion and neck supple. Skin:     General: Skin is warm. Capillary Refill: Capillary refill takes less than 2 seconds. Findings: No rash. Neurological:      Mental Status: She is alert and oriented to person, place, and time. Comments: No gross motor or sensory deficits          MDM  Number of Diagnoses or Management Options  Abdominal pain, generalized  Hyponatremia  Malignant neoplasm metastatic to both lungs (HCC)  Sepsis, due to unspecified organism, unspecified whether acute organ dysfunction present ([address]  SOB (shortness of breath)  Tachycardia  Uterine sarcoma ([address]  Diagnosis management comments: Patient with metastatic uterine carcinoma with worsening abdominal pain and shortness of breath. Patient found to be tachycardic in the 130s. Imaging no signs of PE. Increased size of metastatic lung cancer. And stable large uterine mass. Patient has some ascites in the abdomen. Patient has significant leukocytosis and procalcitonin is significantly elevated.   Will give broad-spectrum antibiotics still looking for source of infection. Pending urine. IMPRESSION:  Uterine sarcoma (HCC)  (primary encounter diagnosis)  Malignant neoplasm metastatic to both lungs (HCC)  SOB (shortness of breath)  Abdominal pain, generalized  Tachycardia  Hyponatremia  Sepsis, due to unspecified organism, unspecified whether acute organ dysfunction present ([facility]    - Broad Spectrum Antibiotics ordered: Cefepime  - Repeat lactic acid ordered for time N/A  - Re-assessment performed at time 0530 and clinical condition stable. - Hypotension or Lactic Acidosis present (SBP<90, MAP<65, Lactate >4): NO IVF:  30cc/kg actual Body Weight  - Persistent Hypotension despite IVF resuscitation: NO  Vasopressors: Not indicated due to septic shock not present    Plan:  Admit to Telemetry    Total critical care time spent exclusive of procedures:  45 minutes    Marcy Mao MD           Amount and/or Complexity of Data Reviewed  Clinical lab tests: reviewed  Tests in the radiology section of CPT®: reviewed  Tests in the medicine section of CPT®: reviewed  Decide to obtain previous medical records or to obtain history from someone other than the patient: yes      ED Course as of 05/08/22 0530   Sun May 08, 2022   0112 EKG sinus tachycardia rate of 127 bpm with normal intervals. Normal axis. No ST elevation or depression. EKG interpreted by Marcy Mao MD   [ZD]   0410 Procalcitonin: 8.31 [ZD]   6462 WBC(!): 20.6 [ZD]      ED Course User Index  [ZD] Reyes Bianchi, MD       Procedures          Perfect Serve Consult for Admission  5:31 AM    ED Room Number: ER06/06  Patient Name and age:  Solomon Sanz 43 y.o.  female  Working Diagnosis:   1. Uterine sarcoma ([facility]    2. Malignant neoplasm metastatic to both lungs ([address]    3. SOB (shortness of breath)    4. Abdominal pain, generalized    5. Tachycardia    6. Hyponatremia    7.  Sepsis, due to unspecified organism, unspecified whether acute organ dysfunction present ([address]        COVID-19 Suspicion: N/A  Sepsis present:  yes  Reassessment needed: no  Code Status:  Full Code  Readmission: no  Isolation Requirements:  no  Recommended Level of Care:  telemetry  Department:[facility] Adult ED - 21   Other:         Recent Results (from the past 24 hour(s))   EKG, 12 LEAD, INITIAL    Collection Time: 05/08/22  1:04 AM   Result Value Ref Range    Ventricular Rate 127 BPM    Atrial Rate 127 BPM    P-R Interval 114 ms    QRS Duration 68 ms    Q-T Interval 302 ms    QTC Calculation (Bezet) 438 ms    Calculated P Axis 24 degrees    Calculated R Axis 10 degrees    Calculated T Axis -9 degrees    Diagnosis       Sinus tachycardia  Otherwise normal ECG  When compared with ECG of 14-APR-2022 01:08,  T wave inversion now evident in Inferior leads     CBC WITH AUTOMATED DIFF    Collection Time: 05/08/22  1:13 AM   Result Value Ref Range    WBC 20.6 (H) 3.6 - 11.0 K/uL    RBC 2.99 (L) 3.80 - 5.20 M/uL    HGB 7.2 (L) 11.5 - 16.0 g/dL    HCT 23.0 (L) 35.0 - 47.0 %    MCV 76.9 (L) 80.0 - 99.0 FL    MCH 24.1 (L) 26.0 - 34.0 PG    MCHC 31.3 30.0 - 36.5 g/dL    RDW 24.1 (H) 11.5 - 14.5 %    PLATELET 709 628 - 138 K/uL    MPV 10.2 8.9 - 12.9 FL    NRBC 0.0 0  WBC    ABSOLUTE NRBC 0.00 0.00 - 0.01 K/uL    NEUTROPHILS 97 (H) 32 - 75 %    LYMPHOCYTES 2 (L) 12 - 49 %    MONOCYTES 0 (L) 5 - 13 %    EOSINOPHILS 0 0 - 7 %    BASOPHILS 0 0 - 1 %    IMMATURE GRANULOCYTES 1 (H) 0.0 - 0.5 %    ABS. NEUTROPHILS 20.0 (H) 1.8 - 8.0 K/UL    ABS. LYMPHOCYTES 0.4 (L) 0.8 - 3.5 K/UL    ABS. MONOCYTES 0.0 0.0 - 1.0 K/UL    ABS. EOSINOPHILS 0.0 0.0 - 0.4 K/UL    ABS. BASOPHILS 0.0 0.0 - 0.1 K/UL    ABS. IMM.  GRANS. 0.2 (H) 0.00 - 0.04 K/UL    DF SMEAR SCANNED      RBC COMMENTS ANISOCYTOSIS  2+        RBC COMMENTS MICROCYTOSIS  1+        RBC COMMENTS HYPOCHROMIA  1+        RBC COMMENTS KARL CELLS  1+        RBC COMMENTS TARGET CELLS  1+        RBC COMMENTS SCHISTOCYTES  1+        RBC COMMENTS POLYCHROMASIA  PRESENT       METABOLIC PANEL, COMPREHENSIVE    Collection Time: 05/08/22  1:13 AM   Result Value Ref Range    Sodium 125 (L) 136 - 145 mmol/L    Potassium 5.3 (H) 3.5 - 5.1 mmol/L    Chloride 93 (L) 97 - 108 mmol/L    CO2 25 21 - 32 mmol/L    Anion gap 7 5 - 15 mmol/L    Glucose 122 (H) 65 - 100 mg/dL    BUN 17 6 - 20 MG/DL    Creatinine 0.82 0.55 - 1.02 MG/DL    BUN/Creatinine ratio 21 (H) 12 - 20      GFR est AA >60 >60 ml/min/1.73m2    GFR est non-AA >60 >60 ml/min/1.73m2    Calcium 8.6 8.5 - 10.1 MG/DL    Bilirubin, total 1.2 (H) 0.2 - 1.0 MG/DL    ALT (SGPT) 15 12 - 78 U/L    AST (SGOT) 52 (H) 15 - 37 U/L    Alk. phosphatase 68 45 - 117 U/L    Protein, total 6.3 (L) 6.4 - 8.2 g/dL    Albumin 1.6 (L) 3.5 - 5.0 g/dL    Globulin 4.7 (H) 2.0 - 4.0 g/dL    A-G Ratio 0.3 (L) 1.1 - 2.2     NT-PRO BNP    Collection Time: 05/08/22  1:13 AM   Result Value Ref Range    NT pro-BNP 90 <125 PG/ML   TROPONIN-HIGH SENSITIVITY    Collection Time: 05/08/22  1:13 AM   Result Value Ref Range    Troponin-High Sensitivity 5 0 - 51 ng/L   LIPASE    Collection Time: 05/08/22  1:13 AM   Result Value Ref Range    Lipase 69 (L) 73 - 393 U/L   SAMPLES BEING HELD    Collection Time: 05/08/22  1:13 AM   Result Value Ref Range    SAMPLES BEING HELD 1PST,1RED,1LAV,1BLUE     COMMENT        Add-on orders for these samples will be processed based on acceptable specimen integrity and analyte stability, which may vary by analyte. PROCALCITONIN    Collection Time: 05/08/22  3:35 AM   Result Value Ref Range    Procalcitonin 8.31 ng/mL   LACTIC ACID    Collection Time: 05/08/22  3:35 AM   Result Value Ref Range    Lactic acid 1.9 0.4 - 2.0 MMOL/L       CTA CHEST W OR W WO CONT    Result Date: 5/8/2022  EXAM:  CTA CHEST W OR W WO CONT, CT ABD PELV W CONT INDICATION: sob COMPARISON: CT abdomen pelvis 5/3/2022, CT chest 4/14/2022. CONTRAST:  100 mL of Isovue-370.  TECHNIQUE: Following the uneventful intravenous administration of contrast, thin axial images were obtained through the chest, abdomen and pelvis. Coronal and sagittal reconstructions were generated. MIP reconstructions of the chest were generated. Oral contrast was not administered. CT dose reduction was achieved through use of a standardized protocol tailored for this examination and automatic exposure control for dose modulation. FINDINGS: Chest: Vascular: No evidence of acute or chronic pulmonary embolism. The pulmonary arteries are normal in size. The thoracic aorta is normal in size. Lungs/Pleura: Again seen are numerous pulmonary nodules/masses, which overall have increased in size since prior exam, for example, right upper lobe nodule now measures 2.0 cm (series 3 image 114), previously measuring 11 mm; right upper lobe nodule now measures 22 mm (series 3 image 109), previously measuring 13 mm; right middle lobe nodule now measures 20 mm (series 3 image 102), previously measuring 11 mm; lingular nodule now measures 24 mm (series 3 image 101), previously measuring 13 mm. There is no pleural effusion or pneumothorax. Axilla/Soft Tissue: No pathologic axillary adenopathy. Mediastinum: The heart is normal in size. No pericardial effusion. No pathologic mediastinal or hilar adenopathy. Bones: No evidence of acute fracture, dislocation, or aggressive osseous abnormality. Abdomen/Pelvis: Liver:  No focal liver lesions. Biliary system: Gallbladder is unremarkable. No intrahepatic or extrahepatic biliary ductal dilatation. Spleen: Normal. Pancreas: Normal. Kidneys/Ureters/Bladder: No renal masses. No renal or ureteral calculi. No hydronephrosis or hydroureter. The bladder is normal. Adrenals: Normal. Stomach/bowel: No dilation or abnormal wall thickening is present. No free intraperitoneal air noted. Reproductive Organs: Stable size of massive uterine pelvic tumor measuring 22.2 x 16.7 x 20.0 cm. Vasculature: Normal caliber arteries. Portal vein, SMV, and splenic vein are patent.  Nodes: Stable size of left para-aortic lymph node measuring 2.0 cm (series 6 image 54). Stable peritoneal carcinomatosis with omental caking and numerous discrete peritoneal implants, for example, in the right abdomen adjacent to the liver measuring 2.2 cm (series 6 image 32) and the posterior deep right pelvis measuring 2.8 x 2.0 cm (series 6 image 96). Fluid: Small to moderate volume ascites, similar to prior exam. Bones/Soft Tissue: No acute fractures or aggressive osseous lesions are seen. Chest: 1. No evidence of pulmonary embolism. 2. Interval increase in size of numerous pulmonary nodules/masses since 4/14/2022, consistent with metastatic disease progression. Abdomen/pelvis: 1. Stable metastatic disease and peritoneal carcinomatosis since recent CT 5/3/2022. Small to moderate volume ascites is similar to prior exam. 2. Stable size of massive uterine pelvic tumor. CT ABD PELV W CONT    Result Date: 5/8/2022  EXAM:  CTA CHEST W OR W WO CONT, CT ABD PELV W CONT INDICATION: sob COMPARISON: CT abdomen pelvis 5/3/2022, CT chest 4/14/2022. CONTRAST:  100 mL of Isovue-370. TECHNIQUE: Following the uneventful intravenous administration of contrast, thin axial images were obtained through the chest, abdomen and pelvis. Coronal and sagittal reconstructions were generated. MIP reconstructions of the chest were generated. Oral contrast was not administered. CT dose reduction was achieved through use of a standardized protocol tailored for this examination and automatic exposure control for dose modulation. FINDINGS: Chest: Vascular: No evidence of acute or chronic pulmonary embolism. The pulmonary arteries are normal in size. The thoracic aorta is normal in size.  Lungs/Pleura: Again seen are numerous pulmonary nodules/masses, which overall have increased in size since prior exam, for example, right upper lobe nodule now measures 2.0 cm (series 3 image 114), previously measuring 11 mm; right upper lobe nodule now measures 22 mm (series 3 image 109), previously measuring 13 mm; right middle lobe nodule now measures 20 mm (series 3 image 102), previously measuring 11 mm; lingular nodule now measures 24 mm (series 3 image 101), previously measuring 13 mm. There is no pleural effusion or pneumothorax. Axilla/Soft Tissue: No pathologic axillary adenopathy. Mediastinum: The heart is normal in size. No pericardial effusion. No pathologic mediastinal or hilar adenopathy. Bones: No evidence of acute fracture, dislocation, or aggressive osseous abnormality. Abdomen/Pelvis: Liver:  No focal liver lesions. Biliary system: Gallbladder is unremarkable. No intrahepatic or extrahepatic biliary ductal dilatation. Spleen: Normal. Pancreas: Normal. Kidneys/Ureters/Bladder: No renal masses. No renal or ureteral calculi. No hydronephrosis or hydroureter. The bladder is normal. Adrenals: Normal. Stomach/bowel: No dilation or abnormal wall thickening is present. No free intraperitoneal air noted. Reproductive Organs: Stable size of massive uterine pelvic tumor measuring 22.2 x 16.7 x 20.0 cm. Vasculature: Normal caliber arteries. Portal vein, SMV, and splenic vein are patent. Nodes: Stable size of left para-aortic lymph node measuring 2.0 cm (series 6 image 54). Stable peritoneal carcinomatosis with omental caking and numerous discrete peritoneal implants, for example, in the right abdomen adjacent to the liver measuring 2.2 cm (series 6 image 32) and the posterior deep right pelvis measuring 2.8 x 2.0 cm (series 6 image 96). Fluid: Small to moderate volume ascites, similar to prior exam. Bones/Soft Tissue: No acute fractures or aggressive osseous lesions are seen. Chest: 1. No evidence of pulmonary embolism. 2. Interval increase in size of numerous pulmonary nodules/masses since 4/14/2022, consistent with metastatic disease progression. Abdomen/pelvis: 1. Stable metastatic disease and peritoneal carcinomatosis since recent CT 5/3/2022.  Small to moderate volume ascites is similar to prior exam. 2. Stable size of massive uterine pelvic tumor.

## 2022-05-08 NOTE — PROGRESS NOTES
Pharmacist Note - Vancomycin Dosing    Consult provided for this 43 y.o. female for indication of sepsis. Antibiotic regimen(s): vanc + cefepime  Patient on vancomycin PTA? NO     Recent Labs     22  0113   WBC 20.6*   CREA 0.82   BUN 17     Frequency of BMP: tomorrow Am x1  Height: 170.2 cm  Weight: 100.2 kg  Est CrCl: >100 ml/min; UO: -- ml/kg/hr  Temp (24hrs), Av °F (38.3 °C), Min:99.9 °F (37.7 °C), Max:102.1 °F (38.9 °C)    Cultures:   blood: in process      MRSA Swab ordered (if applicable)? YES    The plan below is expected to result in a target range of AUC/PARI 400-600    Therapy received a loading dose of 2500 mg IV x 1 this morning @ 0549. Will order a maintenance dose of 1250 mg IV every 12 hours. Pharmacy to follow patient daily and order levels / make dose adjustments as appropriate. *Vancomycin has been dosed used Bayesian kinetics software to target an AUC/PARI of 400-600, which provides adequate exposure for an assumed infection due to MRSA with an PARI of 1 or less while reducing the risk of nephrotoxicity as seen with traditional trough based dosing goals.

## 2022-05-08 NOTE — ROUTINE PROCESS
TRANSFER - OUT REPORT:    Verbal report given to Lane Burton RN (name) on Roxanna Ramirez  being transferred to [facility] (unit) for routine progression of care       Report consisted of patients Situation, Background, Assessment and   Recommendations(SBAR). Information from the following report(s) SBAR, Kardex, ED Summary, MAR, Accordion and Recent Results was reviewed with the receiving nurse. Lines:   Peripheral IV 05/08/22 Left Hand (Active)       Peripheral IV 44/78/27 Left Basilic (Active)   Site Assessment Clean, dry, & intact 05/08/22 0405   Phlebitis Assessment 0 05/08/22 0405   Infiltration Assessment 0 05/08/22 0405   Dressing Status Clean, dry, & intact 05/08/22 0405   Dressing Type Transparent 05/08/22 0405   Hub Color/Line Status Green 05/08/22 0405        Opportunity for questions and clarification was provided.       Patient transported with:   Registered Nurse on monitor X 3

## 2022-05-08 NOTE — ED NOTES
PCA pump ordered for patient upon assessment patient was lying in bed and stated she was fine and didn't need the PCA pump at this time states she has no pain.   is aware and PCA key given to oncoming nurse

## 2022-05-09 PROBLEM — R19.00 PELVIC MASS: Status: ACTIVE | Noted: 2022-01-01

## 2022-05-09 PROBLEM — Z51.11 ENCOUNTER FOR ANTINEOPLASTIC CHEMOTHERAPY: Status: ACTIVE | Noted: 2022-01-01

## 2022-05-09 NOTE — PROGRESS NOTES
Bedside and Verbal shift change report given to [address] (oncoming nurse) by Rolena Goltz RN (offgoing nurse).  Report included the following information SBAR, Kardex, OR Summary, Procedure Summary, Intake/Output and MAR.       2027 PCA numbers checked with Merary Hinojosa RN

## 2022-05-09 NOTE — PROGRESS NOTES
Bedside shift change report given to José Manuel Bautista RN (oncoming nurse) by Lily Kerr RN (offgoing nurse).  Report included the following information SBAR, Kardex, ED Summary, Intake/Output, MAR and Cardiac Rhythm ST.

## 2022-05-09 NOTE — CONSULTS
Seen and examined  Thanks for the consult  A/p:  MICHEAL,in the setting of labile BP,IV dye on 5.8.22; so far suspect but cannot ro obstruction(no hydro on CTscan but massive uterine mass)  Hypovolemic hyponatremia  Non AG met acidosis  Hypocalcemia,partially corrects with low alb  Leukocytosis  Metastatic uterine ca  Suspicion of sepsis  Drop in H/H    AB  IVF with bic  Burger  Urine sodium  Check cortisol,CK  At risk for decompensation

## 2022-05-09 NOTE — CONSULTS
[address] [facility] [address], [address] [facility]  P [phone]  F [phone]    Inpatient Consult Note  Patient ID:  Name:  Solomon Sanz  MRN:  [id number]  :  1980/42 y.o. Date:  2022      HISTORY OF PRESENT ILLNESS:  Ms. Solomon Sanz is a 43 y.o. female who was admitted on 2022 with complaints of worsening abdominal pain and shortness of breath. WBC 20 on admission, Temp 102. CTPE negative for PE. CT C/A/P 2022 with progression of pelvic, abdominal, and pulmonary metastatic disease. Admitted to hospitalist service. On Vanc/Cefepime. Blood cultures pending. Gynecologic Oncology consult placed while inpatient. On 3/18/2022 underwent an urgent surgery, Exploratory laparotomy, total abdominal hysterectomy with bilateral salpingo-oophorectomy, omentectomy. 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      Admitted to [facility] on 4/15/2022 with tachycardia and shortness of breath. Negative CTPE. CT Chest 2022 demonstrated multiple pulmonary nodules. CT A/P 2022 concerns for recurrence in the pelvis. PET 2022 demonstrates pulmonary mets, but does not demonstrate metabolic activity in the pelvis, which suggests that may be blood products (possible hemoperitoneum). The patient had low-grade temperatures while inpatient. Found to have a UTI. Discharged home on 4/15/2022 after Cardiology consult. Tachycardia improved. Discharged home with Bactrim for a UTI.      Patient reports her pain is controlled with her PCA, but she is a little \"loopy\". Reports her breathing is worse than last week. Reports her pain is her main concern as the breathing is an issue more when she is moving around. Reports constipation since starting oxycodone. Last bowel movement was 4 days ago. Prior to that she was having normal bowel movements.  Denies CP, but as noted above she is having significant dyspnea on exertion.      Initial History:  Ms. Lary Lundberg is a 43 y.o. female who presented to the ER with abdominal pain and anemia and found to have a large fibromatous uterus with concerns for possible sarcoma.      The patient reports that about 2 months ago she began to have increasing pain and bloating. Seen in [facility] on 3/11/2022 and found to have a fibroid uterus with Hgb of 5.2. Received 4 units of PRBC at that time. Seen by Dr. Jannette Renee on 3/15/2022 and was scheduled for surgery for next week. Presented to ER last night (3/16/93008) with worsening pain. Found to have a hgb of 6.0. CT A/P demonstrated \"Massively enlarged and heterogenous appearing uterus without clear delineation  of ovarian structures. There is associated hyperdense ascites/hemoperitoneum, in  addition there are bilateral pulmonary nodular densities demonstrated. \"           Pathology Review:   3/18/2022:  * * *FINAL PATHOLOGIC DIAGNOSIS* * *   1.  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)   Benign bilateral ovaries without evidence of involvement by sarcoma   Benign bilateral fallopian tubes without evidence of involvement by   sarcoma   Refer to synoptic report below        SYNOPTIC REPORT - UTERUS (SARCOMA)    SPECIMEN       Procedure:  Total hysterectomy and bilateral salpingo-oophorectomy       Specimen Integrity: Distorted with rupture of anterior and posterior   uterine wall    TUMOR       Histologic Type: Undifferentiated uterine sarcoma       Tumor Size: At least 8.0 x 7.8 cm       Other Tissue / Organ Involvement: Not identified       Lymphovascular Invasion: Not identified    MARGINS       Margins: Cannot be assessed - rupture of anterior and posterior   uterine wall    LYMPH NODES       Regional Lymph Nodes: No lymph nodes submitted or found    PATHOLOGIC STAGE CLASSIFICATION (pTNM, AJCC 8th ed.)       Primary Tumor (pT): pT1b       Regional Lymph Nodes (pN): pNX   2.  Right pelvic peritoneal, biopsy:        Fibrovascular tissue   No tumor identified   3.  Omentum, omentectomy:        Benign mature adipose tissue        No tumor identified   * * *Comment* * *   Immunohistochemical stains are performed.  The tumor is positive for CD10,   p16, and vimentin while negative for smooth muscle actin, desmin,   pankeratin AE1/AE3, p53, cyclin D1, ER, , and DOG1.  This staining   pattern supports the above rendered diagnosis.     Block 1F is sent for Sarcoma Comprehensive NGS Fusion Panel; results will   be reported in an addendum. This case is seen in consultation with one or more additional pathologists   who agree with the above rendered diagnoses.      Imaging Review:   CT A/P 5/3/2022:  FINDINGS:   LOWER THORAX: Numerous bilateral pulmonary nodules are significantly decreased  in size, now measuring up to 2.5 cm (previously up to 1.1 cm). Newly enlarged  pericardiophrenic lymph nodes are seen, measuring up to 1.3 cm in short axis  diameter. Unchanged elevation of the left hemidiaphragm. LIVER: No mass. BILIARY TREE: Gallbladder is within normal limits. CBD is not dilated. SPLEEN: within normal limits. PANCREAS: No mass or ductal dilatation. ADRENALS: Unremarkable. KIDNEYS: Punctate nonobstructing right renal calculi. No hydronephrosis. STOMACH: Unremarkable. SMALL BOWEL: No dilatation or wall thickening. COLON: No dilatation or wall thickening. APPENDIX: Not visualized. PERITONEUM: New small to moderate ascites. RETROPERITONEUM: Multiple new enlarged mesenteric masses measuring up to 4.8 cm. REPRODUCTIVE ORGANS: Heterogeneous mass arising from the pelvis has  significantly increased in size, from 11.3 x 9.7 x 11.0 on the 4/14/2022  examination to 21.8 x 16.1 x 17.9 cm on today's examination. The mass displaces  bowel superiorly and to the left. URINARY BLADDER: Decompressed.   BONES: No destructive bone lesion. ABDOMINAL WALL: No mass or hernia. ADDITIONAL COMMENTS: N/A  IMPRESSION  Significant interval increase in size of pelvic mass, now measuring 21.8 cm in  maximal diameter. Multiple new mesenteric masses. New pericardiophrenic  lymphadenopathy. Significant progression of pulmonary metastatic disease. New  small to moderate ascites.     PET 4/13/2022:  FINDINGS:  HEAD/NECK: No apparent foci of abnormal hypermetabolism. Cerebral evaluation is  limited by normal intense activity. CHEST:   Multiple hypermetabolic pulmonary nodules, max SUV 6.4. No wei foci of abnormal hypermetabolism. ABDOMEN/PELVIS: Physiologic activity in bowel. No convincing neoplastic foci of  abnormal hypermetabolism. Ametabolic soft tissue density in the hysterectomy  bed, likely residual hemoperitoneum or other benign process. SKELETON: No foci of abnormal hypermetabolism in the axial and visualized  appendicular skeleton. IMPRESSION  1. Multiple hypermetabolic pulmonary nodules, presumed metastases. 2. Otherwise negative PET.     CT Chest 4/14/2022:  FINDINGS: This is a good quality study for the evaluation of pulmonary embolism  to the first subsegmental arterial level. There is no pulmonary embolism to this  level. No axillary or supraclavicular lymphadenopathy  THYROID: No nodule. MEDIASTINUM: No mediastinal adenopathy  ZEYAD: No mass or lymphadenopathy. THORACIC AORTA: No aneurysm. HEART: Normal in size. ESOPHAGUS: No wall thickening or dilatation. TRACHEA/BRONCHI: Patent. PLEURA: No effusion or pneumothorax. LUNGS: Multiple lung nodules and lung masses are identified. The larger lesions  measures 2.0 x 1.4 cm. UPPER ABDOMEN: Loculated fluid in the left upper quadrant of the abdomen. BONES: No aggressive bone lesion or fracture. IMPRESSION  1.  Multiple lung masses some of which are pleural-based consistent with  metastatic disease. 2.  Loculated fluid in the left upper abdomen.   3.  No evidence of pulmonary embolus     CT A/P 3/17/2022;  FINDINGS:   LOWER THORAX: There is elevation of the left hemidiaphragm. There is a pulmonary  nodule left lung base. There are additional pulmonary nodules at the right lung  base. LIVER: Hepatic steatosis  BILIARY TREE: Gallbladder is within normal limits. CBD is not dilated. SPLEEN: within normal limits. PANCREAS: No mass or ductal dilatation. ADRENALS: Unremarkable. KIDNEYS: Right renal calculus. Mild hydronephrosis on the right with punctate  calculus at the renal pelvis on the right. STOMACH: Unremarkable. SMALL BOWEL: No dilatation or wall thickening. COLON: No dilatation or wall thickening. APPENDIX: Not visualized  PERITONEUM: Moderate ascites. Moderately hyperdense  RETROPERITONEUM: No lymphadenopathy or aortic aneurysm. REPRODUCTIVE ORGANS: The uterus is distorted. There are large uterine mass  lesions demonstrated. These are numerous. The dome of the uterus lies above the  umbilicus. URINARY BLADDER: Nondistended, not evaluated  BONES: No destructive bone lesion. ABDOMINAL WALL: No mass or hernia. ADDITIONAL COMMENTS: N/A  IMPRESSION  Massively enlarged and heterogenous appearing uterus without clear delineation  of ovarian structures. There is associated hyperdense ascites/hemoperitoneum, in  addition there are bilateral pulmonary nodular densities demonstrated. Imaging findings are concerning for atypical/aggressive uterine fibroids versus  uterine neoplasm. Gynecological consultation is recommended. Mild hydronephrosis on the right with punctate calculus at the renal pelvis on  the right. ROS:  A comprehensive review of systems was negative except for that written in the History of Present Illness.  , 10 point ROS      ECOG Grade: 2-3      Problem List:  Patient Active Problem List    Diagnosis Date Noted    Encounter for antineoplastic chemotherapy 05/09/2022    SIRS (systemic inflammatory response syndrome) ([facility] [address] 05/08/2022    Acute cystitis with hematuria 2022    Bandemia 2022    Malignant neoplasm metastatic to both lungs ([facility] [address] 2022    Tachycardia 2022    Uterine sarcoma ([facility] [address] 2022    Pelvic mass 2022    Acute on chronic anemia 2022    Acute blood loss anemia 2022    Uterine fibroid 2022    Abnormal uterine bleeding 2022    Leiomyoma of body of uterus 2022    Anemia associated with acute blood loss 2022    Fibroid uterus 2022    Anemia 2018    Obesity 2018    PCOS (polycystic ovarian syndrome) 2016    Infertility associated with anovulation 2016    Benign neoplasm of muscle 2015    Dysmenorrhea 2014     PMH:  Past Medical History:   Diagnosis Date    Anemia 2018    Anxiety     anxiety - took medications as a teenager, no medications recently     Fibroids     Gestational hypertension affecting first pregnancy 2018    History of PCOS     Infertility, female     Polycystic disease, ovaries       PSH:  Past Surgical History:   Procedure Laterality Date    HX  SECTION      HX GYN  2022    Exploratory laparotomy, total abdominal hysterectomy with bilateral salpingo-oophorectomy, omentectomy    HX WISDOM TEETH EXTRACTION        Social History:  Social History     Tobacco Use    Smoking status: Never Smoker    Smokeless tobacco: Never Used   Substance Use Topics    Alcohol use: No      Family History:  Family History   Problem Relation Age of Onset    Hypertension Mother     Hypertension Sister     Diabetes Sister     Hypertension Brother     Cancer Paternal Uncle     Diabetes Paternal Uncle     Diabetes Maternal Uncle     Breast Cancer Cousin       Medications: (reviewed)  Current Facility-Administered Medications   Medication Dose Route Frequency    polyethylene glycol (MIRALAX) packet 17 g  17 g Oral BID    senna-docusate (PERICOLACE) 8.6-50 mg per tablet 1 Tablet 1 Tablet Oral DAILY    0.9% sodium chloride infusion 250 mL  250 mL IntraVENous PRN    sodium chloride (NS) flush 5-10 mL  5-10 mL IntraVENous PRN    0.9% sodium chloride infusion 250 mL  250 mL IntraVENous PRN    oxyCODONE IR (ROXICODONE) tablet 10 mg  10 mg Oral Q4H PRN    naloxone (NARCAN) injection 0.4 mg  0.4 mg IntraVENous EVERY 2 MINUTES AS NEEDED    cefepime (MAXIPIME) 2 g in 0.9% sodium chloride (MBP/ADV) 100 mL MBP  2 g IntraVENous Q8H    sodium chloride (NS) flush 5-40 mL  5-40 mL IntraVENous Q8H    sodium chloride (NS) flush 5-40 mL  5-40 mL IntraVENous PRN    acetaminophen (TYLENOL) tablet 650 mg  650 mg Oral Q6H PRN    Or    acetaminophen (TYLENOL) suppository 650 mg  650 mg Rectal Q6H PRN    ondansetron (ZOFRAN ODT) tablet 4 mg  4 mg Oral Q8H PRN    Or    ondansetron (ZOFRAN) injection 4 mg  4 mg IntraVENous Q6H PRN    HYDROmorphone 30 mg/30 mL (DILAUDID) PCA   IntraVENous TITRATE    ibuprofen (MOTRIN) tablet 400 mg  400 mg Oral TID PRN    0.9% sodium chloride infusion  100 mL/hr IntraVENous CONTINUOUS    Vancomycin - pharmacy to dose   Other Rx Dosing/Monitoring    vancomycin (VANCOCIN) 1250 mg in  ml infusion  1,250 mg IntraVENous Q12H     Allergies: (reviewed)  No Known Allergies       OBJECTIVE:  Physical Exam:  Visit Vitals  BP (!) 103/55   Pulse (!) 124   Temp 98.9 °F (37.2 °C)   Resp 22   Ht 5' 7\" (1.702 m)   Wt 221 lb (100.2 kg)   LMP 02/07/2022 (Exact Date)   SpO2 100%   BMI 34.61 kg/m²      General: Alert and oriented. Appears dyspneic with 4L O2 supplementation  HEENT: No thyroid enlargment. Neck supple without restrictions. Sclera normal. Normal occular motion. Moist mucous membranes. Lymphatics: No evidence of axillary, cervical, or subclavicular adenopathy. Respiratory: clear to auscultation and percussion to the bases. No CVAT. Cardiovascular: tachycardic No murmurs, rubs, or gallops. Gastrointestinal: Distended.  Known large pelvic mass; abdomen appears larger than last week. Well-healed incision. Musculoskeletal: normal gait. No joint tenderness, deformity or swelling. No muscular tenderness. Extremities: extremities normal, atraumatic, no cyanosis or edema. Pelvic: deferred while inpatient  Neuro: Grossly intact. Normal gait and movement. No acute deficit  Skin: No evidence of rashes or skin changes. Lab Date as available:    Lab Results   Component Value Date/Time    WBC 15.9 (H) 05/09/2022 07:21 AM    HGB 6.0 (L) 05/09/2022 07:21 AM    HCT 20.0 (L) 05/09/2022 07:21 AM    PLATELET 942 12/16/2460 07:21 AM    MCV 79.4 (L) 05/09/2022 07:21 AM     Lab Results   Component Value Date/Time    Sodium 130 (L) 05/09/2022 07:21 AM    Potassium 5.1 05/09/2022 07:21 AM    Chloride 107 05/09/2022 07:21 AM    CO2 13 (LL) 05/09/2022 07:21 AM    Anion gap 10 05/09/2022 07:21 AM    Glucose 132 (H) 05/09/2022 07:21 AM    BUN 28 (H) 05/09/2022 07:21 AM    Creatinine 2.12 (H) 05/09/2022 07:21 AM    BUN/Creatinine ratio 13 05/09/2022 07:21 AM    GFR est AA 31 (L) 05/09/2022 07:21 AM    GFR est non-AA 26 (L) 05/09/2022 07:21 AM    Calcium 6.1 (LL) 05/09/2022 07:21 AM         IMPRESSION/PLAN:    Ms. Don Ng is a 43 y.o. female who initially presented via the ER with a large 23 week size uterus with concerns for degenerating fibroids versus sarcoma, complicated by possible intra-abdominal ascites or hemorrhage.      On 3/18/2022 underwent an urgent surgery, Exploratory laparotomy, total abdominal hysterectomy with bilateral salpingo-oophorectomy, omentectomy. 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      Ad[facility] to [facility] on 4/15/2022 with tachycardia and shortness of breath. Negative CTPE. CT Chest 4/14/2022 demonstrated multiple pulmonary nodules. CT A/P 4/14/2022 concerns for recurrence in the pelvis.  PET 4/13/2022 demonstrates pulmonary mets, but does not demonstrate metabolic activity in the pelvis, which suggests that may be blood products (possible hemoperitoneum). CT A/P 5/3/2022 with progression of pelvic and abdominal metastatic disease. Admitted to Hospitalist service on 5/8/2022 with pain and dyspnea. WBC 20k, Fever 102    Problems:     Patient Active Problem List    Diagnosis Date Noted    Encounter for antineoplastic chemotherapy 05/09/2022    SIRS (systemic inflammatory response syndrome) ([facility] [address] 05/08/2022    Acute cystitis with hematuria 04/27/2022    Bandemia 04/27/2022    Malignant neoplasm metastatic to both lungs ([facility] [address] 04/19/2022    Tachycardia 04/14/2022    Uterine sarcoma ([facility] [address] 04/05/2022    Pelvic mass 03/17/2022    Acute on chronic anemia 03/17/2022    Acute blood loss anemia 03/13/2022    Uterine fibroid 03/13/2022    Abnormal uterine bleeding 03/13/2022    Leiomyoma of body of uterus 03/11/2022    Anemia associated with acute blood loss 03/11/2022    Fibroid uterus 02/22/2022    Anemia 12/17/2018    Obesity 05/02/2018    PCOS (polycystic ovarian syndrome) 12/02/2016    Infertility associated with anovulation 12/02/2016    Benign neoplasm of muscle 01/20/2015    Dysmenorrhea 08/13/2014       -Gyn Oncology: Reviewed patient's course to date. As noted previously, PET on 4/13/2022 demonstrated multiple metastatic lesions to the lung. Our prior discussion regarding adjuvant treatment is below. CT A/P 5/3/2022 demonstrated progression of her pelvic and abdominal disease. CT 5/8/2022 on admission demonstrates progressin of disease. I discussed the results with the Radiologist. While imaging continues to report progression of her uterine disease, this is not the case. I resected her uterus and primary tumor completely on 3/18/2022. Unfortunately her imaging findings demonstrate rapid recurrence and progression of her poorly differentiated sarcoma within the surgical bed of resection.  Given the patient's continued issue with anemia, I believe the pelvic disease is likely having intermittent bleeding within itself. Initiated on cycle 1 adjuvant gemcitabine 900mg/m2 days 1 and 8, along with docetaxel 75mg/m2 day 8. She has only received day 1 of cycle 1 at this point. Long discussion today with the patient regarding her current state and expectations. Will plan for family meeting tomorrow to discuss short and long term goals. Unfortunately her cancer has progressed rapidly, and I am concerned regarding the patient's current performance status and performance reserve in relation to short term and long term survival. For now will defer infection care to the primary Hospitalist team. Will continue to follow daily    -CV: tachycardic. EKG sinus tachycardia. Multifactorial. Likely related to ongoing anemia, tumor progression, and possible infection. Anemia likely secondary to intra-tumor bleeding and of chronic disease. Unfortunately she is not a surgical candidate any further and this may continue unless chemotherapy is able to shrink and stabilize her tumor.     -Pulm: pulmonary mets likely resulting in dyspnea and oxygen supplementation requirement.     -Renal: Good UOP    -ID: blood and urine cultures pending. On broad spectrum antibiotics, Cefepime and Vanc. No evidence of an identifiable source of infection. Will continue empiric treatment per primary team. I did discuss with the patient that some of this may be related to tumor burden and progression.     -Prophylaxis: SCDs. Hold pharmacologic VTE prophylaxis given anemia    -Disposition: Care per primary team. Family meeting tomorrow for discussion of short term and long term goals and expectations. I am concerned regarding her performance status and reserve for continued therapy, but we will continue this discussion as her care progresses both inpatient and outpatient. Palliative Care consult for pain control. An electronic signature was used to authenticate this note.      Awilda Carreno MD

## 2022-05-09 NOTE — CONSULTS
Palliative Medicine Consult  [place]: [phone] ([phone])    Patient Name: Raul Landry  YOB: 1980    Date of Initial Consult: 5/9/22  Reason for Consult: Overwhelming sx  Requesting Provider: Lala Thopmson   Primary Care Physician: Joe Parr MD     SUMMARY:   Raul Landry is a 43 y.o. with a past history of anxiety, PCOS, uterine sarcoma s/p urgent ex lap, SHELLY/BSO 3/18/22 - subsequent imaging has shown rapid recurrence and progression of disease with peritoneal carcinomatosis and pulmonary mets. She has received only 1 chemo tx w/ Dr Mickey Taylor. She was admitted on 5/8/2022 from home w/ fever, leukocytosis - on IV Vanc and Cefepime and BCx pending. Recent dx last month for cystitis. Acute anemia- HgB 6 this morning and being transfused PRBC. Current medical issues leading to Palliative Medicine involvement include: overwhelming sx- severe intractable pain. On Dilaudid PCA.  reviewed- recent RX for Oxycodone 5mg but prior to that was controlled on Tramadol. Social: Pt  to John Means. They have a 2 yo son. Her parents and her mother in law Ms Mari Brown all very involved. PALLIATIVE DIAGNOSES:   1. Intractable cancer pain- on Dilaudid PCA  2. Constipation due to opioids  3. Progression of malignancy   4. Debility    5. Goals of care       PLAN:   1. Meet w/ pt and mother in law Mrs Mari Brown at bedside. Pt alert/oriented and appropriate but slightly lethargic. Prior to admission was taking Oxycodone 5mg po every 6h prn w/out sx relief- and before this RX was on Tramadol only. 2. Cancer pain: See below for description of pain- mainly in abdomen. 3. Cont Dilaudid PCA 0.2mg/h basal and 0.4mg every 10 min demand- in past 8h has used 2.39mg. 4. Will assist w/ transition to regimen for home when ready. 5. Some opioid safety reviewed,   6. Constipation: related to opioids, immobility. Not taking regular bowel regimen at home. Last BM 5 days ago. +flatus.    7. Cont Miralax bid started on admission. 8. Mag Citrate x 1, bisacodyl suppository. May also need to consider relistor. 9. Goals of care:  Dr Dennie Riser has started conversations about pt's progressive cancer and concern that will not tolerate further treatment. Goals clear for full measures. Our team available to be part of family meeting once day/time determined. 10. Initial consult note routed to primary continuity provider and/or primary health care team members  11. Communicated plan of care with: Palliative IDT[facility]nalisa [facility] Team incl Jay Orozco RN and Markell Levi Alama     GOALS OF CARE / TREATMENT PREFERENCES:     GOALS OF CARE:  Patient/Health Care Proxy Stated Goals: Prolong life    TREATMENT PREFERENCES:   Code Status: Full Code    Patient and family's personal goals include: to get better from acute issues to proceed w/ tx      Advance Care Planning:  [x] [facility] Interdisciplinary Team has updated the ACP Navigator with Health Care Decision Maker and Patient Capacity      [facility] [phone]  Advance Care Planning 5/6/2022   Patient's Healthcare Decision Maker is: Legal Next of Verna 69   Primary Decision Maker Name -   Primary Decision [facility] Phone Number -   Primary Decision Maker Relationship to Patient -   Confirm Advance Directive None   Patient Would Like to Complete Advance Directive No       Medical Interventions: Full interventions       Other:    As far as possible, the palliative care team has discussed with patient / health care proxy about goals of care / treatment preferences for patient. HISTORY:     History obtained from: Pt, chart, staff, family     CHIEF COMPLAINT: abdominal pain     HPI/SUBJECTIVE:    The patient is:   [x] Verbal and participatory    Pt has had abdominal pain for past 1.5 weeks but has worsened and Oxycodone 5mg tablets not improving sx. Constant and worse w/ movement. Also feeling nauseated. Poor po intake recently. Last BM 5 days ago but +flatus. Clinical Pain Assessment (nonverbal scale for severity on nonverbal patients):   Clinical Pain Assessment  Severity: 5  Location: abdomen, pelvis  Character: aching, tight  Duration: worse over past week  Effect: harder to move, function  Factors: worse w/ movement  Frequency: constant          Duration: for how long has pt been experiencing pain (e.g., 2 days, 1 month, years)  Frequency: how often pain is an issue (e.g., several times per day, once every few days, constant)     FUNCTIONAL ASSESSMENT:     Palliative Performance Scale (PPS):  PPS: 40       PSYCHOSOCIAL/SPIRITUAL SCREENING:     Palliative IDT has assessed this patient for cultural preferences / practices and a referral made as appropriate to needs (Cultural Services, Patient Advocacy, Ethics, etc.)    Any spiritual / Buddhism concerns:  [] Yes /  [x] No   If \"Yes\" to discuss with pastoral care during IDT     Does caregiver feel burdened by caring for their loved one:   [] Yes /  [x] No /  [] No Caregiver Present    If \"Yes\" to discuss with social work during IDT    Anticipatory grief assessment:   [x] Normal  / [] Maladaptive     If \"Maladaptive\" to discuss with social work during IDT    ESAS Anxiety: Anxiety: 0    ESAS Depression: Depression: 0        REVIEW OF SYSTEMS:     Positive and pertinent negative findings in ROS are noted above in HPI. The following systems were [x] reviewed / [] unable to be reviewed as noted in HPI  Other findings are noted below. Systems: constitutional, ears/nose/mouth/throat, respiratory, gastrointestinal, genitourinary, musculoskeletal, integumentary, neurologic, psychiatric, endocrine. Positive findings noted below.   Modified ESAS Completed by: provider   Fatigue: 8 Drowsiness: 2   Depression: 0 Pain: 5   Anxiety: 0 Nausea: 3   Anorexia: 5 Dyspnea: 0     Constipation: Yes              PHYSICAL EXAM:     From RN flowsheet:  Wt Readings from Last 3 Encounters:   05/08/22 221 lb (100.2 kg)   05/03/22 197 lb 6.4 oz (89.5 kg)   22 197 lb (89.4 kg)     Blood pressure 97/64, pulse (!) 118, temperature 97.6 °F (36.4 °C), resp. rate 20, height 5' 7\" (1.702 m), weight 221 lb (100.2 kg), last menstrual period 2022, SpO2 100 %, not currently breastfeeding. Pain Scale 1: Numeric (0 - 10)  Pain Intensity 1: 6     Pain Location 1: Hip,Abdomen        Pain Intervention(s) 1: Encouraged PCA  Last bowel movement, if known: 5 days ago    Constitutional: awake, alert, oriented but fatigued and eyes closed most of the time  Eyes: pupils equal, anicteric  ENMT: no nasal discharge, moist mucous membranes  Cardiovascular: regular rhythm, tachy   Respiratory: breathing not labored, symmetric  Gastrointestinal: distended, hypoactive bowel sounds, min TTP  Musculoskeletal: no deformity, no tenderness to palpation  Skin: warm, dry  Neurologic: following commands, moving all extremities  Psychiatric: full affect, no hallucinations  Other:       HISTORY:     Active Problems:    SIRS (systemic inflammatory response syndrome) ([facility] [address] (2022)      Past Medical History:   Diagnosis Date    Anemia 2018    Anxiety     anxiety - took medications as a teenager, no medications recently     Fibroids     Gestational hypertension affecting first pregnancy 2018    History of PCOS     Infertility, female     Polycystic disease, ovaries       Past Surgical History:   Procedure Laterality Date    HX  SECTION      HX GYN  2022    Exploratory laparotomy, total abdominal hysterectomy with bilateral salpingo-oophorectomy, omentectomy    HX WISDOM TEETH EXTRACTION        Family History   Problem Relation Age of Onset    Hypertension Mother     Hypertension Sister     Diabetes Sister     Hypertension Brother     Cancer Paternal Uncle     Diabetes Paternal Uncle     Diabetes Maternal Uncle     Breast Cancer Cousin       History reviewed, no pertinent family history.   Social History     Tobacco Use    Smoking status: Never Smoker    Smokeless tobacco: Never Used   Substance Use Topics    Alcohol use: No     No Known Allergies   Current Facility-Administered Medications   Medication Dose Route Frequency    polyethylene glycol (MIRALAX) packet 17 g  17 g Oral BID    0.9% sodium chloride infusion 250 mL  250 mL IntraVENous PRN    sodium bicarbonate (8.4%) 150 mEq in sterile water 1,000 mL infusion   IntraVENous CONTINUOUS    cefepime (MAXIPIME) 2 g in 0.9% sodium chloride (MBP/ADV) 100 mL MBP  2 g IntraVENous Q12H    magnesium citrate solution 148 mL  148 mL Oral NOW    bisacodyL (DULCOLAX) suppository 10 mg  10 mg Rectal DAILY PRN    [START ON 5/10/2022] senna (SENOKOT) tablet 8.6 mg  1 Tablet Oral DAILY    [START ON 5/10/2022] senna-docusate (PERICOLACE) 8.6-50 mg per tablet 2 Tablet  2 Tablet Oral DAILY    sodium chloride (NS) flush 5-10 mL  5-10 mL IntraVENous PRN    0.9% sodium chloride infusion 250 mL  250 mL IntraVENous PRN    oxyCODONE IR (ROXICODONE) tablet 10 mg  10 mg Oral Q4H PRN    naloxone (NARCAN) injection 0.4 mg  0.4 mg IntraVENous EVERY 2 MINUTES AS NEEDED    sodium chloride (NS) flush 5-40 mL  5-40 mL IntraVENous Q8H    sodium chloride (NS) flush 5-40 mL  5-40 mL IntraVENous PRN    acetaminophen (TYLENOL) tablet 650 mg  650 mg Oral Q6H PRN    Or    acetaminophen (TYLENOL) suppository 650 mg  650 mg Rectal Q6H PRN    ondansetron (ZOFRAN ODT) tablet 4 mg  4 mg Oral Q8H PRN    Or    ondansetron (ZOFRAN) injection 4 mg  4 mg IntraVENous Q6H PRN    HYDROmorphone 30 mg/30 mL (DILAUDID) PCA   IntraVENous TITRATE    ibuprofen (MOTRIN) tablet 400 mg  400 mg Oral TID PRN    Vancomycin - pharmacy to dose   Other Rx Dosing/Monitoring          LAB AND IMAGING FINDINGS:     Lab Results   Component Value Date/Time    WBC 15.9 (H) 05/09/2022 07:21 AM    HGB 6.0 (L) 05/09/2022 07:21 AM    PLATELET 278 29/30/6412 07:21 AM     Lab Results   Component Value Date/Time    Sodium 130 (L) 05/09/2022 07:21 AM    Potassium 5.1 05/09/2022 07:21 AM    Chloride 107 05/09/2022 07:21 AM    CO2 13 (LL) 05/09/2022 07:21 AM    BUN 28 (H) 05/09/2022 07:21 AM    Creatinine 2.12 (H) 05/09/2022 07:21 AM    Calcium 6.1 (LL) 05/09/2022 07:21 AM    Magnesium 1.8 05/09/2022 07:21 AM    Phosphorus 5.4 (H) 05/09/2022 07:21 AM      Lab Results   Component Value Date/Time    Alk. phosphatase 68 05/08/2022 01:13 AM    Protein, total 6.3 (L) 05/08/2022 01:13 AM    Albumin 1.6 (L) 05/08/2022 01:13 AM    Globulin 4.7 (H) 05/08/2022 01:13 AM     No results found for: INR, PTMR, PTP, PT1, PT2, APTT, INREXT, INREXT   Lab Results   Component Value Date/Time    Iron 33 (L) 05/08/2022 01:13 AM    TIBC 133 (L) 05/08/2022 01:13 AM    Iron % saturation 25 05/08/2022 01:13 AM    Ferritin 1,529 (H) 05/08/2022 01:13 AM      No results found for: PH, PCO2, PO2  No components found for: GLPOC   No results found for: CPK, CKMB             Total time:   Counseling / coordination time, spent as noted above:   > 50% counseling / coordination?:     Prolonged service was provided for  []30 min   []75 min in face to face time in the presence of the patient, spent as noted above. Time Start:   Time End:   Note: this can only be billed with 69161 (initial) or [id number] (follow up). If multiple start / stop times, list each separately.

## 2022-05-09 NOTE — PROGRESS NOTES
[facility]YN[facility]COL[facility]  [address] [place] [address], [address]  P [phone]  F [phone]    Inpatient History and Physical  Patient ID:  Name:  Cherise Leija  MRN:  [id number]  :  1980/42 y.o. Date:  2022      HISTORY OF PRESENT ILLNESS:  Ms. Cherise Leija is a 43 y.o. female who on 3/18/2022 underwent an urgent surgery, Exploratory laparotomy, total abdominal hysterectomy with bilateral salpingo-oophorectomy, omentectomy. 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 Presents today for imaging review and treatment discussion. Admitted to [facility] on 4/15/2022 with tachycardia and shortness of breath. Negative CTPE. CT Chest 2022 demonstrated multiple pulmonary nodules. CT A/P 2022 concerns for recurrence in the pelvis. PET 2022 demonstrates pulmonary mets, but does not demonstrate metabolic activity in the pelvis, which suggests that may be blood products (possible hemoperitoneum). The patient had low-grade temperatures while inpatient. Found to have a UTI. Discharged home on 4/15/2022 after Cardiology consult. Tachycardia improved. Discharged home with Bactrim for a UTI. Since discharge the patient reports low-grade temps in 99s. Still have some dyspnea on exertion. Reports normal bowel habits. She is anxious today over our discussion. Reports overall normal appetite. She reports pain and cramping in her lower abdomen. Repeat CBCD yesterday demonstrated normalizing WBC to 13 from 16. She is to complete Augmentin 10 days this week. Initial History:  Ms. Cherise Leija is a 43 y.o. female who presented to the ER with abdominal pain and anemia and found to have a large fibromatous uterus with concerns for possible sarcoma. The patient reports that about 2 months ago she began to have increasing pain and bloating. Seen in [facility] ER on 3/11/2022 and found to have a fibroid uterus with Hgb of 5.2. Received 4 units of PRBC at that time. Seen by Dr. Jose David Garg on 3/15/2022 and was scheduled for surgery for next week. Presented to ER last night (3/16/81472) with worsening pain. Found to have a hgb of 6.0. CT A/P demonstrated \"Massively enlarged and heterogenous appearing uterus without clear delineation  of ovarian structures. There is associated hyperdense ascites/hemoperitoneum, in  addition there are bilateral pulmonary nodular densities demonstrated. \"         Pathology Review:   3/18/2022:  * * *FINAL PATHOLOGIC DIAGNOSIS* * *   1.  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)   Benign bilateral ovaries without evidence of involvement by sarcoma   Benign bilateral fallopian tubes without evidence of involvement by   sarcoma   Refer to synoptic report below        SYNOPTIC REPORT - UTERUS (SARCOMA)    SPECIMEN       Procedure:  Total hysterectomy and bilateral salpingo-oophorectomy       Specimen Integrity: Distorted with rupture of anterior and posterior   uterine wall    TUMOR       Histologic Type: Undifferentiated uterine sarcoma       Tumor Size: At least 8.0 x 7.8 cm       Other Tissue / Organ Involvement: Not identified       Lymphovascular Invasion: Not identified    MARGINS       Margins: Cannot be assessed - rupture of anterior and posterior   uterine wall    LYMPH NODES       Regional Lymph Nodes: No lymph nodes submitted or found    PATHOLOGIC STAGE CLASSIFICATION (pTNM, AJCC 8th ed.)       Primary Tumor (pT): pT1b       Regional Lymph Nodes (pN): pNX   2.  Right pelvic peritoneal, biopsy:        Fibrovascular tissue   No tumor identified   3.  Omentum, omentectomy:        Benign mature adipose tissue        No tumor identified   * * *Comment* * *   Immunohistochemical stains are performed.  The tumor is positive for CD10,   p16, and vimentin while negative for smooth muscle actin, desmin,   pankeratin AE1/AE3, p53, cyclin D1, ER, , and DOG1.  This staining   pattern supports the above rendered diagnosis.     Block 1F is sent for Sarcoma Comprehensive NGS Fusion Panel; results will   be reported in an addendum. This case is seen in consultation with one or more additional pathologists   who agree with the above rendered diagnoses. Imaging Review:   PET 4/13/2022:  FINDINGS:  HEAD/NECK: No apparent foci of abnormal hypermetabolism. Cerebral evaluation is  limited by normal intense activity. CHEST:   Multiple hypermetabolic pulmonary nodules, max SUV 6.4. No wei foci of abnormal hypermetabolism. ABDOMEN/PELVIS: Physiologic activity in bowel. No convincing neoplastic foci of  abnormal hypermetabolism. Ametabolic soft tissue density in the hysterectomy  bed, likely residual hemoperitoneum or other benign process. SKELETON: No foci of abnormal hypermetabolism in the axial and visualized  appendicular skeleton. IMPRESSION  1. Multiple hypermetabolic pulmonary nodules, presumed metastases. 2. Otherwise negative PET. CT Chest 4/14/2022:  FINDINGS: This is a good quality study for the evaluation of pulmonary embolism  to the first subsegmental arterial level. There is no pulmonary embolism to this  level. No axillary or supraclavicular lymphadenopathy  THYROID: No nodule. MEDIASTINUM: No mediastinal adenopathy  ZEYAD: No mass or lymphadenopathy. THORACIC AORTA: No aneurysm. HEART: Normal in size. ESOPHAGUS: No wall thickening or dilatation. TRACHEA/BRONCHI: Patent. PLEURA: No effusion or pneumothorax. LUNGS: Multiple lung nodules and lung masses are identified. The larger lesions  measures 2.0 x 1.4 cm. UPPER ABDOMEN: Loculated fluid in the left upper quadrant of the abdomen. BONES: No aggressive bone lesion or fracture. IMPRESSION  1. Multiple lung masses some of which are pleural-based consistent with  metastatic disease.   2.  Loculated fluid in the left upper abdomen. 3.  No evidence of pulmonary embolus    CT A/P 3/17/2022;  FINDINGS:   LOWER THORAX: There is elevation of the left hemidiaphragm. There is a pulmonary  nodule left lung base. There are additional pulmonary nodules at the right lung  base. LIVER: Hepatic steatosis  BILIARY TREE: Gallbladder is within normal limits. CBD is not dilated. SPLEEN: within normal limits. PANCREAS: No mass or ductal dilatation. ADRENALS: Unremarkable. KIDNEYS: Right renal calculus. Mild hydronephrosis on the right with punctate  calculus at the renal pelvis on the right. STOMACH: Unremarkable. SMALL BOWEL: No dilatation or wall thickening. COLON: No dilatation or wall thickening. APPENDIX: Not visualized  PERITONEUM: Moderate ascites. Moderately hyperdense  RETROPERITONEUM: No lymphadenopathy or aortic aneurysm. REPRODUCTIVE ORGANS: The uterus is distorted. There are large uterine mass  lesions demonstrated. These are numerous. The dome of the uterus lies above the  umbilicus. URINARY BLADDER: Nondistended, not evaluated  BONES: No destructive bone lesion. ABDOMINAL WALL: No mass or hernia. ADDITIONAL COMMENTS: N/A  IMPRESSION  Massively enlarged and heterogenous appearing uterus without clear delineation  of ovarian structures. There is associated hyperdense ascites/hemoperitoneum, in  addition there are bilateral pulmonary nodular densities demonstrated. Imaging findings are concerning for atypical/aggressive uterine fibroids versus  uterine neoplasm. Gynecological consultation is recommended. Mild hydronephrosis on the right with punctate calculus at the renal pelvis on  the right. ROS:  A comprehensive review of systems was negative except for that written in the History of Present Illness. , 10 point ROS    OB/GYN ROS:  Patient denies significant menstrual problems.     ECOG ndGndrndanddndend:nd nd2nd Problem List:  Patient Active Problem List    Diagnosis Date Noted    SIRS (systemic inflammatory response syndrome) ([facility] 2022    Acute cystitis with hematuria 2022    Bandemia 2022    Malignant neoplasm metastatic to both lungs ([facility] 2022    Tachycardia 2022    Uterine sarcoma ([facility] 2022    Uterine mass 2022    Acute on chronic anemia 2022    Acute blood loss anemia 2022    Uterine fibroid 2022    Abnormal uterine bleeding 2022    Leiomyoma of body of uterus 2022    Anemia associated with acute blood loss 2022    Fibroid uterus 2022    Anemia 2018    Obesity 2018    PCOS (polycystic ovarian syndrome) 2016    Infertility associated with anovulation 2016    Benign neoplasm of muscle 2015    Dysmenorrhea 2014     PMH:  Past Medical History:   Diagnosis Date    Anemia 2018    Anxiety     anxiety - took medications as a teenager, no medications recently     Fibroids     Gestational hypertension affecting first pregnancy 2018    History of PCOS     Infertility, female     Polycystic disease, ovaries       PSH:  Past Surgical History:   Procedure Laterality Date    HX  SECTION      HX GYN  2022    Exploratory laparotomy, total abdominal hysterectomy with bilateral salpingo-oophorectomy, omentectomy    HX WISDOM TEETH EXTRACTION        Social History:  Social History     Tobacco Use    Smoking status: Never Smoker    Smokeless tobacco: Never Used   Substance Use Topics    Alcohol use: No      Family History:  Family History   Problem Relation Age of Onset    Hypertension Mother     Hypertension Sister     Diabetes Sister     Hypertension Brother     Cancer Paternal Uncle     Diabetes Paternal Uncle     Diabetes Maternal Uncle     Breast Cancer Cousin       Medications: (reviewed)  No current facility-administered medications for this visit. No current outpatient medications on file.      Facility-Administered Medications Ordered in Other Visits   Medication Dose Route Frequency    polyethylene glycol (MIRALAX) packet 17 g  17 g Oral BID    senna-docusate (PERICOLACE) 8.6-50 mg per tablet 1 Tablet  1 Tablet Oral DAILY    sodium chloride (NS) flush 5-10 mL  5-10 mL IntraVENous PRN    0.9% sodium chloride infusion 250 mL  250 mL IntraVENous PRN    oxyCODONE IR (ROXICODONE) tablet 10 mg  10 mg Oral Q4H PRN    naloxone (NARCAN) injection 0.4 mg  0.4 mg IntraVENous EVERY 2 MINUTES AS NEEDED    cefepime (MAXIPIME) 2 g in 0.9% sodium chloride (MBP/ADV) 100 mL MBP  2 g IntraVENous Q8H    sodium chloride (NS) flush 5-40 mL  5-40 mL IntraVENous Q8H    sodium chloride (NS) flush 5-40 mL  5-40 mL IntraVENous PRN    acetaminophen (TYLENOL) tablet 650 mg  650 mg Oral Q6H PRN    Or    acetaminophen (TYLENOL) suppository 650 mg  650 mg Rectal Q6H PRN    ondansetron (ZOFRAN ODT) tablet 4 mg  4 mg Oral Q8H PRN    Or    ondansetron (ZOFRAN) injection 4 mg  4 mg IntraVENous Q6H PRN    HYDROmorphone 30 mg/30 mL (DILAUDID) PCA   IntraVENous TITRATE    ibuprofen (MOTRIN) tablet 400 mg  400 mg Oral TID PRN    0.9% sodium chloride infusion  100 mL/hr IntraVENous CONTINUOUS    Vancomycin - pharmacy to dose   Other Rx Dosing/Monitoring    vancomycin (VANCOCIN) 1250 mg in  ml infusion  1,250 mg IntraVENous Q12H     Allergies: (reviewed)  No Known Allergies     Gyn History:   Last pap: 2/2020, normal      OBJECTIVE:  Physical Exam:  Visit Vitals  /81 (BP 1 Location: Left arm, BP Patient Position: Sitting)   Pulse (!) 120   Temp 99.3 °F (37.4 °C) (Oral)   Ht 5' 7\" (1.702 m)   Wt 197 lb 6.4 oz (89.5 kg)   LMP 02/07/2022 (Exact Date)   BMI 30.92 kg/m²      General: Alert and oriented. No acute distress. Well-nourished  HEENT: No thyroid enlargment. Neck supple without restrictions. Sclera normal. Normal occular motion. Moist mucous membranes. Lymphatics: No evidence of axillary, cervical, or subclavicular adenopathy.    Respiratory: clear to auscultation and percussion to the bases. No CVAT. Cardiovascular: regular rate and rhythm. No murmurs, rubs, or gallops. Gastrointestinal: soft, slightly tender in lower abdomen. Well-healed incision. Fullness in the lower abdomen. Could not discern mass, but concerning given fullness. Non-distended. Musculoskeletal: normal gait. No joint tenderness, deformity or swelling. No muscular tenderness. Extremities: extremities normal, atraumatic, no cyanosis or edema. Pelvic: exam chaperoned by nurse. Normal appearing external genitalia. On speculum exam, the vaginal cuff is intact and healing well. On bimanual, There is a large mass/collection abutting the vaginal cuff. This likely correlates with the last CT scan, however concerns this may be larger. Neuro: Grossly intact. Normal gait and movement. No acute deficit  Skin: No evidence of rashes or skin changes. IMPRESSION/PLAN:    Ms. Bienvenido Yoon is a 43 y.o. female who initially presented via the ER with a large 23 week size uterus with concerns for degenerating fibroids versus sarcoma, complicated by possible intra-abdominal ascites or hemorrhage. On 3/18/2022 underwent an urgent surgery, Exploratory laparotomy, total abdominal hysterectomy with bilateral salpingo-oophorectomy, omentectomy. 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 Admitted to [facility] on 4/15/2022 with tachycardia and shortness of breath. Negative CTPE. CT Chest 4/14/2022 demonstrated multiple pulmonary nodules. CT A/P 4/14/2022 concerns for recurrence in the pelvis. PET 4/13/2022 demonstrates pulmonary mets, but does not demonstrate metabolic activity in the pelvis, which suggests that may be blood products (possible hemoperitoneum).     Problems:     Patient Active Problem List    Diagnosis Date Noted    SIRS (systemic inflammatory response syndrome) ([facility] 05/08/2022    Acute cystitis with hematuria 04/27/2022  Bandemia 04/27/2022    Malignant neoplasm metastatic to both lungs ([facility] 04/19/2022    Tachycardia 04/14/2022    Uterine sarcoma ([facility] 04/05/2022    Uterine mass 03/17/2022    Acute on chronic anemia 03/17/2022    Acute blood loss anemia 03/13/2022    Uterine fibroid 03/13/2022    Abnormal uterine bleeding 03/13/2022    Leiomyoma of body of uterus 03/11/2022    Anemia associated with acute blood loss 03/11/2022    Fibroid uterus 02/22/2022    Anemia 12/17/2018    Obesity 05/02/2018    PCOS (polycystic ovarian syndrome) 12/02/2016    Infertility associated with anovulation 12/02/2016    Benign neoplasm of muscle 01/20/2015    Dysmenorrhea 08/13/2014     Reviewed patient's course to date, including her surgical pathology and imaging results consistent with metastatic disease to the lungs on PET 4/13/2022. I also discussed again the findings in the pelvis. Given that she is having worsening pain and cramping and continues to have low-grade temperatures, I have recommended a CT A/P to rule out an infection. Per our prior discussion: \"Counseled patient regarding her stage IV disease and the role of adjuvant chemotherapy. Unfortunately long-term survival is low. The patient was very emotional today, which is expected given the change in her life the last month regarding her surgery, diagnosis, and now the need for adjuvant therapy. Recommended adjuvant gemcitabine 900mg/m2 days 1 and 8, along with docetaxel 75mg/m2 day 8. Plan to initiate next week. Plan for 3 cycles followed by interval imaging. Discussed that radiation and surgery for her lung lesions is not an option. Discussed the advanced nature of her cancer, and overall the difficulty in treating this type of cancer. \"    She is scheduled for chemotherapy later this week. If her CT does not demonstrate an infection, and her WBC continues to improve, then we will proceed with chemotherapy later this week.  All questions and concerns were addressed with the patient and she is comfortable with the plan. An electronic signature was used to authenticate this note.      Vincent Coe MD

## 2022-05-09 NOTE — PROGRESS NOTES
[facility] GYNECOLOGIC ONCOLOGY  [address] [facility], [address], [address]  P [phone]  F [phone]    Inpatient History and Physical  Patient ID:  Name:  Susanne Cruz  MRN:  [id number]  :  1980/42 y.o. Date:  2022      HISTORY OF PRESENT ILLNESS:  Ms. Susanne Cruz is a 43 y.o. female who on 3/18/2022 underwent an urgent surgery, Exploratory laparotomy, total abdominal hysterectomy with bilateral salpingo-oophorectomy, omentectomy. 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 Admitted to [facility] on 4/15/2022 with tachycardia and shortness of breath. Negative CTPE. CT Chest 2022 demonstrated multiple pulmonary nodules. CT A/P 2022 concerns for recurrence in the pelvis. PET 2022 demonstrates pulmonary mets, but does not demonstrate metabolic activity in the pelvis, which suggests that may be blood products (possible hemoperitoneum). The patient had low-grade temperatures while inpatient. Found to have a UTI. Discharged home on 4/15/2022 after Cardiology consult. Tachycardia improved. Discharged home with Bactrim for a UTI. Since discharge the patient reports low-grade temps in 99s. Still have some dyspnea on exertion. Reports normal bowel habits. She is anxious today over our discussion. Reports overall normal appetite. She reports pain and cramping in her lower abdomen. Repeat CBCD yesterday demonstrated normalizing WBC to 13 from 16. She is to complete Augmentin 10 days this week. Presents back via virtual visit to discuss CT A/P results. Initial History:  Ms. Susanne Cruz is a 43 y.o. female who presented to the ER with abdominal pain and anemia and found to have a large fibromatous uterus with concerns for possible sarcoma. The patient reports that about 2 months ago she began to have increasing pain and bloating. Seen in [facility] ER on 3/11/2022 and found to have a fibroid uterus with Hgb of 5.2. Received 4 units of PRBC at that time. Seen by Dr. Lana Cazares on 3/15/2022 and was scheduled for surgery for next week. Presented to ER last night (3/16/19193) with worsening pain. Found to have a hgb of 6.0. CT A/P demonstrated \"Massively enlarged and heterogenous appearing uterus without clear delineation  of ovarian structures. There is associated hyperdense ascites/hemoperitoneum, in  addition there are bilateral pulmonary nodular densities demonstrated. \"         Pathology Review:   3/18/2022:  * * *FINAL PATHOLOGIC DIAGNOSIS* * *   1.  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)   Benign bilateral ovaries without evidence of involvement by sarcoma   Benign bilateral fallopian tubes without evidence of involvement by   sarcoma   Refer to synoptic report below        SYNOPTIC REPORT - UTERUS (SARCOMA)    SPECIMEN       Procedure:  Total hysterectomy and bilateral salpingo-oophorectomy       Specimen Integrity: Distorted with rupture of anterior and posterior   uterine wall    TUMOR       Histologic Type: Undifferentiated uterine sarcoma       Tumor Size: At least 8.0 x 7.8 cm       Other Tissue / Organ Involvement: Not identified       Lymphovascular Invasion: Not identified    MARGINS       Margins: Cannot be assessed - rupture of anterior and posterior   uterine wall    LYMPH NODES       Regional Lymph Nodes: No lymph nodes submitted or found    PATHOLOGIC STAGE CLASSIFICATION (pTNM, AJCC 8th ed.)       Primary Tumor (pT): pT1b       Regional Lymph Nodes (pN): pNX   2.  Right pelvic peritoneal, biopsy:        Fibrovascular tissue   No tumor identified   3.  Omentum, omentectomy:        Benign mature adipose tissue        No tumor identified   * * *Comment* * *   Immunohistochemical stains are performed.  The tumor is positive for CD10,   p16, and vimentin while negative for smooth muscle actin, desmin,   pankeratin AE1/AE3, p53, cyclin D1, ER, , and DOG1.  This staining   pattern supports the above rendered diagnosis.     Block 1F is sent for Sarcoma Comprehensive NGS Fusion Panel; results will   be reported in an addendum. This case is seen in consultation with one or more additional pathologists   who agree with the above rendered diagnoses. Imaging Review:   CT A/P 5/3/2022:  FINDINGS:   LOWER THORAX: Numerous bilateral pulmonary nodules are significantly decreased  in size, now measuring up to 2.5 cm (previously up to 1.1 cm). Newly enlarged  pericardiophrenic lymph nodes are seen, measuring up to 1.3 cm in short axis  diameter. Unchanged elevation of the left hemidiaphragm. LIVER: No mass. BILIARY TREE: Gallbladder is within normal limits. CBD is not dilated. SPLEEN: within normal limits. PANCREAS: No mass or ductal dilatation. ADRENALS: Unremarkable. KIDNEYS: Punctate nonobstructing right renal calculi. No hydronephrosis. STOMACH: Unremarkable. SMALL BOWEL: No dilatation or wall thickening. COLON: No dilatation or wall thickening. APPENDIX: Not visualized. PERITONEUM: New small to moderate ascites. RETROPERITONEUM: Multiple new enlarged mesenteric masses measuring up to 4.8 cm. REPRODUCTIVE ORGANS: Heterogeneous mass arising from the pelvis has  significantly increased in size, from 11.3 x 9.7 x 11.0 on the 4/14/2022  examination to 21.8 x 16.1 x 17.9 cm on today's examination. The mass displaces  bowel superiorly and to the left. URINARY BLADDER: Decompressed. BONES: No destructive bone lesion. ABDOMINAL WALL: No mass or hernia. ADDITIONAL COMMENTS: N/A  IMPRESSION  Significant interval increase in size of pelvic mass, now measuring 21.8 cm in  maximal diameter. Multiple new mesenteric masses. New pericardiophrenic  lymphadenopathy. Significant progression of pulmonary metastatic disease. New  small to moderate ascites.     PET 4/13/2022:  FINDINGS:  HEAD/NECK: No apparent foci of abnormal hypermetabolism. Cerebral evaluation is  limited by normal intense activity. CHEST:   Multiple hypermetabolic pulmonary nodules, max SUV 6.4. No wei foci of abnormal hypermetabolism. ABDOMEN/PELVIS: Physiologic activity in bowel. No convincing neoplastic foci of  abnormal hypermetabolism. Ametabolic soft tissue density in the hysterectomy  bed, likely residual hemoperitoneum or other benign process. SKELETON: No foci of abnormal hypermetabolism in the axial and visualized  appendicular skeleton. IMPRESSION  1. Multiple hypermetabolic pulmonary nodules, presumed metastases. 2. Otherwise negative PET. CT Chest 4/14/2022:  FINDINGS: This is a good quality study for the evaluation of pulmonary embolism  to the first subsegmental arterial level. There is no pulmonary embolism to this  level. No axillary or supraclavicular lymphadenopathy  THYROID: No nodule. MEDIASTINUM: No mediastinal adenopathy  ZEYAD: No mass or lymphadenopathy. THORACIC AORTA: No aneurysm. HEART: Normal in size. ESOPHAGUS: No wall thickening or dilatation. TRACHEA/BRONCHI: Patent. PLEURA: No effusion or pneumothorax. LUNGS: Multiple lung nodules and lung masses are identified. The larger lesions  measures 2.0 x 1.4 cm. UPPER ABDOMEN: Loculated fluid in the left upper quadrant of the abdomen. BONES: No aggressive bone lesion or fracture. IMPRESSION  1. Multiple lung masses some of which are pleural-based consistent with  metastatic disease. 2.  Loculated fluid in the left upper abdomen. 3.  No evidence of pulmonary embolus    CT A/P 3/17/2022;  FINDINGS:   LOWER THORAX: There is elevation of the left hemidiaphragm. There is a pulmonary  nodule left lung base. There are additional pulmonary nodules at the right lung  base. LIVER: Hepatic steatosis  BILIARY TREE: Gallbladder is within normal limits. CBD is not dilated. SPLEEN: within normal limits.   PANCREAS: No mass or ductal dilatation. ADRENALS: Unremarkable. KIDNEYS: Right renal calculus. Mild hydronephrosis on the right with punctate  calculus at the renal pelvis on the right. STOMACH: Unremarkable. SMALL BOWEL: No dilatation or wall thickening. COLON: No dilatation or wall thickening. APPENDIX: Not visualized  PERITONEUM: Moderate ascites. Moderately hyperdense  RETROPERITONEUM: No lymphadenopathy or aortic aneurysm. REPRODUCTIVE ORGANS: The uterus is distorted. There are large uterine mass  lesions demonstrated. These are numerous. The dome of the uterus lies above the  umbilicus. URINARY BLADDER: Nondistended, not evaluated  BONES: No destructive bone lesion. ABDOMINAL WALL: No mass or hernia. ADDITIONAL COMMENTS: N/A  IMPRESSION  Massively enlarged and heterogenous appearing uterus without clear delineation  of ovarian structures. There is associated hyperdense ascites/hemoperitoneum, in  addition there are bilateral pulmonary nodular densities demonstrated. Imaging findings are concerning for atypical/aggressive uterine fibroids versus  uterine neoplasm. Gynecological consultation is recommended. Mild hydronephrosis on the right with punctate calculus at the renal pelvis on  the right. ROS:  A comprehensive review of systems was negative except for that written in the History of Present Illness. , 10 point ROS    OB/GYN ROS:  Patient denies significant menstrual problems.     ECOG ndGndrndanddndend:nd nd2nd Problem List:  Patient Active Problem List    Diagnosis Date Noted    SIRS (systemic inflammatory response syndrome) ([address] 05/08/2022    Acute cystitis with hematuria 04/27/2022    Bandemia 04/27/2022    Malignant neoplasm metastatic to both lungs ([address] 04/19/2022    Tachycardia 04/14/2022    Uterine sarcoma ([address] 04/05/2022    Pelvic mass 03/17/2022    Acute on chronic anemia 03/17/2022    Acute blood loss anemia 03/13/2022    Uterine fibroid 03/13/2022    Abnormal uterine bleeding 03/13/2022    Leiomyoma of body of uterus 2022    Anemia associated with acute blood loss 2022    Fibroid uterus 2022    Anemia 2018    Obesity 2018    PCOS (polycystic ovarian syndrome) 2016    Infertility associated with anovulation 2016    Benign neoplasm of muscle 2015    Dysmenorrhea 2014     PMH:  Past Medical History:   Diagnosis Date    Anemia 2018    Anxiety     anxiety - took medications as a teenager, no medications recently     Fibroids     Gestational hypertension affecting first pregnancy 2018    History of PCOS     Infertility, female     Polycystic disease, ovaries       PSH:  Past Surgical History:   Procedure Laterality Date    HX  SECTION      HX GYN  2022    Exploratory laparotomy, total abdominal hysterectomy with bilateral salpingo-oophorectomy, omentectomy    HX WISDOM TEETH EXTRACTION        Social History:  Social History     Tobacco Use    Smoking status: Never Smoker    Smokeless tobacco: Never Used   Substance Use Topics    Alcohol use: No      Family History:  Family History   Problem Relation Age of Onset    Hypertension Mother     Hypertension Sister     Diabetes Sister     Hypertension Brother     Cancer Paternal Uncle     Diabetes Paternal Uncle     Diabetes Maternal Uncle     Breast Cancer Cousin       Medications: (reviewed)  No current facility-administered medications for this visit. No current outpatient medications on file.      Facility-Administered Medications Ordered in Other Visits   Medication Dose Route Frequency    polyethylene glycol (MIRALAX) packet 17 g  17 g Oral BID    senna-docusate (PERICOLACE) 8.6-50 mg per tablet 1 Tablet  1 Tablet Oral DAILY    sodium chloride (NS) flush 5-10 mL  5-10 mL IntraVENous PRN    0.9% sodium chloride infusion 250 mL  250 mL IntraVENous PRN    oxyCODONE IR (ROXICODONE) tablet 10 mg  10 mg Oral Q4H PRN    naloxone (NARCAN) injection 0.4 mg  0.4 mg IntraVENous EVERY 2 MINUTES AS NEEDED    cefepime (MAXIPIME) 2 g in 0.9% sodium chloride (MBP/ADV) 100 mL MBP  2 g IntraVENous Q8H    sodium chloride (NS) flush 5-40 mL  5-40 mL IntraVENous Q8H    sodium chloride (NS) flush 5-40 mL  5-40 mL IntraVENous PRN    acetaminophen (TYLENOL) tablet 650 mg  650 mg Oral Q6H PRN    Or    acetaminophen (TYLENOL) suppository 650 mg  650 mg Rectal Q6H PRN    ondansetron (ZOFRAN ODT) tablet 4 mg  4 mg Oral Q8H PRN    Or    ondansetron (ZOFRAN) injection 4 mg  4 mg IntraVENous Q6H PRN    HYDROmorphone 30 mg/30 mL (DILAUDID) PCA   IntraVENous TITRATE    ibuprofen (MOTRIN) tablet 400 mg  400 mg Oral TID PRN    0.9% sodium chloride infusion  100 mL/hr IntraVENous CONTINUOUS    Vancomycin - pharmacy to dose   Other Rx Dosing/Monitoring    vancomycin (VANCOCIN) 1250 mg in  ml infusion  1,250 mg IntraVENous Q12H     Allergies: (reviewed)  No Known Allergies     Gyn History:   Last pap: 2/2020, normal      OBJECTIVE:  *deferred today given video-conference visit for ongoing COVID-19 pandemic*   Physical Exam:  Visit Vitals  LMP 02/07/2022 (Exact Date)      General: Alert and oriented. No acute distress. Well-nourished  HEENT: No thyroid enlargment. Neck supple without restrictions. Sclera normal. Normal occular motion. Moist mucous membranes. Lymphatics: No evidence of axillary, cervical, or subclavicular adenopathy. Respiratory: clear to auscultation and percussion to the bases. No CVAT. Cardiovascular: regular rate and rhythm. No murmurs, rubs, or gallops. Gastrointestinal: soft, slightly tender in lower abdomen. Well-healed incision. Fullness in the lower abdomen. Could not discern mass, but concerning given fullness. Non-distended. Musculoskeletal: normal gait. No joint tenderness, deformity or swelling. No muscular tenderness. Extremities: extremities normal, atraumatic, no cyanosis or edema. Pelvic: exam chaperoned by nurse.  Normal appearing external genitalia. On speculum exam, the vaginal cuff is intact and healing well. On bimanual, There is a large mass/collection abutting the vaginal cuff. This likely correlates with the last CT scan, however concerns this may be larger. Neuro: Grossly intact. Normal gait and movement. No acute deficit  Skin: No evidence of rashes or skin changes. IMPRESSION/PLAN:    Ms. Tesfaye Toussaint is a 43 y.o. female who initially presented via the ER with a large 23 week size uterus with concerns for degenerating fibroids versus sarcoma, complicated by possible intra-abdominal ascites or hemorrhage. On 3/18/2022 underwent an urgent surgery, Exploratory laparotomy, total abdominal hysterectomy with bilateral salpingo-oophorectomy, omentectomy. 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 Admitted to [facility] on 4/15/2022 with tachycardia and shortness of breath. Negative CTPE. CT Chest 4/14/2022 demonstrated multiple pulmonary nodules. CT A/P 4/14/2022 concerns for recurrence in the pelvis. PET 4/13/2022 demonstrates pulmonary mets, but does not demonstrate metabolic activity in the pelvis, which suggests that may be blood products (possible hemoperitoneum).     Problems:     Patient Active Problem List    Diagnosis Date Noted    SIRS (systemic inflammatory response syndrome) ([address] 05/08/2022    Acute cystitis with hematuria 04/27/2022    Bandemia 04/27/2022    Malignant neoplasm metastatic to both lungs ([address] 04/19/2022    Tachycardia 04/14/2022    Uterine sarcoma ([address] 04/05/2022    Pelvic mass 03/17/2022    Acute on chronic anemia 03/17/2022    Acute blood loss anemia 03/13/2022    Uterine fibroid 03/13/2022    Abnormal uterine bleeding 03/13/2022    Leiomyoma of body of uterus 03/11/2022    Anemia associated with acute blood loss 03/11/2022    Fibroid uterus 02/22/2022    Anemia 12/17/2018    Obesity 05/02/2018    PCOS (polycystic ovarian syndrome) 12/02/2016    Infertility associated with anovulation 12/02/2016    Benign neoplasm of muscle 01/20/2015    Dysmenorrhea 08/13/2014     Reviewed patient's course to date. As noted previously, PET on 4/13/2022 demonstrated multiple metastatic lesions to the lung. Our prior discussion regarding adjuvant treatment is below. CT A/P 5/3/2022 demonstrated progression of her pelvic and abdominal disease. I discussed the results with the Radiologist. While imaging continues to report progression of her uterine disease, this is not the case. I resected her uterus and primary tumor completely on 3/18/2022. Unfortunately her imaging findings demonstrate rapid recurrence and progression of her poorly differentiated sarcoma. Given the patient's continued issue with anemia, I believe the pelvic disease is likely having intermittent bleeding within itself. I have discussed all of these findings and concerns with the patient and her family. The patient understands the aggressive nature of her cancer and the need to start chemotherapy. She continues to have low-grade temperatures, but her WBC has improved and she does not have an identifiable source of infection. I believe her low-grade temps are related to tumor burden and rapid progression. Will plan to proceed with treatment later this week. I am concerned that if we do not start now then her cancer will progress further and we will not be able to start treatment. Per our prior discussion: \"Counseled patient regarding her stage IV disease and the role of adjuvant chemotherapy. Unfortunately long-term survival is low. The patient was very emotional today, which is expected given the change in her life the last month regarding her surgery, diagnosis, and now the need for adjuvant therapy. Recommended adjuvant gemcitabine 900mg/m2 days 1 and 8, along with docetaxel 75mg/m2 day 8. Plan to initiate next week.  Plan for 3 cycles followed by interval imaging. Discussed that radiation and surgery for her lung lesions is not an option. Discussed the advanced nature of her cancer, and overall the difficulty in treating this type of cancer. \"    All questions and concerns were addressed with the patient and she is comfortable with the plan. An electronic signature was used to authenticate this note. Josh Benavides MD    Pursuant to the emergency declaration unde the [address] [place] [address] waiver authority and the [employer] Act, this Virtual Visit was conducted, with patient's consent, to reduce the patient's risk of exposure to COVID-19 and provide continuity of care for an established patient. Patient identification was verified at the start of the visit: Yes    Services were provided through a video synchronous discussion virtually to substitute for in-person clinic visit. Patient was at her individual home, while the provider was in his/her respective office.     I spent at least 25 minutes with this established patient, and >50% of the time was spent counseling and/or coordinating care regarding imaging results and chemotherapy    Josh Benavides MD     .

## 2022-05-09 NOTE — PROGRESS NOTES
Spiritual Care Assessment/Progress Note  [facility]      NAME: Addison Greer      MRN: [id number]  AGE: 43 y.o. SEX: female  Buddhist Affiliation: Jainism   Language: English     5/9/2022     Total Time (in minutes): 10     Spiritual Assessment begun in [facility] through conversation with:         [x]Patient        [x] Family    [] Friend(s)        Reason for Consult: Palliative Care, Initial/Spiritual Assessment     Spiritual beliefs: (Please include comment if needed)     [x] Identifies with a leslie tradition: Jainism       [] Supported by a leslie community:            [] Claims no spiritual orientation:           [] Seeking spiritual identity:                [] Adheres to an individual form of spirituality:           [] Not able to assess:                           Identified resources for coping:      [x] Prayer                               [] Music                  [] Guided Imagery     [x] Family/friends                 [] Pet visits     [] Devotional reading                         [] Unknown     [] Other:                                               Interventions offered during this visit: (See comments for more details)    Patient Interventions: Affirmation of emotions/emotional suffering,Affirmation of leslie,Catharsis/review of pertinent events in supportive environment,Coping skills reviewed/reinforced,Initial/Spiritual assessment, patient floor,Prayer (actual),Prayer (assurance of)     Family/Friend(s):  Affirmation of emotions/emotional suffering,Affirmation of leslie,Prayer (actual),Prayer (assurance of)     Plan of Care:     [] Support spiritual and/or cultural needs    [] Support AMD and/or advance care planning process      [] Support grieving process   [] Coordinate Rites and/or Rituals    [] Coordination with community clergy   [] No spiritual needs identified at this time   [] Detailed Plan of Care below (See Comments)  [] Make referral to Music Therapy  [] Make referral to Pet Therapy [] Make referral to Addiction services  [] Make referral to [facility]  [] Make referral to Spiritual Care Partner  [] No future visits requested        [x] Contact Spiritual Care for further referrals     Comments: Provided support for this pt in [facility] [place] . Reviewed pt's chart prior to this visit. Pt's mother was present during this visit. Pt was noticably in some pain so [address] kept visit brief. Offered prayer for pt and family. Assured pt of prayers. Nicole Landry MDiv.  Staff   Request  Support/Spiritual Care Services on [phone])

## 2022-05-09 NOTE — PROGRESS NOTES
Chart checked, and note that pt's hemoglobin resulted today as 6.0 today at 0721 (down from 9.9 yesterday at 1728). PT will hold, follow, and see for the eval as able and appropriate.  Thank you, Deborrah Leah, PT

## 2022-05-09 NOTE — PROGRESS NOTES
Occupational Therapy:    Chart reviewed, patient with HGB of 6.0 down from 9.9. Will hold and continue to follow up as medically appropriate.       Leigh Pepe, OT

## 2022-05-09 NOTE — CONSULTS
[address]    Name:  Mehrdad Jorge  MR#:  [id number]  :  1980  ACCOUNT #:  [de-identified]  DATE OF SERVICE:  2022    REASON FOR CONSULTATION:  Seen for renal failure. Thanks Dr. Ericka Hidalgo for the consult. HISTORY OF PRESENT ILLNESS:  She is a very unfortunate 55-year-old female, uterine sarcoma, total abdominal hysterectomy, came with shortness of breath, multiple ER visits. She had metastatic uterine sarcoma and pain, quite anemic. On admission, creatinine was 0.8, baseline is 0.5. She got a drop in her hemoglobin from 9.9 to 6; and she has had a CTA on the  around 4 in the morning which showed no hydronephrosis, metastatic disease and peritoneal carcinomatosis and some ascites and the creatinine went up from 0.8-2.1. The patient is very sleepy. Difficult to arouse. She is on Dilaudid PCA. MEDICATIONS AS INPATIENT:  Are as follows:  1. Vancomycin. 2.  Famotidine. 3.  Dexa,  4. Cefepime. PAST MEDICAL HISTORY:  Include,  1. MICHEAL. 2.  Metastatic uterine sarcoma. 3.  Anemia. 4.  Anxiety. 5.  PCOS. ALLERGIES:  NO KNOWN ALLERGY. SOCIAL HISTORY:  Reviewed. FAMILY HISTORY:  Reviewed. PHYSICAL EXAMINATION:  GENERAL:  The patient is resting, arousable but looking quite cachectic. VITAL SIGNS:  Blood pressure labile. It was as low as 97/64, 100% O2 sat on 3 liters. ABDOMEN:  Distended. Urine voided around 400 mL. LABORATORY DATA:  As follows:  Hemoglobin 6, platelets 880, WBC 50.1. Eosinophil 0.  UA, specific gravity 1.020, +1 protein, 0 to 4 wbc and 0 to 5 rbc. Sodium 130, potassium is 5.1. Bicarb 13, anion gap 10, not corrected. Corrected gap will be around 16, BUN 28, creatinine is 2.1, calcium is 6.1, phosphorus 5.4, magnesium 1.8. Ferritin is 1529. AST is 52, ALT 15, total bili is 1.2, this was yesterday, not today. IMPRESSION:  1.   Acute kidney injury in the setting of contrast labile blood pressure at risk of an acute tubular necrosis. To note, the patient has a very large uterine mass, although not showing any hydronephrosis, but still cannot rule out obstruction in the retroperitoneal area. 2.  Metabolic acidosis, mostly non-anion gap, switch to intravenous fluids with bicarb. 3.  Hypocalcemia corrects with low albumin at least partially. 4.  Metastatic uterine cancer. 5.  Anemia, acute drop in hemoglobin and hematocrit. 6.  Hypovolemic hyponatremia, but also cannot rule out any adrenal failure in the setting of metastatic cancer. RECOMMENDATIONS:  Would be,  1. IV fluids with bicarb. 2.  Urine studies. 3.  Check cortisol level. 4.  On dexamethasone, should not affect the cortisol read. 5.  On antibiotic. 6.  At risk of decompensation, overall outcome is poor. If the renal function not better, will recommend to do cysto with retrograde pyelogram to rule out any obstruction.       Eduardo Em MD      [id number]  D:  05/09/2022 17:11  T:  05/09/2022 19:15  JOB #:  [id number]

## 2022-05-09 NOTE — PROGRESS NOTES
Renal Dosing/Monitoring  Medication: Cefepime   Current regimen:  2 grams IV every 8 hr  Recent Labs     05/09/22  0721 05/08/22  0113   CREA 2.12* 0.82   BUN 28* 17     Estimated CrCl:  42 ml/min  Plan: Change to 2 grams IV every 12 hours per [facility] P&T Committee Protocol with respect to renal function. Pharmacy will continue to monitor patient daily and will make dosage adjustments based upon changing renal function.     Dana Aguilar, PharmD, BCPS

## 2022-05-09 NOTE — PROGRESS NOTES
[address]  Hospitalist Group                                                                                          Hospitalist Progress Note  Magda Joya DO  Answering service: [phone] or [phone] from in house phone        Date of Service:  2022  NAME:  Roxanna Ramirez  :  1980  MRN:  [id number]      Admission Summary:   \"37 y.o. female with history of uterine sarcoma s/p total abdominal hysterectomy with bilateral salpingo-oophorectomy and omentectomy, history of polycystic ovarian syndrome, anemia and generalized anxiety disorder who presents to hospital with complaints of shortness of breath and severe abdominal pain. Per chart review and surgical oncology notes, patient underwent urgent surgery on  and was subsequently readmitted on April 15 for tachycardia and dyspnea. She had negative CT PE with PET scan at the time demonstrating pleural metastasis. She was diagnosed with acute cystitis and subsequently discharged on Bactrim. The patient denies any fever, chills, chest pain, nausea, vomiting, cough, congestion, recent illness, palpitations, or dysuria. Remarkable vitals on ER Presentations: Heart rate 129, /91, respiratory rate to 34  Labs Remarkable for: Hemoglobin 7.2, WBC 20.6, sodium 125, procalcitonin 8.31  ER Images: CT PE negative. Interval increase in size of numerous pulmonary nodules. CT abdomen pelvis: Stable size of uterine pelvic tumor and stable metastatic disease and retroperitoneal carcinomatosis. ER treatment: Cefepime, Dilaudid, 1 L normal saline bolus. \"       Interval history / Subjective: Follow up SIRS. Patient seen and examined earlier this afternoon. Patient states that she feels overall worse than day prior. Has difficulty breathing. Vitals are relatively improved from day prior. Cr worsened. Mother in law at bedside. States patient's legs feel cold. Blood cultures NGTD. Hgb 6.0.       Assessment & Plan:       SIRS (tachycardia, fever), POA  -No identifiable infectious source   -Follow blood cultures- NGTD  -UA fine  -CT with increase numerous pulmonary nodules/masses, stable peritoneal carcinomatosis, small to moderate volume ascites  -Continue cefepime, vancomycin  -Aggressively treat fever  -Empiric cefepime, vancomycin   -change fluids to bicarbonate drip     Acute renal failure:   Metabolic acidosis, non anion gap:   -change fluids to bicarbonate drip  -monitor urine output  -nephrology consulted    Metastatic Uterine sarcoma   Intractable malignancy related pain  -Pain regimen: dilaudid pca  -Narcan with parameters  -Gyn Onc and palliative consult  -Family meeting 5/9     Symptomatic anemia  -Multifactorial anemia: Blood loss, iron deficiency and chronic disease  -transfuse 1 unit pRBCs    Tachycardia  -multifactorial     Hyponatremia- improved  -Likely hypovolemic hyponatremia    Lower extremity swelling L>R: lower extremity dopplers    Patient critically ill. Updated family at bedside.            Code status: full   Prophylaxis: holding due to anemia  Care Plan discussed with: patient, nurse, family   Anticipated Disposition: >48 hours     Hospital Problems  Date Reviewed: 5/9/2022          Codes Class Noted POA    SIRS (systemic inflammatory response syndrome) ([facility] [address] ICD-10-CM: R65.10  ICD-9-CM: 995.90  5/8/2022 Unknown                Review of Systems:   Negative unless stated above      Vital Signs:    Last 24hrs VS reviewed since prior progress note.  Most recent are:  Visit Vitals  /83 (BP 1 Location: Left upper arm, BP Patient Position: At rest)   Pulse (!) 114   Temp 98 °F (36.7 °C)   Resp 22   Ht 5' 7\" (1.702 m)   Wt 100.2 kg (221 lb)   SpO2 100%   BMI 34.61 kg/m²         Intake/Output Summary (Last 24 hours) at 5/9/2022 1636  Last data filed at 5/9/2022 0550  Gross per 24 hour   Intake    Output 300 ml   Net -300 ml        Physical Examination:     I had a face to face encounter with this patient and independently examined them on 5/9/2022 as outlined below:          Constitutional:  + acute distress, young female    ENT:  Oral mucosa moist, oropharynx benign. Resp:  CTA bilaterally. No wheezing/rhonchi/rales. No accessory muscle use. CV:  tachycardic, no murmurs, gallops, rubs    GI:  +distended, hypoactive bowel sounds, central scar, no fluid wave appreciated, no hepatosplenomegaly     Musculoskeletal:  legs cool to touch, left leg slightly larger compared to right, pulses intact    Neurologic:  Moves all extremities            Data Review:    Review and/or order of clinical lab test  Review and/or order of tests in the radiology section of CPT  Review and/or order of tests in the medicine section of CPT      Labs:     Recent Labs     05/09/22  0721 05/08/22  1728 05/08/22 0113 05/08/22 0113   WBC 15.9*  --   --  20.6*   HGB 6.0* 9.9*   < > 7.2*   HCT 20.0* 27.6*   < > 23.0*     --   --  336    < > = values in this interval not displayed. Recent Labs     05/09/22  0721 05/08/22 0113   * 125*   K 5.1 5.3*    93*   CO2 13* 25   BUN 28* 17   CREA 2.12* 0.82   * 122*   CA 6.1* 8.6   MG 1.8 2.0   PHOS 5.4* 4.1     Recent Labs     05/08/22 0113   ALT 15   AP 68   TBILI 1.2*   TP 6.3*   ALB 1.6*   GLOB 4.7*   LPSE 69*     No results for input(s): INR, PTP, APTT, INREXT, INREXT in the last 72 hours. Recent Labs     05/08/22 0113   TIBC 133*   PSAT 25   FERR 1,529*      Lab Results   Component Value Date/Time    Folate 6.2 05/08/2022 01:13 AM      No results for input(s): PH, PCO2, PO2 in the last 72 hours. No results for input(s): CPK, CKNDX, TROIQ in the last 72 hours.     No lab exists for component: CPKMB  Lab Results   Component Value Date/Time    Cholesterol, total 77 12/15/2021 09:17 AM    HDL Cholesterol 41 12/15/2021 09:17 AM    LDL, calculated 26.8 12/15/2021 09:17 AM    Triglyceride 46 12/15/2021 09:17 AM    CHOL/HDL Ratio 1.9 12/15/2021 09:17 AM     Lab Results Component Value Date/Time    Glucose (POC) 116 04/13/2022 03:59 PM    Glucose (POC) 158 (H) 03/18/2022 08:41 AM    Glucose (POC) 156 (H) 03/12/2022 07:35 PM     Lab Results   Component Value Date/Time    Color DARK YELLOW 05/08/2022 05:50 AM    Appearance CLEAR 05/08/2022 05:50 AM    Specific gravity 1.020 05/08/2022 05:50 AM    Specific gravity 1.015 04/27/2022 03:20 PM    pH (UA) 5.0 05/08/2022 05:50 AM    Protein 30 (A) 05/08/2022 05:50 AM    Glucose Negative 05/08/2022 05:50 AM    Ketone TRACE (A) 05/08/2022 05:50 AM    Bilirubin Negative 05/02/2022 11:27 AM    Urobilinogen 1.0 05/08/2022 05:50 AM    Nitrites Negative 05/08/2022 05:50 AM    Leukocyte Esterase TRACE (A) 05/08/2022 05:50 AM    Epithelial cells FEW 05/08/2022 05:50 AM    Bacteria Negative 05/08/2022 05:50 AM    WBC 0-4 05/08/2022 05:50 AM    RBC 0-5 05/08/2022 05:50 AM         Medications Reviewed:     Current Facility-Administered Medications   Medication Dose Route Frequency    polyethylene glycol (MIRALAX) packet 17 g  17 g Oral BID    0.9% sodium chloride infusion 250 mL  250 mL IntraVENous PRN    sodium bicarbonate (8.4%) 150 mEq in sterile water 1,000 mL infusion   IntraVENous CONTINUOUS    cefepime (MAXIPIME) 2 g in 0.9% sodium chloride (MBP/ADV) 100 mL MBP  2 g IntraVENous Q12H    bisacodyL (DULCOLAX) suppository 10 mg  10 mg Rectal DAILY PRN    [START ON 5/10/2022] senna (SENOKOT) tablet 8.6 mg  1 Tablet Oral DAILY    [START ON 5/10/2022] senna-docusate (PERICOLACE) 8.6-50 mg per tablet 2 Tablet  2 Tablet Oral DAILY    famotidine (PF) (PEPCID) 20 mg in 0.9% sodium chloride 10 mL injection  20 mg IntraVENous Q24H    [START ON 5/10/2022] Vancomycin random level- please draw with AM labs on 5/10. Thanks!    Other ONCE    sodium chloride (NS) flush 5-10 mL  5-10 mL IntraVENous PRN    0.9% sodium chloride infusion 250 mL  250 mL IntraVENous PRN    oxyCODONE IR (ROXICODONE) tablet 10 mg  10 mg Oral Q4H PRN    naloxone (NARCAN) injection 0.4 mg  0.4 mg IntraVENous EVERY 2 MINUTES AS NEEDED    sodium chloride (NS) flush 5-40 mL  5-40 mL IntraVENous Q8H    sodium chloride (NS) flush 5-40 mL  5-40 mL IntraVENous PRN    acetaminophen (TYLENOL) tablet 650 mg  650 mg Oral Q6H PRN    Or    acetaminophen (TYLENOL) suppository 650 mg  650 mg Rectal Q6H PRN    ondansetron (ZOFRAN ODT) tablet 4 mg  4 mg Oral Q8H PRN    Or    ondansetron (ZOFRAN) injection 4 mg  4 mg IntraVENous Q6H PRN    HYDROmorphone 30 mg/30 mL (DILAUDID) PCA   IntraVENous TITRATE    ibuprofen (MOTRIN) tablet 400 mg  400 mg Oral TID PRN    Vancomycin - pharmacy to dose   Other Rx Dosing/Monitoring     ______________________________________________________________________  EXPECTED LENGTH OF STAY: - - -  ACTUAL LENGTH OF STAY:          [facility], DO

## 2022-05-09 NOTE — PROGRESS NOTES
RENAL DOSE ADJUSTMENT MADE PER P/T PROTOCOL     PREVIOUS ORDER:  20 mg Q12    Estimated Creatinine Clearance: 42 mL/min (A) (based on SCr of 2.12 mg/dL (H)). Recent Labs     05/09/22 0721 05/08/22 0113 05/08/22  0113   BUN 28*  --  17      < > 336    < > = values in this interval not displayed.         NEW RENALLY ADJUSTED ORDER:  20 mg Q 24, for pt with CrCl< 50 ml/min    ELROY Leary  5/9/2022 2:29 PM

## 2022-05-10 NOTE — PROGRESS NOTES
Renal Dosing/Monitoring  Medication: Cefepime   Current regimen:  2 grams IV every 12 hr  Recent Labs     05/10/22  0045 05/09/22  0721 05/08/22  0113   CREA 3.89* 2.12* 0.82   BUN 48* 28* 17     Estimated CrCl:  23.4 ml/min  Plan: Change to 1 gram IV every 12 hours per [address] [place] [address] P&T Committee Protocol with respect to renal function. Pharmacy will continue to monitor patient daily and will make dosage adjustments based upon changing renal function.     Noe Cardoso, PharmD, BCPS

## 2022-05-10 NOTE — PROGRESS NOTES
[address], [address], [address]  P [phone]  F [phone]       Patient: Akshat Brown  Admit Date: 5/8/2022  Admit Dx: SIRS (systemic inflammatory response syndrome) (HCC) [R65.10]    Subjective:     Overnight with additional pain, oliguria with difficult zamora placement. Attempted ambulation, essentially bedbound with max assit. Remains drowsy, easily aroused. Family present including mother, mother in law,  Radha Maciel by video. Objective:     Date 05/09/22 0700 - 05/10/22 0659 05/10/22 0700 - 05/11/22 0659   Shift 2084-5568 9137-9893 24 Hour Total 9829-6665 0276-7223 24 Hour Total   INTAKE   Blood 306.3  306.3        Volume (TRANSFUSE PACKED RBC'S) 306.3  306. 3      Shift Total(mL/kg) 306. 3(3.1)  306.3(2.9)      OUTPUT   Urine(mL/kg/hr) 1000(0.8)  1000(0.4)        Urine Voided 1000  1000      Shift Total(mL/kg) 1000(10)  1000(9.6)      NET -693.7  -693.7      Weight (kg) 100.2 104.3 104.3 104.3 104.3 104. 3       Physical Exam  /82 (BP 1 Location: Left upper arm, BP Patient Position: At rest;Lying)   Pulse (!) 109   Temp 97.7 °F (36.5 °C)   Resp 25   Ht 5' 7\" (1.702 m)   Wt 230 lb (104.3 kg)   LMP 02/07/2022 (Exact Date)   SpO2 100%   BMI 36.02 kg/m²      General:  fatigued, cooperative, mild distress     Cardiac:  Tachy, reg rhythm tele        Lungs:  Poor excursion, reduced/labored.  On 3L NC  Abdomen:  Extensively protuberant, tenderness generalized w/o rebound/gaurding   Extremity: edema pedal trace    Wt Readings from Last 3 Encounters:   05/10/22 230 lb (104.3 kg)   05/03/22 197 lb 6.4 oz (89.5 kg)   05/02/22 197 lb (89.4 kg)         Data Review      Recent Labs     05/10/22  0045 05/09/22  2110 05/09/22  0721 05/08/22  1728 05/08/22  0113   WBC 10.1  --  15.9*  --  20.6*   ANEU  --   --   --   --  20.0*   HGB 7.9* 8.1* 6.0*   < > 7.2*   HCT 24.7* 25.7* 20.0*   < > 23.0*     --  292  --  336    < > = values in this interval not displayed. Recent Labs     05/10/22  0045 05/09/22  0721 05/08/22  0113   * 130* 125*   K 6.0* 5.1 5.3*   CL 91* 107 93*   * 132* 122*   BUN 48* 28* 17   CREA 3.89* 2.12* 0.82   CA 8.4* 6.1* 8.6   MG 2.8* 1.8 2.0   PHOS 7.3* 5.4* 4.1   ALB  --   --  1.6*   TBILI  --   --  1.2*   ALT  --   --  15         Assessment/Plan:     43 y.o. admitted for abdominal pain with uterine sarcoma s/p recent TAHBSO 3/18/21, likely stage IV at diagnosis with pulmonary mets on subsequent PET. Now with grossly progressive disease/hemoperitoneum s/p recent gamzar/carboplatin on 5/6/21  Admitted now for abdominal pain, persistent anemia, SIRS    Rapidly progressive disease and deterioration in clinical status. Long discussion with them today regarding her course, prognosis and current/relavant therapies available. Anemia persistent despite multiple units in the last weeks, another on admit d/t likely hemoperitoneum and tumoral bleeding. Now with ARF, metabolic disturbances c/w contrast nephropathy and tumor lysis. Nephrology following w/o recommendation for dialysis. Though she is at risk for obstruction, her Cr was normal on admit. Currently trending aneuric, don't think she is significantly obstructed at this time. Her clinical condition is such that we could not provided additional chemotherapy and her prognosis remains poor. Gabrielle Angel is resistant to discuss hospice and in fact stopped the conversation short d/t her anxiety. We have agreed to watchful waiting with continued palliative gestures mostly with pain/anxiety control. We did discuss code status. She requests to remain FULL CODE against medical advice with a poor prognosis. They understand a code if successful would result in ICU admission, with a near certain probability they would have to withdraw care. Her , Tip Cline, is her chosen MDM in this case.  Her mother in law lost her  last year to an MI and did have to withdraw care in the ICU for him. She is counseling her son. Family present and vigilant. Continue palliative intervention for pain control, respiratory comfort and medical interventions as able. We will continue to follow, expressed availability to family.       Magalys Zaragoza PA-C

## 2022-05-10 NOTE — PROGRESS NOTES
Palliative Medicine Consult  [place]: [phone] ([phone]    Patient Name: Tamiko Christian  YOB: 1980    Date of Initial Consult: 5/9/22  Reason for Consult: Overwhelming sx  Requesting Provider: Beba Hudson   Primary Care Physician: Mago Stephenson MD     SUMMARY:   Tamiko Christian is a 43 y.o. with a past history of anxiety, PCOS, uterine sarcoma s/p urgent ex lap, SHELLY/BSO 3/18/22 - subsequent imaging has shown rapid recurrence and progression of disease with peritoneal carcinomatosis and pulmonary mets. She has received only 1 chemo tx w/ Dr Jayce Kohli. She was admitted on 5/8/2022 from home w/ fever, leukocytosis - on IV Vanc and Cefepime and BCx pending. Recent dx last month for cystitis. Acute anemia- HgB 6 this morning and being transfused PRBC. Current medical issues leading to Palliative Medicine involvement include: overwhelming sx- severe intractable pain. On Dilaudid PCA.  reviewed- recent RX for Oxycodone 5mg but prior to that was controlled on Tramadol. Social: Pt  to Lamine Dickey. They have a 2 yo son. Her parents and her mother in law Ms Zaire Darden all very involved. Pain regimen: Prior to admission was taking Oxycodone 5mg po every 6h prn w/out sx relief- and before this RX was on Tramadol only. 5/7/22: dilaudid PCA 0.2mg basal and 0.4mg pca dose  5/8/22: start ms contin   PALLIATIVE DIAGNOSES:   1. Intractable cancer pain- on Dilaudid PCA  2. Constipation due to opioids  3. Progression of malignancy   4. Debility    5. Goals of care  6. Palliative Care Encounter     PLAN:   1. Mother, sister, and mother in law at the bedside planning to attend family meeting at 11:30am today with gyne onc team.  2. Pt very drowsy but easily aroused and answers questions appropriately. She is not oversedated   3. Cancer pain: pt demonstrates ability to press the pca button. She used 7.56mg/ 24 hours. Discussed that we will dc basal today and start long acting opioid.   Order placed for Oxycontin 10 mg every 8. New PCA order~ NO BASAL. Continue 0.4mg now every 6min instead of every 10. Maintain current 4 hour lockout of 8mg. Added rescue dose of dilaudid 2mg iv every 2 hours as needed for failure to respond to pca and oral meds only. 4. Constipation: related to opioids,poor appetite, immobility. Not taking regular bowel regimen at home. Last BM 6 days ago. Bowel sounds hypoactive. Continue miralax bid  Also has senna, pericolace ordered     5. Goals of care: meeting today at 11:30 with [facility] team     GOALS OF CARE / TREATMENT PREFERENCES:     GOALS OF CARE:  Patient/Health Care Proxy Stated Goals: Prolong life    TREATMENT PREFERENCES:   Code Status: Full Code    Patient and family's personal goals include: to get better from acute issues to proceed w/ tx    Advance Care Planning:  [x] [facility] Interdisciplinary Team has updated the ACP Navigator with Health Care Decision Maker and Patient Capacity      Primary Decision Maker[facility]Mem[facility] Hospitalmariel[facility] Carlos - [phone]  Advance Care Planning 5/6/2022   Patient's Healthcare Decision Maker is: Legal Next of Verna    Primary Decision Maker Name -   Primary Decision [facility] Phone Number -   Primary Decision Maker Relationship to Patient -   Confirm Advance Directive None   Patient Would Like to Complete Advance Directive No       Medical Interventions: Full interventions       Other:    As far as possible, the palliative care team has discussed with patient / health care proxy about goals of care / treatment preferences for patient. HISTORY:     History obtained from: Pt, chart, staff, family     CHIEF COMPLAINT: abdominal pain     HPI/SUBJECTIVE:    The patient is:   [x] Verbal and participatory    5/10/22: pt says her pain is well controlled at this time. Says she had issues earlier because the pump was not working. Neg nausea. Day 6 of no bowel movement. Appetite poor.   5/8/22: Pt has had abdominal pain for past 1.5 weeks but has worsened and Oxycodone 5mg tablets not improving sx. Constant and worse w/ movement. Also feeling nauseated. Poor po intake recently. Last BM 5 days ago but +flatus. Clinical Pain Assessment (nonverbal scale for severity on nonverbal patients):   Clinical Pain Assessment  Severity: 0  Location: abdomen, pelvis  Character: aching, tight  Duration: worse over past week  Effect: harder to move, function  Factors: worse w/ movement  Frequency: constant          Duration: for how long has pt been experiencing pain (e.g., 2 days, 1 month, years)  Frequency: how often pain is an issue (e.g., several times per day, once every few days, constant)     FUNCTIONAL ASSESSMENT:     Palliative Performance Scale (PPS):  PPS: 40       PSYCHOSOCIAL/SPIRITUAL SCREENING:     Palliative IDT has assessed this patient for cultural preferences / practices and a referral made as appropriate to needs (Cultural Services, Patient Advocacy, Ethics, etc.)    Any spiritual / Episcopal concerns:  [] Yes /  [x] No   If \"Yes\" to discuss with pastoral care during IDT     Does caregiver feel burdened by caring for their loved one:   [] Yes /  [x] No /  [] No Caregiver Present    If \"Yes\" to discuss with social work during IDT    Anticipatory grief assessment:   [x] Normal  / [] Maladaptive     If \"Maladaptive\" to discuss with social work during IDT    ESAS Anxiety: Anxiety: 0    ESAS Depression: Depression: 0        REVIEW OF SYSTEMS:     Positive and pertinent negative findings in ROS are noted above in HPI. The following systems were [x] reviewed / [] unable to be reviewed as noted in HPI  Other findings are noted below. Systems: constitutional, ears/nose/mouth/throat, respiratory, gastrointestinal, genitourinary, musculoskeletal, integumentary, neurologic, psychiatric, endocrine. Positive findings noted below.   Modified ESAS Completed by: provider   Fatigue: 7 Drowsiness: 4   Depression: 0 Pain: 0   Anxiety: 0 Nausea: 0   Anorexia: 7 Dyspnea: 0     Constipation: Yes              PHYSICAL EXAM:     From RN flowsheet:  Wt Readings from Last 3 Encounters:   05/10/22 230 lb (104.3 kg)   22 197 lb 6.4 oz (89.5 kg)   22 197 lb (89.4 kg)     Blood pressure 131/85, pulse (!) 108, temperature 97.7 °F (36.5 °C), resp. rate (!) 39, height 5' 7\" (1.702 m), weight 230 lb (104.3 kg), last menstrual period 2022, SpO2 100 %, not currently breastfeeding.     Pain Scale 1: Numeric (0 - 10)  Pain Intensity 1: 9     Pain Location 1: Hip,Abdomen        Pain Intervention(s) 1: Encouraged PCA  Last bowel movement, if known: 5 days ago    Constitutional: easily aroused  Eyes: pupils equal, anicteric  ENMT: no nasal discharge, moist mucous membranes  Cardiovascular: regular rhythm, tachy   Respiratory: breathing not labored, symmetric  Gastrointestinal: distended, firm, hypoactive bowel sounds, non tender  Musculoskeletal: no deformity, no tenderness to palpation  Skin: warm, dry  Neurologic: following commands, moving all extremities  Psychiatric: full affect, no hallucinations  Other:       HISTORY:     Active Problems:    SIRS (systemic inflammatory response syndrome) ([facility] [address] (2022)      Past Medical History:   Diagnosis Date    Anemia 2018    Anxiety     anxiety - took medications as a teenager, no medications recently     Fibroids     Gestational hypertension affecting first pregnancy 2018    History of PCOS     Infertility, female     Polycystic disease, ovaries       Past Surgical History:   Procedure Laterality Date    HX  SECTION      HX GYN  2022    Exploratory laparotomy, total abdominal hysterectomy with bilateral salpingo-oophorectomy, omentectomy    HX WISDOM TEETH EXTRACTION        Family History   Problem Relation Age of Onset    Hypertension Mother     Hypertension Sister     Diabetes Sister     Hypertension Brother     Cancer Paternal Uncle     Diabetes Paternal Uncle     Diabetes Maternal Uncle     Breast Cancer Cousin       History reviewed, no pertinent family history.   Social History     Tobacco Use    Smoking status: Never Smoker    Smokeless tobacco: Never Used   Substance Use Topics    Alcohol use: No     No Known Allergies   Current Facility-Administered Medications   Medication Dose Route Frequency    cefepime (MAXIPIME) 1 g in 0.9% sodium chloride (MBP/ADV) 50 mL MBP  1 g IntraVENous Q12H    sodium zirconium cyclosilicate (LOKELMA) powder packet 10 g  10 g Oral NOW    calcium gluconate 1 gram in sodium chloride (ISO-OSM) 50 mL infusion  1 g IntraVENous ONCE    morphine CR (MS CONTIN) tablet 15 mg  15 mg Oral Q12H    HYDROmorphone (DILAUDID) injection 2 mg  2 mg IntraVENous Q2H PRN    polyethylene glycol (MIRALAX) packet 17 g  17 g Oral BID    0.9% sodium chloride infusion 250 mL  250 mL IntraVENous PRN    sodium bicarbonate (8.4%) 150 mEq in sterile water 1,000 mL infusion   IntraVENous CONTINUOUS    bisacodyL (DULCOLAX) suppository 10 mg  10 mg Rectal DAILY PRN    senna (SENOKOT) tablet 8.6 mg  1 Tablet Oral DAILY    senna-docusate (PERICOLACE) 8.6-50 mg per tablet 2 Tablet  2 Tablet Oral DAILY    famotidine (PF) (PEPCID) 20 mg in 0.9% sodium chloride 10 mL injection  20 mg IntraVENous Q24H    sodium chloride (NS) flush 5-10 mL  5-10 mL IntraVENous PRN    0.9% sodium chloride infusion 250 mL  250 mL IntraVENous PRN    oxyCODONE IR (ROXICODONE) tablet 10 mg  10 mg Oral Q4H PRN    naloxone (NARCAN) injection 0.4 mg  0.4 mg IntraVENous EVERY 2 MINUTES AS NEEDED    sodium chloride (NS) flush 5-40 mL  5-40 mL IntraVENous Q8H    sodium chloride (NS) flush 5-40 mL  5-40 mL IntraVENous PRN    acetaminophen (TYLENOL) tablet 650 mg  650 mg Oral Q6H PRN    Or    acetaminophen (TYLENOL) suppository 650 mg  650 mg Rectal Q6H PRN    ondansetron (ZOFRAN ODT) tablet 4 mg  4 mg Oral Q8H PRN    Or    ondansetron (ZOFRAN) injection 4 mg  4 mg IntraVENous Q6H PRN    HYDROmorphone 30 mg/30 mL (DILAUDID) PCA IntraVENous TITRATE    Vancomycin - pharmacy to dose   Other Rx Dosing/Monitoring          LAB AND IMAGING FINDINGS:     Lab Results   Component Value Date/Time    WBC 10.1 05/10/2022 12:45 AM    HGB 7.9 (L) 05/10/2022 12:45 AM    PLATELET 538 42/83/1804 12:45 AM     Lab Results   Component Value Date/Time    Sodium 125 (L) 05/10/2022 12:45 AM    Potassium 6.0 (H) 05/10/2022 12:45 AM    Chloride 91 (L) 05/10/2022 12:45 AM    CO2 21 05/10/2022 12:45 AM    BUN 48 (H) 05/10/2022 12:45 AM    Creatinine 3.89 (H) 05/10/2022 12:45 AM    Calcium 8.4 (L) 05/10/2022 12:45 AM    Magnesium 2.8 (H) 05/10/2022 12:45 AM    Phosphorus 7.3 (H) 05/10/2022 12:45 AM      Lab Results   Component Value Date/Time    Alk. phosphatase 68 05/08/2022 01:13 AM    Protein, total 6.3 (L) 05/08/2022 01:13 AM    Albumin 1.6 (L) 05/08/2022 01:13 AM    Globulin 4.7 (H) 05/08/2022 01:13 AM     No results found for: INR, PTMR, PTP, PT1, PT2, APTT, INREXT, INREXT   Lab Results   Component Value Date/Time    Iron 33 (L) 05/08/2022 01:13 AM    TIBC 133 (L) 05/08/2022 01:13 AM    Iron % saturation 25 05/08/2022 01:13 AM    Ferritin 1,529 (H) 05/08/2022 01:13 AM      No results found for: PH, PCO2, PO2  No components found for: [place]   Lab Results   Component Value Date/Time    CK 31 05/09/2022 07:21 AM                Total time:   Counseling / coordination time, spent as noted above:   > 50% counseling / coordination?:     Prolonged service was provided for  []30 min   []75 min in face to face time in the presence of the patient, spent as noted above. Time Start:   Time End:   Note: this can only be billed with 90838 (initial) or 14208 (follow up). If multiple start / stop times, list each separately.

## 2022-05-10 NOTE — PROGRESS NOTES
[facility]  Hospitalist Group                                                                                          Hospitalist Progress Note  Magda Lincoln,   Answering service: [phone] or [phone] from in house phone        Date of Service:  5/10/2022  NAME:  Lary Lundberg  :  1980  MRN:  [id number]      Admission Summary:   \"37 y.o. female with history of uterine sarcoma s/p total abdominal hysterectomy with bilateral salpingo-oophorectomy and omentectomy, history of polycystic ovarian syndrome, anemia and generalized anxiety disorder who presents to hospital with complaints of shortness of breath and severe abdominal pain. Per chart review and surgical oncology notes, patient underwent urgent surgery on  and was subsequently readmitted on April 15 for tachycardia and dyspnea. She had negative CT PE with PET scan at the time demonstrating pleural metastasis. She was diagnosed with acute cystitis and subsequently discharged on Bactrim. The patient denies any fever, chills, chest pain, nausea, vomiting, cough, congestion, recent illness, palpitations, or dysuria. Remarkable vitals on ER Presentations: Heart rate 129, /91, respiratory rate to 34  Labs Remarkable for: Hemoglobin 7.2, WBC 20.6, sodium 125, procalcitonin 8.31  ER Images: CT PE negative. Interval increase in size of numerous pulmonary nodules. CT abdomen pelvis: Stable size of uterine pelvic tumor and stable metastatic disease and retroperitoneal carcinomatosis. ER treatment: Cefepime, Dilaudid, 1 L normal saline bolus. \"       Interval history / Subjective: Follow up SIRS. Patient seen and examined. Reports uncontrolled pain. No bowel movement, reports not passing gas. Palliative addressing pain. Overnight, no urine output. Nursing placed zamora with no output. Has elevated Cr, K, low Na. Nephrology following. Urology consulted to verify appropriate zamora placement.  Family- mother, sister, mother in law at bedside. Assessment & Plan:       SIRS (tachycardia, fever), POA  -No identifiable infectious source   -Follow blood cultures- NGTD  -CT with increase numerous pulmonary nodules/masses, stable peritoneal carcinomatosis, small to moderate volume ascites  -Continue cefepime, vancomycin  -Symptoms could be explained by cancer. Would favor continuing broad spectrum antibiotics pending final cultures ~5 days  -Empiric cefepime, vancomycin     Acute renal failure: worseing  Metabolic acidosis, non anion gap:   Hyperkalemia  Hyponatremia  Urinary retention:   -nephrology and urology consulted  -continue bicarbonate drip  -zamora catheter placed  -concern for obstruction, I do not think patient would be strong enough to undergo a urological procedure if renal failure was due to tumor burden     Metastatic Uterine sarcoma   Intractable malignancy related pain  -Pain regimen: long acting with dilaudid pca   -Narcan with parameters  -Gyn Onc and palliative consult  -Family meeting 5/9     Symptomatic anemia  -Multifactorial anemia: Blood loss, iron deficiency and chronic disease  -s/p 1 unit pRBCs    Tachycardia  -multifactorial    Lower extremity swelling L>R: lower extremity dopplers negative     Patient remains critically ill. Updated family at bedside.        Code status: full   Prophylaxis: holding due to anemia  Care Plan discussed with: patient, nurse, family   Anticipated Disposition: >48 hours     Hospital Problems  Date Reviewed: 5/9/2022          Codes Class Noted POA    SIRS (systemic inflammatory response syndrome) ([facility] [address] ICD-10-CM: R65.10  ICD-9-CM: 995.90  5/8/2022 Unknown                Review of Systems:   Negative unless stated above      Vital Signs:    Last 24hrs VS reviewed since prior progress note.  Most recent are:  Visit Vitals  /85   Pulse (!) 108   Temp 97.7 °F (36.5 °C)   Resp (!) 39   Ht 5' 7\" (1.702 m)   Wt 104.3 kg (230 lb)   SpO2 100%   BMI 36.02 kg/m²         Intake/Output Summary (Last 24 hours) at 5/10/2022 1206  Last data filed at 5/9/2022 1841  Gross per 24 hour   Intake 306.3 ml   Output 1000 ml   Net -693.7 ml        Physical Examination:     I had a face to face encounter with this patient and independently examined them on 5/10/2022 as outlined below:          Constitutional:  + acute distress, young female, very ill appearing     ENT:  Oral mucosa moist, oropharynx benign. Resp:  CTA bilaterally. No wheezing/rhonchi/rales. + accessory muscle use. CV:  tachycardic, no murmurs, gallops, rubs    GI:  +distended, hypoactive bowel sounds, central scar, no fluid wave appreciated, no hepatosplenomegaly     Musculoskeletal:  legs cool to touch, left leg slightly larger compared to right, pulses intact    Neurologic:  Moves all extremities            Data Review:    Review and/or order of clinical lab test  Review and/or order of tests in the radiology section of CPT  Review and/or order of tests in the medicine section of CPT      Labs:     Recent Labs     05/10/22  0045 05/09/22 2110 05/09/22  0721 05/09/22  0721   WBC 10.1  --   --  15.9*   HGB 7.9* 8.1*   < > 6.0*   HCT 24.7* 25.7*   < > 20.0*     --   --  292    < > = values in this interval not displayed. Recent Labs     05/10/22  0045 05/09/22  0721 05/08/22 0113   * 130* 125*   K 6.0* 5.1 5.3*   CL 91* 107 93*   CO2 21 13* 25   BUN 48* 28* 17   CREA 3.89* 2.12* 0.82   * 132* 122*   CA 8.4* 6.1* 8.6   MG 2.8* 1.8 2.0   PHOS 7.3* 5.4* 4.1     Recent Labs     05/08/22 0113   ALT 15   AP 68   TBILI 1.2*   TP 6.3*   ALB 1.6*   GLOB 4.7*   LPSE 69*     No results for input(s): INR, PTP, APTT, INREXT, INREXT in the last 72 hours. Recent Labs     05/08/22  0113   TIBC 133*   PSAT 25   FERR 1,529*      Lab Results   Component Value Date/Time    Folate 6.2 05/08/2022 01:13 AM      No results for input(s): PH, PCO2, PO2 in the last 72 hours.   Recent Labs     05/09/22  0721   CPK 31     Lab Results Component Value Date/Time    Cholesterol, total 77 12/15/2021 09:17 AM    HDL Cholesterol 41 12/15/2021 09:17 AM    LDL, calculated 26.8 12/15/2021 09:17 AM    Triglyceride 46 12/15/2021 09:17 AM    CHOL/HDL Ratio 1.9 12/15/2021 09:17 AM     Lab Results   Component Value Date/Time    Glucose (POC) 116 04/13/2022 03:59 PM    Glucose (POC) 158 (H) 03/18/2022 08:41 AM    Glucose (POC) 156 (H) 03/12/2022 07:35 PM     Lab Results   Component Value Date/Time    Color DARK YELLOW 05/08/2022 05:50 AM    Appearance CLEAR 05/08/2022 05:50 AM    Specific gravity 1.020 05/08/2022 05:50 AM    Specific gravity 1.015 04/27/2022 03:20 PM    pH (UA) 5.0 05/08/2022 05:50 AM    Protein 30 (A) 05/08/2022 05:50 AM    Glucose Negative 05/08/2022 05:50 AM    Ketone TRACE (A) 05/08/2022 05:50 AM    Bilirubin Negative 05/02/2022 11:27 AM    Urobilinogen 1.0 05/08/2022 05:50 AM    Nitrites Negative 05/08/2022 05:50 AM    Leukocyte Esterase TRACE (A) 05/08/2022 05:50 AM    Epithelial cells FEW 05/08/2022 05:50 AM    Bacteria Negative 05/08/2022 05:50 AM    WBC 0-4 05/08/2022 05:50 AM    RBC 0-5 05/08/2022 05:50 AM         Medications Reviewed:     Current Facility-Administered Medications   Medication Dose Route Frequency    cefepime (MAXIPIME) 1 g in 0.9% sodium chloride (MBP/ADV) 50 mL MBP  1 g IntraVENous Q12H    sodium zirconium cyclosilicate (LOKELMA) powder packet 10 g  10 g Oral NOW    calcium gluconate 1 gram in sodium chloride (ISO-OSM) 50 mL infusion  1 g IntraVENous ONCE    HYDROmorphone (DILAUDID) injection 2 mg  2 mg IntraVENous Q2H PRN    oxyCODONE ER (OxyCONTIN) tablet 10 mg  10 mg Oral Q8H    polyethylene glycol (MIRALAX) packet 17 g  17 g Oral BID    0.9% sodium chloride infusion 250 mL  250 mL IntraVENous PRN    sodium bicarbonate (8.4%) 150 mEq in sterile water 1,000 mL infusion   IntraVENous CONTINUOUS    bisacodyL (DULCOLAX) suppository 10 mg  10 mg Rectal DAILY PRN    senna (SENOKOT) tablet 8.6 mg  1 Tablet Oral DAILY    senna-docusate (PERICOLACE) 8.6-50 mg per tablet 2 Tablet  2 Tablet Oral DAILY    famotidine (PF) (PEPCID) 20 mg in 0.9% sodium chloride 10 mL injection  20 mg IntraVENous Q24H    sodium chloride (NS) flush 5-10 mL  5-10 mL IntraVENous PRN    0.9% sodium chloride infusion 250 mL  250 mL IntraVENous PRN    oxyCODONE IR (ROXICODONE) tablet 10 mg  10 mg Oral Q4H PRN    naloxone (NARCAN) injection 0.4 mg  0.4 mg IntraVENous EVERY 2 MINUTES AS NEEDED    sodium chloride (NS) flush 5-40 mL  5-40 mL IntraVENous Q8H    sodium chloride (NS) flush 5-40 mL  5-40 mL IntraVENous PRN    acetaminophen (TYLENOL) tablet 650 mg  650 mg Oral Q6H PRN    Or    acetaminophen (TYLENOL) suppository 650 mg  650 mg Rectal Q6H PRN    ondansetron (ZOFRAN ODT) tablet 4 mg  4 mg Oral Q8H PRN    Or    ondansetron (ZOFRAN) injection 4 mg  4 mg IntraVENous Q6H PRN    HYDROmorphone 30 mg/30 mL (DILAUDID) PCA   IntraVENous TITRATE    Vancomycin - pharmacy to dose   Other Rx Dosing/Monitoring     ______________________________________________________________________  EXPECTED LENGTH OF STAY: 4d 19h  ACTUAL LENGTH OF STAY:          2                 Magda Job DO I[place]

## 2022-05-10 NOTE — PROGRESS NOTES
Requesting Provider: Sandrine Wagoner DO - Reason for Consultation: \"MICHEAL\"  Pre-existing [facility] Patient:   No                Patient: Pilar Concepcion MRN: [id number]  SSN: [id number]    YOB: 1980  Age: 43 y.o. Sex: female     Location: [facility]       Code Status: Full Code   PCP: Junior An MD  - [phone]   Emergency Contact:  Primary Emergency Contact: Linda Torres, Home Phone: [phone]   Race/Confucianism/Language: Robby Quintero / Steffanie Ley / Giovanni Spire: Payor: Filippo Luo / Plan: Alisa Mathur / Product Type: PPO /    Prior Admission Data: 4/15/22 [facility] [address] [facility], [facility] 75 R   Hospitalized:  Hospital Day: 3 - Admitted 5/8/2022 12:53 AM     CONSULTANTS  IP CONSULT TO GYNECOLOGICAL ONCOLOGY  IP CONSULT TO PALLIATIVE CARE - PROVIDER  IP CONSULT TO NEPHROLOGY  IP CONSULT  [facility]    ICD-10-CM ICD-9-CM   1. 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 SOB (shortness of breath)  R06.02 786.05   4. Abdominal pain, generalized  R10.84 789.07   5. Tachycardia  R00.0 785.0   6. Hyponatremia  E87.1 276.1   7. Sepsis, due to unspecified organism, unspecified whether acute organ dysfunction present ([facility] [address]  A41.9 038.9     995.91   8. SIRS (systemic inflammatory response syndrome) (HCC)  R65.10 995.90   9. Cancer associated pain  G89.3 338.3   10. Constipation due to opioid therapy  K59.03 564.09    T40.2X5A E935.2   11. Palliative care encounter  Z51.5 V66.7         Assessment/Plan:       · MICHEAL   · Metastatic uterine cancer     - CT imaged reviewed by myself and Dr. Neri Marie. I do not believe that her acute renal failure is secondary to obstruction. Burger placement confirmed in the bladder. More likely a medical process. I do not recommend intervention with stent placement at this time.  Additionally, stent placement would likely be challenging given the large pelvic mass. Nephrology is not recommending dialysis at this time. Palliative and Gyn Onc discussing goals of care with the family. Will follow peripherally. Supervising Dr. Ruperto MCRAE      CC: No chief complaint on file. HPI: She is a 43 y.o. female history of uterine sarcoma s/p urgent ex lap, SHELLY/BSO 3/18/22 - subsequent imaging has shown progression of disease with peritoneal carcinomatosis and pulmonary mets. She was admitted on 2022 from home w/ abdominal pain, SOB, fever, leukocytosis - w/o identifiable source of infection. UA negative, BCx NGTD. on IV Vanc and Cefepime. Recent dx last month for cystitis. Seen in consult by Urology as a new patient to  for MICHEAL with concern for obstruction. Her creatinine was 0.82 at admission. Acute rise to 2.12 in 1 day and up to 3.89 today. CT A/P w/ contrast at admission negative for renal masses, renal or ureteral calculi or hydro. The bladder is normal. Stable massive uterine pelvic tumor measuring 22.2x 16.7 x 20.0 cm. Small to moderate volume ascites is similar to prior exam.    Staff tell me that she has had low UOP with elevated bladder scans >300 cc but no output with I/O cath. A zamora was placed today. I manually irrigated with sterile water and it irrigated easily with equal input and output.  Confirmed zamora placement in the bladder.        Temp (24hrs), Av.1 °F (36.7 °C), Min:97.7 °F (36.5 °C), Max:98.7 °F (37.1 °C)       Creatinine   Date/Time Value Ref Range Status   05/10/2022 12:45 AM 3.89 (H) 0.55 - 1.02 MG/DL Final     Comment:     INVESTIGATED PER DELTA CHECK PROTOCOL   2022 07:21 AM 2.12 (H) 0.55 - 1.02 MG/DL Final     Comment:     INVESTIGATED PER DELTA CHECK PROTOCOL   2022 01:13 AM 0.82 0.55 - 1.02 MG/DL Final   2022 09:10 AM 0.54 (L) 0.55 - 1.02 MG/DL Final   2022 12:12 PM 0.65 0.57 - 1.00 mg/dL Final     Current Antimicrobial Therapy (168h ago, onward)     Ordered     Start Stop    22 2309 cefepime (MAXIPIME) 2 g in 0.9% sodium chloride (MBP/ADV) 100 mL MBP  2 g,   IntraVENous,   EVERY 12 HOURS        References:    [facility]    05/09/22 1959 --    05/08/22 1047  Vancomycin - pharmacy to dose  Other,   RX DOSING/MONITORING        References:    [facility]    05/08/22 1046 --              Key Anti-Platelet Anticoagulant Meds     The patient is on no antiplatelet meds or anticoagulants.         Diet: ADULT DIET Regular -       Labs     Lab Results   Component Value Date/Time    Lactic acid 1.9 05/08/2022 03:35 AM    Lactic acid 1.3 03/19/2022 03:55 AM    Lactic acid 2.5 (HH) 03/18/2022 09:20 AM    WBC 10.1 05/10/2022 12:45 AM    HCT 24.7 (L) 05/10/2022 12:45 AM    PLATELET 431 29/55/7980 12:45 AM    Sodium 125 (L) 05/10/2022 12:45 AM    Potassium 6.0 (H) 05/10/2022 12:45 AM    Chloride 91 (L) 05/10/2022 12:45 AM    CO2 21 05/10/2022 12:45 AM    BUN 48 (H) 05/10/2022 12:45 AM    Creatinine 3.89 (H) 05/10/2022 12:45 AM    Glucose 136 (H) 05/10/2022 12:45 AM    Calcium 8.4 (L) 05/10/2022 12:45 AM    Magnesium 2.8 (H) 05/10/2022 12:45 AM     UA:   Lab Results   Component Value Date/Time    Color DARK YELLOW 05/08/2022 05:50 AM    Appearance CLEAR 05/08/2022 05:50 AM    Specific gravity 1.020 05/08/2022 05:50 AM    Specific gravity 1.015 04/27/2022 03:20 PM    pH (UA) 5.0 05/08/2022 05:50 AM    Protein 30 (A) 05/08/2022 05:50 AM    Glucose Negative 05/08/2022 05:50 AM    Ketone TRACE (A) 05/08/2022 05:50 AM    Bilirubin Negative 05/02/2022 11:27 AM    Urobilinogen 1.0 05/08/2022 05:50 AM    Nitrites Negative 05/08/2022 05:50 AM    Leukocyte Esterase TRACE (A) 05/08/2022 05:50 AM    Epithelial cells FEW 05/08/2022 05:50 AM    Bacteria Negative 05/08/2022 05:50 AM    WBC 0-4 05/08/2022 05:50 AM    RBC 0-5 05/08/2022 05:50 AM     Imaging     Results for orders placed during the hospital encounter of 05/08/22    CT ABD PELV W CONT    Narrative  EXAM:  CTA CHEST W OR W WO CONT, CT ABD PELV W CONT    INDICATION: sob    COMPARISON: CT abdomen pelvis 5/3/2022, CT chest 4/14/2022. CONTRAST:  100 mL of Isovue-370. TECHNIQUE:  Following the uneventful intravenous administration of contrast, thin axial  images were obtained through the chest, abdomen and pelvis. Coronal and sagittal  reconstructions were generated. MIP reconstructions of the chest were generated. Oral contrast was not administered. CT dose reduction was achieved through use  of a standardized protocol tailored for this examination and automatic exposure  control for dose modulation. FINDINGS:  Chest:  Vascular:  No evidence of acute or chronic pulmonary embolism. The pulmonary arteries are  normal in size. The thoracic aorta is normal in size. Lungs/Pleura: Again seen are numerous pulmonary nodules/masses, which overall  have increased in size since prior exam, for example, right upper lobe nodule  now measures 2.0 cm (series 3 image 114), previously measuring 11 mm; right  upper lobe nodule now measures 22 mm (series 3 image 109), previously measuring  13 mm; right middle lobe nodule now measures 20 mm (series 3 image 102),  previously measuring 11 mm; lingular nodule now measures 24 mm (series 3 image  101), previously measuring 13 mm. There is no pleural effusion or pneumothorax. Axilla/Soft Tissue: No pathologic axillary adenopathy. Mediastinum: The heart is normal in size. No pericardial effusion. No pathologic  mediastinal or hilar adenopathy. Bones: No evidence of acute fracture, dislocation, or aggressive osseous  abnormality. Abdomen/Pelvis:  Liver:  No focal liver lesions. Biliary system: Gallbladder is unremarkable. No intrahepatic or extrahepatic  biliary ductal dilatation. Spleen: Normal.    Pancreas: Normal.    Kidneys/Ureters/Bladder: No renal masses. No renal or ureteral calculi. No  hydronephrosis or hydroureter.  The bladder is normal.    Adrenals: Normal.    Stomach/bowel: No dilation or abnormal wall thickening is present. No free  intraperitoneal air noted. Reproductive Organs: Stable size of massive uterine pelvic tumor measuring 22.2  x 16.7 x 20.0 cm. Vasculature: Normal caliber arteries. Portal vein, SMV, and splenic vein are  patent. Nodes: Stable size of left para-aortic lymph node measuring 2.0 cm (series 6  image 54). Stable peritoneal carcinomatosis with omental caking and numerous  discrete peritoneal implants, for example, in the right abdomen adjacent to the  liver measuring 2.2 cm (series 6 image 32) and the posterior deep right pelvis  measuring 2.8 x 2.0 cm (series 6 image 96). Fluid: Small to moderate volume ascites, similar to prior exam.    Bones/Soft Tissue: No acute fractures or aggressive osseous lesions are seen. Impression  Chest:  1. No evidence of pulmonary embolism. 2. Interval increase in size of numerous pulmonary nodules/masses since  4/14/2022, consistent with metastatic disease progression. Abdomen/pelvis:  1. Stable metastatic disease and peritoneal carcinomatosis since recent CT  5/3/2022. Small to moderate volume ascites is similar to prior exam.  2. Stable size of massive uterine pelvic tumor. US Results (most recent):  Results from [place] encounter on 03/11/22    US PELV NON OBS    Narrative  EXAMINATION: US TRANSVAGINAL, US PELV NON OBS    INDICATION: abnormal uterine bleeding, fibroids, anemia    COMPARISON: Pelvic ultrasound 2/22/2022. TECHNIQUE: Multiplanar grayscale transabdominal ultrasound of the female pelvis  was performed. Multiplanar high resolution grayscale transvaginal ultrasound of  the female pelvis was performed. The examination is supplemented with color  Doppler as needed. FINDINGS:    TRANSABDOMINAL:  The uterus measures 15.2 x 14.6 x 15.1 cm. The endometrial stripe is not well  seen due to distortion by multiple large fibroids. The bilateral ovaries are not well seen due to overlying bowel gas.     TRANSVAGINAL:  The uterus measures 18.7 x 13.1 x 15.8 cm. The endometrial stripe is not well  seen due to distortion by multiple large fibroids, though none able to be  discretely measured. The bilateral ovaries are not well seen due to overlying bowel gas. No free fluid in the pelvis. Impression  1. Enlarged leiomyomatous uterus. Cultures     All Micro Results     Procedure Component Value Units Date/Time    CULTURE, BLOOD, PAIRED [[id number]] Collected: 05/08/22 0335    Order Status: Completed Specimen: Blood Updated: 05/10/22 0611     Special Requests: NO SPECIAL REQUESTS        Culture result: NO GROWTH 2 DAYS       CULTURE, MRSA [[id number]]     Order Status: Sent Specimen: Nasal from Nares     URINE CULTURE HOLD SAMPLE [[id number]] Collected: 05/08/22 0550    Order Status: Completed Specimen: Urine from Serum Updated: 05/08/22 0552     Urine culture hold       Urine on hold in Microbiology dept for 2 days. If unpreserved urine is submitted, it cannot be used for addtional testing after 24 hours, recollection will be required.           URINE CULTURE HOLD SAMPLE [[id number]]     Order Status: Canceled Specimen: Urine            Past History: (Complete 2+/3 areas)   No Known Allergies   Current Facility-Administered Medications   Medication Dose Route Frequency    polyethylene glycol (MIRALAX) packet 17 g  17 g Oral BID    0.9% sodium chloride infusion 250 mL  250 mL IntraVENous PRN    sodium bicarbonate (8.4%) 150 mEq in sterile water 1,000 mL infusion   IntraVENous CONTINUOUS    cefepime (MAXIPIME) 2 g in 0.9% sodium chloride (MBP/ADV) 100 mL MBP  2 g IntraVENous Q12H    bisacodyL (DULCOLAX) suppository 10 mg  10 mg Rectal DAILY PRN    senna (SENOKOT) tablet 8.6 mg  1 Tablet Oral DAILY    senna-docusate (PERICOLACE) 8.6-50 mg per tablet 2 Tablet  2 Tablet Oral DAILY    famotidine (PF) (PEPCID) 20 mg in 0.9% sodium chloride 10 mL injection  20 mg IntraVENous Q24H    sodium chloride (NS) flush 5-10 mL  5-10 mL IntraVENous PRN    0.9% sodium chloride infusion 250 mL  250 mL IntraVENous PRN    oxyCODONE IR (ROXICODONE) tablet 10 mg  10 mg Oral Q4H PRN    naloxone (NARCAN) injection 0.4 mg  0.4 mg IntraVENous EVERY 2 MINUTES AS NEEDED    sodium chloride (NS) flush 5-40 mL  5-40 mL IntraVENous Q8H    sodium chloride (NS) flush 5-40 mL  5-40 mL IntraVENous PRN    acetaminophen (TYLENOL) tablet 650 mg  650 mg Oral Q6H PRN    Or    acetaminophen (TYLENOL) suppository 650 mg  650 mg Rectal Q6H PRN    ondansetron (ZOFRAN ODT) tablet 4 mg  4 mg Oral Q8H PRN    Or    ondansetron (ZOFRAN) injection 4 mg  4 mg IntraVENous Q6H PRN    HYDROmorphone 30 mg/30 mL (DILAUDID) PCA   IntraVENous TITRATE    ibuprofen (MOTRIN) tablet 400 mg  400 mg Oral TID PRN    Vancomycin - pharmacy to dose   Other Rx Dosing/Monitoring    Prior to Admission medications    Medication Sig Start Date End Date Taking? Authorizing Provider   acetaminophen (Tylenol Extra Strength) 500 mg tablet Take 1,000 mg by mouth every six (6) hours as needed for Pain. Provider, Historical   senna-docusate (PERICOLACE) 8.6-50 mg per tablet Take 1 Tablet by mouth nightly. Stop for loose stools. Indications: constipation  Patient not taking: Reported on 5/3/2022 4/26/22   Nuvia Obrien PA-C   bisacodyL (DULCOLAX) 10 mg supp Insert 10 mg into rectum daily. Indications: constipation 4/26/22   Steve Tse PA-C   polyethylene glycol (Miralax) 17 gram/dose powder Take 17 g by mouth daily. Provider, Historical        PMHx:  has a past medical history of Anemia (12/17/2018), Anxiety, Fibroids, Gestational hypertension affecting first pregnancy (12/13/2018), History of PCOS, Infertility, female, and Polycystic disease, ovaries. She has no past medical history of Abnormal Papanicolaou smear of cervix, Asthma, Breast disorder, Chlamydia, Complication of anesthesia, Diabetes ([address] Epilepsy ([facility] [address] Genital herpes, Gestational diabetes, Gonorrhea, Heart abnormality, Herpes gestationis, Herpes simplex virus (HSV) infection, Human immunodeficiency virus (HIV) disease ([facility] [address] Kidney disease, Liver disease, Nicotine vapor product user, Non-nicotine vapor product user, Phlebitis and thrombophlebitis, Pituitary disorder ([facility] [address] Postpartum depression, Rhesus isoimmunization affecting pregnancy, Sickle cell disease ([facility] [address] Sickle cell trait syndrome ([facility] [address] Syphilis, Systemic lupus erythematosus ([facility] [address] Thyroid activity decreased, or Trauma. PSurgHx:  has a past surgical history that includes hx  section; hx wisdom teeth extraction (); and hx gyn (2022). PSocHx:  reports that she has never smoked. She has never used smokeless tobacco. She reports that she does not drink alcohol and does not use drugs.    ROS:  (Complete - 10 systems) - DENIES: Weightloss (Constitutional), Dry mouth (ENMT), Chest pain (CV), SOB (Respiratory), Constipation (GI),  Pallor (Skin), TIA Sx (Neuro), Confusion (Psych), Easy bruising (Heme); ADMITS Weakness (MS),    Physical Exam: (Comprehesive - 8+ 1995 Systems)     (1) Constitutional:  Appears uncomfortable, alert  FIO2:   on SpO2: O2 Sat (%): 100 %  O2 Device: Nasal cannula O2 Flow Rate (L/min): 3 l/min  Patient Vitals for the past 24 hrs:   BP Temp Pulse Resp SpO2 Weight   05/10/22 1000   (!) 108      05/10/22 0942 131/85 97.7 °F (36.5 °C) (!) 109 (!) 39 100 %    05/10/22 0600   (!) 108   104.3 kg (230 lb)   05/10/22 0400   (!) 109      05/10/22 0200   (!) 107      22 2335 121/83 98.1 °F (36.7 °C) (!) 108 30 100 %    22 2200   (!) 110      22 1958   (!) 113      22 1800   (!) 111      22 1755 131/88 98 °F (36.7 °C) (!) 111 20 100 %    22 1655 119/81 98.1 °F (36.7 °C) (!) 113 18 100 %    22 1626 116/83 98 °F (36.7 °C) (!) 114 22 100 %    22 1603 114/75 98.7 °F (37.1 °C) (!) 114 20 100 %    22 1400   (!) 116      22 1239 114/72 98.2 °F (36.8 °C) (!) 115 20 100 %    05/09/22 1200   (!) 116          Date 05/09/22 0700 - 05/10/22 0659 05/10/22 0700 - 05/11/22 0659   Shift 2080-6563 0239-0966 24 Hour Total 2012-4416 2448-6689 24 Hour Total   INTAKE   Blood 306.3  306.3        Volume (TRANSFUSE PACKED RBC'S) 306.3  306. 3      Shift Total(mL/kg) 306. 3(3.1)  306.3(2.9)      OUTPUT   Urine(mL/kg/hr) 1000(0.8)  1000(0.4)        Urine Voided 1000  1000      Shift Total(mL/kg) 1000(10)  1000(9.6)      NET -693.7  -693.7      Weight (kg) 100.2 104.3 104.3 104.3 104.3 104.3      (2) ENMT:   moist mucous membranes, normal sinuses   (3) Respiratory:  breathing easily, no distress   (4) GI:  diffuse tenderness, abd distension    (5) :   zamora, yellow UA, no CVA tenderness   (6) Lymphatic:  no adenopathy, neck supple   (7) Muscloskeletal:  no gross deformity, normal ROM   (8) Skin:  no rash, warm & dry   (9) Neuro:  lethargic, normal speech      Signed By: Alena Love NP  - May 10, 2022

## 2022-05-10 NOTE — PROGRESS NOTES
Bedside shift change report given to [facility], RN (oncoming nurse) by Cody Leger RN (offgoing nurse).  Report included the following information SBAR, Kardex, ED Summary, Intake/Output, MAR and Cardiac Rhythm ST.

## 2022-05-10 NOTE — PROGRESS NOTES
Bedside and Verbal shift change report given to [address] (oncoming nurse) by Margraita Mccullough RN (offgoing nurse). Report included the following information SBAR, Kardex, Intake/Output and Recent Results.

## 2022-05-10 NOTE — PROGRESS NOTES
Transitions of Care    Reason for Admission:  Fever, Leukocytosis,                   RUR Score:    27%       PCP: First and Last name:  Joe Parr MD     Name of Practice:   Are you a current patient: Yes   Approximate date of last visit: 4/1/2022   Can you do a virtual visit with your PCP:   Yes             Resources/supports as identified by patient/family:   Mrs. ANGELA BARR [facility] MARK has strong family support                Top Challenges facing patient (as identified by patient/family and CM): Finances/Medication cost?    No                Transportation? Ms. [facility] appears to be weak and symptomatic for pain. She may need assistance with transportation. Support system or lack thereof? Mrs. ANGELA BARR [facility] MARK has a strong family support system                      Living arrangements? Ms. [facility] lives with her  in a 1 story home with 3-4 steps to enter. She will need a hospital bed when she is discharged. She may need oxygen when she is discharged. Self-care/ADLs/Cognition? Mrs. [facility] was drowsy but alert during the interview. She would not be able to care for herself if her mentation and strength does not improve. She may need home health or personal care when she is discharged. Palliative care has seen Mrs. ANGELA BARR [facility] MARK. Current Advanced Directive/Advance Care Plan:  Full Code  Mrs. [facility] does not have any advanced care documents. Healthcare Decision Maker:   Click here to complete [address] including selection of the Healthcare Decision Maker Relationship (ie \"Primary\")      Primary Decision MakerGail [facility] - [phone]    Payor Source Payor: BLUE CROSS / Plan: [address] [place] [address] / Product Type: PPO /                             Plan for utilizing home health:   Mrs. ANGELA BARR [facility] MARK may need home health when she is discharged. She is willing to have this service if needed.                     Transition of Care Plan:   Mrs. [facility] is planning to return to her home.  She will need a hospital bed and may need O2 at discharge. She may also need home health and personal care when she is discharged. She is dependent with her ADLs and IADLs at this time. Care Management Interventions  PCP Verified by CM: Yes  Last Visit to PCP: 04/01/22  Palliative Care Criteria Met (RRAT>21 & CHF Dx)?: Yes  Palliative Consult Recommended?: Yes  Mode of Transport at Discharge: [address] (CM Consult): Discharge Planning  MyChart Signup: No  Discharge Durable Medical Equipment: No  Physical Therapy Consult: No  Occupational Therapy Consult: No  Speech Therapy Consult: No  Support Systems: Spouse/Significant Other,Other Family Member(s)  Confirm Follow Up Transport: Family  The Patient and/or Patient Representative was Provided with a Choice of Provider and Agrees with the Discharge Plan?: No  Freedom of Choice List was Provided with Basic Dialogue that Supports the Patient's Individualized Plan of Care/Goals, Treatment Preferences and Shares the Quality Data Associated with the Providers?: No  [place] Resource Information Provided?: No  Discharge Location  Patient Expects to be Discharged to[de-identified] Home  Will continue to follow for discharge planning.   Signed By: Lg Kent LCSW     May 10, 2022

## 2022-05-10 NOTE — PROGRESS NOTES
Virtual Post op Visit to discuss pathology report, surgery was on 3/18/2022    1. Have you been to the ER, urgent care clinic since your last visit? Hospitalized since your last visit? Yes, surgery with Dr. Perry Stafford on 3/18/2022    2. Have you seen or consulted any other health care providers outside of the [address] [place] [address] since your last visit? Include any pap smears or colon screening.    no Quality 394a: Meningococcal Immunizations For Adolescents: Patient had one dose of meningococcal vaccine (serogroups A, C, W, Y) on or between the patient's 11th and 13th birthdays. Quality 394c: Hpv Vaccine For Adolescents: Patient has had at least three HPV vaccines on or between the patient's 9th and 13th birthdays. Detail Level: Detailed Quality 394b: Td/Tdap Immunizations For Adolescents: Patient had one tetanus, diphtheria toxoids and acellular pertussis vaccine (Tdap) on or between the patient's 10th and 13th birthdays.

## 2022-05-10 NOTE — PROGRESS NOTES
Chart checked, case discussed with pt's nurse who did not clear pt for therapy at this time, pt not medically stable. PT will hold, follow, and see for the eval when appropriate.  Thank you, Carla Dasilva, PT

## 2022-05-10 NOTE — PROGRESS NOTES
Pharmacist Note - Vancomycin Dosing  Therapy day 3  Indication: Sepsis of unclear source  -hx metastatic uterine sarcoma (s/p total abdominal hysterectomy with bilateral salpingo-oophorectomy and omentectomy in March 2022  Current regimen: Dosing by levels- last received 1250 mg IV on 5/9 @ 0524    Recent Labs     05/10/22  0045 05/09/22  0721 05/08/22  0113   WBC 10.1 15.9* 20.6*   CREA 3.89* 2.12* 0.82   BUN 48* 28* 17       A random vancomycin level of 43.7 mcg/mL was obtained ~20 hours post dose    Goal target range Trough 10-15 mcg/mL      Plan: Continue to HOLD vancomycin and will get another random level in 48 hours if therapy still active. Pharmacy will continue to monitor this patient daily for changes in clinical status and renal function.     Vickie Carlson, [place], BCPS

## 2022-05-10 NOTE — PROGRESS NOTES
RENAL  PROGRESS NOTE        Subjective:    family at bedside;patient said pain is slightly better    Objective:   VITALS SIGNS:    Visit Vitals  /85   Pulse (!) 108   Temp 97.7 °F (36.5 °C)   Resp (!) 39   Ht 5' 7\" (1.702 m)   Wt 104.3 kg (230 lb)   LMP 2022 (Exact Date)   SpO2 100%   BMI 36.02 kg/m²       O2 Device: Nasal cannula   O2 Flow Rate (L/min): 3 l/min   Temp (24hrs), Av.1 °F (36.7 °C), Min:97.7 °F (36.5 °C), Max:98.7 °F (37.1 °C)         PHYSICAL EXAM:  abd distension  Resting,arousable    DATA REVIEW:     INTAKE / OUTPUT:   Last shift:      No intake/output data recorded. Last 3 shifts:  1901 - 05/10 0700  In: 306.3   Out: 1300 [Urine:1300]    Intake/Output Summary (Last 24 hours) at 5/10/2022 1056  Last data filed at 2022 1841  Gross per 24 hour   Intake 306.3 ml   Output 1000 ml   Net -693.7 ml         LABS:   Recent Labs     05/10/22  0045 22  2110 22  0721 22  1728 22  0113   WBC 10.1  --  15.9*  --  20.6*   HGB 7.9* 8.1* 6.0*   < > 7.2*   HCT 24.7* 25.7* 20.0*   < > 23.0*     --  292  --  336    < > = values in this interval not displayed. 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  \"  Suspicion of sepsis  Drop in H/H    As above worry about obstruction among others;it will be very challenging to address it via PCN. ..view her massive pelvic mass  Dialysis is indicated but discuss it with her mom and rest of her family ,I will not recommend it view her advanced cancer(family meeting today )  lokelma  One dose IV calcium  Poor prognosis  Discussed in length with her and her family    Varsha Persaud MD

## 2022-05-11 NOTE — PROGRESS NOTES
Problem: Pain  Goal: *Control of Pain  Outcome: Progressing Towards Goal     Problem: Falls - Risk of  Goal: *Absence of Falls  Description: Document Fadia Fall Risk and appropriate interventions in the flowsheet. Outcome: Progressing Towards Goal  Note: Fall Risk Interventions:            Medication Interventions: Evaluate medications/consider consulting pharmacy    Elimination Interventions: Toileting schedule/hourly rounds              Problem: Pressure Injury - Risk of  Goal: *Prevention of pressure injury  Description: Document Samson Scale and appropriate interventions in the flowsheet.   Outcome: Progressing Towards Goal  Note: Pressure Injury Interventions:  Sensory Interventions: Keep linens dry and wrinkle-free,Minimize linen layers    Moisture Interventions: Absorbent underpads    Activity Interventions: Pressure redistribution bed/mattress(bed type)    Mobility Interventions: HOB 30 degrees or less    Nutrition Interventions: Document food/fluid/supplement intake    Friction and Shear Interventions: Minimize layers

## 2022-05-11 NOTE — PROGRESS NOTES
TRANSFER - IN REPORT:    Verbal report received from [facility], [facility], [facility] (name) on Konrad Cummings  being received from [address] (unit) for routine progression of care      Report consisted of patients Situation, Background, Assessment and   Recommendations(SBAR). Information from the following report(s) SBAR, Kardex, [facility] and Recent Results was reviewed with the receiving nurse. Opportunity for questions and clarification was provided. Assessment completed upon patients arrival to unit and care assumed.

## 2022-05-11 NOTE — PROGRESS NOTES
Bedside shift change report given to [facility], RN (oncoming nurse) by IRAJ VELARDE (offgoing nurse).  Report included the following information SBAR, Kardex, ED Summary, Intake/Output, MAR and Cardiac Rhythm ST.

## 2022-05-11 NOTE — PROGRESS NOTES
Provided support for this pt in [facility] [place] [facility]. Reviewed pt's chart prior to this visit. Pt's mother and aunt were present for this visit. Facilitated life review to assess potential support needs or coping strategies. Pt's family offered update of pt's current situation. Mi is important for the entire family. Offered prayer for pt and pt's family. Assured pt's family of continued prayers. Domonique Martinez MDiv.  Staff   Request  Support/Spiritual Care Services on [phone])

## 2022-05-11 NOTE — PROGRESS NOTES
Palliative Medicine  [place]: [phone]  [facility]: [phone]    The Palliative Medicine SW met with the patient at bedside along with Dr. Rah Patel and her mother Ivy Salinas- patient appears to be restless, mother shares that she has been having hot flashes today- also noted patient is a difficult stick and has had difficulty with IV access. The patient intermittently is awake, will engage briefly with simple answers- but support overall provided to patient's mother during visit. Dr. Rah Patel adjusting medications- see her note for additional details, possible PICC line placement-     SW stayed in the room after MD completed assessment, sat with the patient's mother and introduced role, provided support. Cynthia Wheeler at bedside- SW discussed how Ivy Salinas was coping- inquired about her spirits, and she endorsed that she was hanging in there. Ivy Salnias shared that she does have other children- Ivy Salinas lives in [place], [address] [place] [address] acknowledged the difficulty family facing, all the information- provided supportive presence. SW inquired about the patient's  Larry Raymond and how he is doing, she reflected on Larry Ramyond, he is currently caring for their three year old son- she shared that Gaby Colon is his wife, SW acknowledged the difficulty with the current situation, she shares that he will be in visiting today. SW provided supportive presence to family, made Ivy Salinas aware that SW is available for ongoing support to family during this time- Yady soria, amendable for SW to continue to follow for ongoing support.      Thank you for including Palliative Medicine in the care of [address] [place] [address]  [phone]

## 2022-05-11 NOTE — PROGRESS NOTES
Bedside shift change report given to [employer], RN (oncoming nurse) by Cristal Marquez RN (offgoing nurse). Report included the following information SBAR, Kardex, MAR and Recent Results.

## 2022-05-11 NOTE — PROGRESS NOTES
Occupational therapy   [phone]  Reviewed and pt case discussed with covering PT who spoke with RN. Pt remains in extreme pain with any attempts at movement by RN staff, and covering MD teams are in agreement and recommend hospice and comfort measures as pt's prognosis is poor. OT interventions are inappropriate at this time. Will complete OT orders. Please re consult should pt's status improve and she could benefit from OT services. Thank you. Lesly Quevedo MS, OTR/L

## 2022-05-11 NOTE — PROCEDURES
PICC Placement Note        PRE-PROCEDURE VERIFICATION  Correct Procedure: yes  Correct Site:  yes  Temperature: Temp: 97.5 °F (36.4 °C), Temperature Source: Temp Source: Axillary  Recent Labs     05/11/22  0313   BUN 61*   CREA 5.15*      WBC 8.5     Allergies: Patient has no known allergies. Education materials, including PICC Booklet, for PICC Care given to patient: yes. See Patient Education activity for further details. PROCEDURE DETAIL  PICC placed using modified Seldinger method. A double lumen PICC line was started for vascular access. The following documentation is in addition to the PICC properties in the lines/airways flowsheet :  Lot #: [id number]  Was xylocaine 1% used intradermally:  yes  Catheter Length: 41 (cm)  Vein Selection for PICC:right basilic  Central Line Bundle followed yes  Complication Related to Insertion: none    The placement was verified by ECG  technology: The  tip location is on the right side and the tip is in the  superior vena cava. See ECG results for PICC tip placement. Report given to nurse Bam Doyle. Line is okay to use.     Grazyna Clinton RN

## 2022-05-11 NOTE — PROGRESS NOTES
Gynecologic Oncology - [address], [address], [address]  P [phone]  F [phone]       Patient: Don Ng  Admit Date: 5/8/2022  Admit Dx: SIRS (systemic inflammatory response syndrome) (HCC) [R65.10]    Subjective:     Patient seen this AM with mother and  at bedside. No events overnight. Pain control is adequate. Breathing remains labored. Burger with minimal UOP since placement. Remains bedbound. Drowsy, does engage in limited conversation. Objective:     Date 05/10/22 0700 - 05/11/22 0659 05/11/22 0700 - 05/12/22 0659   Shift 0971-4480 4328-6374 24 Hour Total 3019-4526 5511-7952 24 Hour Total   INTAKE   Shift Total(mL/kg)         OUTPUT   Urine(mL/kg/hr) 0(0) 0(0) 0(0)        Urine Output (mL) (Urinary Catheter 05/10/22 Coude) 0 0 0      Shift Total(mL/kg) 0(0) 0(0) 0(0)      NET 0 0 0      Weight (kg) 104.3 105.2 105.2 105.2 105.2 105.2       Physical Exam  /71   Pulse (!) 104   Temp 98 °F (36.7 °C)   Resp (!) 32   Ht 5' 7\" (1.702 m)   Wt 232 lb (105.2 kg)   LMP 02/07/2022 (Exact Date)   SpO2 100%   BMI 36.34 kg/m²      General:  fatigued, cooperative, mild distress     Cardiac:  Tachy, reg rhythm tele        Lungs:  Poor excursion, reduced/labored. On 3L NC  Abdomen:  Extensively protuberant, tenderness generalized w/o rebound/gaurding   Extremity: edema pedal trace     Wt Readings from Last 3 Encounters:   05/11/22 232 lb (105.2 kg)   05/03/22 197 lb 6.4 oz (89.5 kg)   05/02/22 197 lb (89.4 kg)         Data Review      Recent Labs     05/11/22  0313 05/10/22  0045 05/09/22  2110 05/09/22  0721 05/09/22  0721   WBC 8.5 10.1  --   --  15.9*   HGB 7.5* 7.9* 8.1*   < > 6.0*   HCT 23.6* 24.7* 25.7*   < > 20.0*    264  --   --  292    < > = values in this interval not displayed.      Recent Labs     05/11/22  0313 05/10/22  0045 05/09/22  0721   * 125* 130*   K 6.4* 6.0* 5.1   CL 88* 91* 107   * 136* 132*   BUN 61* 48* 28*   CREA 5.15* 3.89* 2.12*   CA 8.3* 8.4* 6.1*   MG 3.0* 2.8* 1.8   PHOS 8.4* 7.3* 5.4*         Assessment/Plan:     43 y.o. admitted for abdominal pain with uterine sarcoma s/p recent TAHBSO 3/18/21, likely stage IV at diagnosis with pulmonary mets on subsequent PET. Now with grossly progressive disease/hemoperitoneum s/p recent gamzar/carboplatin on 5/6/21  Admitted now for abdominal pain, persistent anemia, SIRS    Rapidly progressive disease and deterioration in clinical status. Anemia persistent despite multiple units in the last weeks, another on admit d/t likely hemoperitoneum and tumoral bleeding. Now with ARF, metabolic disturbances c/w contrast nephropathy and tumor lysis. Nephrology following w/o recommendation for dialysis. Though she is at risk for obstruction, her Cr was normal on admit. Currently trending aneuric, don't think she is significantly obstructed at this time. Her clinical condition is such that we could not provided additional chemotherapy and her prognosis remains poor. We revisited code status and hospice. Antione Aleman and  both agree she would like to be made NO CODE. Discussed inpatient hospice concept. Her mother would like to engage this, though Dionicio Hess and Antione Aleman are not there at this time. Currently continue with palliative/limited interventions such as antibiotics, pain control, oxygen and assisted breathing PRN via positive pressure PRN. They understand goal is for comfort and are mentally coming to terms. Her , Dionicio Hess, is her chosen/legal MDM. Aunt who is a  is visiting today. Dionicio Hess will remain at bedside and bringing their 4yo to hospital today. We will continue to follow, expressed availability to family.        Cinthia Jackson PA-C

## 2022-05-11 NOTE — PROGRESS NOTES
Physical Therapy Note    Chart reviewed and pt case discussed with covering RN. Pt remains in extreme pain with any attempts at movement by RN staff, and covering MD teams are in agreement and recommend hospice and comfort measures as pt's prognosis is poor. PT interventions are inappropriate at this time. Will complete PT orders. Please re consult should pt's status improve and she could benefit from PT services. Thank you.     Brady Knowles PT, DPT

## 2022-05-11 NOTE — PROGRESS NOTES
RENAL  PROGRESS NOTE        Subjective:    at bedside,patient in mild pain    Objective:   VITALS SIGNS:    Visit Vitals  /71   Pulse (!) 104   Temp 98 °F (36.7 °C)   Resp (!) 32   Ht 5' 7\" (1.702 m)   Wt 105.2 kg (232 lb)   LMP 2022 (Exact Date)   SpO2 100%   BMI 36.34 kg/m²       O2 Device: Nasal cannula   O2 Flow Rate (L/min): 3 l/min   Temp (24hrs), Av.8 °F (36.6 °C), Min:97.4 °F (36.3 °C), Max:98.1 °F (36.7 °C)         PHYSICAL EXAM:  abd distension  Resting,arousable    DATA REVIEW:     INTAKE / OUTPUT:   Last shift:      No intake/output data recorded. Last 3 shifts: No intake/output data recorded. Intake/Output Summary (Last 24 hours) at 2022 0739  Last data filed at 5/10/2022 2000  Gross per 24 hour   Intake    Output 0 ml   Net 0 ml         LABS:   Recent Labs     05/11/22  0313 05/10/22  0045 22  0721 22  0721   WBC 8.5 10.1  --   --  15.9*   HGB 7.5* 7.9* 8.1*   < > 6.0*   HCT 23.6* 24.7* 25.7*   < > 20.0*    264  --   --  292    < > = values in this interval not displayed. Recent Labs     05/11/22  0313 05/10/22  0045 22  0721   * 125* 130*   K 6.4* 6.0* 5.1   CL 88* 91* 107   CO2 24 21 13*   * 136* 132*   BUN 61* 48* 28*   CREA 5.15* 3.89* 2.12*   CA 8.3* 8.4* 6.1*   MG 3.0* 2.8* 1.8   PHOS 8.4* 7.3* 5.4*           Assessment:   MICHEAL,in the setting of labile BP,IV dye on 22; worry about obstruction with a massive uterine mass compressing her bladder(and her bowels);TLS,abd comp syndrome,ATN. 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  \"  Suspicion of sepsis  Drop in H/H       Dialysis is indicated but discuss it today with her mom ,than with her  who was at bedside ,I will not recommend it view her advanced cancer ; said NO DIALYSIS   recom hospice     Poor prognosis  Discussed in length with her and her family,GYN Onc team    Carson Sam MD

## 2022-05-11 NOTE — PROGRESS NOTES
[facility]                                                                                          Hospitalist Progress Note  Magda Major Fee, DO  Answering service: [phone] OR [phone] from in house phone        Date of Service:  2022  NAME:  Marlen Waggoner  :  1980  MRN:  [id number]      Admission Summary:   \"37 y.o. female with history of uterine sarcoma s/p total abdominal hysterectomy with bilateral salpingo-oophorectomy and omentectomy, history of polycystic ovarian syndrome, anemia and generalized anxiety disorder who presents to hospital with complaints of shortness of breath and severe abdominal pain. Per chart review and surgical oncology notes, patient underwent urgent surgery on  and was subsequently readmitted on April 15 for tachycardia and dyspnea. She had negative CT PE with PET scan at the time demonstrating pleural metastasis. She was diagnosed with acute cystitis and subsequently discharged on Bactrim. The patient denies any fever, chills, chest pain, nausea, vomiting, cough, congestion, recent illness, palpitations, or dysuria. Remarkable vitals on ER Presentations: Heart rate 129, /91, respiratory rate to 34  Labs Remarkable for: Hemoglobin 7.2, WBC 20.6, sodium 125, procalcitonin 8.31  ER Images: CT PE negative. Interval increase in size of numerous pulmonary nodules. CT abdomen pelvis: Stable size of uterine pelvic tumor and stable metastatic disease and retroperitoneal carcinomatosis. ER treatment: Cefepime, Dilaudid, 1 L normal saline bolus. \"       Interval history / Subjective: Follow up SIRS. Patient seen and examined. Patient appears to be actively dying. Extremities cool. No urine production. Cr elevated. Now on 5 liters. Multiple family members at bedside. Discussed with patient's mother that anticipate patient will pass soon and treatment would be more beneficial under hospice care.  Mother states that patient's  is in agreement. Palliative care notified. Assessment & Plan:     Metastatic Uterine sarcoma   Intractable malignancy related pain  SIRS (tachycardia, fever), POA  Acute renal failure: not a candidate for dialysis   Metabolic acidosis, non anion gap  Hyperkalemia  Hyponatremia  Symptomatic anemia, s/p 1 unit pRBCs  Tachycardia  -No identifiable infectious source. Symptoms are secondary to cancer and now in multi system organ failure. Recommend hospice and in agreement. Transition to comfort care. Code status: DNR     Hospital Problems  Date Reviewed: 5/9/2022          Codes Class Noted POA    SIRS (systemic inflammatory response syndrome) ([facility] [address] ICD-10-CM: R65.10  ICD-9-CM: 995.90  5/8/2022 Unknown                Review of Systems:   Negative unless stated above      Vital Signs:    Last 24hrs VS reviewed since prior progress note.  Most recent are:  Visit Vitals  /67 (BP 1 Location: Left upper arm, BP Patient Position: At rest;Lying)   Pulse 100   Temp 97.6 °F (36.4 °C)   Resp 28   Ht 5' 7\" (1.702 m)   Wt 105.2 kg (232 lb)   SpO2 100%   BMI 36.34 kg/m²         Intake/Output Summary (Last 24 hours) at 5/11/2022 1511  Last data filed at 5/10/2022 2000  Gross per 24 hour   Intake    Output 0 ml   Net 0 ml        Physical Examination:     I had a face to face encounter with this patient and independently examined them on 5/11/2022 as outlined below:          Constitutional:  + mild acute distress, young female, very ill appearing     ENT:  Oral mucosa moist, oropharynx benign   Resp:  coarse bilaterally    CV:  tachycardic, no murmurs, gallops, rubs    GI:  +distended, no bowel sounds, central well healed scar, no fluid wave appreciated, no hepatosplenomegaly     Musculoskeletal:  legs and arms cool to touch with very diminished pulses    Neurologic:  Moves all extremities            Data Review:    Review and/or order of clinical lab test  Review and/or order of tests in the radiology section of [facility]  Review and/or order of tests in the medicine section of [facility]      Labs:     Recent Labs     05/11/22 0313 05/10/22  0045   WBC 8.5 10.1   HGB 7.5* 7.9*   HCT 23.6* 24.7*    264     Recent Labs     05/11/22 0313 05/10/22  0045 05/09/22  0721   * 125* 130*   K 6.4* 6.0* 5.1   CL 88* 91* 107   CO2 24 21 13*   BUN 61* 48* 28*   CREA 5.15* 3.89* 2.12*   * 136* 132*   CA 8.3* 8.4* 6.1*   MG 3.0* 2.8* 1.8   PHOS 8.4* 7.3* 5.4*   URICA  --  8.4*  --      No results for input(s): ALT, AP, TBIL, TBILI, TP, ALB, GLOB, GGT, AML, LPSE in the last 72 hours. No lab exists for component: SGOT, GPT, AMYP, HLPSE  No results for input(s): INR, PTP, APTT, INREXT, INREXT in the last 72 hours. No results for input(s): FE, TIBC, PSAT, FERR in the last 72 hours. Lab Results   Component Value Date/Time    Folate 6.2 05/08/2022 01:13 AM      No results for input(s): PH, PCO2, PO2 in the last 72 hours.   Recent Labs     05/09/22  0721   CPK 31     Lab Results   Component Value Date/Time    Cholesterol, total 77 12/15/2021 09:17 AM    HDL Cholesterol 41 12/15/2021 09:17 AM    LDL, calculated 26.8 12/15/2021 09:17 AM    Triglyceride 46 12/15/2021 09:17 AM    CHOL/HDL Ratio 1.9 12/15/2021 09:17 AM     Lab Results   Component Value Date/Time    Glucose (POC) 116 04/13/2022 03:59 PM    Glucose (POC) 158 (H) 03/18/2022 08:41 AM    Glucose (POC) 156 (H) 03/12/2022 07:35 PM     Lab Results   Component Value Date/Time    Color DARK YELLOW 05/08/2022 05:50 AM    Appearance CLEAR 05/08/2022 05:50 AM    Specific gravity 1.020 05/08/2022 05:50 AM    Specific gravity 1.015 04/27/2022 03:20 PM    pH (UA) 5.0 05/08/2022 05:50 AM    Protein 30 (A) 05/08/2022 05:50 AM    Glucose Negative 05/08/2022 05:50 AM    Ketone TRACE (A) 05/08/2022 05:50 AM    Bilirubin Negative 05/02/2022 11:27 AM    Urobilinogen 1.0 05/08/2022 05:50 AM    Nitrites Negative 05/08/2022 05:50 AM    Leukocyte Esterase TRACE (A) 05/08/2022 05:50 AM Epithelial cells FEW 05/08/2022 05:50 AM    Bacteria Negative 05/08/2022 05:50 AM    WBC 0-4 05/08/2022 05:50 AM    RBC 0-5 05/08/2022 05:50 AM         Medications Reviewed:     Current Facility-Administered Medications   Medication Dose Route Frequency    mineral oil (FLEET) enema   Rectal PRN    LORazepam (ATIVAN) injection 0.5 mg  0.5 mg IntraVENous Q8H PRN    HYDROmorphone (DILAUDID) injection 2 mg  2 mg IntraVENous Q2H PRN    oxyCODONE ER (OxyCONTIN) tablet 10 mg  10 mg Oral Q8H    0.9% sodium chloride infusion 250 mL  250 mL IntraVENous PRN    sodium bicarbonate (8.4%) 150 mEq in sterile water 1,000 mL infusion   IntraVENous CONTINUOUS    bisacodyL (DULCOLAX) suppository 10 mg  10 mg Rectal DAILY PRN    sodium chloride (NS) flush 5-10 mL  5-10 mL IntraVENous PRN    0.9% sodium chloride infusion 250 mL  250 mL IntraVENous PRN    oxyCODONE IR (ROXICODONE) tablet 10 mg  10 mg Oral Q4H PRN    naloxone (NARCAN) injection 0.4 mg  0.4 mg IntraVENous EVERY 2 MINUTES AS NEEDED    sodium chloride (NS) flush 5-40 mL  5-40 mL IntraVENous Q8H    sodium chloride (NS) flush 5-40 mL  5-40 mL IntraVENous PRN    acetaminophen (TYLENOL) tablet 650 mg  650 mg Oral Q6H PRN    Or    acetaminophen (TYLENOL) suppository 650 mg  650 mg Rectal Q6H PRN    ondansetron (ZOFRAN ODT) tablet 4 mg  4 mg Oral Q8H PRN    Or    ondansetron (ZOFRAN) injection 4 mg  4 mg IntraVENous Q6H PRN    HYDROmorphone 30 mg/30 mL (DILAUDID) PCA   IntraVENous TITRATE     ______________________________________________________________________  EXPECTED LENGTH OF STAY: 4d 19h  ACTUAL LENGTH OF STAY:          [address] [facility],

## 2022-05-11 NOTE — HOSPICE
[facility]  [phone]     Patient Name: Marlen Waggoner  YOB: 1980  Age: 43 y.o. [facility] RN Note:  Hospice consult received, reviewing chart. Will follow up with Unit Nurse and Care Manager to discuss plan of care, patient status and discharge disposition within the hour. Called and spoke with  Pati Esqueda. Appt is set up for 10 am on Thursday. Thank you for the opportunity to be of service to this patient.     [id number] Liaison  [phone] c  [phone] o

## 2022-05-11 NOTE — PROGRESS NOTES
Palliative Medicine Consult  Joshua: [phone] ([phone])    Patient Name: Marlen Waggoner  YOB: 1980    Date of Initial Consult: 5/9/22  Reason for Consult: Overwhelming sx  Requesting Provider: Chavo Andujar   Primary Care Physician: Gwen Hendrickson MD     SUMMARY:   Marlen Waggoner is a 43 y.o. with a past history of anxiety, PCOS, uterine sarcoma s/p urgent ex lap, SHELLY/BSO 3/18/22 - subsequent imaging has shown rapid recurrence and progression of disease with peritoneal carcinomatosis and pulmonary mets. She has received only 1 chemo tx w/ Dr Angeli Smith. She was admitted on 5/8/2022 from home w/ fever, leukocytosis - on IV Vanc and Cefepime and BCx pending. Recent dx last month for cystitis. Acute anemia-  transfused PRBC. Concerned that pelvic mass is hemoperitoneum. Significantly worsened renal function - pt and  deciding upon dialysis, recommendation was for hospice care. 5/11- changed to DNR status. Discussions held w/ pt,  and mother. Current medical issues leading to Palliative Medicine involvement include: overwhelming sx- severe intractable pain. On Dilaudid PCA.  reviewed- recent RX for Oxycodone 5mg but prior to that was controlled on Tramadol. Social: Pt  to Pati Esqueda. They have a 2 yo son. Her parents (mother Lenny Jeffers) and her mother in law Ms Louann Sewell all very involved. Pain regimen: Prior to admission was taking Oxycodone 5mg po every 6h prn w/out sx relief- and before this RX was on Tramadol only. 5/8/22: dilaudid PCA 0.2mg basal and 0.4mg pca dose  5/10/22: start oxycontin    PALLIATIVE DIAGNOSES:   1. Intractable cancer pain- on Dilaudid PCA  2. Constipation due to opioids, immobility  3. Progression of malignancy   4. Debility    5. Goals of care  6. Palliative Care Encounter     PLAN:     Addendum 330pm: Pt continues to decline. Family has discussed situation w/ GynOnc, primary team and renal and with themselves w/ as much input as pt can give.       John Agent is [facility]. Many family members have been in and family has requested that nursing not turn, not check vitals, etc. Mother Trista Garcia and Anay Osgood at bedside state that all family wants hospice. I speak w/ John Agent - confirm that he wants inpatient hospice and that means no further monitoring, testing, labs, IV abx and just focus on pain/SOB/sx management. Understands that we have been trying to balance pain w/ being alert, but now we may have to accept the unintended side effect. We will help her die comfortable/peacefully. He will be back at the hospital ~ 5pm and I have asked hospice RN to meet w/ him to admit. Transfer to  [address], family aware.     -Cannot take po. Add basal back to Dilaudid PCA now 0.4mg/h basal, 0.4mg IV every 10 min demand  -RN dose of Dilaudid for pain and SOB  -Other comfort meds in. Discussed w/ Dr William Alejandre, [facility] RN and Tatiana Runner aware      Note from this AM:    1. Pt very drowsy but easily aroused and answers questions appropriately. She is not oversedated from opioids. Drowsiness from medications but moreso I think from progressive cancer and MICHEAL. 2. Meet w/ pt and her mother Trista Garcia along w/ Orlando Peña- difficult discussions held with pt and  John Means (legal NOK)- now DNR/I status, but still deciding on dialysis. 3. Cancer pain: Pt cont to demonstrate ability to press the pca button. 4. Oxycontin 10mg every 8h started 5/10. Controlling pain. 5. Cont Dilaudid PCA no basal, 0.4mg IV every 10 min demand dose and IV rescue dose. 6. Poor IV access. Will need access even if pt transitions to IP hospice as has many sx. When pt unable to take in po medications, will switch back to basal rate on PCA. 7. PICC ordered. 8. Constipation: related to opioids,poor appetite, immobility. Not taking regular bowel regimen at home. Last BM 7 days ago. Very hypoactive bowel sounds.  While CT did not show an obstruction, I don't think this could be completely ruled out for reslistor to be considered. Mag Citrate given 5/9. 9. Cont Miralx bid, Senna, trial suppository and/or enema today if pt wishes. Discussed w/ Dr Spain Hunger / TREATMENT PREFERENCES:     GOALS OF CARE:  Patient/Health Care Proxy Stated Goals: Prolong life    TREATMENT PREFERENCES:   Code Status: DNR    Patient and family's personal goals include: to get better from acute issues to proceed w/ tx    Advance Care Planning:  [x] [facility] Interdisciplinary Team has updated the ACP Navigator with Health Care Decision Maker and Patient Capacity      Primary Decision MakeCarolina Mason - [phone]  Advance Care Planning 5/6/2022   Patient's Healthcare Decision Maker is: Legal Next of Verna 69   Primary Decision Maker Name -   Primary Decision [address] Phone Number -   Primary Decision Maker Relationship to Patient -   Confirm Advance Directive None   Patient Would Like to Complete Advance Directive No       Medical Interventions: Full interventions       Other:    As far as possible, the palliative care team has discussed with patient / health care proxy about goals of care / treatment preferences for patient. HISTORY:     History obtained from: Pt, chart, staff, family     CHIEF COMPLAINT: abdominal pain     HPI/SUBJECTIVE:    The patient is:   [x] Verbal and participatory    5/11- Pain controlled, but feels hot, nauseated. Restless in bed. Mother at bedside. 5/10/22: pt says her pain is well controlled at this time. Says she had issues earlier because the pump was not working. Neg nausea. Day 6 of no bowel movement. Appetite poor. 5/8/22: Pt has had abdominal pain for past 1.5 weeks but has worsened and Oxycodone 5mg tablets not improving sx. Constant and worse w/ movement. Also feeling nauseated. Poor po intake recently. Last BM 5 days ago but +flatus.      Clinical Pain Assessment (nonverbal scale for severity on nonverbal patients):   Clinical Pain Assessment  Severity: 0  Location: abdomen, pelvis  Character: aching, tight  Duration: worse over past week  Effect: harder to move, function  Factors: worse w/ movement  Frequency: constant          Duration: for how long has pt been experiencing pain (e.g., 2 days, 1 month, years)  Frequency: how often pain is an issue (e.g., several times per day, once every few days, constant)     FUNCTIONAL ASSESSMENT:     Palliative Performance Scale (PPS):  PPS: 20       PSYCHOSOCIAL/SPIRITUAL SCREENING:     Palliative IDT has assessed this patient for cultural preferences / practices and a referral made as appropriate to needs (Cultural Services, Patient Advocacy, Ethics, etc.)    Any spiritual / Mandaeism concerns:  [] Yes /  [x] No   If \"Yes\" to discuss with pastoral care during IDT     Does caregiver feel burdened by caring for their loved one:   [] Yes /  [x] No /  [] No Caregiver Present    If \"Yes\" to discuss with social work during IDT    Anticipatory grief assessment:   [x] Normal  / [] Maladaptive     If \"Maladaptive\" to discuss with social work during IDT    ESAS Anxiety: Anxiety: 4    ESAS Depression: Depression: 0        REVIEW OF SYSTEMS:     Positive and pertinent negative findings in ROS are noted above in HPI. The following systems were [x] reviewed / [] unable to be reviewed as noted in HPI  Other findings are noted below. Systems: constitutional, ears/nose/mouth/throat, respiratory, gastrointestinal, genitourinary, musculoskeletal, integumentary, neurologic, psychiatric, endocrine. Positive findings noted below. Modified ESAS Completed by: provider   Fatigue: 8 Drowsiness: 2   Depression: 0 Pain: 0   Anxiety: 4 Nausea: 0   Anorexia: 7 Dyspnea: 0     Constipation: Yes              PHYSICAL EXAM:     From RN flowsheet:  Wt Readings from Last 3 Encounters:   05/11/22 232 lb (105.2 kg)   05/03/22 197 lb 6.4 oz (89.5 kg)   05/02/22 197 lb (89.4 kg)     Blood pressure 110/78, pulse 100, temperature 97.5 °F (36.4 °C), resp.  rate 30, height 5' 7\" (1.702 m), weight 232 lb (105.2 kg), last menstrual period 2022, SpO2 96 %, not currently breastfeeding. Pain Scale 1: Numeric (0 - 10)  Pain Intensity 1: 10  Pain Onset 1: acute  Pain Location 1: Abdomen  Pain Orientation 1: Anterior  Pain Description 1: Aching  Pain Intervention(s) 1: Encouraged PCA  Last bowel movement, if known: 7 days ago    Constitutional: easily aroused, but ill appearing  Eyes: pupils equal, anicteric  ENMT: no nasal discharge, moist mucous membranes  Cardiovascular: regular rhythm, tachy   Respiratory: breathing min labored   Gastrointestinal: distended, firm, very hypoactive bowel sounds,   Musculoskeletal: no deformity, no tenderness to palpation  Skin: warm, dry  Neurologic: following commands, moving all extremities  Psychiatric: restless        HISTORY:     Active Problems:    SIRS (systemic inflammatory response syndrome) (HCC) (2022)      Past Medical History:   Diagnosis Date    Anemia 2018    Anxiety     anxiety - took medications as a teenager, no medications recently     Fibroids     Gestational hypertension affecting first pregnancy 2018    History of PCOS     Infertility, female     Polycystic disease, ovaries       Past Surgical History:   Procedure Laterality Date    HX  SECTION      HX GYN  2022    Exploratory laparotomy, total abdominal hysterectomy with bilateral salpingo-oophorectomy, omentectomy    HX WISDOM TEETH EXTRACTION        Family History   Problem Relation Age of Onset    Hypertension Mother     Hypertension Sister     Diabetes Sister     Hypertension Brother     Cancer Paternal Uncle     Diabetes Paternal Uncle     Diabetes Maternal Uncle     Breast Cancer Cousin       History reviewed, no pertinent family history.   Social History     Tobacco Use    Smoking status: Never Smoker    Smokeless tobacco: Never Used   Substance Use Topics    Alcohol use: No     No Known Allergies   Current Facility-Administered Medications   Medication Dose Route Frequency    mineral oil (FLEET) enema   Rectal PRN    cefepime (MAXIPIME) 1 g in 0.9% sodium chloride (MBP/ADV) 50 mL MBP  1 g IntraVENous Q12H    HYDROmorphone (DILAUDID) injection 2 mg  2 mg IntraVENous Q2H PRN    oxyCODONE ER (OxyCONTIN) tablet 10 mg  10 mg Oral Q8H    polyethylene glycol (MIRALAX) packet 17 g  17 g Oral BID    0.9% sodium chloride infusion 250 mL  250 mL IntraVENous PRN    sodium bicarbonate (8.4%) 150 mEq in sterile water 1,000 mL infusion   IntraVENous CONTINUOUS    bisacodyL (DULCOLAX) suppository 10 mg  10 mg Rectal DAILY PRN    senna (SENOKOT) tablet 8.6 mg  1 Tablet Oral DAILY    senna-docusate (PERICOLACE) 8.6-50 mg per tablet 2 Tablet  2 Tablet Oral DAILY    famotidine (PF) (PEPCID) 20 mg in 0.9% sodium chloride 10 mL injection  20 mg IntraVENous Q24H    sodium chloride (NS) flush 5-10 mL  5-10 mL IntraVENous PRN    0.9% sodium chloride infusion 250 mL  250 mL IntraVENous PRN    oxyCODONE IR (ROXICODONE) tablet 10 mg  10 mg Oral Q4H PRN    naloxone (NARCAN) injection 0.4 mg  0.4 mg IntraVENous EVERY 2 MINUTES AS NEEDED    sodium chloride (NS) flush 5-40 mL  5-40 mL IntraVENous Q8H    sodium chloride (NS) flush 5-40 mL  5-40 mL IntraVENous PRN    acetaminophen (TYLENOL) tablet 650 mg  650 mg Oral Q6H PRN    Or    acetaminophen (TYLENOL) suppository 650 mg  650 mg Rectal Q6H PRN    ondansetron (ZOFRAN ODT) tablet 4 mg  4 mg Oral Q8H PRN    Or    ondansetron (ZOFRAN) injection 4 mg  4 mg IntraVENous Q6H PRN    HYDROmorphone 30 mg/30 mL (DILAUDID) PCA   IntraVENous TITRATE    Vancomycin - pharmacy to dose   Other Rx Dosing/Monitoring          LAB AND IMAGING FINDINGS:     Lab Results   Component Value Date/Time    WBC 8.5 05/11/2022 03:13 AM    HGB 7.5 (L) 05/11/2022 03:13 AM    PLATELET 523 65/36/0688 03:13 AM     Lab Results   Component Value Date/Time    Sodium 124 (L) 05/11/2022 03:13 AM    Potassium 6.4 (H) 05/11/2022 03:13 AM Chloride 88 (L) 05/11/2022 03:13 AM    CO2 24 05/11/2022 03:13 AM    BUN 61 (H) 05/11/2022 03:13 AM    Creatinine 5.15 (H) 05/11/2022 03:13 AM    Calcium 8.3 (L) 05/11/2022 03:13 AM    Magnesium 3.0 (H) 05/11/2022 03:13 AM    Phosphorus 8.4 (H) 05/11/2022 03:13 AM      Lab Results   Component Value Date/Time    Alk. phosphatase 68 05/08/2022 01:13 AM    Protein, total 6.3 (L) 05/08/2022 01:13 AM    Albumin 1.6 (L) 05/08/2022 01:13 AM    Globulin 4.7 (H) 05/08/2022 01:13 AM     No results found for: INR, PTMR, PTP, PT1, PT2, APTT, INREXT, INREXT   Lab Results   Component Value Date/Time    Iron 33 (L) 05/08/2022 01:13 AM    TIBC 133 (L) 05/08/2022 01:13 AM    Iron % saturation 25 05/08/2022 01:13 AM    Ferritin 1,529 (H) 05/08/2022 01:13 AM      No results found for: PH, PCO2, PO2  No components found for: [place]   Lab Results   Component Value Date/Time    CK 31 05/09/2022 07:21 AM                Total time:   Counseling / coordination time, spent as noted above:   > 50% counseling / coordination?:     Prolonged service was provided for  []30 min   []75 min in face to face time in the presence of the patient, spent as noted above. Time Start:   Time End:   Note: this can only be billed with 45628 (initial) or 31659 (follow up). If multiple start / stop times, list each separately.

## 2022-05-11 NOTE — PROGRESS NOTES
Transition Plan of Care  RUR 26%-High  Disposition-palliative note reviewed and consult for Hospice sent to [facility]/Hospice.

## 2022-05-12 ENCOUNTER — HOME CARE VISIT (OUTPATIENT)
Dept: HOSPICE | Facility: HOSPICE | Age: 42
End: 2022-05-12

## 2022-05-12 NOTE — DEATH NOTE
Death Declaration        Pt Name  Heath Martinez   Admit date:  5/8/2022   Date and time of death:  5/11/22 @ 2200   Room Number  614/01    Medical Record Number  [id number] @ [facility]   Age  43 y.o. Date of Birth 1980   PCP Georgie Alexander MD   Attending physician Colleen Laird DO      Code Status  DNR    Patient seen and examined     Mental status   Unresponsive    Pupils Dilated and Fixed    Respiration Nil    Pulse  Absent    Heart Sounds  Absent    Rhythm  Flat line   Family  Notified by Nursing staff    Chaplan Service  Notified by Nursing staff     Death certificate and discharge summary completion remain  Dr. Colleen Laird DO's responsibility.    [place], [place]                              5/11/2022

## 2022-05-12 NOTE — HOSPICE
[employer] Worker Note:     LCSW visited pt and pts family to offer support and assurance hospice would be visiting in the morning for admission at 10 AM.  Pt appeared to be in the active stage of dying. Pt appeared to be having labored breathing and LCSW alerted pts RN who was going to provide pt medication. Many family members were present supporting one another. Among the family members were pts , mother, mother-in-law, sister, best friend and pts 1year old son. LCSW visited with pts 1 yr old son and he appeared to be coping ok. Pts  explained he did not think pts son fully understood what was going on with pt. LCSW explained counseling services and that hospice could be apart of that support to explain to pts son. Pts  was appreciative of support. Pts  reported having great support around him and reported coping ok at this time. LCSW also met with pts mother who reported good support as well and appeared to be accepting of pts prognosis. LCSW provided supportive counseling and encouragement for pts family to call with any needs. LCSW provided hospice folder with information and phone number for family to call with any questions/needs. LCSW updated hospice RN on visit with pt and family. LCSW will continue to monitor and assess needs.       Thank you for the opportunity to be of service to this patient.     Naomi Weir LCSW  Clinical Supervisor of [address] [place] [address]   [phone]

## 2022-05-12 NOTE — PROGRESS NOTES
Pager response to notification of Death in [address]. Large family presence. Condolences given. Provided empathic listening, grief support/counseling, and collaborated with and supported staff. Chart reviewed. Please contact Spiritual Care for any further referrals. Visited by: Zev Sky. Puja Walters, [address] [place] [address] paging Service [phone] Please contact [facility] for any further referrals.

## 2022-05-13 ENCOUNTER — HOSPITAL ENCOUNTER (OUTPATIENT)
Dept: INFUSION THERAPY | Age: 42
End: 2022-05-13

## 2022-05-13 LAB
BACTERIA SPEC CULT: NORMAL
SERVICE CMNT-IMP: NORMAL

## 2022-05-13 NOTE — DISCHARGE SUMMARY
Discharge Summary       PATIENT ID: Jacqueline Mayo  MRN: [id number]   YOB: 1980    DATE OF ADMISSION: 5/8/2022 12:53 AM    DATE OF DISCHARGE: 5/11/2022   PRIMARY CARE PROVIDER: Varsha Sheldon MD     ATTENDING PHYSICIAN: Mariah Crespo DO   DISCHARGING PROVIDER: Mariah Crespo DO    To contact this individual call [phone] and ask the  to page. If unavailable ask to be transferred the Adult Hospitalist Department. 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 CT abdomen/pelvis as noted below. Labs were remarkable for hemoglobin 7.2, WBC 20.6, sodium 125, procalcitonin 8. 31. Vitals remarkable for heart rate 129, blood pressure 141/91, respiratory rate 34. She was initially admitted for possible sepsis and initiated on broad-spectrum antibiotics and fluid resuscitation. She required PCA pump for intractable pain. Unfortunately, patient had rapid deterioration of her clinical status with acute renal failure and respiratory failure. Infection was ruled out. Her clinical presentation was determined secondary to aggressive metastatic uterine cancer. Goals of care discussion were held with family and decision was made to transition to comfort care. She passed away peacefully on 5/11/2022 at 2200 in the presence of her family. CT chest/abdomen/pelvis:  IMPRESSION  Chest:  1. No evidence of pulmonary embolism.   2. Interval increase in size of numerous pulmonary nodules/masses since  4/14/2022, consistent with metastatic disease progression. Abdomen/pelvis:  1. Stable metastatic disease and peritoneal carcinomatosis since recent CT  5/3/2022. Small to moderate volume ascites is similar to prior exam.  2. Stable size of massive uterine pelvic tumor.     DISCHARGE DIAGNOSES / PLAN:      Metastatic Uterine sarcoma   Intractable malignancy related pain  SIRS (tachycardia, fever), POA- (sepsis rule out)  Acute hypoxic respiratory failure  Acute renal failure: not a candidate for dialysis   Metabolic acidosis, non anion gap  Hyperkalemia  Hyponatremia  Symptomatic anemia (microcytic), s/p 1 unit pRBCs  Tachycardia              PHYSICAL EXAMINATION AT DISCHARGE:  See death note       CHRONIC MEDICAL DIAGNOSES:  Problem List as of 5/11/2022 Date Reviewed: 5/9/2022          Codes Class Noted - Resolved    Encounter for antineoplastic chemotherapy ICD-10-CM: Z51.11  ICD-9-CM: V58.11  5/9/2022 - Present        SIRS (systemic inflammatory response syndrome) ([facility] ICD-10-CM: R65.10  ICD-9-CM: 995.90  5/8/2022 - Present        Acute cystitis with hematuria ICD-10-CM: N30.01  ICD-9-CM: 595.0  4/27/2022 - Present        Bandemia ICD-10-CM: D72.825  ICD-9-CM: 288.66  4/27/2022 - Present        Malignant neoplasm metastatic to both lungs [facility]) ICD-10-CM: C78.01, C78.02  ICD-9-CM: 197.0  4/19/2022 - Present        Tachycardia ICD-10-CM: R00.0  ICD-9-CM: 785.0  4/14/2022 - Present        Uterine sarcoma ([facility] ICD-10-CM: C54.9  ICD-9-CM: 182.0  4/5/2022 - Present        Pelvic mass ICD-10-CM: R19.00  ICD-9-CM: 789.30  3/17/2022 - Present        Acute on chronic anemia ICD-10-CM: D64.9  ICD-9-CM: 285.9  3/17/2022 - Present        Acute blood loss anemia ICD-10-CM: D62  ICD-9-CM: 285.1  3/13/2022 - Present        Uterine fibroid ICD-10-CM: D25.9  ICD-9-CM: 218.9  3/13/2022 - Present        Abnormal uterine bleeding ICD-10-CM: N93.9  ICD-9-CM: 626.9  3/13/2022 - Present        Leiomyoma of body of uterus ICD-10-CM: D25.9  ICD-9-CM: 218.9 3/11/2022 - Present        Anemia associated with acute blood loss ICD-10-CM: D62  ICD-9-CM: 285.1  3/11/2022 - Present        Fibroid uterus ICD-10-CM: D25.9  ICD-9-CM: 218.9  2/22/2022 - Present        Anemia ICD-10-CM: D64.9  ICD-9-CM: 285.9  12/17/2018 - Present        Obesity ICD-10-CM: E66.9  ICD-9-CM: 278.00  5/2/2018 - Present        PCOS (polycystic ovarian syndrome) ICD-10-CM: E28.2  ICD-9-CM: 256.4  12/2/2016 - Present        Infertility associated with anovulation ICD-10-CM: N97.0  ICD-9-CM: 628.0  12/2/2016 - Present        Benign neoplasm of muscle ICD-10-CM: D21.9  ICD-9-CM: 215.9  1/20/2015 - Present        Dysmenorrhea ICD-10-CM: N94.6  ICD-9-CM: 625.3  8/13/2014 - Present        RESOLVED: Gestational hypertension affecting first pregnancy ICD-10-CM: O13.9  ICD-9-CM: 642.30  12/13/2018 - 12/17/2018        RESOLVED: Threatened premature labor ICD-10-CM: O47.00  ICD-9-CM: 644.00  10/16/2018 - 2/4/2022        RESOLVED: Pregnancy ICD-10-CM: Z34.90  ICD-9-CM: V22.2  9/25/2018 - 12/17/2018        RESOLVED: BMI 33.0-33.9,adult ICD-10-CM: V60.43  ICD-9-CM: V85.33  8/13/2014 - 10/27/2015        RESOLVED: Menorrhagia ICD-10-CM: N92.0  ICD-9-CM: 626.2  8/13/2014 - 2/4/2022        RESOLVED: Abdominal pain, other specified site ICD-10-CM: R10.9  ICD-9-CM: 789.09  5/27/2014 - 2/4/2022        RESOLVED: Morbid obesity ([address] ICD-10-CM: E66.01  ICD-9-CM: 278.01  8/13/2013 - 8/13/2014        RESOLVED: Oligomenorrhea ICD-10-CM: N91.5  ICD-9-CM: 626.1  8/13/2013 - 2/4/2022              Signed:   [facility]  5/13/2022  2:48 PM

## 2022-05-13 NOTE — PROGRESS NOTES
Physician Progress Note      PATIENT:               Kimani Alvarez  CSN #:                  [id number]  :                       1980  ADMIT DATE:       2022 12:53 AM  DISCH DATE:        2022 10:00 PM  RESPONDING  PROVIDER #:        Felipe Crane DO          QUERY TEXT:    Patient admitted with SIRS POA. Noted documentation of suspicion of sepsis in PNs. In order to support the diagnosis of sepsis, please include additional clinical indicators in your documentation. Or please document if the diagnosis of sepsis has been ruled out after further study. The medical record reflects the following:  Risk Factors: 44 y/o female presents with SOB, Uterine Carcinoma and Lung Mets  Clinical Indicators: Fever 102.1, Tachycardic HR 130s-150s, RR 30-39, Wbc 20.6, Lactic acid 1.9,  Per ED note \"Sepsis d/t unknown organism,\" BC NGTD, \"UC fine\" per nephrology consult and progress note states \"Suspicion of sepsis\"  Treatment: Rocephin, Vancomycin, IVFs, Telemetry, BC, UC,  Options provided:  -- Sepsis present on admission as evidenced by, Please document evidence. -- Sepsis not present on admission as evidenced by, Please document evidence. -- Sepsis was ruled out after study  -- Other - I will add my own diagnosis  -- Disagree - Not applicable / Not valid  -- Disagree - Clinically unable to determine / Unknown  -- Refer to Clinical Documentation Reviewer    PROVIDER RESPONSE TEXT:    Sepsis was ruled out after study. Query created by:  Dania Foote on 2022 7:33 AM      Electronically signed by:  Madelin Hutton DO 2022 2:53 PM

## 2022-05-20 ENCOUNTER — APPOINTMENT (OUTPATIENT)
Dept: INFUSION THERAPY | Age: 42
End: 2022-05-20

## 2022-06-10 ENCOUNTER — APPOINTMENT (OUTPATIENT)
Dept: INFUSION THERAPY | Age: 42
End: 2022-06-10

## 2022-06-17 ENCOUNTER — APPOINTMENT (OUTPATIENT)
Dept: INFUSION THERAPY | Age: 42
End: 2022-06-17

## 2024-07-09 NOTE — PROGRESS NOTES
0900 new admit assessment shows tachy 130s and fever at 102.1, concern for SIRS. Patient in severe pain and PCA started, seems to be helping. Alerted Dr. Hola Harvey by perfect serve of concern and asked for fluids and other interventions.     7501 Dr. Hola Harvey in room tylenol, EKG, O2 increased    1500 noted that urine output is low, 100ml at this time 10-Jul-2024 06:10

## 2024-08-06 NOTE — DISCHARGE SUMMARY
Antepartum  Discharge Summary     Patient ID:  Juan R Chang  [id number]  52 y.o.  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 Patient Instructions:   Current Discharge Medication List      CONTINUE these medications which have NOT CHANGED    Details   progesterone (PROMETRIUM) 200 mg capsule Insert 1 Cap into vagina nightly. Qty: 30 Cap, Refills: 1      PNV66-Iron Fumarate-FA-DSS-DHA 26-1.2- mg cap Take  by mouth. Activity: Bedrest  Diet: Regular Diet    Follow-up with   Follow-up Appointments   Procedures    FOLLOW UP VISIT Appointment in: One Week [place] at [facility] and [place] at [facility]     Standing Status:   Standing     Number of Occurrences:   1     Order Specific Question:   Appointment in     Answer: One Week        Signed:   Grant Neely MD  10/22/2018  11:57 Written and verbal DC instructions reviewed w/pt. Pt verbalized understanding. Pt ambulated off unit w/steady gait.    AM

## (undated) DEVICE — GARMENT,MEDLINE,DVT,INT,CALF,MED, GEN2: Brand: MEDLINE

## (undated) DEVICE — 3000CC GUARDIAN II: Brand: GUARDIAN

## (undated) DEVICE — LEGGINGS: Brand: CONVERTORS

## (undated) DEVICE — DRAPE FLD WRM W44XL66IN C6L FOR INTRATEMP SYS THERMABASIN

## (undated) DEVICE — TRAY PREP DRY W/ PREM GLV 2 APPL 6 SPNG 2 UNDPD 1 OVERWRAP

## (undated) DEVICE — TOWEL SURG W17XL27IN STD BLU COT NONFENESTRATED PREWASHED

## (undated) DEVICE — ROYALSILK SURGICAL GOWN, L: Brand: CONVERTORS

## (undated) DEVICE — DEVON™ KNEE AND BODY STRAP 60" X 3" (1.5 M X 7.6 CM): Brand: DEVON

## (undated) DEVICE — PAD,SANITARY,11 IN,MAXI,N-STRL,IND WRAP: Brand: MEDLINE

## (undated) DEVICE — SUTURE PERMAHAND SZ 2-0 L18IN NONABSORBABLE BLK L26MM SH C012D

## (undated) DEVICE — KENDALL SCD EXPRESS SLEEVES, KNEE LENGTH, MEDIUM: Brand: KENDALL SCD

## (undated) DEVICE — SOLUTION IV 1000ML 0.9% SOD CHL

## (undated) DEVICE — REM POLYHESIVE ADULT PATIENT RETURN ELECTRODE: Brand: VALLEYLAB

## (undated) DEVICE — STAPLER SKIN SQ 30 ABSRB STPL DISP INSORB

## (undated) DEVICE — LIGHT HANDLE: Brand: DEVON

## (undated) DEVICE — MEDI-VAC NON-CONDUCTIVE SUCTION TUBING: Brand: CARDINAL HEALTH

## (undated) DEVICE — TOTAL TRAY, DB, 100% SILI FOLEY, 16FR 10: Brand: MEDLINE

## (undated) DEVICE — Z DISCONTINUED USE 2744636  DRESSING AQUACEL 14 IN ALG W3.5XL14IN POLYUR FLM CVR W/ HYDRCOLL

## (undated) DEVICE — STERILE POLYISOPRENE POWDER-FREE SURGICAL GLOVES: Brand: PROTEXIS

## (undated) DEVICE — SUTURE VCRL SZ 0 L36IN ABSRB VLT L36MM CT-1 1/2 CIR J346H

## (undated) DEVICE — SUTURE VCRL SZ 0 L36IN ABSRB UD L40MM CT 1/2 CIR TAPERPOINT J958H

## (undated) DEVICE — SOLUTION IRRIG 1000ML H2O STRL BLT

## (undated) DEVICE — PREP SKN CHLRAPRP APL 26ML STR --

## (undated) DEVICE — SOLUTION IRRIG 1000ML STRL H2O USP PLAS POUR BTL

## (undated) DEVICE — (D)PREP SKN CHLRAPRP APPL 26ML -- CONVERT TO ITEM 371833

## (undated) DEVICE — CURVED, LARGE JAW, OPEN SEALER/DIVIDER NANO-COATED: Brand: LIGASURE IMPACT

## (undated) DEVICE — COVER LT HNDL PLAS RIG 1 PER PK

## (undated) DEVICE — CATH FOLEY 16F LUBRI-SIL IC --

## (undated) DEVICE — FLOSEAL HEMOSTATIC MATRIX, 10ML: Brand: FLOSEAL HEMOSTATIC MATRIX

## (undated) DEVICE — SPONGE LAP 18X18IN STRL -- 5/PK

## (undated) DEVICE — GLOVE ORANGE PI 7   MSG9070

## (undated) DEVICE — BASIN ST MAJOR-NO CAUTERY: Brand: MEDLINE INDUSTRIES, INC.

## (undated) DEVICE — GLOVE SURG SZ 75 L1212IN FNGR THK138MIL BRN LTX FREE

## (undated) DEVICE — BLADE ASSEMB CLP HAIR FINE --

## (undated) DEVICE — SOLIDIFIER MEDC 1200ML -- CONVERT TO 356117

## (undated) DEVICE — PACK PROCEDURE SURG C SECT KT SMH

## (undated) DEVICE — SOLUTION IRRIG 1000ML 0.9% SOD CHL USP POUR PLAS BTL

## (undated) DEVICE — SHEET,DRAPE,UNDERBUTTOCK,GRAD POUCH,PORT: Brand: MEDLINE

## (undated) DEVICE — SUTURE PDS II SZ 1 L96IN ABSRB VLT TP-1 L65MM 1/2 CIR Z880G

## (undated) DEVICE — PENCIL SMK EVAC 10 FT BLADE ELECTRD ROCKER FOR TELSCP

## (undated) DEVICE — SUTURE VCRL SZ 2-0 L36IN ABSRB UD L36MM CT-1 1/2 CIR J945H

## (undated) DEVICE — SURGICAL PROCEDURE PACK GYN ONCOLOGY CUST ST MARYS LF

## (undated) DEVICE — COVERALL PREM SMS 2XL KNIT --

## (undated) DEVICE — DRAPE, LAVH, STERILE: Brand: MEDLINE